# Patient Record
Sex: FEMALE | Race: WHITE | NOT HISPANIC OR LATINO | Employment: OTHER | URBAN - METROPOLITAN AREA
[De-identification: names, ages, dates, MRNs, and addresses within clinical notes are randomized per-mention and may not be internally consistent; named-entity substitution may affect disease eponyms.]

---

## 2020-05-13 ENCOUNTER — TELEPHONE (OUTPATIENT)
Dept: SCHEDULING | Facility: IMAGING CENTER | Age: 75
End: 2020-05-13

## 2020-05-13 ENCOUNTER — NURSE TRIAGE (OUTPATIENT)
Dept: HEALTH INFORMATION MANAGEMENT | Facility: OTHER | Age: 75
End: 2020-05-13

## 2020-05-20 ENCOUNTER — TELEMEDICINE (OUTPATIENT)
Dept: MEDICAL GROUP | Facility: PHYSICIAN GROUP | Age: 75
End: 2020-05-20
Payer: MEDICARE

## 2020-05-20 VITALS — WEIGHT: 293 LBS | BODY MASS INDEX: 57.52 KG/M2 | TEMPERATURE: 98 F | HEIGHT: 60 IN

## 2020-05-20 RX ORDER — FLUTICASONE PROPIONATE 220 UG/1
1 AEROSOL, METERED RESPIRATORY (INHALATION) 2 TIMES DAILY
COMMUNITY
End: 2021-11-30

## 2020-05-20 NOTE — PROGRESS NOTES
Telemedicine Visit: Established Patient     This encounter was conducted via Zoom.   Verbal consent was obtained. Patient's identity was verified.    CC:  Chief Complaint   Patient presents with   • Establish Care   • Other     Needs equipment for sleep apnea machine        HISTORY OF PRESENT ILLNESS: Patient is a 74 y.o. female established patient presenting ***      No problem-specific Assessment & Plan notes found for this encounter.      There are no active problems to display for this patient.     Allergies:Doxycycline; Lactose; Prednisone; Sulfa drugs; Cephalexin; Montelukast; and Codeine    Current Outpatient Medications   Medication Sig Dispense Refill   • Albuterol (VENTOLIN INH) Inhale 200 mcg by mouth 1 time daily as needed.     • fluticasone (FLOVENT HFA) 220 MCG/ACT Aerosol Inhale 1 Puff by mouth 2 times a day.       No current facility-administered medications for this visit.        Social History     Tobacco Use   • Smoking status: Never Smoker   • Smokeless tobacco: Never Used   Substance Use Topics   • Alcohol use: Not Currently     Comment: rare    • Drug use: Never     Social History     Social History Narrative   • Not on file       No family history on file.     ROS:     - Constitutional: *** Negative for fever, chills, unexpected weight change, and fatigue/generalized weakness.    - HEENT: *** Negative for headaches, vision changes, hearing changes, ear pain, ear discharge, rhinorrhea, sinus congestion, sore throat, and neck pain.      - Respiratory:*** Negative for cough, sputum production, chest congestion, dyspnea, wheezing, and crackles.      - Cardiovascular:*** Negative for chest pain, palpitations, orthopnea, and bilateral lower extremity edema.     - Gastrointestinal:*** Negative for heartburn, nausea, vomiting, abdominal pain, hematochezia, melena, diarrhea, constipation, and greasy/foul-smelling stools.     - Genitourinary:*** Negative for dysuria, polyuria, hematuria, pyuria, urinary  urgency, and urinary incontinence.     - Musculoskeletal:*** Negative for myalgias, back pain, and joint pain.     - Skin:*** Negative for rash, itching, cyanotic skin color change.     - Neurological:*** Negative for dizziness, tingling, tremors, focal sensory deficit, focal weakness and headaches.     - Endo/Heme/Allergies:*** Does not bruise/bleed easily.     - Psychiatric/Behavioral:*** Negative for depression, suicidal/homicidal ideation and memory loss.        {ROSALLOTHERSNE}      Exam:  ***  Temp 36.7 °C (98 °F) (Temporal)   Ht 1.524 m (5')   Wt (!) 135.2 kg (298 lb)  Body mass index is 58.2 kg/m².    General:  Well nourished, well developed female in NAD  Head is grossly normal.  Neck: Supple.   Pulmonary: No accessory muscle use  Skin: No apparent lesions  Neuro: Alert and oriented  Psych: normal mood and affect  ***  Please note that this dictation was created using voice recognition software. I have made every reasonable attempt to correct obvious errors, but I expect that there are errors of grammar and possibly content that I did not discover before finalizing the note.    LABS: ***: Results reviewed and discussed with the patient, questions answered.    Assessment/Plan:

## 2021-01-12 DIAGNOSIS — Z23 NEED FOR VACCINATION: ICD-10-CM

## 2021-11-30 ENCOUNTER — APPOINTMENT (OUTPATIENT)
Dept: RADIOLOGY | Facility: MEDICAL CENTER | Age: 76
End: 2021-11-30
Attending: EMERGENCY MEDICINE
Payer: MEDICARE

## 2021-11-30 ENCOUNTER — HOSPITAL ENCOUNTER (EMERGENCY)
Facility: MEDICAL CENTER | Age: 76
End: 2021-11-30
Attending: EMERGENCY MEDICINE
Payer: MEDICARE

## 2021-11-30 ENCOUNTER — OFFICE VISIT (OUTPATIENT)
Dept: URGENT CARE | Facility: CLINIC | Age: 76
End: 2021-11-30
Payer: MEDICARE

## 2021-11-30 VITALS
BODY MASS INDEX: 57.52 KG/M2 | TEMPERATURE: 98 F | HEIGHT: 60 IN | WEIGHT: 293 LBS | SYSTOLIC BLOOD PRESSURE: 214 MMHG | HEART RATE: 105 BPM | RESPIRATION RATE: 22 BRPM | OXYGEN SATURATION: 92 % | DIASTOLIC BLOOD PRESSURE: 93 MMHG

## 2021-11-30 VITALS
SYSTOLIC BLOOD PRESSURE: 132 MMHG | RESPIRATION RATE: 24 BRPM | HEART RATE: 92 BPM | DIASTOLIC BLOOD PRESSURE: 88 MMHG | BODY MASS INDEX: 57.52 KG/M2 | WEIGHT: 293 LBS | HEIGHT: 60 IN | TEMPERATURE: 98.2 F | OXYGEN SATURATION: 93 %

## 2021-11-30 DIAGNOSIS — R07.9 CHEST PAIN, UNSPECIFIED TYPE: ICD-10-CM

## 2021-11-30 DIAGNOSIS — J21.0 ACUTE BRONCHIOLITIS DUE TO RESPIRATORY SYNCYTIAL VIRUS (RSV): ICD-10-CM

## 2021-11-30 DIAGNOSIS — Z87.09 HISTORY OF ASTHMA: ICD-10-CM

## 2021-11-30 DIAGNOSIS — R05.9 COUGH: ICD-10-CM

## 2021-11-30 DIAGNOSIS — R06.02 SHORTNESS OF BREATH: ICD-10-CM

## 2021-11-30 DIAGNOSIS — R06.2 WHEEZING: ICD-10-CM

## 2021-11-30 LAB
ALBUMIN SERPL BCP-MCNC: 4 G/DL (ref 3.2–4.9)
ALBUMIN/GLOB SERPL: 1 G/DL
ALP SERPL-CCNC: 91 U/L (ref 30–99)
ALT SERPL-CCNC: 52 U/L (ref 2–50)
ANION GAP SERPL CALC-SCNC: 17 MMOL/L (ref 7–16)
AST SERPL-CCNC: 53 U/L (ref 12–45)
BASOPHILS # BLD AUTO: 0.6 % (ref 0–1.8)
BASOPHILS # BLD: 0.06 K/UL (ref 0–0.12)
BILIRUB SERPL-MCNC: 0.6 MG/DL (ref 0.1–1.5)
BUN SERPL-MCNC: 10 MG/DL (ref 8–22)
CALCIUM SERPL-MCNC: 8.9 MG/DL (ref 8.4–10.2)
CHLORIDE SERPL-SCNC: 101 MMOL/L (ref 96–112)
CO2 SERPL-SCNC: 19 MMOL/L (ref 20–33)
CREAT SERPL-MCNC: 0.64 MG/DL (ref 0.5–1.4)
EKG IMPRESSION: NORMAL
EOSINOPHIL # BLD AUTO: 0.19 K/UL (ref 0–0.51)
EOSINOPHIL NFR BLD: 1.8 % (ref 0–6.9)
ERYTHROCYTE [DISTWIDTH] IN BLOOD BY AUTOMATED COUNT: 50.4 FL (ref 35.9–50)
FLUAV RNA SPEC QL NAA+PROBE: NEGATIVE
FLUBV RNA SPEC QL NAA+PROBE: NEGATIVE
GLOBULIN SER CALC-MCNC: 4 G/DL (ref 1.9–3.5)
GLUCOSE SERPL-MCNC: 119 MG/DL (ref 65–99)
HCT VFR BLD AUTO: 44.2 % (ref 37–47)
HGB BLD-MCNC: 14.3 G/DL (ref 12–16)
IMM GRANULOCYTES # BLD AUTO: 0.04 K/UL (ref 0–0.11)
IMM GRANULOCYTES NFR BLD AUTO: 0.4 % (ref 0–0.9)
LACTATE BLD-SCNC: 1.5 MMOL/L (ref 0.5–2)
LYMPHOCYTES # BLD AUTO: 1.06 K/UL (ref 1–4.8)
LYMPHOCYTES NFR BLD: 10.1 % (ref 22–41)
MCH RBC QN AUTO: 30.7 PG (ref 27–33)
MCHC RBC AUTO-ENTMCNC: 32.4 G/DL (ref 33.6–35)
MCV RBC AUTO: 94.8 FL (ref 81.4–97.8)
MONOCYTES # BLD AUTO: 1.33 K/UL (ref 0–0.85)
MONOCYTES NFR BLD AUTO: 12.7 % (ref 0–13.4)
NEUTROPHILS # BLD AUTO: 7.78 K/UL (ref 2–7.15)
NEUTROPHILS NFR BLD: 74.4 % (ref 44–72)
NRBC # BLD AUTO: 0 K/UL
NRBC BLD-RTO: 0 /100 WBC
NT-PROBNP SERPL IA-MCNC: 147 PG/ML (ref 0–125)
PLATELET # BLD AUTO: 278 K/UL (ref 164–446)
PMV BLD AUTO: 9.3 FL (ref 9–12.9)
POTASSIUM SERPL-SCNC: 4 MMOL/L (ref 3.6–5.5)
PROT SERPL-MCNC: 8 G/DL (ref 6–8.2)
RBC # BLD AUTO: 4.66 M/UL (ref 4.2–5.4)
RSV RNA SPEC QL NAA+PROBE: POSITIVE
SARS-COV-2 RNA RESP QL NAA+PROBE: NOTDETECTED
SODIUM SERPL-SCNC: 137 MMOL/L (ref 135–145)
SPECIMEN SOURCE: ABNORMAL
TROPONIN T SERPL-MCNC: 12 NG/L (ref 6–19)
WBC # BLD AUTO: 10.5 K/UL (ref 4.8–10.8)

## 2021-11-30 PROCEDURE — 85025 COMPLETE CBC W/AUTO DIFF WBC: CPT

## 2021-11-30 PROCEDURE — 84484 ASSAY OF TROPONIN QUANT: CPT

## 2021-11-30 PROCEDURE — 99204 OFFICE O/P NEW MOD 45 MIN: CPT | Performed by: NURSE PRACTITIONER

## 2021-11-30 PROCEDURE — 83880 ASSAY OF NATRIURETIC PEPTIDE: CPT

## 2021-11-30 PROCEDURE — 83605 ASSAY OF LACTIC ACID: CPT

## 2021-11-30 PROCEDURE — 93005 ELECTROCARDIOGRAM TRACING: CPT | Performed by: EMERGENCY MEDICINE

## 2021-11-30 PROCEDURE — 99284 EMERGENCY DEPT VISIT MOD MDM: CPT

## 2021-11-30 PROCEDURE — 0241U HCHG SARS-COV-2 COVID-19 NFCT DS RESP RNA 4 TRGT MIC: CPT

## 2021-11-30 PROCEDURE — 80053 COMPREHEN METABOLIC PANEL: CPT

## 2021-11-30 PROCEDURE — C9803 HOPD COVID-19 SPEC COLLECT: HCPCS | Performed by: EMERGENCY MEDICINE

## 2021-11-30 PROCEDURE — 71045 X-RAY EXAM CHEST 1 VIEW: CPT

## 2021-11-30 PROCEDURE — 700111 HCHG RX REV CODE 636 W/ 250 OVERRIDE (IP): Performed by: EMERGENCY MEDICINE

## 2021-11-30 RX ORDER — DEXAMETHASONE 4 MG/1
2 TABLET ORAL 2 TIMES DAILY
Status: SHIPPED | COMMUNITY
Start: 2021-11-29 | End: 2022-03-21 | Stop reason: SDUPTHER

## 2021-11-30 RX ORDER — DEXAMETHASONE SODIUM PHOSPHATE 4 MG/ML
6 INJECTION, SOLUTION INTRA-ARTICULAR; INTRALESIONAL; INTRAMUSCULAR; INTRAVENOUS; SOFT TISSUE ONCE
Status: COMPLETED | OUTPATIENT
Start: 2021-11-30 | End: 2021-11-30

## 2021-11-30 RX ORDER — LEVOTHYROXINE SODIUM 0.05 MG/1
50 TABLET ORAL
COMMUNITY
Start: 2021-11-18 | End: 2022-01-21

## 2021-11-30 RX ORDER — IBUPROFEN 200 MG
200 TABLET ORAL 2 TIMES DAILY
COMMUNITY
End: 2022-01-27

## 2021-11-30 RX ORDER — ACETAMINOPHEN 325 MG/1
650 TABLET ORAL EVERY 4 HOURS PRN
COMMUNITY
End: 2021-11-30

## 2021-11-30 RX ORDER — ALBUTEROL SULFATE 90 UG/1
2 AEROSOL, METERED RESPIRATORY (INHALATION) EVERY 6 HOURS PRN
Status: SHIPPED | COMMUNITY
End: 2022-03-21 | Stop reason: SDUPTHER

## 2021-11-30 RX ORDER — ASPIRIN 81 MG/1
81 TABLET ORAL DAILY
COMMUNITY

## 2021-11-30 RX ADMIN — DEXAMETHASONE SODIUM PHOSPHATE 6 MG: 4 INJECTION, SOLUTION INTRA-ARTICULAR; INTRALESIONAL; INTRAMUSCULAR; INTRAVENOUS; SOFT TISSUE at 17:05

## 2021-11-30 NOTE — ED PROVIDER NOTES
ED Provider Note    CHIEF COMPLAINT  Chief Complaint   Patient presents with   • Shortness of Breath     the last 5 days  [place]  for [place]    • Cough     last 5 days        HPI  Cecile Teixeira is a 76 y.o. female who presents with shortness of breath.  The patient states she is had progressive increased work of breathing over the last 5 days.  She does have associated nonproductive cough.  She has not had any associated fevers.  She has chronic swelling to her legs from lymphedema.  She states she is on a sleep apnea machine at night but does not have any known history of CHF nor COPD.  She denies tobacco abuse.  She is vaccinated and she is unaware of any sick contacts.    REVIEW OF SYSTEMS  See HPI for further details. All other systems are negative.     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   • Asthma    • GERD (gastroesophageal reflux disease)    • Hypothyroidism    • Osteoarthritis    • Sleep apnea        FAMILY HISTORY  [unfilled]    SOCIAL HISTORY  Social History     Socioeconomic History   • Marital status: Single     Spouse name: Not on file   • Number of children: Not on file   • Years of education: Not on file   • Highest education level: Not on file   Occupational History   • Not on file   Tobacco Use   • Smoking status: Never Smoker   • Smokeless tobacco: Never Used   Vaping Use   • Vaping Use: Never used   Substance and Sexual Activity   • Alcohol use: Not Currently     Comment: rare    • Drug use: Never   • Sexual activity: Not on file   Other Topics Concern   • Not on file   Social History Narrative   • Not on file     Social Determinants of Health     Financial Resource Strain:    • Difficulty of Paying Living Expenses: Not on file   Food Insecurity:    • Worried About Running Out of Food in the Last Year: Not on file   • Ran Out of Food in the Last Year: Not on file   Transportation Needs:    • Lack of Transportation (Medical):  Not on file   • Lack of Transportation (Non-Medical): Not on file   Physical Activity:    • Days of Exercise per Week: Not on file   • Minutes of Exercise per Session: Not on file   Stress:    • Feeling of Stress : Not on file   Social Connections:    • Frequency of Communication with Friends and Family: Not on file   • Frequency of Social Gatherings with Friends and Family: Not on file   • Attends Yazdanism Services: Not on file   • Active Member of Clubs or Organizations: Not on file   • Attends Club or Organization Meetings: Not on file   • Marital Status: Not on file   Intimate Partner Violence:    • Fear of Current or Ex-Partner: Not on file   • Emotionally Abused: Not on file   • Physically Abused: Not on file   • Sexually Abused: Not on file   Housing Stability:    • Unable to Pay for Housing in the Last Year: Not on file   • Number of Places Lived in the Last Year: Not on file   • Unstable Housing in the Last Year: Not on file       SURGICAL HISTORY  Past Surgical History:   Procedure Laterality Date   • OTHER Left [id number]    LT LEG SURGERY        CURRENT MEDICATIONS  Home Medications    **Home medications have not yet been reviewed for this encounter**         ALLERGIES  Allergies   Allergen Reactions   • Doxycycline Hives   • Lactose Unspecified     Lactose intolerance since a child    • Prednisone Unspecified     Diarrhea and very irritable    • Sulfa Drugs Unspecified     Mental status change    • Cephalexin Diarrhea     Diarrhea and mild rash    • Montelukast Rash     Leg pain, back pain and rash    • Codeine Unspecified     Headaches and confusion        PHYSICAL EXAM  VITAL SIGNS: Pulse 94   Temp 36.9 °C (98.5 °F) (Temporal)   Resp (!) 24   Ht 1.524 m (5')   Wt (!) 152 kg (336 lb 1.5 oz)   SpO2 95%   BMI 65.64 kg/m²       Constitutional: Obese and ill in appearance  HENT: Normocephalic, Atraumatic, Bilateral external ears normal, Oropharynx moist, No oral exudates, Nose normal.   Eyes: PERRLA,  EOMI, Conjunctiva normal, No discharge.   Neck: Normal range of motion, No tenderness, Supple, No stridor.   Lymphatic: No lymphadenopathy noted.   Cardiovascular: Normal heart rate, Normal rhythm, No murmurs, No rubs, No gallops.   Thorax & Lungs: Symmetrically diminished throughout, diffuse rhonchi, diffuse wheezing.   Abdomen: Bowel sounds normal, Soft, No tenderness, No masses, No pulsatile masses.   Skin: Soft tissue mass with some mild bleeding to the left posterior thigh.   Back: No tenderness, No CVA tenderness.   Extremities: Intact distal pulses, No edema, No tenderness, No cyanosis, No clubbing. .   Neurologic: Alert & oriented x 3, Normal motor function, Normal sensory function, No focal deficits noted.   Psychiatric: Affect normal, Judgment normal, Mood normal.     Results for orders placed or performed during the hospital encounter of 11/30/21   CBC w/ Differential   Result Value Ref Range    WBC 10.5 4.8 - 10.8 K/uL    RBC 4.66 4.20 - 5.40 M/uL    Hemoglobin 14.3 12.0 - 16.0 g/dL    Hematocrit 44.2 37.0 - 47.0 %    MCV 94.8 81.4 - 97.8 fL    MCH 30.7 27.0 - 33.0 pg    MCHC 32.4 (L) 33.6 - 35.0 g/dL    RDW 50.4 (H) 35.9 - 50.0 fL    Platelet Count 278 164 - 446 K/uL    MPV 9.3 9.0 - 12.9 fL    Neutrophils-Polys 74.40 (H) 44.00 - 72.00 %    Lymphocytes 10.10 (L) 22.00 - 41.00 %    Monocytes 12.70 0.00 - 13.40 %    Eosinophils 1.80 0.00 - 6.90 %    Basophils 0.60 0.00 - 1.80 %    Immature Granulocytes 0.40 0.00 - 0.90 %    Nucleated RBC 0.00 /100 WBC    Neutrophils (Absolute) 7.78 (H) 2.00 - 7.15 K/uL    Lymphs (Absolute) 1.06 1.00 - 4.80 K/uL    Monos (Absolute) 1.33 (H) 0.00 - 0.85 K/uL    Eos (Absolute) 0.19 0.00 - 0.51 K/uL    Baso (Absolute) 0.06 0.00 - 0.12 K/uL    Immature Granulocytes (abs) 0.04 0.00 - 0.11 K/uL    NRBC (Absolute) 0.00 K/uL   Complete Metabolic Panel (CMP)   Result Value Ref Range    Sodium 137 135 - 145 mmol/L    Potassium 4.0 3.6 - 5.5 mmol/L    Chloride 101 96 - 112 mmol/L     Co2 19 (L) 20 - 33 mmol/L    Anion Gap 17.0 (H) 7.0 - 16.0    Glucose 119 (H) 65 - 99 mg/dL    Bun 10 8 - 22 mg/dL    Creatinine 0.64 0.50 - 1.40 mg/dL    Calcium 8.9 8.4 - 10.2 mg/dL    AST(SGOT) 53 (H) 12 - 45 U/L    ALT(SGPT) 52 (H) 2 - 50 U/L    Alkaline Phosphatase 91 30 - 99 U/L    Total Bilirubin 0.6 0.1 - 1.5 mg/dL    Albumin 4.0 3.2 - 4.9 g/dL    Total Protein 8.0 6.0 - 8.2 g/dL    Globulin 4.0 (H) 1.9 - 3.5 g/dL    A-G Ratio 1.0 g/dL   proBrain Natriuretic Peptide, NT   Result Value Ref Range    NT-proBNP 147 (H) 0 - 125 pg/mL   Troponin STAT   Result Value Ref Range    Troponin T 12 6 - 19 ng/L   COV-2, FLU A/B, AND RSV BY PCR (2-4 HOURS CEPHEID): Collect NP swab in VTM    Specimen: Nasopharyngeal; Respirate   Result Value Ref Range    Influenza virus A RNA Negative Negative    Influenza virus B, PCR Negative Negative    RSV, PCR POSITIVE (A) Negative    SARS-CoV-2 by PCR NotDetected     SARS-CoV-2 Source NP Swab    LACTIC ACID   Result Value Ref Range    Lactic Acid 1.5 0.5 - 2.0 mmol/L   ESTIMATED GFR   Result Value Ref Range    GFR If African American >60 >60 mL/min/1.73 m 2    GFR If Non African American >60 >60 mL/min/1.73 m 2   EKG   Result Value Ref Range    Report       [facility] Emergency Dept[facility]    Test Date:  2021  Pt Name:    LUCIANA MUELLER                   Department: [facility]  MRN:        [id number]                      Room:       -ROOM 7  Gender:     Female                       Technician: RYAN  :        1945                   Requested By:PURVI ESPAÑA  Order #:    [id number]                    Reading MD: PURVI ESPAÑA MD    Measurements  Intervals                                Axis  Rate:       88                           P:          62  UT:         178                          QRS:        68  QRSD:       96                           T:          269  QT:         347  QTc:        420    Interpretive Statements  Twelve-lead EKG shows a normal sinus  rhythm with a ventricular to 88, QRS has  poor R wave progression, no ST segment elevation nor depression, T waves  inverted laterally  Electronically Signed On 11- 16:57:11 PST by AGUSTIN HANKS MD           RADIOLOGY/PROCEDURES  DX-CHEST-PORTABLE (1 VIEW)   Final Result      Diffuse hazy opacity is most likely from body habitus factors favored over diffuse pulmonary ground glass from infection or edema          COURSE & MEDICAL DECISION MAKING  Pertinent Labs & Imaging studies reviewed. (See chart for details)  This a 76-year-old female who presents acutely ill.  Viral testing did come back positive for RSV but negative for Covid and influenza.  The patient is not hypoxic.  She is hypertensive but I suspect is from her acutely ill state.  She has not any respiratory distress.  Chest x-ray does not show any evidence of a focal process such as pneumonia.  The patient will receive Decadron for acute inflammation.  She states she has a humidifier at home.  She will be discharged home with instructions to utilize humidifier and return for increased work of breathing.    As for the soft tissue mass to the left posterior thigh this is not an acute finding the patient does need to have follow-up excision sent to pathology for diagnosis.    FINAL IMPRESSION  1.  Shortness of breath  2.  Secondary to acute bronchiolitis  3.  Soft tissue mass of left posterior thigh    Disposition  The patient will be discharged in stable condition      Electronically signed by: Agustin Hanks M.D., 11/30/2021 3:01 PM

## 2021-11-30 NOTE — ED TRIAGE NOTES
"Pt comes in via [facility]  C/o increased SOB  And cough the last 5 days  Was at  and sent here for further evaluation and treatment of this issue  Was given albuterol X1 breathing treatment by [facility]  \"which helped\"   "

## 2021-11-30 NOTE — PROGRESS NOTES
"Cecile Teixeira is a 76 y.o. female who presents for Cough (coughing fits, phlegm, sore throat x 5 days, dark waxs/welling in ears, Rt ear pop, tarted after getting a flu shot)      HPI This is a new problem.  C/o chest pain, coughing, sore throat and ear pain. Muscle in her stomach hurts and she 'pees myself' when she coughs. Chest pain all the time. Cannot 'get my breath\". hard time breathing for 5 days.  Getting worse each day. \" I feel awful\". Having anterior wall  chest pain. Always has a hard time but worse over the past days.  No fevers.  Has CPAP at home - not helping at all. Using rescue inhaler but it's not helping her symptoms.   She brought herself in her wheelchair today to urgent care. ' I thought I was in the emergency room\".     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 No pertinent family history.  Social History     Tobacco Use   • Smoking status: Never Smoker   • Smokeless tobacco: Never Used   Substance Use Topics   • Alcohol use: Not Currently     Comment: rare        Problems:   There is no problem list on file for this patient.      Medications:   Current Outpatient Medications on File Prior to Visit   Medication Sig Dispense Refill   • levothyroxine " (SYNTHROID) 50 MCG Tab      • aspirin (ASA) 81 MG Chew Tab chewable tablet Chew 81 mg every day.     • acetaminophen (TYLENOL) 325 MG Tab Take 650 mg by mouth every four hours as needed.     • Pseudoephedrine-Naproxen Na (ALEVE COLD & SINUS PO) Take  by mouth.     • NON SPECIFIED Indications: ZPAP MACHINE     • Albuterol (VENTOLIN INH) Inhale 200 mcg by mouth 1 time daily as needed.     • fluticasone (FLOVENT HFA) 220 MCG/ACT Aerosol Inhale 1 Puff by mouth 2 times a day.       No current facility-administered medications on file prior to visit.          Objective:     /88 (BP Location: Left arm, Patient Position: Sitting, BP Cuff Size: Adult long) Comment (BP Location): forearm  Pulse 92   Temp 36.8 °C (98.2 °F) (Temporal)   Resp (!) 24   Ht 1.524 m (5') Comment: per pt  Wt (!) 136 kg (300 lb) Comment: per pt  SpO2 93%   BMI 58.59 kg/m²     Physical Exam  Vitals and nursing note reviewed.   Constitutional:       General: She is not in acute distress.     Appearance: Normal appearance. She is well-developed. She is morbidly obese. She is ill-appearing. She is not toxic-appearing.   HENT:      Right Ear: Hearing normal. No middle ear effusion. Tympanic membrane is injected. Tympanic membrane is not erythematous.      Left Ear: Hearing normal.  No middle ear effusion. Tympanic membrane is injected. Tympanic membrane is not erythematous.      Nose: No mucosal edema.      Right Sinus: No maxillary sinus tenderness or frontal sinus tenderness.      Left Sinus: No maxillary sinus tenderness or frontal sinus tenderness.      Mouth/Throat:      Mouth: Mucous membranes are moist.      Pharynx: Uvula midline.      Tonsils: No tonsillar abscesses.   Neck:      Trachea: Trachea normal.   Cardiovascular:      Rate and Rhythm: Normal rate and regular rhythm.      Pulses: Normal pulses.   Pulmonary:      Effort: Tachypnea and accessory muscle usage present. No respiratory distress.      Breath sounds: Wheezing and  "rhonchi present. No decreased breath sounds.      Comments: + Coughing     Chest:   Breasts:      Right: No supraclavicular adenopathy.      Left: No supraclavicular adenopathy.       Abdominal:      Palpations: Abdomen is soft.      Tenderness: There is no abdominal tenderness.   Musculoskeletal:         General: Normal range of motion.      Cervical back: Full passive range of motion without pain, normal range of motion and neck supple.   Lymphadenopathy:      Cervical: No cervical adenopathy.      Upper Body:      Right upper body: No supraclavicular adenopathy.      Left upper body: No supraclavicular adenopathy.   Skin:     General: Skin is warm and dry.      Capillary Refill: Capillary refill takes less than 2 seconds.   Neurological:      Mental Status: She is alert and oriented to person, place, and time.      Comments: In wheelchair    Psychiatric:         Attention and Perception: Attention normal.         Mood and Affect: Mood normal.         Assessment /Associated Orders:      1. Chest pain, unspecified type     2. Shortness of breath     3. Cough     4. Wheezing     5. History of asthma             Medical Decision Making:    Patient is tachypneic complaining of anterior chest wall pain and shortness of breath.  She reports feeling terrible.  She is wheezing.  She is speaking in full short sentences.  She is wheelchair-bound.  She says her CPAP is not helping her at home.  She feels particularly short of breath at night when she tries to lay down.  She sleeps in a more upright position.  Patient is actively coughing and wheezing during the exam today.  I believe she is further evaluation management care in the emergency room.  She is going to be transported via [facility] ambulance to [facility] emergency room.  Transfer center called to arrange transport to a higher level of care.    Report given to paramedics by exam room.    Pt required maximum assistance to moustapha. C/o \" I can't breath\" - " yelling. Will not keep mask on. Placed on non-rebreather mask. VSS.   Transported to [facility] ER     Her wheelchair / jacket  Were picked up by her dtr from urgent care.

## 2021-12-01 NOTE — DISCHARGE INSTRUCTIONS
Purchase a pulse oximeter to check your oxygen.  Return for repeat exam if your oxygen saturation is consistently less than 88%.  Follow-up with your primary care doctor in 2 weeks for repeat blood pressure check as your blood pressure is elevated here in the emergency department.  Return to the emerge department if you are acutely worse.  You need to follow-up with the plastic surgeon as discussed for excision of the soft tissue mass to the left posterior thigh.

## 2021-12-01 NOTE — ED NOTES
Med rec updated and complete  Allergies reviewed  Pt reports no antibiotics in the last 30 days.    No current facility-administered medications on file prior to encounter.     Current Outpatient Medications on File Prior to Encounter   Medication Sig Dispense Refill   • albuterol 108 (90 Base) MCG/ACT Aero Soln inhalation aerosol Inhale 2 Puffs every 6 hours as needed for Shortness of Breath.     • Phenylephrine HCl (SUDAFED PE SINUS CONGESTION PO) Take 1 Tablet by mouth 2 times a day as needed (For sinus congestion).     • ibuprofen (MOTRIN) 200 MG Tab Take 200 mg by mouth every 6 hours as needed for Mild Pain.     • levothyroxine (SYNTHROID) 50 MCG Tab Take 50 mcg by mouth every morning on an empty stomach.     • aspirin 81 MG EC tablet Take 81 mg by mouth every day.     • FLOVENT  MCG/ACT Aerosol Inhale 1 Puff 2 times a day.

## 2021-12-01 NOTE — ED NOTES
MD at  for re-evaluation of complaints and new item of growth to back of L leg  growth the size of a thumb  tender and scant bleeding noted   Pt to be cleared for home  Calling family to come and pick her up

## 2021-12-01 NOTE — ED NOTES
"Pt very unhappy with service/care. Pt states that we \"lost her coat, bag, and hat.\" Pt yelling at staff. Pt upset that no prescription was giving. States she was told to \"pick it up on the way home.\" No prescription given by ERP. Discharge instructions given to pt/granddaughter. Pt refused to sign. Granddaughter signed. Pt escorted to parking lot via w/c. Pt assisted by family to get into car. Pt dcd home with family with 0 s/s distress noted.   "

## 2021-12-21 ENCOUNTER — TELEPHONE (OUTPATIENT)
Dept: HEALTH INFORMATION MANAGEMENT | Facility: OTHER | Age: 76
End: 2021-12-21

## 2021-12-21 PROBLEM — E03.9 HYPOTHYROIDISM: Status: ACTIVE | Noted: 2021-12-21

## 2021-12-21 PROBLEM — E66.01 MORBID OBESITY (HCC): Status: ACTIVE | Noted: 2021-12-21

## 2021-12-21 PROBLEM — R94.30 NONSPECIFIC ABNORMAL FUNCTION STUDY, CARDIOVASCULAR: Status: ACTIVE | Noted: 2021-12-21

## 2021-12-21 PROBLEM — F32.1 MODERATE MAJOR DEPRESSION (HCC): Status: ACTIVE | Noted: 2021-12-21

## 2021-12-21 PROBLEM — G63 POLYNEUROPATHY IN OTHER DISEASES CLASSIFIED ELSEWHERE (HCC): Status: ACTIVE | Noted: 2021-12-21

## 2021-12-21 NOTE — TELEPHONE ENCOUNTER
Outcome: Pt called wanting a . Explained it depends as to which locations she established with. Pt requested PCP with [facility]. Adv pt would need to call [facility] to schedule an appt. Adv  would not be available if pt established care with [facility]. Pt stated will call back.  Please transfer to Patient Outreach Team at [phone] when patient returns call.      [facility] Verified: yes    Attempt # 1

## 2021-12-22 ENCOUNTER — TELEPHONE (OUTPATIENT)
Dept: HEALTH INFORMATION MANAGEMENT | Facility: OTHER | Age: 76
End: 2021-12-22

## 2022-01-01 ENCOUNTER — APPOINTMENT (OUTPATIENT)
Dept: RADIOLOGY | Facility: MEDICAL CENTER | Age: 77
End: 2022-01-01
Attending: EMERGENCY MEDICINE
Payer: MEDICARE

## 2022-01-01 ENCOUNTER — HOSPITAL ENCOUNTER (EMERGENCY)
Facility: MEDICAL CENTER | Age: 77
End: 2022-01-01
Attending: EMERGENCY MEDICINE
Payer: MEDICARE

## 2022-01-01 VITALS
DIASTOLIC BLOOD PRESSURE: 104 MMHG | HEART RATE: 71 BPM | TEMPERATURE: 98.7 F | BODY MASS INDEX: 60.54 KG/M2 | RESPIRATION RATE: 53 BRPM | WEIGHT: 293 LBS | OXYGEN SATURATION: 96 % | SYSTOLIC BLOOD PRESSURE: 232 MMHG

## 2022-01-01 DIAGNOSIS — E66.01 MORBID OBESITY (HCC): ICD-10-CM

## 2022-01-01 DIAGNOSIS — E03.9 HYPOTHYROIDISM, UNSPECIFIED TYPE: ICD-10-CM

## 2022-01-01 DIAGNOSIS — R06.02 SHORTNESS OF BREATH: ICD-10-CM

## 2022-01-01 DIAGNOSIS — G47.33 OSA (OBSTRUCTIVE SLEEP APNEA): ICD-10-CM

## 2022-01-01 PROBLEM — J45.909 UNCOMPLICATED ASTHMA: Status: ACTIVE | Noted: 2022-01-01

## 2022-01-01 LAB
ALBUMIN SERPL BCP-MCNC: 3.9 G/DL (ref 3.2–4.9)
ALBUMIN/GLOB SERPL: 1 G/DL
ALP SERPL-CCNC: 96 U/L (ref 30–99)
ALT SERPL-CCNC: 48 U/L (ref 2–50)
ANION GAP SERPL CALC-SCNC: 12 MMOL/L (ref 7–16)
AST SERPL-CCNC: 45 U/L (ref 12–45)
BASOPHILS # BLD AUTO: 1 % (ref 0–1.8)
BASOPHILS # BLD: 0.08 K/UL (ref 0–0.12)
BILIRUB SERPL-MCNC: 0.4 MG/DL (ref 0.1–1.5)
BUN SERPL-MCNC: 20 MG/DL (ref 8–22)
CALCIUM SERPL-MCNC: 8.9 MG/DL (ref 8.4–10.2)
CHLORIDE SERPL-SCNC: 100 MMOL/L (ref 96–112)
CO2 SERPL-SCNC: 24 MMOL/L (ref 20–33)
CREAT SERPL-MCNC: 0.7 MG/DL (ref 0.5–1.4)
EOSINOPHIL # BLD AUTO: 0.26 K/UL (ref 0–0.51)
EOSINOPHIL NFR BLD: 3.3 % (ref 0–6.9)
ERYTHROCYTE [DISTWIDTH] IN BLOOD BY AUTOMATED COUNT: 52.4 FL (ref 35.9–50)
GLOBULIN SER CALC-MCNC: 4.1 G/DL (ref 1.9–3.5)
GLUCOSE SERPL-MCNC: 137 MG/DL (ref 65–99)
HCT VFR BLD AUTO: 43.5 % (ref 37–47)
HGB BLD-MCNC: 13.8 G/DL (ref 12–16)
IMM GRANULOCYTES # BLD AUTO: 0.04 K/UL (ref 0–0.11)
IMM GRANULOCYTES NFR BLD AUTO: 0.5 % (ref 0–0.9)
LYMPHOCYTES # BLD AUTO: 1.31 K/UL (ref 1–4.8)
LYMPHOCYTES NFR BLD: 16.6 % (ref 22–41)
MCH RBC QN AUTO: 30.6 PG (ref 27–33)
MCHC RBC AUTO-ENTMCNC: 31.7 G/DL (ref 33.6–35)
MCV RBC AUTO: 96.5 FL (ref 81.4–97.8)
MONOCYTES # BLD AUTO: 0.75 K/UL (ref 0–0.85)
MONOCYTES NFR BLD AUTO: 9.5 % (ref 0–13.4)
NEUTROPHILS # BLD AUTO: 5.44 K/UL (ref 2–7.15)
NEUTROPHILS NFR BLD: 69.1 % (ref 44–72)
NRBC # BLD AUTO: 0 K/UL
NRBC BLD-RTO: 0 /100 WBC
NT-PROBNP SERPL IA-MCNC: 84 PG/ML (ref 0–125)
PLATELET # BLD AUTO: 353 K/UL (ref 164–446)
PMV BLD AUTO: 9.1 FL (ref 9–12.9)
POTASSIUM SERPL-SCNC: 3.8 MMOL/L (ref 3.6–5.5)
PROT SERPL-MCNC: 8 G/DL (ref 6–8.2)
RBC # BLD AUTO: 4.51 M/UL (ref 4.2–5.4)
SODIUM SERPL-SCNC: 136 MMOL/L (ref 135–145)
TROPONIN T SERPL-MCNC: 17 NG/L (ref 6–19)
WBC # BLD AUTO: 7.9 K/UL (ref 4.8–10.8)

## 2022-01-01 PROCEDURE — 71045 X-RAY EXAM CHEST 1 VIEW: CPT

## 2022-01-01 PROCEDURE — 99283 EMERGENCY DEPT VISIT LOW MDM: CPT | Mod: 25

## 2022-01-01 PROCEDURE — 99283 EMERGENCY DEPT VISIT LOW MDM: CPT | Performed by: INTERNAL MEDICINE

## 2022-01-01 PROCEDURE — 80053 COMPREHEN METABOLIC PANEL: CPT

## 2022-01-01 PROCEDURE — 83880 ASSAY OF NATRIURETIC PEPTIDE: CPT

## 2022-01-01 PROCEDURE — 85025 COMPLETE CBC W/AUTO DIFF WBC: CPT

## 2022-01-01 PROCEDURE — 84484 ASSAY OF TROPONIN QUANT: CPT

## 2022-01-01 ASSESSMENT — ENCOUNTER SYMPTOMS
COUGH: 1
SINUS PAIN: 0
CHILLS: 0
HEMOPTYSIS: 0
NAUSEA: 0
DIAPHORESIS: 0
DIZZINESS: 0
PALPITATIONS: 0
WEIGHT LOSS: 0
MYALGIAS: 0
SPUTUM PRODUCTION: 0
WHEEZING: 0
FEVER: 0
HEADACHES: 0
HEARTBURN: 0
SHORTNESS OF BREATH: 1

## 2022-01-01 ASSESSMENT — FIBROSIS 4 INDEX: FIB4 SCORE: 2.01

## 2022-01-01 NOTE — ED NOTES
"Pt asked to speak to \"the Little blonde\" but wound tell me why or more specific as to who she wanted to speak with.  "

## 2022-01-01 NOTE — ED NOTES
Called  for help with transport, [facility] is not able to help with tranport for pt leaving AMA, called Security for lifting help, they also are not allowed per Officer to provide help related to AMA status.

## 2022-01-01 NOTE — ED NOTES
Spoke with son in law and he states the pt home the ramps leading to her door are covered in ice and snow making it very unsafe to get the patient home today without further assistance.

## 2022-01-01 NOTE — DISCHARGE INSTRUCTIONS
We cannot provide a CPAP machine for you.  There was recommendation for you to be admitted to the hospital you chose to go home.  This is an appropriate choice for you if you feel comfortable with the potential risk.    I try to say sitting more upright like in a recliner, return if your symptoms change or worsen or if you change your mind.    Please follow-up with pulmonologist as directed by your discussion with her.

## 2022-01-01 NOTE — ED PROVIDER NOTES
ED Provider Note    Addendum    This patient presented to the emergency room with complaints of CPAP machine not working having difficulty sleeping concerns of eyes significant sleep apnea.  Plan was for her to be admitted.  She was evaluated by Dr. Llamas.  The patient did have a consultation by Dr. Lopez pulmonologist and at this time being a holiday, and no DME company available CPAP cannot be supplied to her at home.    The patient has decided that she does not want to be admitted to the hospital.  She will be leaving AGAINST MEDICAL ADVICE of the advice of Dr. Roa, Dr. Lozano, and Dr. Llamas.    I spoke with the patient and the patient will be discharged with instructions and to return if she changes her mind anything worsens.

## 2022-01-01 NOTE — ED NOTES
Spoke with CM about getting the patient a new Cpap machine.  CM states she may need to speak with her PCP about getting a new one that it will not happen today.

## 2022-01-01 NOTE — ED PROVIDER NOTES
ED Provider Note    CHIEF COMPLAINT  Chief Complaint   Patient presents with   • Shortness of Breath     Pt states her cpap machine is 15 y/o and fire department states it is not working.  Pt felt SOB this am but RA is 95%.       HPI  Cecile Teixeira is a 76 y.o. female who presents with a report that her 15-year-old CPAP machine stopped working and she had a lot of trouble overnight and could not sleep and was very short of breath.  She has not had a recent sleep study and her last 1 was 15 years ago.  Denies any fever, chills, sweats, new leg swelling and denies any history of coronavirus infection.  She is fully vaccinated but did not receive her booster.    REVIEW OF SYSTEMS  See HPI for further details. All other systems are negative.     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 No pertinent family history.    SOCIAL HISTORY   reports that she has never smoked. She has never used smokeless tobacco. She reports previous alcohol use. She reports that she does not use drugs.    SURGICAL HISTORY  Past Surgical History:   Procedure Laterality Date   • OTHER Left [id number]    LT LEG SURGERY        CURRENT MEDICATIONS  Home Medications     Reviewed by Aaron Das (Pharmacy Tech) on 01/01/22 at 1037  Med List Status: Complete   Medication Last Dose Status   albuterol 108 (90 Base) MCG/ACT Aero Soln inhalation aerosol 12/31/2021 Active   aspirin 81 MG EC tablet 12/31/2021 Active   Chlorpheniramine-Phenylephrine (SUDAFED PE SINUS/ALLERGY PO) 12/31/2021 Active   FLOVENT  MCG/ACT Aerosol 12/31/2021 Active   ibuprofen (MOTRIN) 200 MG Tab 12/31/2021 Active   levothyroxine (SYNTHROID) 50 MCG Tab 12/25/2021 Active                ALLERGIES  Allergies   Allergen Reactions   • Doxycycline Hives   • Lactose  Unspecified     Lactose intolerance since a child    • Levothyroxine Anxiety     Panic attack    • Prednisone Unspecified     Diarrhea and very irritable    • Sulfa Drugs Unspecified     Mental status change    • Cephalexin Diarrhea     Diarrhea and mild rash    • Montelukast Rash     Leg pain, back pain and rash    • Spinach Unspecified     Skin allergy test   • Other Environmental      Trees except oak   • Codeine Unspecified     Headaches and confusion        PHYSICAL EXAM  VITAL SIGNS: BP (!) 232/104   Pulse 71   Temp 37.1 °C (98.7 °F) (Temporal)   Resp (!) 53   Wt (!) 141 kg (310 lb)   SpO2 96%   BMI 60.54 kg/m²    Constitutional: Morbidly obese, No acute distress, Non-toxic appearance.   HENT: Normocephalic, Atraumatic, Bilateral external ears normal, Oropharynx is clear mucous membranes are moist. No oral exudates or nasal discharge.   Eyes: Pupils are equal round and reactive, EOMI, Conjunctiva normal, No discharge.   Neck: Normal range of motion, No tenderness, Supple, No stridor. No meningismus.  Lymphatic: No lymphadenopathy noted.   Cardiovascular: Regular rate and rhythm without murmur rub or gallop.  Thorax & Lungs: Clear breath sounds bilaterally without wheezes, rhonchi or rales. There is no chest wall tenderness.   Abdomen: Soft non-tender non-distended. There is no rebound or guarding. No organomegaly is appreciated. Bowel sounds are normal.  Skin: Normal without rash.  Hirsutism of face  back: No CVA or spinal tenderness.   Extremities: Intact distal pulses, 3+ nonpitting edema bilateral lower extremities, No tenderness, No cyanosis, No clubbing. Capillary refill is less than 2 seconds.  Musculoskeletal: Good range of motion in all major joints. No tenderness to palpation or major deformities noted.   Neurologic: Alert & oriented x 3, Normal motor function, Normal sensory function, No focal deficits noted. Reflexes are normal.  Psychiatric: Affect normal, Judgment normal, Mood normal. There  is no suicidal ideation or patient reported hallucinations.       RADIOLOGY/PROCEDURES  DX-CHEST-PORTABLE (1 VIEW)   Final Result      No acute cardiac or pulmonary abnormality is noted.            COURSE & MEDICAL DECISION MAKING  Pertinent Labs & Imaging studies reviewed. (See chart for details)  I initiated a work-up to ensure that she does not have a more serious cause for shortness of breath and this was unremarkable showing a chest x-ray with no acute airspace disease such as pulmonary edema or pneumonia    Laboratory evaluation reveals no leukocytosis, shift, anemia, electrolyte derangements or acidosis and troponins unremarkable at 17 with a BMP of 84.    I spoke with Maranda with case management who evaluated the patient along with me and we feel the patient would do well at home with a new CPAP machine but despite our best efforts we were unable to arrange this through [facility] or [facility] who are requesting a new sleep study prior to dispensing machine.    Given that we are unable to get her home with a safe disposition we will bring her into the hospital for ongoing care and CPAP at night.  Patient is frustrated as we are that this cannot be accomplished today however she understands the need for safe disposition and is agreeable to hospitalization given this scenario    Patient does have a number of questions about her medications and primary care.  She does not have a primary care provider in the region and she has been here for 2-1/2 years.    FINAL IMPRESSION  1. Shortness of breath    2. YOLA (obstructive sleep apnea)    3. Morbid obesity (HCC)    4. Hypothyroidism, unspecified type             Electronically signed by: Rian Lee M.D., 1/1/2022 12:01 PM

## 2022-01-01 NOTE — ED NOTES
Pt was screaming in the room, pt requested the curtain be opened.  Pt was found to have pulled her IV out, she dressed herself and is verbally combative.  ERP came to the room and explained the plan.  We are unable to get the pt a cpap machine today.  Pt continues to yell at staff.

## 2022-01-01 NOTE — DISCHARGE PLANNING
Call from [facility] with transportation concern for patient. Clarified with [facility] that,  Transportation is not arranged for patients leaving AMA. VU

## 2022-01-01 NOTE — ED NOTES
Pt was d/c AMA, pt was explained in detail the risks of leaving  AMA up to and including death.  Pt agrees to accept all risks and signed the AMA form.

## 2022-01-01 NOTE — DISCHARGE PLANNING
Anticipated Discharge Disposition: D/C home with CI-PAP    Action: Bedside discussion with pt who states she received her first unit from the [facility]. She does not know which DME provided unit. Choice obtained for- any company  Choice form faxed to [facility]    Barriers to Discharge: CI-PAP delivery to ER    Plan: Follow closely

## 2022-01-01 NOTE — FACE TO FACE
Face to Face Note  -  Durable Medical Equipment    Rian Lee M.D. - NPI: [id number]  I certify that this patient is under my care and that they have had a durable medical equipment(DME)face to face encounter by myself that meets the physician DME face-to-face encounter requirements with this patient on:    Date of encounter:   Patient:                    MRN:                       YOB: 2022  Cecile Teixeira  [id number]  1945     The encounter with the patient was in whole, or in part, for the following medical condition, which is the primary reason for durable medical equipment:  Other - Obstructive sleep apnea    I certify that, based on my findings, the following durable medical equipment is medically necessary:  CI-PAP.    HOME O2 Saturation Measurements:(Values must be present for Home Oxygen orders)     Of note the patient's machine she has been using for 15 years is now not working causing her her symptoms overnight.      My Clinical findings support the need for the above equipment due to:  Hypoxia    Supporting Symptoms: Sudden awakening with severe shortness of breath    ------------------------------------------------------------------------------------------------------------------    Face to Face Supporting Documentation - Home Health    The encounter with this patient was in whole or in part the primary reason for home health admission.    Date of encounter:   Patient:                    MRN:                       YOB: 2022  Cecile Teixeira  [id number]  1945     Home health to see patient for:  Comment: CPAP machine    Skilled need for:  Exacerbation of Chronic Disease State Of obstructive sleep apnea given a broken CPAP machine    Skilled nursing interventions to include:  Comment: Home assessment for fall risk as well as adequacy of her home treatment plan    Homebound evidenced status by:  Need the aid of supportive devices such as crutches, canes,  wheelchairs or walkers. Leaving home must require a considerable and taxing effort. There must exist a normal inability to leave the home.    Community Physician to provide follow up care: Pcp Pt States None     Optional Interventions    Wound information & treatment:    Home Infusion Therapy orders:    Line/Drain/Airway:    I certify the face to face encounter for this home care referral meets the CMS requirements and the encounter/clinical assessment with the patient was, in whole, or in part, for the medical condition(s) listed above, which is the primary reason for home health care. Based on my clinical findings: the service(s) are medically necessary, support the need for home health care, and the homebound criteria are met.  I certify that this patient has had a face to face encounter by myself.  Rian Lee M.D. - NPI: [id number]    *Debility, frailty and advanced age in the absence of an acute deterioration or exacerbation of a condition do not qualify a patient for home health.

## 2022-01-02 NOTE — ASSESSMENT & PLAN NOTE
Patient likely needs PFTs and a follow up appointment in pulmonary.  Will submit for an appointment.

## 2022-01-02 NOTE — CONSULTS
Pulmonary Consultation    Date of consult: 1/1/2022    Referring Physician  No att. providers found    Reason for Consultation  CPAP/ sleep study    History of Presenting Illness  76 y.o. female who presented 1/1/2022 with broken CPAP machine and inability to sleep.  She has been on CPAP at night for several decades and last completed a sleep study at the [facility] about 10 years ago.  She still has the CPAP machine she was given there and has not followed in a sleep clinic for many years.  She also has a history of asthma on flovent and as needed albuterol and was recently in the ER and diagnosed with RSV which as made her breathing worse and caused her to need her rescue inhaler nightly and sometimes during the day.  She usually uses it about every 3 days or so. Her CPAP machine worked for 2 hours last night and then stopped.     Code Status  No Order    Review of Systems  Review of Systems   Constitutional: Negative for chills, diaphoresis, fever and weight loss.   HENT: Negative for congestion and sinus pain.    Respiratory: Positive for cough and shortness of breath. Negative for hemoptysis, sputum production and wheezing.    Cardiovascular: Negative for chest pain, palpitations and leg swelling.   Gastrointestinal: Negative for heartburn and nausea.   Genitourinary: Negative for dysuria.   Musculoskeletal: Negative for myalgias.   Neurological: Negative for dizziness and headaches.       Past Medical History   has a past medical history of Asthma, Blood clots in brain (2000), GERD (gastroesophageal reflux disease), Hypothyroidism, Osteoarthritis, and Sleep apnea.    Surgical History   has a past surgical history that includes other (Left, [id number]).    Family History  family history is not on file.    Social History   reports that she has never smoked. She has never used smokeless tobacco. She reports previous alcohol use. She reports that she does not use drugs.    Medications  Home Medications      Reviewed by Aaron Das (Pharmacy Tech) on 01/01/22 at 1037  Med List Status: Complete   Medication Last Dose Status   albuterol 108 (90 Base) MCG/ACT Aero Soln inhalation aerosol 12/31/2021 Active   aspirin 81 MG EC tablet 12/31/2021 Active   Chlorpheniramine-Phenylephrine (SUDAFED PE SINUS/ALLERGY PO) 12/31/2021 Active   FLOVENT  MCG/ACT Aerosol 12/31/2021 Active   ibuprofen (MOTRIN) 200 MG Tab 12/31/2021 Active   levothyroxine (SYNTHROID) 50 MCG Tab 12/25/2021 Active              No current facility-administered medications for this encounter.     Current Outpatient Medications   Medication Sig Dispense Refill   • Chlorpheniramine-Phenylephrine (SUDAFED PE SINUS/ALLERGY PO) Take 1 Tablet by mouth every evening.     • levothyroxine (SYNTHROID) 50 MCG Tab Take 50 mcg by mouth every morning on an empty stomach.     • aspirin 81 MG EC tablet Take 81 mg by mouth every day.     • FLOVENT  MCG/ACT Aerosol Inhale 2 Puffs 2 times a day.     • albuterol 108 (90 Base) MCG/ACT Aero Soln inhalation aerosol Inhale 2 Puffs every 6 hours as needed for Shortness of Breath.     • ibuprofen (MOTRIN) 200 MG Tab Take 200 mg by mouth 2 times a day.         Allergies  Allergies   Allergen Reactions   • Doxycycline Hives   • Lactose Unspecified     Lactose intolerance since a child    • Levothyroxine Anxiety     Panic attack    • Prednisone Unspecified     Diarrhea and very irritable    • Sulfa Drugs Unspecified     Mental status change    • Cephalexin Diarrhea     Diarrhea and mild rash    • Montelukast Rash     Leg pain, back pain and rash    • Spinach Unspecified     Skin allergy test   • Other Environmental      Trees except oak   • Codeine Unspecified     Headaches and confusion        Vital Signs last 24 hours  Temp:  [37.1 °C (98.7 °F)] 37.1 °C (98.7 °F)  Pulse:  [68-71] 71  Resp:  [18-53] 53  BP: (232)/(104) 232/104  SpO2:  [95 %-96 %] 96 %    Physical Exam  Physical Exam  Constitutional:        Appearance: Normal appearance. She is obese.   HENT:      Head: Normocephalic and atraumatic.   Cardiovascular:      Rate and Rhythm: Normal rate and regular rhythm.      Heart sounds: Normal heart sounds.   Pulmonary:      Effort: Pulmonary effort is normal.      Breath sounds: Normal breath sounds.   Abdominal:      Palpations: Abdomen is soft.   Musculoskeletal:         General: No deformity.   Skin:     General: Skin is warm and dry.   Neurological:      General: No focal deficit present.      Mental Status: She is alert and oriented to person, place, and time.         Fluids  No intake or output data in the 24 hours ending 01/01/22 1609    Laboratory  Recent Results (from the past 48 hour(s))   CBC w/ Differential    Collection Time: 01/01/22  9:25 AM   Result Value Ref Range    WBC 7.9 4.8 - 10.8 K/uL    RBC 4.51 4.20 - 5.40 M/uL    Hemoglobin 13.8 12.0 - 16.0 g/dL    Hematocrit 43.5 37.0 - 47.0 %    MCV 96.5 81.4 - 97.8 fL    MCH 30.6 27.0 - 33.0 pg    MCHC 31.7 (L) 33.6 - 35.0 g/dL    RDW 52.4 (H) 35.9 - 50.0 fL    Platelet Count 353 164 - 446 K/uL    MPV 9.1 9.0 - 12.9 fL    Neutrophils-Polys 69.10 44.00 - 72.00 %    Lymphocytes 16.60 (L) 22.00 - 41.00 %    Monocytes 9.50 0.00 - 13.40 %    Eosinophils 3.30 0.00 - 6.90 %    Basophils 1.00 0.00 - 1.80 %    Immature Granulocytes 0.50 0.00 - 0.90 %    Nucleated RBC 0.00 /100 WBC    Neutrophils (Absolute) 5.44 2.00 - 7.15 K/uL    Lymphs (Absolute) 1.31 1.00 - 4.80 K/uL    Monos (Absolute) 0.75 0.00 - 0.85 K/uL    Eos (Absolute) 0.26 0.00 - 0.51 K/uL    Baso (Absolute) 0.08 0.00 - 0.12 K/uL    Immature Granulocytes (abs) 0.04 0.00 - 0.11 K/uL    NRBC (Absolute) 0.00 K/uL   Complete Metabolic Panel (CMP)    Collection Time: 01/01/22  9:25 AM   Result Value Ref Range    Sodium 136 135 - 145 mmol/L    Potassium 3.8 3.6 - 5.5 mmol/L    Chloride 100 96 - 112 mmol/L    Co2 24 20 - 33 mmol/L    Anion Gap 12.0 7.0 - 16.0    Glucose 137 (H) 65 - 99 mg/dL    Bun 20 8 - 22  mg/dL    Creatinine 0.70 0.50 - 1.40 mg/dL    Calcium 8.9 8.4 - 10.2 mg/dL    AST(SGOT) 45 12 - 45 U/L    ALT(SGPT) 48 2 - 50 U/L    Alkaline Phosphatase 96 30 - 99 U/L    Total Bilirubin 0.4 0.1 - 1.5 mg/dL    Albumin 3.9 3.2 - 4.9 g/dL    Total Protein 8.0 6.0 - 8.2 g/dL    Globulin 4.1 (H) 1.9 - 3.5 g/dL    A-G Ratio 1.0 g/dL   Troponin STAT    Collection Time: 01/01/22  9:25 AM   Result Value Ref Range    Troponin T 17 6 - 19 ng/L   proBrain Natriuretic Peptide, NT    Collection Time: 01/01/22  9:25 AM   Result Value Ref Range    NT-proBNP 84 0 - 125 pg/mL   ESTIMATED GFR    Collection Time: 01/01/22  9:25 AM   Result Value Ref Range    GFR If African American >60 >60 mL/min/1.73 m 2    GFR If Non African American >60 >60 mL/min/1.73 m 2       Imaging  DX-CHEST-PORTABLE (1 VIEW)   Final Result      No acute cardiac or pulmonary abnormality is noted.          Assessment/Plan  Uncomplicated asthma  Assessment & Plan  Patient likely needs PFTs and a follow up appointment in pulmonary.  Will submit for an appointment.     YOLA (obstructive sleep apnea)  Assessment & Plan  Patient needs a new CPAP machine but first will either need to provide the chip from her current CPAP machine or obtain a new sleep study.  She is unwilling to remain inpatient to receive CPAP here until a study can be arranged for her.  I have messaged our sleep clinic to schedule her asap.  She is able to explain to me the risks of leaving today from the ER without CPAP for tonight including the risk of death.  I did recommend that the healthiest thing to do and the most expedient way to obtain these studies is to remain inpatient with us.        Discussed patient condition and risk of morbidity and/or mortality with Hospitalist, RN and Patient.    The patient remains critically ill.  Critical care time = 30 minutes in directly providing and coordinating critical care and extensive data review.  No time overlap and excludes procedures.    Carol  MD Jessica RD  Pulmonary and Critical Care    Available on Voalte

## 2022-01-02 NOTE — ASSESSMENT & PLAN NOTE
Patient needs a new CPAP machine but first will either need to provide the chip from her current CPAP machine or obtain a new sleep study.  She is unwilling to remain inpatient to receive CPAP here until a study can be arranged for her.  I have messaged our sleep clinic to schedule her asap.  She is able to explain to me the risks of leaving today from the ER without CPAP for tonight including the risk of death.  I did recommend that the healthiest thing to do and the most expedient way to obtain these studies is to remain inpatient with us.

## 2022-01-03 ENCOUNTER — TELEPHONE (OUTPATIENT)
Dept: SLEEP MEDICINE | Facility: MEDICAL CENTER | Age: 77
End: 2022-01-03

## 2022-01-03 NOTE — TELEPHONE ENCOUNTER
Deb from [facility] called, trying to get a hold of MA for Carol Lopez, Dr. Lopez saw RI in ER, and was wondering about a PFT and FV for Pt.     Deb -  ext [phone]    Thank you,   Jeana BURRELL

## 2022-01-03 NOTE — DISCHARGE PLANNING
Anticipated Discharge Disposition: Home    Action:  ER CM followed with Sleep study clinic, no referral found. Contacted Dr Lopez office ( unable to leave vm for MA x2 full mailbox) but left info in office and await CB to see if can expedite appts for Pulm and Sleep study for her    Barriers to Discharge: Home    Plan: Cont to await call back. Requested expedited Pulm MD appt and sleep study. Generic message left for pt

## 2022-01-04 NOTE — DISCHARGE PLANNING
Anticipated Discharge Disposition: Home    Action:  VM left for Kayy RESENDIZ at [facility] to follow this pt for post acute expedited needs    Barriers to Discharge: None    Plan: ER CM referred to [facility] for outpt assistance

## 2022-01-05 DIAGNOSIS — G47.33 OSA (OBSTRUCTIVE SLEEP APNEA): ICD-10-CM

## 2022-01-05 NOTE — DISCHARGE PLANNING
Anticipated Discharge Disposition: Home    Action: ER CM teams Kayy RESENDIZ yesterday and will follow up today. Attempted to leave message again at Dr Tank caballero for expedited follow up appt and expedited Sleep study order but unable to deliver as mailbox full. Called exchange and asked to leave vm for  , Collins Maldonado, and did leave VM    Barriers to Discharge: None    Plan: Will cont to follow

## 2022-01-05 NOTE — TELEPHONE ENCOUNTER
Called and spoke with pt. Scheduled pt a Hospital follow up on 01/26/22 with Dr Lopez at [facility]. (did offer pt an appt for next week 01/11/22 with our PA-C, pt declined)      Called and spoke with Deb. Notified her of this. She said she think pt would need to be seen sooner due to her severity of YOLA..    Dr Hutchins-can you look into this for me? Add [facility] Friday? Place referral for sleep?

## 2022-01-05 NOTE — DISCHARGE PLANNING
Anticipated Discharge Disposition: Home already    Action: Call back from Deja BARONE at Dr Lopez office. Scheduled for the 26th. ERCM expressed concern for sooner appt due to concerns of sleep apnea severity since they were going to admit to hosp. Before she ama'd. She will review with Dr Hutchins and team for expedited appt and or referral sleep md. Pt declined a sooner appt for 1.11.22 with APRN/PA.    Barriers to Discharge: None    Plan: No further needs. Pulm med will follow and expedite as able. [facility] can follow and assist with compliance

## 2022-01-05 NOTE — TELEPHONE ENCOUNTER
Jeana Mcguire, Med Ass't  [facility] 2 days ago     KN    Or call - [phone]

## 2022-01-06 NOTE — TELEPHONE ENCOUNTER
Called and spoke with pt.  Notified pt re: sleep referral to be seen for her Broken CPAP. Offered pt an appt today or Monday. Pt was speaking angerly towards me with her frustration and where she can't get a ride and when she does they cancel on her. Told pt sorry about this and asked how am I able to help her.  Ask if she can lower her voice a little bit. Pt said no and told me I need more training in physiology and was getting more rude towards me. I hung up the call then.       Called Deb and nafisa, notifying her of this. Asked for her to return my call and see how we can help the pt.

## 2022-01-06 NOTE — TELEPHONE ENCOUNTER
Progress Notes  Jason Oliveira M.D. (Physician) • • Pulmonary  Sleep referral  Admitted to [facility] with broken cpap

## 2022-01-09 NOTE — DISCHARGE PLANNING
Anticipated Discharge Disposition: Home    Action: Call from Deja BARONE at [facility] med. She got MD order for sleep study MD. She offered at appt for Thursday or Monday . Pt became frustrated. She did not have ride. Call was ended for agitation/inappropriatenes. ER CM forwarded vm to [facility] Kayy RESENDIZ to assist with ride etc. EXt [phone] Deja    Barriers to Discharge: Agitation. Ride. Appt scheduling.     Plan: No furhter needs from ER CM will allow Pul and [facility] to assist

## 2022-01-12 PROBLEM — I10 ESSENTIAL HYPERTENSION: Status: ACTIVE | Noted: 2022-01-12

## 2022-01-12 PROBLEM — L03.116 CELLULITIS OF LEFT LOWER EXTREMITY: Status: ACTIVE | Noted: 2022-01-12

## 2022-01-12 PROBLEM — H91.93 BILATERAL HEARING LOSS: Status: ACTIVE | Noted: 2022-01-12

## 2022-01-12 PROBLEM — S81.802A WOUND OF LEFT LOWER EXTREMITY: Status: ACTIVE | Noted: 2022-01-12

## 2022-01-12 PROBLEM — I89.0 LYMPHEDEMA ASSOCIATED WITH OBESITY: Status: ACTIVE | Noted: 2022-01-12

## 2022-01-12 PROBLEM — E66.9 LYMPHEDEMA ASSOCIATED WITH OBESITY: Status: ACTIVE | Noted: 2022-01-12

## 2022-01-12 PROBLEM — L30.9 ECZEMA: Status: ACTIVE | Noted: 2022-01-12

## 2022-01-12 PROBLEM — F41.9 ANXIETY: Status: ACTIVE | Noted: 2022-01-12

## 2022-01-19 ENCOUNTER — HOME HEALTH ADMISSION (OUTPATIENT)
Dept: HOME HEALTH SERVICES | Facility: HOME HEALTHCARE | Age: 77
End: 2022-01-19
Payer: MEDICARE

## 2022-01-19 ENCOUNTER — TELEPHONE (OUTPATIENT)
Dept: SLEEP MEDICINE | Facility: MEDICAL CENTER | Age: 77
End: 2022-01-19

## 2022-01-19 NOTE — TELEPHONE ENCOUNTER
Pt requested a call back to talk about this appointment and issues shes having with her cpap she also said she had 4 prior sleep studies at the [facility].     Ph# [phone]    Thank you.

## 2022-01-21 ENCOUNTER — HOME CARE VISIT (OUTPATIENT)
Dept: HOME HEALTH SERVICES | Facility: HOME HEALTHCARE | Age: 77
End: 2022-01-21
Payer: MEDICARE

## 2022-01-21 ENCOUNTER — DOCUMENTATION (OUTPATIENT)
Dept: MEDICAL GROUP | Facility: PHYSICIAN GROUP | Age: 77
End: 2022-01-21

## 2022-01-21 VITALS
TEMPERATURE: 98.7 F | HEART RATE: 80 BPM | RESPIRATION RATE: 20 BRPM | OXYGEN SATURATION: 95 % | DIASTOLIC BLOOD PRESSURE: 94 MMHG | SYSTOLIC BLOOD PRESSURE: 140 MMHG

## 2022-01-21 PROCEDURE — 665001 SOC-HOME HEALTH

## 2022-01-21 PROCEDURE — G0493 RN CARE EA 15 MIN HH/HOSPICE: HCPCS

## 2022-01-21 ASSESSMENT — ENCOUNTER SYMPTOMS
DEPRESSED MOOD: 1
PAIN LOCATION - PAIN QUALITY: ANNOYING""
HIGHEST PAIN SEVERITY IN PAST 24 HOURS: 6/10
SUBJECTIVE PAIN PROGRESSION: WAXING AND WANING
NAUSEA: DENIES
PAIN LOCATION - PAIN SEVERITY: 6/10
LOWEST PAIN SEVERITY IN PAST 24 HOURS: 2/10
FATIGUE: 1
PERSON REPORTING PAIN: PATIENT
PAIN LOCATION: LEFT LEG
PAIN SEVERITY GOAL: 2/10
VOMITING: DENIES
PAIN LOCATION - PAIN FREQUENCY: INTERMITTENT
SHORTNESS OF BREATH: T
PAIN: 1
PAIN LOCATION - PAIN DURATION: VARIES

## 2022-01-21 ASSESSMENT — PATIENT HEALTH QUESTIONNAIRE - PHQ9
CLINICAL INTERPRETATION OF PHQ2 SCORE: 1
2. FEELING DOWN, DEPRESSED, IRRITABLE, OR HOPELESS: 00
1. LITTLE INTEREST OR PLEASURE IN DOING THINGS: 01
5. POOR APPETITE OR OVEREATING: 1 - SEVERAL DAYS
SUM OF ALL RESPONSES TO PHQ QUESTIONS 1-9: 4

## 2022-01-21 ASSESSMENT — ACTIVITIES OF DAILY LIVING (ADL): OASIS_M1830: 06

## 2022-01-21 NOTE — CASE COMMUNICATION
Primary Diagnosis: Essential HTN, wound LLE, cellulitis LLE, lymphedema assiciated obesity, YOLA rose mary/ramos depression, polyneuropathy, Anxiety  Skilled Need: Assessment and Instruction  Zip Code: [address]   Frequency: 1x1, 3x9  Disciplines ordered: SN, PT, OT, MSW, HHA  Insurance and Authorization: [facility]  Certification Period: 1/21/2022-3/21/2022  Special Considerations: Call daughter Elmer with appointments [phone]

## 2022-01-21 NOTE — CASE COMMUNICATION
Admitted to [facility], medication reconciliation completed, no major drug interations, for review. Thank you, Kareen LAURENT

## 2022-01-22 NOTE — PROGRESS NOTES
Medication chart review for [facility] services    PCP:  Cici Hunt P.A.-C.  [address] [address]  Fax: [phone]    Current medication list     Current Outpatient Medications:   •  Vitamin D3, 2 Drop, Oral, DAILY  •  TURMERIC CURCUMIN PO, 500 mg, Oral, DAILY  •  Phenylephrine-Ibuprofen (ADVIL SINUS CONGESTION & PAIN PO), 1 Capsule, Oral, BID PRN  •  nystatin, 1 Application, Topical, BID  •  amoxicillin, 500 mg, Oral, TID  •  furosemide, 40 mg, Oral, DAILY  •  potassium chloride SA, 20 mEq, Oral, DAILY  •  lisinopril, 5 mg, Oral, DAILY  •  triamcinolone acetonide, 1 Application, Topical, BID PRN  •  aspirin, 81 mg, Oral, DAILY  •  Flovent HFA, 2 Puff, Inhalation, BID  •  albuterol, 2 Puff, Inhalation, Q6HRS PRN  •  ibuprofen, 200 mg, Oral, BID    Allergies   Allergen Reactions   • Doxycycline Hives   • Lactose Unspecified     Lactose intolerance since a child    • Levothyroxine Anxiety     Panic attack    • Prednisone Unspecified     Diarrhea and very irritable    • Sulfa Drugs Unspecified     Mental status change    • Cephalexin Diarrhea     Diarrhea and mild rash    • Montelukast Rash     Leg pain, back pain and rash    • Spinach Unspecified     Skin allergy test   • Other Environmental      Trees except oak   • Codeine Unspecified     Headaches and confusion        Labs     Lab Results   Component Value Date/Time    SODIUM 136 01/01/2022 09:25 AM    POTASSIUM 3.8 01/01/2022 09:25 AM    CHLORIDE 100 01/01/2022 09:25 AM    CO2 24 01/01/2022 09:25 AM    GLUCOSE 137 (H) 01/01/2022 09:25 AM    BUN 20 01/01/2022 09:25 AM    CREATININE 0.70 01/01/2022 09:25 AM     Lab Results   Component Value Date/Time    ALKPHOSPHAT 96 01/01/2022 09:25 AM    ASTSGOT 45 01/01/2022 09:25 AM    ALTSGPT 48 01/01/2022 09:25 AM    TBILIRUBIN 0.4 01/01/2022 09:25 AM    ALBUMIN 3.9 01/01/2022 09:25 AM        Assessment and Plan:   • Received referral from [facility]. Medications reviewed.       Joel Ramey, TyD, MS,  EDMUNDO, [facility]  Phone [phone] fax [phone]    This note was created using voice recognition software (Dragon). The accuracy of the dictation is limited by the abilities of the software. I have reviewed the note prior to signing, however some errors in grammar and context are still possible. If you have any questions related to this note please do not hesitate to contact our office.

## 2022-01-22 NOTE — CASE COMMUNICATION
FYI: In addition to telephone call today recorded by April, patient also reported she does not take her levothyroxine, states will not take it, believes it has components that increase her anxiety, has not taken it since sometime in December, prefers brand Synthroid if prescribed again, order sent to [place] levothyroxine. SN to obtain ordered lab work at Monday's visit.

## 2022-01-24 ENCOUNTER — HOME CARE VISIT (OUTPATIENT)
Dept: HOME HEALTH SERVICES | Facility: HOME HEALTHCARE | Age: 77
End: 2022-01-24
Payer: MEDICARE

## 2022-01-24 ENCOUNTER — HOSPITAL ENCOUNTER (OUTPATIENT)
Facility: MEDICAL CENTER | Age: 77
End: 2022-01-24
Attending: INTERNAL MEDICINE
Payer: MEDICARE

## 2022-01-24 VITALS
OXYGEN SATURATION: 96 % | DIASTOLIC BLOOD PRESSURE: 96 MMHG | HEART RATE: 72 BPM | TEMPERATURE: 97.5 F | SYSTOLIC BLOOD PRESSURE: 144 MMHG | RESPIRATION RATE: 20 BRPM

## 2022-01-24 VITALS
TEMPERATURE: 97.5 F | DIASTOLIC BLOOD PRESSURE: 96 MMHG | RESPIRATION RATE: 20 BRPM | HEART RATE: 72 BPM | OXYGEN SATURATION: 96 % | SYSTOLIC BLOOD PRESSURE: 144 MMHG

## 2022-01-24 LAB
25(OH)D3 SERPL-MCNC: 25 NG/ML (ref 30–100)
ALBUMIN SERPL BCP-MCNC: 3.8 G/DL (ref 3.2–4.9)
ALBUMIN/GLOB SERPL: 1 G/DL
ALP SERPL-CCNC: 91 U/L (ref 30–99)
ALT SERPL-CCNC: 32 U/L (ref 2–50)
AMORPH CRY #/AREA URNS HPF: PRESENT /HPF
ANION GAP SERPL CALC-SCNC: 10 MMOL/L (ref 7–16)
APPEARANCE UR: ABNORMAL
AST SERPL-CCNC: 40 U/L (ref 12–45)
BACTERIA #/AREA URNS HPF: ABNORMAL /HPF
BASOPHILS # BLD AUTO: 1 % (ref 0–1.8)
BASOPHILS # BLD: 0.07 K/UL (ref 0–0.12)
BILIRUB SERPL-MCNC: 0.5 MG/DL (ref 0.1–1.5)
BILIRUB UR QL STRIP.AUTO: NEGATIVE
BUN SERPL-MCNC: 22 MG/DL (ref 8–22)
CALCIUM SERPL-MCNC: 8.1 MG/DL (ref 8.4–10.2)
CAOX CRY #/AREA URNS HPF: ABNORMAL /HPF
CHLORIDE SERPL-SCNC: 102 MMOL/L (ref 96–112)
CHOLEST SERPL-MCNC: 198 MG/DL (ref 100–199)
CO2 SERPL-SCNC: 27 MMOL/L (ref 20–33)
COLOR UR: YELLOW
CREAT SERPL-MCNC: 0.72 MG/DL (ref 0.5–1.4)
EOSINOPHIL # BLD AUTO: 0.27 K/UL (ref 0–0.51)
EOSINOPHIL NFR BLD: 3.9 % (ref 0–6.9)
EPI CELLS #/AREA URNS HPF: ABNORMAL /HPF
ERYTHROCYTE [DISTWIDTH] IN BLOOD BY AUTOMATED COUNT: 51.7 FL (ref 35.9–50)
EST. AVERAGE GLUCOSE BLD GHB EST-MCNC: 146 MG/DL
FASTING STATUS PATIENT QL REPORTED: NORMAL
FOLATE SERPL-MCNC: 5.6 NG/ML
GLOBULIN SER CALC-MCNC: 3.9 G/DL (ref 1.9–3.5)
GLUCOSE SERPL-MCNC: 124 MG/DL (ref 65–99)
GLUCOSE UR STRIP.AUTO-MCNC: NEGATIVE MG/DL
HBA1C MFR BLD: 6.7 % (ref 4–5.6)
HCT VFR BLD AUTO: 41.2 % (ref 37–47)
HDLC SERPL-MCNC: 54 MG/DL
HGB BLD-MCNC: 13.4 G/DL (ref 12–16)
HYALINE CASTS #/AREA URNS LPF: ABNORMAL /LPF
IMM GRANULOCYTES # BLD AUTO: 0.02 K/UL (ref 0–0.11)
IMM GRANULOCYTES NFR BLD AUTO: 0.3 % (ref 0–0.9)
KETONES UR STRIP.AUTO-MCNC: NEGATIVE MG/DL
LDLC SERPL CALC-MCNC: 126 MG/DL
LEUKOCYTE ESTERASE UR QL STRIP.AUTO: NEGATIVE
LYMPHOCYTES # BLD AUTO: 1.41 K/UL (ref 1–4.8)
LYMPHOCYTES NFR BLD: 20.5 % (ref 22–41)
MCH RBC QN AUTO: 30.9 PG (ref 27–33)
MCHC RBC AUTO-ENTMCNC: 32.5 G/DL (ref 33.6–35)
MCV RBC AUTO: 94.9 FL (ref 81.4–97.8)
MICRO URNS: ABNORMAL
MONOCYTES # BLD AUTO: 0.6 K/UL (ref 0–0.85)
MONOCYTES NFR BLD AUTO: 8.7 % (ref 0–13.4)
MUCOUS THREADS #/AREA URNS HPF: ABNORMAL /HPF
NEUTROPHILS # BLD AUTO: 4.5 K/UL (ref 2–7.15)
NEUTROPHILS NFR BLD: 65.6 % (ref 44–72)
NITRITE UR QL STRIP.AUTO: NEGATIVE
NRBC # BLD AUTO: 0 K/UL
NRBC BLD-RTO: 0 /100 WBC
NT-PROBNP SERPL IA-MCNC: 103 PG/ML (ref 0–125)
PH UR STRIP.AUTO: 6 [PH] (ref 5–8)
PLATELET # BLD AUTO: 368 K/UL (ref 164–446)
PMV BLD AUTO: 9.5 FL (ref 9–12.9)
POTASSIUM SERPL-SCNC: 4.1 MMOL/L (ref 3.6–5.5)
PROT SERPL-MCNC: 7.7 G/DL (ref 6–8.2)
PROT UR QL STRIP: 30 MG/DL
RBC # BLD AUTO: 4.34 M/UL (ref 4.2–5.4)
RBC # URNS HPF: ABNORMAL /HPF
RBC UR QL AUTO: NEGATIVE
SODIUM SERPL-SCNC: 139 MMOL/L (ref 135–145)
SP GR UR STRIP.AUTO: >=1.03
T4 FREE SERPL-MCNC: 0.7 NG/DL (ref 0.93–1.7)
TRIGL SERPL-MCNC: 92 MG/DL (ref 0–149)
TSH SERPL DL<=0.005 MIU/L-ACNC: 22.8 UIU/ML (ref 0.38–5.33)
VIT B12 SERPL-MCNC: 362 PG/ML (ref 211–911)
WBC # BLD AUTO: 6.9 K/UL (ref 4.8–10.8)
WBC #/AREA URNS HPF: ABNORMAL /HPF

## 2022-01-24 PROCEDURE — 83036 HEMOGLOBIN GLYCOSYLATED A1C: CPT

## 2022-01-24 PROCEDURE — 81001 URINALYSIS AUTO W/SCOPE: CPT

## 2022-01-24 PROCEDURE — 84443 ASSAY THYROID STIM HORMONE: CPT

## 2022-01-24 PROCEDURE — 85025 COMPLETE CBC W/AUTO DIFF WBC: CPT

## 2022-01-24 PROCEDURE — A6197 ALGINATE DRSG >16 <=48 SQ IN: HCPCS

## 2022-01-24 PROCEDURE — 83880 ASSAY OF NATRIURETIC PEPTIDE: CPT

## 2022-01-24 PROCEDURE — 80053 COMPREHEN METABOLIC PANEL: CPT

## 2022-01-24 PROCEDURE — G0299 HHS/HOSPICE OF RN EA 15 MIN: HCPCS

## 2022-01-24 PROCEDURE — G0156 HHCP-SVS OF AIDE,EA 15 MIN: HCPCS

## 2022-01-24 PROCEDURE — 82607 VITAMIN B-12: CPT

## 2022-01-24 PROCEDURE — G0151 HHCP-SERV OF PT,EA 15 MIN: HCPCS

## 2022-01-24 PROCEDURE — A6453 SELF-ADHER BAND W <3"/YD: HCPCS

## 2022-01-24 PROCEDURE — 84439 ASSAY OF FREE THYROXINE: CPT

## 2022-01-24 PROCEDURE — A6253 ABSORPT DRG > 48 SQ IN W/O B: HCPCS

## 2022-01-24 PROCEDURE — 80061 LIPID PANEL: CPT

## 2022-01-24 PROCEDURE — A6403 STERILE GAUZE>16 <= 48 SQ IN: HCPCS

## 2022-01-24 PROCEDURE — 82746 ASSAY OF FOLIC ACID SERUM: CPT

## 2022-01-24 PROCEDURE — 82306 VITAMIN D 25 HYDROXY: CPT

## 2022-01-24 ASSESSMENT — ENCOUNTER SYMPTOMS
PAIN: 1
PAIN LOCATION - RELIEVING FACTORS: PAIN MED , REST
SHORTNESS OF BREATH: T
LIMITED RANGE OF MOTION: 1
HIGHEST PAIN SEVERITY IN PAST 24 HOURS: 5/10
HIGHEST PAIN SEVERITY IN PAST 24 HOURS: 5/10
PERSON REPORTING PAIN: PATIENT
PAIN: 1
LOWEST PAIN SEVERITY IN PAST 24 HOURS: 4/10
MUSCLE WEAKNESS: 1
PAIN LOCATION - PAIN SEVERITY: 4/10
PAIN SEVERITY GOAL: 1/10
SUBJECTIVE PAIN PROGRESSION: WAXING AND WANING
PAIN LOCATION - PAIN SEVERITY: 4/10
NAUSEA: DENIES
MUSCLE WEAKNESS: 1
LOWEST PAIN SEVERITY IN PAST 24 HOURS: 4/10
PAIN LOCATION - PAIN QUALITY: ACHY
ASSOCIATED SYMPTOMS: DENIES
VOMITING: DENIES
PAIN LOCATION: LEFT LEG
PAIN LOCATION - EXACERBATING FACTORS: MOVEMENT
PERSON REPORTING PAIN: PATIENT
ARTHRALGIAS: 1
PAIN LOCATION - PAIN QUALITY: ACHY
PAIN SEVERITY GOAL: 1/10
PAIN LOCATION: LEFT LEG
PAIN LOCATION - PAIN FREQUENCY: FREQUENT

## 2022-01-24 ASSESSMENT — ACTIVITIES OF DAILY LIVING (ADL)
AMBULATION ASSISTANCE: NON-AMBULATORY
CURRENT_FUNCTION: ONE PERSON

## 2022-01-24 NOTE — CASE COMMUNICATION
noted  ----- Message -----  From: Yovana Mina R.N.  Sent: 1/21/2022  12:48 PM PST  To: Mallika Espana R.N.      Primary Diagnosis: Essential HTN, wound LLE, cellulitis LLE, lymphedema assiciated obesity, YOLA rose mary/ramos depression, polyneuropathy, Anxiety  Skilled Need: Assessment and Instruction  Zip Code: [address]   Frequency: 1x1, 3x9  Disciplines ordered: SN, PT, OT, MSW, HHA  Insurance and Authorization: [facility]  Certificatio n Period: 1/21/2022-3/21/2022  Special Considerations: Call daughter Elmer with appointments [phone]

## 2022-01-24 NOTE — CASE COMMUNICATION
noted  ----- Message -----  From: Yovana Mina R.N.  Sent: 1/21/2022   7:40 PM PST  To: MADELEINE Pink: In addition to telephone call today recorded by April, patient also reported she does not take her levothyroxine, states will not take it, believes it has components that increase her anxiety, has not taken it since sometime in December, prefers brand Synthroid if prescribed again, order sent to [place] levothyroxine. SN to  obtain ordered lab work at Monday's visit.

## 2022-01-24 NOTE — Clinical Note
PT evaluation completed, requesting follow up visits with frequency of 1w1,2w3 effective as of 1/24/2022.

## 2022-01-25 NOTE — TELEPHONE ENCOUNTER
Called and spoke with pt. Scheduled pt an appt on 02/3/22 with Dr Lemos. Pt asked for me to call her back and lm with address.    Called pt but her vm was not set up. Unable to leave message.     Mailed appt letter to pt.

## 2022-01-26 ENCOUNTER — HOME CARE VISIT (OUTPATIENT)
Dept: HOME HEALTH SERVICES | Facility: HOME HEALTHCARE | Age: 77
End: 2022-01-26
Payer: MEDICARE

## 2022-01-26 VITALS
RESPIRATION RATE: 20 BRPM | OXYGEN SATURATION: 95 % | DIASTOLIC BLOOD PRESSURE: 70 MMHG | HEART RATE: 68 BPM | TEMPERATURE: 98.7 F | SYSTOLIC BLOOD PRESSURE: 142 MMHG

## 2022-01-26 PROCEDURE — A6253 ABSORPT DRG > 48 SQ IN W/O B: HCPCS

## 2022-01-26 PROCEDURE — G0299 HHS/HOSPICE OF RN EA 15 MIN: HCPCS

## 2022-01-26 PROCEDURE — A6403 STERILE GAUZE>16 <= 48 SQ IN: HCPCS

## 2022-01-26 PROCEDURE — 6650300 HCR  CLEANSER 4-IN-1

## 2022-01-26 PROCEDURE — A6455 SELF-ADHER BAND >=5"/YD: HCPCS

## 2022-01-26 PROCEDURE — A4369 SKIN BARRIER LIQUID PER OZ: HCPCS

## 2022-01-26 ASSESSMENT — ENCOUNTER SYMPTOMS
VOMITING: NO
LOWEST PAIN SEVERITY IN PAST 24 HOURS: 5/10
PAIN LOCATION: LEFT LEG
LIMITED RANGE OF MOTION: 1
PAIN: 1
PERSON REPORTING PAIN: PATIENT
HIGHEST PAIN SEVERITY IN PAST 24 HOURS: 8/10
PAIN LOCATION - PAIN FREQUENCY: CONSTANT
SUBJECTIVE PAIN PROGRESSION: UNCHANGED
PAIN LOCATION - PAIN SEVERITY: 5/10
PAIN SEVERITY GOAL: 2/10
NAUSEA: NO

## 2022-01-26 NOTE — CASE COMMUNICATION
noted  ----- Message -----  From: Navin Samuel, PT  Sent: 1/24/2022  11:08 PM PST  To: Mallika Espana R.N.      PT evaluation completed, requesting follow up visits with frequency of 1w1,2w3 effective as of 1/24/2022.

## 2022-01-26 NOTE — CASE COMMUNICATION
noted  ----- Message -----  From: Pat Sorto CTorresNTorresATorres  Sent: 1/24/2022   3:09 PM PST  To: Mallika Espana R.N.      Patient refused vitals, because she said RN just took them

## 2022-01-26 NOTE — Clinical Note
ACTION REQUIRED  Wound care options submitted to Cici Hunt for review and signature.  Pt states she was extremely active prior to menopause. States she lived in [place] and would bike, ski, etc. States she gained her weight after menopause - stopped exercising but can't explain why. Lives alone; daughter visits several times a week. Poor memory. Pt is in motorized w/c. Sleeps in recliner with legs elevated on chair in front of her. Prepares her own food. Medications are in several zip lock bags. States she is not taking Lasix, potassium, or lisinopril. Was receptive to medication tray and will have next HH RN bring her one. Is taking her antibiotic - suggested she space out about every 8 hr if she can. She states she has to take it with food. She has her own thoughts about things. Has extensive candidiasis side to side under pannus and bilateral groins. States this is an ongoing problem. Has not been doing the antifungal consistently. I think she wound do better with cream. She has a small tube of triamcinolone, but Lotrimin or Nystatin should be fine. I don't think she can manage to lift pannus and use powder. Cream would be easier. Washed now and antifungal applied. Her right ear is draining - brown crusty dried exudate outside ear. States it is itchy. She thinks she may have ruptured her ear drum? Will let PCP know. May need referral to ENT?  Pt states LLE wound has been there for years. Does not look venous. She says she has eczema but has not been seen by dermatology since moving here from [place]. She should have a referral. I think it would be worth trying triamcinolone ointment over the area twice daily (we will probably be bipin to get once; she thinks daughter would come daily if needed). Should see improvement in a few days if it is going to work. Could use Tubigrip for the compression. Right now, daughter is changing dressing every day we are not coming. Took several explanations to get pt  to understand she needs compression. She kept saying, no it needs to come out. I hope she finally understands. Recommend a good size tube/jar of triamcinolone ointment or cream. Ointment might be better. I asked her to go on line and look for long-handled applicator. Would use Tubigrip for light compression so she can expose wound twice daily and get ointment on. Today, she finally allowed me to wash her leg with No-rinse foam. Full sheet of Aquacel AG+ placed over wound area and covered with ABD's. LLE wrapped with kerlix and light stretch Coban from base of toes to two fingerbreadths below popliteal space. Asked her to please try to leave dressing intact and not change tomorrow. Stated she would try. Also receptive to OT, especially for upper body exercises. Cannot reach beyond knees. Uses C-PAP at night and if she lies down during the day.  Thank you, Makenna Christine, RN, MSN, CWCN

## 2022-01-27 ENCOUNTER — HOME CARE VISIT (OUTPATIENT)
Dept: HOME HEALTH SERVICES | Facility: HOME HEALTHCARE | Age: 77
End: 2022-01-27
Payer: MEDICARE

## 2022-01-27 ASSESSMENT — ACTIVITIES OF DAILY LIVING (ADL): AMBULATION ASSISTANCE: NON-AMBULATORY

## 2022-01-27 NOTE — CASE COMMUNICATION
Quality Review for 1/21/22 SOC OASIS by LEIAN Raymond RN on  January 27, 2022    Edits completed by ELIAN Raymond RN:  1.  is 5 per narrative that patient needs mod assistance with locomotion and response to [id number] R and S that patient needs assistance with mobility in wheelchair  2.  is 8 per care plan therapy sets.

## 2022-01-28 ENCOUNTER — HOME CARE VISIT (OUTPATIENT)
Dept: HOME HEALTH SERVICES | Facility: HOME HEALTHCARE | Age: 77
End: 2022-01-28
Payer: MEDICARE

## 2022-01-28 VITALS
SYSTOLIC BLOOD PRESSURE: 140 MMHG | HEART RATE: 74 BPM | DIASTOLIC BLOOD PRESSURE: 100 MMHG | OXYGEN SATURATION: 97 % | TEMPERATURE: 98.8 F | RESPIRATION RATE: 20 BRPM

## 2022-01-28 PROCEDURE — A6197 ALGINATE DRSG >16 <=48 SQ IN: HCPCS

## 2022-01-28 PROCEDURE — G0299 HHS/HOSPICE OF RN EA 15 MIN: HCPCS

## 2022-01-28 PROCEDURE — A6253 ABSORPT DRG > 48 SQ IN W/O B: HCPCS

## 2022-01-28 PROCEDURE — G0155 HHCP-SVS OF CSW,EA 15 MIN: HCPCS

## 2022-01-28 PROCEDURE — A6452 HIGH COMPRES BAND W>=3"<5"YD: HCPCS

## 2022-01-28 ASSESSMENT — ENCOUNTER SYMPTOMS
VOMITING: NO
PERSON REPORTING PAIN: PATIENT
PAIN LOCATION: LEFT LEG
PAIN LOCATION - EXACERBATING FACTORS: NONE IDENTIFIED
PAIN SEVERITY GOAL: 0/10
NAUSEA: NO
LOWEST PAIN SEVERITY IN PAST 24 HOURS: 3/10
MUSCLE WEAKNESS: 1
PAIN: 1
SUBJECTIVE PAIN PROGRESSION: WAXING AND WANING
PAIN LOCATION - PAIN FREQUENCY: CONSTANT
LIMITED RANGE OF MOTION: 1
HIGHEST PAIN SEVERITY IN PAST 24 HOURS: 9/10
PAIN LOCATION - PAIN SEVERITY: 6/10

## 2022-01-28 NOTE — CASE COMMUNICATION
Agree with CDI recommendations.  Kareen  ----- Message -----  From: Kellen Raymond R.N.  Sent: 1/27/2022   2:50 PM PST  To: Yovana Mina R.N.      Quality Review for 1/21/22 SOC OASIS by ELIAN Raymond RN on  January 27, 2022    Edits completed by ELIAN Raymond RN:  1.  is 5 per narrative that patient needs mod assistance with locomotion and response to [id number] R and S that patient needs assistance with mobility in wheel chair  2.  is 8 per care plan therapy sets.

## 2022-01-28 NOTE — CASE COMMUNICATION
noted  ----- Message -----  From: Makenna Christine R.N.  Sent: 1/27/2022   9:11 PM PST  To: Mallika Espana R.N.      ACTION REQUIRED  Wound care options submitted to Cici Hunt for review and signature.  Pt states she was extremely active prior to menopause. States she lived in [place] [facility] and would bike, ski, etc. States she gained her weight after menopause - stopped exercising but can't explain why. Lives alone; daughter edwin ellison several times a week. Poor memory. Pt is in motorized w/c. Sleeps in recliner with legs elevated on chair in front of her. Prepares her own food. Medications are in several zip lock bags. States she is not taking Lasix, potassium, or lisinopril. Was receptive to medication tray and will have next HH RN bring her one. Is taking her antibiotic - suggested she space out about every 8 hr if she can. She states she has to take it with jigar d. She has her own thoughts about things. Has extensive candidiasis side to side under pannus and bilateral groins. States this is an ongoing problem. Has not been doing the antifungal consistently. I think she wound do better with cream. She has a small tube of triamcinolone, but Lotrimin or Nystatin should be fine. I don't think she can manage to lift pannus and use powder. Cream would be easier. Washed now and antifungal applied. Her  right ear is draining - brown crusty dried exudate outside ear. States it is itchy. She thinks she may have ruptured her ear drum? Will let PCP know. May need referral to ENT?  Pt states LLE wound has been there for years. Does not look venous. She says she has eczema but has not been seen by dermatology since moving here from [place]. She should have a referral. I think it would be worth trying triamcinolone ointment over the area twice da wilfredo (we will probably be bipin to get once; she thinks daughter would come daily if needed). Should see improvement in a few days if it is going to work. Could use Tubigrip for the  compression. Right now, daughter is changing dressing every day we are not coming. Took several explanations to get pt to understand she needs compression. She kept saying, no it needs to come out. I hope she finally understands. Recommend a good size tube/ja r of triamcinolone ointment or cream. Ointment might be better. I asked her to go on line and look for long-handled applicator. Would use Tubigrip for light compression so she can expose wound twice daily and get ointment on. Today, she finally allowed me to wash her leg with No-rinse foam. Full sheet of Aquacel AG+ placed over wound area and covered with ABD's. LLE wrapped with kerlix and light stretch Coban from base of toes to two fi ngerbreadths below popliteal space. Asked her to please try to leave dressing intact and not change tomorrow. Stated she would try. Also receptive to OT, especially for upper body exercises. Cannot reach beyond knees. Uses C-PAP at night and if she lies down during the day.  Thank you, Makenna Christine, RN, MSN, CWCN

## 2022-01-28 NOTE — Clinical Note
FYI  Patient reports pain level 7 in her left leg. Dtr picked up prescription: spironolactone 25 mgs 1/2 tab daily.

## 2022-01-28 NOTE — Clinical Note
"ACTION REQUIRED  Wound care orders clarified and submitted to Cici Hunt for review and signature.  VS 98.8 - 74 - 20  /100  Oxygen saturation 97% on room air. States \"I just got into it with my daughter who was here.\" LLE dressing still intact when I came but did slide down about 1/3 of the way. Serous exudate on dressing. Lateral leg skin breakdown looks blotchy. Not a defined ulcer. May have candida component. Cleansed well. She did not get a large container of triamcinolone - she has 3 small tubes but needs larger one if we are going to try to use daily. Triamcinolone applied from small tube she had. Moist area covered with full sheet of silver hydrofiber. Covered with ABD's, including one over anterior ankle. Skin breakdown does go down over lateral ankle. LLE wrapped with Coflex 2-layer short-stretch compression dressing. If dressing does not hold over the weekend, and daughter willing to come daily, may cleanse daily, apply triamcinolone, and cover with ABD and Tubigrip J. Ideally, should be done twice daily, but unlikely that will happen. Pt cannot reach beyond knees. Did not go on line to find long-handled cream/lotion device. Pt has large area of candidiasis from hip to hip under pannus. Area washed and triamcinolone applied. It does look better. Pt did care once yesterday. Triamcinolone cream applied over affected area LLE. Difficult for pt to lift large pannus, wash and dry as she should, and apply cream. Cream is easier than powder.  Pt continues to c/o bilateral ear pain, itching, exudate. States she has had problems with her ears for years and was seeing ENT before moving here. Pt may have candida in ears as well. She has so much candidiasis that she states never goes away. I don't think she can adequately do hygiene care. Given 2 daily medication boxes and advised her to amanda one am and one pm. She does have new prescription for Aldactone. Explained she was to start taking it. When healed, " recommend referral to CLT. [facility] does not have someone for home health but there is a CLT at out-pt [facility]. MSW visit before I came. Pt found it very helpful. Anticipates she will be able to get Meals on Wheels and use ACCESS for transportation needs.  Thank you, Makenna Christine RN, MSN, CWCN

## 2022-01-30 NOTE — CASE COMMUNICATION
noted  ----- Message -----  From: DAKOTA Colunga  Sent: 1/28/2022   5:51 PM PST  To: MADELEINE Pink  Patient reports pain level 7 in her left leg. Dtr picked up prescription: spironolactone 25 mgs 1/2 tab daily.

## 2022-01-31 ENCOUNTER — HOME CARE VISIT (OUTPATIENT)
Dept: HOME HEALTH SERVICES | Facility: HOME HEALTHCARE | Age: 77
End: 2022-01-31
Payer: MEDICARE

## 2022-01-31 PROCEDURE — G0299 HHS/HOSPICE OF RN EA 15 MIN: HCPCS

## 2022-01-31 NOTE — CASE COMMUNICATION
"noted  ----- Message -----  From: Makenna Christine R.N.  Sent: 1/30/2022   6:00 PM PST  To: Mallika Espana R.N.      ACTION REQUIRED  Wound care orders clarified and submitted to Cici Hunt for review and signature.  VS 98.8 - 74 - 20  /100  Oxygen saturation 97% on room air. States \"I just got into it with my daughter who was here.\" LLE dressing still intact when I came but did slide down about 1/3 of the way. Serous exudate on dressi ng. Lateral leg skin breakdown looks blotchy. Not a defined ulcer. May have candida component. Cleansed well. She did not get a large container of triamcinolone - she has 3 small tubes but needs larger one if we are going to try to use daily. Triamcinolone applied from small tube she had. Moist area covered with full sheet of silver hydrofiber. Covered with ABD's, including one over anterior ankle. Skin breakdown does go down over later al ankle. LLE wrapped with Coflex 2-layer short-stretch compression dressing. If dressing does not hold over the weekend, and daughter willing to come daily, may cleanse daily, apply triamcinolone, and cover with ABD and Tubigrip J. Ideally, should be done twice daily, but unlikely that will happen. Pt cannot reach beyond knees. Did not go on line to find long-handled cream/lotion device. Pt has large area of candidiasis from hip to hip  under pannus. Area washed and triamcinolone applied. It does look better. Pt did care once yesterday. Triamcinolone cream applied over affected area LLE. Difficult for pt to lift large pannus, wash and dry as she should, and apply cream. Cream is easier than powder.  Pt continues to c/o bilateral ear pain, itching, exudate. States she has had problems with her ears for years and was seeing ENT before moving here. Pt may have candida in  ears as well. She has so much candidiasis that she states never goes away. I don't think she can adequately do hygiene care. Given 2 daily medication boxes and advised her to amanda one " am and one pm. She does have new prescription for Aldactone. Explained she was to start taking it. When healed, recommend referral to CLT. [facility] does not have someone for home health but there is a CLT at out-pt [facility]. MSW visit before I came. Pt  found it very helpful. Anticipates she will be able to get Meals on Wheels and use ACCESS for transportation needs.  Thank you, Makenna Christine RN, MSN, CWCN

## 2022-02-01 ENCOUNTER — HOME CARE VISIT (OUTPATIENT)
Dept: HOME HEALTH SERVICES | Facility: HOME HEALTHCARE | Age: 77
End: 2022-02-01
Payer: MEDICARE

## 2022-02-01 PROCEDURE — G0152 HHCP-SERV OF OT,EA 15 MIN: HCPCS

## 2022-02-01 PROCEDURE — G0151 HHCP-SERV OF PT,EA 15 MIN: HCPCS

## 2022-02-01 ASSESSMENT — ENCOUNTER SYMPTOMS
PAIN: 1
PAIN LOCATION - PAIN FREQUENCY: CONSTANT
PAIN LOCATION - PAIN SEVERITY: 4/10
PAIN LOCATION - PAIN QUALITY: ACHY
PERSON REPORTING PAIN: PATIENT
PAIN LOCATION: LEFT LEG

## 2022-02-01 NOTE — Clinical Note
Occupational Therapy Evaluation Completed 02/02/2022.  Requesting authorization for 1w4 effective 02/06/2022 for continued skilled OT.

## 2022-02-02 ENCOUNTER — HOME CARE VISIT (OUTPATIENT)
Dept: HOME HEALTH SERVICES | Facility: HOME HEALTHCARE | Age: 77
End: 2022-02-02
Payer: MEDICARE

## 2022-02-02 VITALS
HEART RATE: 78 BPM | OXYGEN SATURATION: 97 % | SYSTOLIC BLOOD PRESSURE: 150 MMHG | DIASTOLIC BLOOD PRESSURE: 86 MMHG | TEMPERATURE: 97.9 F | RESPIRATION RATE: 20 BRPM

## 2022-02-02 VITALS
RESPIRATION RATE: 20 BRPM | SYSTOLIC BLOOD PRESSURE: 147 MMHG | HEART RATE: 74 BPM | OXYGEN SATURATION: 94 % | TEMPERATURE: 98.6 F | DIASTOLIC BLOOD PRESSURE: 78 MMHG

## 2022-02-02 VITALS
SYSTOLIC BLOOD PRESSURE: 150 MMHG | TEMPERATURE: 97.7 F | DIASTOLIC BLOOD PRESSURE: 90 MMHG | OXYGEN SATURATION: 94 % | RESPIRATION RATE: 18 BRPM | HEART RATE: 77 BPM

## 2022-02-02 PROCEDURE — G0299 HHS/HOSPICE OF RN EA 15 MIN: HCPCS

## 2022-02-02 PROCEDURE — G0155 HHCP-SVS OF CSW,EA 15 MIN: HCPCS

## 2022-02-02 SDOH — ECONOMIC STABILITY: HOUSING INSECURITY: HOME SAFETY: THERAPIST CONTACT INFORMATION LEFT ON BINDER.  FUTURE APPOINTMENTS WRITTEN ON PATIENT CALENDAR.

## 2022-02-02 ASSESSMENT — ENCOUNTER SYMPTOMS
MUSCLE WEAKNESS: 1
PAIN LOCATION - PAIN FREQUENCY: FREQUENT
PAIN SEVERITY GOAL: 0/10
PAIN: 1
PAIN LOCATION - PAIN SEVERITY: 3/10
PAIN LOCATION - RELIEVING FACTORS: REST
ASSOCIATED SYMPTOMS: DENIES
POOR JUDGMENT: 1
PAIN SEVERITY GOAL: 0/10
LOWEST PAIN SEVERITY IN PAST 24 HOURS: 0/10
HIGHEST PAIN SEVERITY IN PAST 24 HOURS: 4/10
PERSON REPORTING PAIN: PATIENT
HIGHEST PAIN SEVERITY IN PAST 24 HOURS: 3/10
PAIN LOCATION - PAIN SEVERITY: 4/10
LIMITED RANGE OF MOTION: 1
PAIN LOCATION - PAIN FREQUENCY: INTERMITTENT
LOWEST PAIN SEVERITY IN PAST 24 HOURS: 3/10
LOWEST PAIN SEVERITY IN PAST 24 HOURS: 3/10
MUSCLE WEAKNESS: 1
SUBJECTIVE PAIN PROGRESSION: UNCHANGED
SUBJECTIVE PAIN PROGRESSION: UNCHANGED
PAIN LOCATION: LEFT LEG
SUBJECTIVE PAIN PROGRESSION: WAXING AND WANING
PAIN LOCATION - PAIN FREQUENCY: FREQUENT
LIMITED RANGE OF MOTION: 1
PERSON REPORTING PAIN: PATIENT
PAIN LOCATION: LEFT LEG
PAIN: 1
DEPRESSED MOOD: 1
NAUSEA: DENIES
PERSON REPORTING PAIN: PATIENT
PAIN SEVERITY GOAL: 0/10
PAIN LOCATION - PAIN QUALITY: ACHE
PAIN LOCATION - PAIN SEVERITY: 4/10
HIGHEST PAIN SEVERITY IN PAST 24 HOURS: 6/10
PAIN: 1
PAIN LOCATION - PAIN DURATION: ONGOING
PAIN LOCATION: LEFT FOOT

## 2022-02-02 ASSESSMENT — ACTIVITIES OF DAILY LIVING (ADL)
CURRENT_FUNCTION: CONTACT GUARD ASSIST
USING THE TELPHONE: INDEPENDENT
TOILETING: MINIMUM ASSIST
PHYSICAL TRANSFERS ASSESSED: 1
AMBULATION ASSISTANCE: NON-AMBULATORY
GROOMING ASSESSED: 1
GROOMING_CURRENT_FUNCTION: INDEPENDENT
TOILETING: 1
CURRENT_FUNCTION: MODERATE ASSIST
DRESSING_LB_CURRENT_FUNCTION: MINIMUM ASSIST
TELEPHONE USE ASSESSED: 1
CURRENT_FUNCTION: MINIMUM ASSIST
ORAL_CARE_ASSESSED: 1
ORAL_CARE_CURRENT_FUNCTION: INDEPENDENT
DRESSING_UB_CURRENT_FUNCTION: INDEPENDENT

## 2022-02-02 NOTE — Clinical Note
FYI-Patient fall   Falls Template         Date & Time of fall: 1/29           Cause of fall:  Patient reports on Saturday evening, she was transferring to her chair to sleep in from her electric wheelchair. She lost her balance and fell back into her wheelchair           Location:  Patient's living room         Was the fall witnessed?                  If yes, by who? No            Actions taken by patient:  Nothing. She did not call 911, her daughter, PCP, or home health            Any new injury?                   If yes, what kind?  No            Any recent medication changes?    Yes-new antibiotic            Reviewed Post Fall Questionnaire: No                 Post fall instructions:  N/a            Actions Taken: Updated care team

## 2022-02-02 NOTE — Clinical Note
pt was being seen by DAKOTA Rinaldi when sn came. pt's apartment  , states she has paid [employer] that comes once a week. pt sitted in motorized wheelchair as menas of ambulation.  assessment and vital signs taken. pt doesn't have  new medications ( amlodipine, diflucan, and snythroid ) ordered by Jacqueline OBRIEN. states she will not take new medications  not until she finished her antibiotic. reported that she used albuterol earleir for shortness of breath, states breathing is fine now.  noted compression wrappings down to ankle area, states it doesnt stay.  wound care done to left lower leg with improvement, scant serosanguinous drainage to old dressing.. wound care done under pannus area, and bilateral groins .pt.instructed on the importance of taking amlodipine to prevent high blood pressure .reenforced safety/fall precautions  Pt/Cg response to the services provided: denies chest pains/respiratory distress, or falls .

## 2022-02-03 ENCOUNTER — HOME CARE VISIT (OUTPATIENT)
Dept: HOME HEALTH SERVICES | Facility: HOME HEALTHCARE | Age: 77
End: 2022-02-03
Payer: MEDICARE

## 2022-02-03 ENCOUNTER — APPOINTMENT (OUTPATIENT)
Dept: SLEEP MEDICINE | Facility: MEDICAL CENTER | Age: 77
End: 2022-02-03
Payer: MEDICARE

## 2022-02-03 VITALS
OXYGEN SATURATION: 95 % | RESPIRATION RATE: 20 BRPM | DIASTOLIC BLOOD PRESSURE: 85 MMHG | SYSTOLIC BLOOD PRESSURE: 150 MMHG | HEART RATE: 81 BPM | TEMPERATURE: 97.6 F

## 2022-02-03 PROCEDURE — G0151 HHCP-SERV OF PT,EA 15 MIN: HCPCS

## 2022-02-03 ASSESSMENT — ENCOUNTER SYMPTOMS
PAIN LOCATION: LEFT LEG
PAIN LOCATION - PAIN FREQUENCY: CONSTANT
PAIN LOCATION - PAIN SEVERITY: 3/10
POOR JUDGMENT: 1
PAIN LOCATION - PAIN QUALITY: ACHY
PAIN LOCATION - PAIN DURATION: CHRONIC
PERSON REPORTING PAIN: PATIENT
PAIN: 1

## 2022-02-03 NOTE — CASE COMMUNICATION
noted  ----- Message -----  From: Ghassan Delgado MS,OTR/L  Sent: 2/2/2022   1:10 PM PST  To: Mallika Espana R.N.      Occupational Therapy Evaluation Completed 02/02/2022.  Requesting authorization for 1w4 effective 02/06/2022 for continued skilled OT.

## 2022-02-04 ENCOUNTER — HOME CARE VISIT (OUTPATIENT)
Dept: HOME HEALTH SERVICES | Facility: HOME HEALTHCARE | Age: 77
End: 2022-02-04
Payer: MEDICARE

## 2022-02-04 PROCEDURE — G0299 HHS/HOSPICE OF RN EA 15 MIN: HCPCS

## 2022-02-04 NOTE — CASE COMMUNICATION
noted  ----- Message -----  From: Melinda Boyd R.N.  Sent: 2/2/2022   8:49 PM PST  To: Mallika Espana R.N.      pt was being seen by DAKOTA Rinaldi when sn came. pt's apartment  , states she has paid caregievr that comes once a week. pt sitted in motorized wheelchair as menas of ambulation.  assessment and vital signs taken. pt doesn't have  new medications ( amlodipine, diflucan, and snythroid ) ordered by Jacqueline SEGOVIA states  she will not take new medications  not until she finished her antibiotic. reported that she used albuterol earleir for shortness of breath, states breathing is fine now.  noted compression wrappings down to ankle area, states it doesnt stay.  wound care done to left lower leg with improvement, scant serosanguinous drainage to old dressing.. wound care done under pannus area, and bilateral groins .pt.instructed on the importance of takin g amlodipine to prevent high blood pressure .reenforced safety/fall precautions  Pt/Cg response to the services provided: denies chest pains/respiratory distress, or falls .

## 2022-02-04 NOTE — CASE COMMUNICATION
noted  ----- Message -----  From: DAKOTA Colunga  Sent: 2/2/2022   6:00 PM PST  To: MADELEINE Pink-Patient fall   Falls Template         Date & Time of fall: 1/29           Cause of fall:  Patient reports on Saturday evening, she was transferring to her chair to sleep in from her electric wheelchair. She lost her balance and fell back into her wheelchair           Location:  Patient's living room         Was the fall w itnessed?                  If yes, by who? No            Actions taken by patient:  Nothing. She did not call 911, her daughter, PCP, or home health            Any new injury?                   If yes, what kind?  No            Any recent medication changes?    Yes-new antibiotic            Reviewed Post Fall Questionnaire: No                 Post fall instructions:  N/a            Actions Taken: Updated care team

## 2022-02-05 VITALS
RESPIRATION RATE: 20 BRPM | DIASTOLIC BLOOD PRESSURE: 100 MMHG | OXYGEN SATURATION: 97 % | SYSTOLIC BLOOD PRESSURE: 170 MMHG | HEART RATE: 73 BPM | TEMPERATURE: 97.5 F

## 2022-02-05 ASSESSMENT — ENCOUNTER SYMPTOMS
PAIN: 1
PAIN LOCATION - PAIN QUALITY: ACHE
PAIN LOCATION - PAIN SEVERITY: 3/10
MUSCLE WEAKNESS: 1
HIGHEST PAIN SEVERITY IN PAST 24 HOURS: 3/10
PERSON REPORTING PAIN: PATIENT
PAIN LOCATION - PAIN FREQUENCY: INTERMITTENT
PAIN LOCATION: LEFT ANKLE
PAIN LOCATION - PAIN DURATION: ONGOING
LOWEST PAIN SEVERITY IN PAST 24 HOURS: 0/10
PAIN SEVERITY GOAL: 0/10

## 2022-02-05 ASSESSMENT — ACTIVITIES OF DAILY LIVING (ADL): AMBULATION ASSISTANCE: NON-AMBULATORY

## 2022-02-07 ENCOUNTER — HOME CARE VISIT (OUTPATIENT)
Dept: HOME HEALTH SERVICES | Facility: HOME HEALTHCARE | Age: 77
End: 2022-02-07
Payer: MEDICARE

## 2022-02-07 VITALS
TEMPERATURE: 97.6 F | DIASTOLIC BLOOD PRESSURE: 84 MMHG | SYSTOLIC BLOOD PRESSURE: 144 MMHG | HEART RATE: 69 BPM | OXYGEN SATURATION: 93 % | RESPIRATION RATE: 20 BRPM

## 2022-02-07 PROCEDURE — G0152 HHCP-SERV OF OT,EA 15 MIN: HCPCS

## 2022-02-07 ASSESSMENT — ENCOUNTER SYMPTOMS
SUBJECTIVE PAIN PROGRESSION: UNCHANGED
PAIN LOCATION - PAIN SEVERITY: 3/10
HIGHEST PAIN SEVERITY IN PAST 24 HOURS: 7/10
DEPRESSED MOOD: 1
LOWEST PAIN SEVERITY IN PAST 24 HOURS: 2/10
PAIN LOCATION: LEFT LEG
DIFFICULTY THINKING: 1
PAIN LOCATION - PAIN QUALITY: SHARP/ACHE
PAIN SEVERITY GOAL: 1/10
POOR JUDGMENT: 1
PAIN LOCATION - PAIN FREQUENCY: INTERMITTENT
PAIN: 1
POOR JUDGMENT: 1
PERSON REPORTING PAIN: PATIENT

## 2022-02-07 NOTE — Clinical Note
Missed SNV, unable to contact. . Went to see pt as schedueled , used doorbell  and knocked several times, no answer. Called pt's listed phone , no answer. ,

## 2022-02-08 ENCOUNTER — HOME CARE VISIT (OUTPATIENT)
Dept: HOME HEALTH SERVICES | Facility: HOME HEALTHCARE | Age: 77
End: 2022-02-08
Payer: MEDICARE

## 2022-02-08 VITALS
DIASTOLIC BLOOD PRESSURE: 80 MMHG | OXYGEN SATURATION: 95 % | TEMPERATURE: 97.8 F | RESPIRATION RATE: 20 BRPM | HEART RATE: 82 BPM | SYSTOLIC BLOOD PRESSURE: 165 MMHG

## 2022-02-08 PROCEDURE — G0155 HHCP-SVS OF CSW,EA 15 MIN: HCPCS

## 2022-02-08 PROCEDURE — G0151 HHCP-SERV OF PT,EA 15 MIN: HCPCS

## 2022-02-08 ASSESSMENT — ENCOUNTER SYMPTOMS
PAIN: 1
PAIN LOCATION - PAIN FREQUENCY: CONSTANT
PAIN LOCATION - PAIN DURATION: CHRONIC
POOR JUDGMENT: 1
PERSON REPORTING PAIN: PATIENT
PAIN LOCATION: LEFT LEG
PAIN LOCATION - PAIN SEVERITY: 3/10
PAIN LOCATION - PAIN QUALITY: ACHE

## 2022-02-08 NOTE — Clinical Note
Patient needs additional medication education, she has been taking and stopping medications based on needs and how she is feeling that day.  She called [employer] the other day thinking that the Levothyroxine that she is taking has added stuff in it like what she saw in the news and it has been causing her difficulty breathing, panic attacks.  I was trying to sort it out but she was on her taking mode and it was hard to redirect her during my visit.  Her BP was elevated during this visit at 165/80 mmHg but she is currently not taking any BP meds.

## 2022-02-08 NOTE — CASE COMMUNICATION
noted  ----- Message -----  From: Melinda Boyd R.N.  Sent: 2/7/2022   6:52 PM PST  To: Mallika Espana R.N.        Missed SNV, unable to contact. . Went to see pt as schedueled , used doorbell  and knocked several times, no answer. Called pt's listed phone , no answer. ,

## 2022-02-09 ENCOUNTER — HOME CARE VISIT (OUTPATIENT)
Dept: HOME HEALTH SERVICES | Facility: HOME HEALTHCARE | Age: 77
End: 2022-02-09
Payer: MEDICARE

## 2022-02-09 VITALS
HEART RATE: 63 BPM | OXYGEN SATURATION: 94 % | TEMPERATURE: 97.6 F | RESPIRATION RATE: 20 BRPM | SYSTOLIC BLOOD PRESSURE: 152 MMHG | DIASTOLIC BLOOD PRESSURE: 90 MMHG

## 2022-02-09 PROCEDURE — G0299 HHS/HOSPICE OF RN EA 15 MIN: HCPCS

## 2022-02-09 PROCEDURE — G0156 HHCP-SVS OF AIDE,EA 15 MIN: HCPCS

## 2022-02-09 ASSESSMENT — ENCOUNTER SYMPTOMS
AGITATION: 1
NAUSEA: DENIES
DENIES PAIN: 1
LIMITED RANGE OF MOTION: 1
PERSON REPORTING PAIN: PATIENT
VOMITING: DENIES
MUSCLE WEAKNESS: 1

## 2022-02-09 ASSESSMENT — ACTIVITIES OF DAILY LIVING (ADL): AMBULATION ASSISTANCE: NON-AMBULATORY

## 2022-02-09 NOTE — Clinical Note
"Estephanie,  CNA was leaving when SN came. vital signs already taken, B/P 152/90.  Elmer, daughter came bringing prescription of lisinopril from [facility] (was discontinued already ). pt reported that she still have problem to her ears more to left ear now,  that she will go to ER because nobody wants to listen or take care of her .  daughter  told her that she doesnt need to go to ER , just go to urgent care . pt easily gets annoyed , irritated was shouting to her daughter  when she was giving suggestions to pt on how they  will do bathing or cleaning  when there is no aide visit..daughter didnt stay long and left. . dressing to left leg already done in ankle area. wound care done . pt has large reddened rashes?, irritatons , exocoriated  under left breast. pannus area bilateral groin ,inner thighs more to left side . CNA had applied nystatin powder after cleaning her to breast , pannus, bilateral groin, inner thighs areas, pt reported that the redness irritations comes and goes sometimes worst in one side , that nobody lsitens to her when she is telling that it is not new.. pt allowed photo taken for short time .   medications reviewed.new printed medlists provided .  pt cant remember if she took diflucan . pt has norvasc, informed her that it is for blood pressure , wrote on container , the importance of taking it to prevnt high blood pressure. states she will take it tomorrow. pt also has synthroid 50mcg , states she is not taking it because it doesnt make her feel good , like \"taking diet pill\" and she doesnt think it is the right dose. pt told this nurse that she wants to live in [place] , air is better than [place].. states she didnt sleep well last night ,had to go outside using her motorized chair. pt instructed on keeping breast areas, pannus, groin ,inner thigh areas clean and dry , apply nystatin powder 2 x a day.. reenforced on safety/fall precautions. [place] sn called SAMANTHA [place] [facility] [place] spoke to Deja" reported about large reddened ,raw areas, to breat pannus, groins, inner thighs and will notify DIMITRY Hunt . to call [facility] if she has questions or concerns.  Pt/Cg response to the services provided: denies chest pains, breathing problems or falls. Plan for the next visit  cont poc, wound consult

## 2022-02-09 NOTE — Clinical Note
----- Message -----  From: Cici Hunt P.A.-C.  Sent: 2/11/2022   4:57 AM PST  To: Mallika Espana R.N., Melinda Boyd R.N., *  Subject: Updates                                            I appreciate the ongoing updates and the documentation.  Our nurse Deja Reed has communicated our concerns with daughter and our recommendations.  Patient and daughter to be encouraged to look into [facility] or  residential scenarios going forward.  Thank you  ----- Message -----  From: Melinda Boyd R.N.  Sent: 2/9/2022   9:02 PM PST  To: Cici Hunt P.A.-C.    Estephanie,  CNA was leaving when SN came. vital signs already taken, B/P 152/90.  Elmer, daughter came bringing prescription of lisinopril from [facility] (was discontinued already ). pt reported that she still have problem to her ears more to left ear now,  that she will go to ER because nobody wants to listen or take care of her .  daughter  told her that she doesnt need to go to ER , just go to urgent care . pt easily gets annoyed , irritated was shouting to her daughter  when she was giving suggestions to pt on how they  will do bathing or cleaning  when there is no aide visit..daughter didnt stay long and left. . dressing to left leg already done in ankle area. wound care done . pt has large reddened rashes?, irritatons , exocoriated  under left breast. pannus area bilateral groin ,inner thighs more to left side . CNA had applied nystatin powder after cleaning her to breast , pannus, bilateral groin, inner thighs areas, pt reported that the redness irritations comes and goes sometimes worst in one side , that nobody lsitens to her when she is telling that it is not new.. pt allowed photo taken for short time .   medications reviewed.new printed medlists provided .  pt cant remember if she took diflucan . pt has norvasc, informed her that it is for blood pressure , wrote on container , the importance of taking it to prevnt high blood pressure. states she will take it  "tomorrow. pt also has synthroid 50mcg , states she is not taking it because it doesnt make her feel good , like \"taking diet pill\" and she doesnt think it is the right dose. pt told this nurse that she wants to live in [place] , air is better than [place].. states she didnt sleep well last night ,had to go outside using her motorized chair. pt instructed on keeping breast areas, pannus, groin ,inner thigh areas clean and dry , apply nystatin powder 2 x a day.. reenforced on safety/fall precautions. this sn called MATTHEW Phillips. ELLEN of [facility] , spoke to Deja reported about large reddened ,raw areas, to breat pannus, groins, inner thighs and will notify DIMITRY Hunt . to call [facility] if she has questions or concerns.  Pt/Cg response to the services provided: denies chest pains, breathing problems or falls. Plan for the next visit  cont poc, wound consult    "

## 2022-02-09 NOTE — CASE COMMUNICATION
FYI  Not taking meds. Continues to be very paranoid, thinks there is fentanyl in levothyroxin, called [employer] regarding issue. Will only take 1 med at a time. Most of the time will only sleep 2-3 hours a day. Unable to stay on topic when talking. BP continues to be up to 165/90 when checked.

## 2022-02-09 NOTE — CASE COMMUNICATION
noted  ----- Message -----  From: Navin Samuel, PT  Sent: 2/8/2022  10:55 PM PST  To: Mallika Espana R.N.      Patient needs additional medication education, she has been taking and stopping medications based on needs and how she is feeling that day.  She called [employer] the other day thinking that the Levothyroxine that she is taking has added stuff in it like what she saw in the news and it has been causing her difficulty breathing, panic  attacks.  I was trying to sort it out but she was on her taking mode and it was hard to redirect her during my visit.  Her BP was elevated during this visit at 165/80 mmHg but she is currently not taking any BP meds.

## 2022-02-10 ENCOUNTER — HOME CARE VISIT (OUTPATIENT)
Dept: HOME HEALTH SERVICES | Facility: HOME HEALTHCARE | Age: 77
End: 2022-02-10
Payer: MEDICARE

## 2022-02-10 NOTE — CASE COMMUNICATION
Lab requested for Friday & order written. If possible, making the psych eval urgent would be helpful but I am not sure she would cooperate. Yelling at both nurse & daughter today during visit.  ----- Message -----  From: Cici Hunt P.A.-C.  Sent: 2/9/2022   9:56 AM PST  To: Mallika Espana R.N., Tanvi Fernández, Med Ass't, *  Subject: Behaviors and other concerns                       Mallika, please obtain UA with C&S to rule out UTI.  Se fu needs a psych evaluation.  Deja, please call patient/POA to advise.  Tanvi, please refer to psychiatry.  Thank you  ----- Message -----  From: Mallika Espana R.N.  Sent: 2/9/2022   8:46 AM PST  To: Cici Hunt P.A.-C.    FYI  Not taking meds. Continues to be very paranoid, thinks there is fentanyl in levothyroxin, called [employer] regarding issue. Will only take 1 med at a time. Most of the time will only sleep 2-3 hours a day. Unable  to stay on topic when talking. BP continues to be up to 165/90 when checked.

## 2022-02-10 NOTE — CASE COMMUNICATION
----- Message -----  From: Deja Reed R.N.  Sent: 2/10/2022  12:39 PM PST  To: Mallika Espana R.N., Cici Hunt P.A.-C., *  Subject: RE: Behaviors and other concerns                   I have spoken with the daughter.    1. She feels patient will be more compliant with medication with a written list of medications and their purposes, as she does not remember this due to memory problems, and is then suspicious of them and unwilling  to take. I have provided that to her. I have asked her to let us know if this strategy is helpful, and if so, perhaps we can add back in other meds that were discontinued due to her unwillingness to take them.    2.  nurse Melinda reported to me that patient's rash to skin folds is worsening again. She is unsure if patient took diflucan, as is the daughter. The daughter is going to check her medications when she is there this afternoon a nd report back to me on this.    3. She feels that patient is overwhelmed and exhausted by too many daily appointments - she is currently having multiple appointments daily between Nursing, PT, OT, CNA, housekeeping, and occasionally social work. She would like to take over most or all bathing of patient, states that she is able to use slide board to transfer to bath without problems. She would like to reduce frequency of PT/OT to once  a week, preferably when she can be there so she can learn exercises and continue to work with patient herself. She would like to continue skilled nursing as currently scheduled. I don't know if this is feasible.    4. She very strongly feels that while patient has significant psychiatric issues, this is not new behavior and she does not want a psych evaluation. She does not feel that patient represents any danger to herself or others be yond her choice not to take medications.  ----- Message -----  From: Cici Hunt P.A.-C.  Sent: 2/9/2022   9:56 AM PST  To: Mallika Espana R.N., Tanvi Fernández, Med Ass't, *  Subject:  Behaviors and other concerns                     Mallika, please obtain UA with C&S to rule out UTI.  Patient needs a psych evaluation.  Deja, please call patient/POA to advise.  Tanvi, please refer to psychiatry.  Thank you  ----- Message -----  From: PETE Espana R.N.  Sent: 2/9/2022   8:46 AM PST  To: Cici Hunt P.A.-C.    FYI  Not taking meds. Continues to be very paranoid, thinks there is fentanyl in levothyroxin, called [employer] regarding issue. Will only take 1 med at a time. Most of the time will only sleep 2-3 hours a day. Unable to stay on topic when talking. BP continues to be up to 165/90 when checked.

## 2022-02-10 NOTE — CASE COMMUNICATION
"noted  ----- Message -----  From: Melinda Boyd R.N.  Sent: 2/9/2022   9:02 PM PST  To: MADELEINE Pink,  CNA was leaving when SN came. vital signs already taken, B/P 152/90.  Elmer, daughter came bringing prescription of lisinopril from [facility] (was discontinued already ). pt reported that she still have problem to her ears more to left ear now,  that she will go to ER because nobody wants to listen or take care of her .   daughter  told her that she doesnt need to go to ER , just go to urgent care . pt easily gets annoyed , irritated was shouting to her daughter  when she was giving suggestions to pt on how they  will do bathing or cleaning  when there is no aide visit..daughter didnt stay long and left. . dressing to left leg already done in ankle area. wound care done . pt has large reddened rashes?, irritatons , exocoriated  under left breast. pannus  area bilateral groin ,inner thighs more to left side . CNA had applied nystatin powder after cleaning her to breast , pannus, bilateral groin, inner thighs areas, pt reported that the redness irritations comes and goes sometimes worst in one side , that nobody lsitens to her when she is telling that it is not new.. pt allowed photo taken for short time .   medications reviewed.new printed medlists provided .  pt cant remember if she to ok diflucan . pt has norvasc, informed her that it is for blood pressure , wrote on container , the importance of taking it to prevnt high blood pressure. states she will take it tomorrow. pt also has synthroid 50mcg , states she is not taking it because it doesnt make her feel good , like \"taking diet pill\" and she doesnt think it is the right dose. pt told this nurse that she wants to live in [place] , air is better than [place].. st ates she didnt sleep well last night ,had to go outside using her motorized chair. pt instructed on keeping breast areas, pannus, groin ,inner thigh areas clean and dry , apply nystatin " powder 2 x a day.. reenforced on safety/fall precautions. this sn called MATTHEW Phillips. C of [facility] [place] spoke to Deja reported about large reddened ,raw areas, to breat pannus, groins, inner thighs and will notify DIMITRY Hunt . to call [facility] if she has questio ns or concerns.  Pt/Cg response to the services provided: denies chest pains, breathing problems or falls. Plan for the next visit  cont poc, wound consult

## 2022-02-10 NOTE — CASE COMMUNICATION
----- Message -----  From: Cici Hunt P.A.-C.  Sent: 2/9/2022   4:42 PM PST  To: Mallika Espana R.N., Deja Reed R.N.  Subject: RE:Behaviors and other concerns                    Patient may need a legal hold if she is danger to herself/others.  May benefit from [facility] admission, though she will likely refuse.  Thank you for update.    ----- Message -----  From: Mallika Espana R.N.  Sent: 2/9/2022   4:34 PM PST  To: Cici ahuja P.A.-C.  Subject: RE:Behaviors and other concerns                  Lab requested for Friday & order written. If possible, making the psych eval urgent would be helpful but I am not sure she would cooperate. Yelling at both nurse & daughter today during visit.  ----- Message -----  From: Cici Hunt P.A.-C.  Sent: 2/9/2022   9:56 AM PST  To: Mallika Espana R.N., Tanvi Fernández, Med Ass't, *  Subject: Behaviors and other concerns                        Mallika, please obtain UA with C&S to rule out UTI.  Patient needs a psych evaluation.  Deja, please call patient/POA to advise.  Tanvi, please refer to psychiatry.  Thank you  ----- Message -----  From: Mallika Espana R.N.  Sent: 2/9/2022   8:46 AM PST  To: Cici Hunt P.A.-C.    FYI  Not taking meds. Continues to be very paranoid, thinks there is fentanyl in levothyroxin, called [employer] regarding issue. Will only take 1  med at a time. Most of the time will only sleep 2-3 hours a day. Unable to stay on topic when talking. BP continues to be up to 165/90 when checked.

## 2022-02-10 NOTE — CASE COMMUNICATION
noted  ----- Message -----  From: Deja Reed R.N.  Sent: 2/10/2022  12:39 PM PST  To: Mallika Espana R.N., Cici Hunt P.A.-C., *  Subject: RE: Behaviors and other concerns                   I have spoken with the daughter.    1. She feels patient will be more compliant with medication with a written list of medications and their purposes, as she does not remember this due to memory problems, and is then suspicious of them and unwil ling to take. I have provided that to her. I have asked her to let us know if this strategy is helpful, and if so, perhaps we can add back in other meds that were discontinued due to her unwillingness to take them.    2.  nurse Melinda reported to me that patient's rash to skin folds is worsening again. She is unsure if patient took diflucan, as is the daughter. The daughter is going to check her medications when she is there this aftern oon and report back to me on this.    3. She feels that patient is overwhelmed and exhausted by too many daily appointments - she is currently having multiple appointments daily between Nursing, PT, OT, CNA, housekeeping, and occasionally social work. She would like to take over most or all bathing of patient, states that she is able to use slide board to transfer to bath without problems. She would like to reduce frequency of PT/OT to  once a week, preferably when she can be there so she can learn exercises and continue to work with patient herself. She would like to continue skilled nursing as currently scheduled. I don't know if this is feasible.    4. She very strongly feels that while patient has significant psychiatric issues, this is not new behavior and she does not want a psych evaluation. She does not feel that patient represents any danger to herself or othe rs beyond her choice not to take medications.  ----- Message -----  From: Cici Hunt P.A.-C.  Sent: 2/9/2022   9:56 AM PST  To: Mallika Espana R.N., Tanvi Fernández, Med Ass't,  *  Subject: Behaviors and other concerns                     Mallika, please obtain UA with C&S to rule out UTI.  Patient needs a psych evaluation.  Deja, please call patient/POA to advise.  Tanvi, please refer to psychiatry.  Thank you  ----- Message -----  Fr om: Mallika Espana R.N.  Sent: 2/9/2022   8:46 AM PST  To: Cici Hunt P.A.-C.    FYI  Not taking meds. Continues to be very paranoid, thinks there is fentanyl in levothyroxin, called [employer] regarding issue. Will only take 1 med at a time. Most of the time will only sleep 2-3 hours a day. Unable to stay on topic when talking. BP continues to be up to 165/90 when checked.

## 2022-02-10 NOTE — Clinical Note
PT arrived for scheduled PT visit, patient did not answer the door or her phone.  To reschedule visit for next week.

## 2022-02-11 ENCOUNTER — HOSPITAL ENCOUNTER (OUTPATIENT)
Facility: MEDICAL CENTER | Age: 77
End: 2022-02-11
Attending: PHYSICIAN ASSISTANT
Payer: MEDICARE

## 2022-02-11 ENCOUNTER — HOME CARE VISIT (OUTPATIENT)
Dept: HOME HEALTH SERVICES | Facility: HOME HEALTHCARE | Age: 77
End: 2022-02-11
Payer: MEDICARE

## 2022-02-11 VITALS
RESPIRATION RATE: 20 BRPM | TEMPERATURE: 99.2 F | SYSTOLIC BLOOD PRESSURE: 142 MMHG | HEART RATE: 80 BPM | DIASTOLIC BLOOD PRESSURE: 92 MMHG | OXYGEN SATURATION: 95 %

## 2022-02-11 LAB
APPEARANCE UR: CLEAR
BACTERIA #/AREA URNS HPF: ABNORMAL /HPF
BILIRUB UR QL STRIP.AUTO: NEGATIVE
COLOR UR: YELLOW
EPI CELLS #/AREA URNS HPF: ABNORMAL /HPF
GLUCOSE UR STRIP.AUTO-MCNC: NEGATIVE MG/DL
KETONES UR STRIP.AUTO-MCNC: NEGATIVE MG/DL
LEUKOCYTE ESTERASE UR QL STRIP.AUTO: NEGATIVE
MICRO URNS: ABNORMAL
MUCOUS THREADS #/AREA URNS HPF: ABNORMAL /HPF
NITRITE UR QL STRIP.AUTO: NEGATIVE
PH UR STRIP.AUTO: 6.5 [PH] (ref 5–8)
PROT UR QL STRIP: 30 MG/DL
RBC # URNS HPF: ABNORMAL /HPF
RBC UR QL AUTO: NEGATIVE
SP GR UR STRIP.AUTO: 1.02
UNIDENT CRYS URNS QL MICRO: ABNORMAL /HPF
WBC #/AREA URNS HPF: ABNORMAL /HPF

## 2022-02-11 PROCEDURE — G0299 HHS/HOSPICE OF RN EA 15 MIN: HCPCS

## 2022-02-11 PROCEDURE — 81001 URINALYSIS AUTO W/SCOPE: CPT

## 2022-02-11 ASSESSMENT — ENCOUNTER SYMPTOMS
PAIN LOCATION - EXACERBATING FACTORS: NONE IDENTIFIED
PAIN LOCATION - PAIN SEVERITY: 3/10
CHEST TIGHTNESS: 1
PAIN LOCATION: LEFT LEG
LOWEST PAIN SEVERITY IN PAST 24 HOURS: 2/10
SHORTNESS OF BREATH: 1
PERSON REPORTING PAIN: PATIENT
PAIN LOCATION - PAIN QUALITY: STABBING
NAUSEA: NO
SUBJECTIVE PAIN PROGRESSION: UNCHANGED
PAIN SEVERITY GOAL: 2/10
DYSPNEA ACTIVITY LEVEL: AT REST
VOMITING: NO
PAIN LOCATION - PAIN FREQUENCY: INTERMITTENT
HIGHEST PAIN SEVERITY IN PAST 24 HOURS: 5/10
PAIN: 1
DEPRESSED MOOD: 1

## 2022-02-11 NOTE — CASE COMMUNICATION
Thank you  ----- Message -----  From: Cici Hunt P.A.-C.  Sent: 2/11/2022   4:57 AM PST  To: Mallika Espana R.N., Melinda Boyd R.N., *  Subject: Updates                                            I appreciate the ongoing updates and the documentation.  Our nurse Deja Reed has communicated our concerns with daughter and our recommendations.  Patient and daughter to be encouraged to look into [facility] or  residential scenarios going  forward.  Thank you  ----- Message -----  From: Melinda Boyd R.N.  Sent: 2/9/2022   9:02 PM PST  To: Cici Hunt P.A.-C.    Estephanie,  CNA was leaving when SN came. vital signs already taken, B/P 152/90.  Elmer, daughter came bringing prescription of lisinopril from [facility] (was discontinued already ). pt reported that she still have problem to her ears more to left ear now,  that she will go to ER because nobody wants to listen or edda e care of her .  daughter  told her that she doesnt need to go to ER , just go to urgent care . pt easily gets annoyed , irritated was shouting to her daughter  when she was giving suggestions to pt on how they  will do bathing or cleaning  when there is no aide visit..daughter didnt stay long and left. . dressing to left leg already done in ankle area. wound care done . pt has large reddened rashes?, irritatons , exocoriated  under lef t breast. pannus area bilateral groin ,inner thighs more to left side . CNA had applied nystatin powder after cleaning her to breast , pannus, bilateral groin, inner thighs areas, pt reported that the redness irritations comes and goes sometimes worst in one side , that nobody lsitens to her when she is telling that it is not new.. pt allowed photo taken for short time .   medications reviewed.new printed medlists provided .  pt cant re member if she took diflucan . pt has norvasc, informed her that it is for blood pressure , wrote on container , the importance of taking it to prevnt high blood pressure. states she  "will take it tomorrow. pt also has synthroid 50mcg , states she is not taking it because it doesnt make her feel good , like \"taking diet pill\" and she doesnt think it is the right dose. pt told this nurse that she wants to live in [place] , air is ruthy r than [place].. states she didnt sleep well last night ,had to go outside using her motorized chair. pt instructed on keeping breast areas, pannus, groin ,inner thigh areas clean and dry , apply nystatin powder 2 x a day.. reenforced on safety/fall precautions. this sn called SAMANTHA Phillips [place] [facility] [place] spoke to Deja sanford about large reddened ,raw areas, to breat pannus, groins, inner thighs and will notify DIMITRY Hunt . to call [facility] if  she has questions or concerns.  Pt/Cg response to the services provided: denies chest pains, breathing problems or falls. Plan for the next visit  cont poc, wound consult    "

## 2022-02-11 NOTE — CASE COMMUNICATION
noted  ----- Message -----  From: Navin Samuel, PT  Sent: 2/10/2022  10:22 PM PST  To: Mallika Espana R.N.      PT arrived for scheduled PT visit, patient did not answer the door or her phone.  To reschedule visit for next week.

## 2022-02-11 NOTE — CASE COMMUNICATION
----- Message -----  From: Navin Samuel, PT  Sent: 2/10/2022  10:22 PM PST  To: Mallika Espana R.N.      PT arrived for scheduled PT visit, patient did not answer the door or her phone.  To reschedule visit for next week.

## 2022-02-11 NOTE — Clinical Note
"FYI  Daughter present for HH visit. She came before HH RN and assisted pt with shower. States she can do this on Fridays. Persistent candidiasis under breasts and pannus. Pt states she always has it - \"just goes from one side to the other.\" Cannot adequately apply antifungal. Triamcinolone applied. She has difficult time lifting skin folds and then applying antifungal. Would be good if she had cloth or something to keep skin folds . LLE lateral skin breakdown almost totally healed. Small thin crusted area remains. Triamcinolone applied to that area. Only need to use Aquacel Ag+ over this area now. Moisturizer applied to the rest of the leg. Coflex 2-layer compression dressing. Pt has no interest in losing weight. Has stopped taking all her medications and states she will start them one at a time. Daughter has printed out a list of her medications, what they are for, when to take them, and posted on wall for pt to see. Pt is most concerned about her thyroid. Is convinced what she has is generic and does not work the same as the non-generic. Would like PCP to write prescription for non-generic. Plans to start with low dose and gradually increase until at prescribed dose. She is not using medication boxes given to her. Is deciding if she will let daughter fill them. Hypertensive with /92 but not taking medication. Unlikely pt will change her self-care behaviors. UA collected at 1335 after pt showered using \"hat\" collection container under toilet seat. Taken to [facility].     "

## 2022-02-14 ENCOUNTER — HOME CARE VISIT (OUTPATIENT)
Dept: HOME HEALTH SERVICES | Facility: HOME HEALTHCARE | Age: 77
End: 2022-02-14
Payer: MEDICARE

## 2022-02-14 VITALS
TEMPERATURE: 97.4 F | SYSTOLIC BLOOD PRESSURE: 140 MMHG | OXYGEN SATURATION: 96 % | HEART RATE: 65 BPM | RESPIRATION RATE: 20 BRPM | DIASTOLIC BLOOD PRESSURE: 70 MMHG

## 2022-02-14 VITALS
HEART RATE: 63 BPM | OXYGEN SATURATION: 94 % | RESPIRATION RATE: 20 BRPM | TEMPERATURE: 97.6 F | SYSTOLIC BLOOD PRESSURE: 152 MMHG | DIASTOLIC BLOOD PRESSURE: 90 MMHG

## 2022-02-14 PROCEDURE — G0152 HHCP-SERV OF OT,EA 15 MIN: HCPCS

## 2022-02-14 PROCEDURE — G0299 HHS/HOSPICE OF RN EA 15 MIN: HCPCS

## 2022-02-14 ASSESSMENT — ENCOUNTER SYMPTOMS
DEPRESSED MOOD: 1
POOR JUDGMENT: 1
SEVERE DYSPNEA: 1
DENIES PAIN: 1

## 2022-02-15 ENCOUNTER — HOME CARE VISIT (OUTPATIENT)
Dept: HOME HEALTH SERVICES | Facility: HOME HEALTHCARE | Age: 77
End: 2022-02-15
Payer: MEDICARE

## 2022-02-15 VITALS
DIASTOLIC BLOOD PRESSURE: 90 MMHG | OXYGEN SATURATION: 99 % | SYSTOLIC BLOOD PRESSURE: 150 MMHG | RESPIRATION RATE: 18 BRPM | HEART RATE: 82 BPM | TEMPERATURE: 98.3 F

## 2022-02-15 VITALS
SYSTOLIC BLOOD PRESSURE: 160 MMHG | OXYGEN SATURATION: 92 % | TEMPERATURE: 97.6 F | RESPIRATION RATE: 18 BRPM | HEART RATE: 65 BPM | DIASTOLIC BLOOD PRESSURE: 80 MMHG

## 2022-02-15 PROCEDURE — G0151 HHCP-SERV OF PT,EA 15 MIN: HCPCS

## 2022-02-15 ASSESSMENT — ENCOUNTER SYMPTOMS
MUSCLE WEAKNESS: 1
POOR JUDGMENT: 1
THOUGHT CONTENT - SUSPICIOUS: 1
DENIES PAIN: 1
PAIN: PATIENT DENIES PAIN DURING THIS VISIT
PERSON REPORTING PAIN: PATIENT
LIMITED RANGE OF MOTION: 1
DENIES PAIN: 1
POOR JUDGMENT: 1
MUSCLE WEAKNESS: 1

## 2022-02-15 ASSESSMENT — ACTIVITIES OF DAILY LIVING (ADL): AMBULATION ASSISTANCE: NON-AMBULATORY

## 2022-02-15 NOTE — CASE COMMUNICATION
noted  ----- Message -----  From: JANELLE KeithNTorresATorres  Sent: 2/14/2022   4:41 PM PST  To: Mallika Espana R.N.      Pt has very irritated red skin under left breast, under stomach, and between her legs. MEHRAN Lawrence and supervisor Hanh are notified.

## 2022-02-15 NOTE — CASE COMMUNICATION
Pt has very irritated red skin under left breast, under stomach, and between her legs. MEHRAN Lawrence and supervisor Hanh are notified.

## 2022-02-15 NOTE — CASE COMMUNICATION
"noted  ----- Message -----  From: Makenna Christine R.N.  Sent: 2/15/2022   6:36 AM PST  To: Mallika Espana R.N.      WALLACE  Daughter present for HH visit. She came before HH RN and assisted pt with shower. States she can do this on Fridays. Persistent candidiasis under breasts and pannus. Pt states she always has it - \"just goes from one side to the other.\" Cannot adequately apply antifungal. Triamcinolone applied. She has difficult time liftin g skin folds and then applying antifungal. Would be good if she had cloth or something to keep skin folds . LLE lateral skin breakdown almost totally healed. Small thin crusted area remains. Triamcinolone applied to that area. Only need to use Aquacel Ag+ over this area now. Moisturizer applied to the rest of the leg. Coflex 2-layer compression dressing. Pt has no interest in losing weight. Has stopped taking all her medication s and states she will start them one at a time. Daughter has printed out a list of her medications, what they are for, when to take them, and posted on wall for pt to see. Pt is most concerned about her thyroid. Is convinced what she has is generic and does not work the same as the non-generic. Would like PCP to write prescription for non-generic. Plans to start with low dose and gradually increase until at prescribed dose. She is not u sing medication boxes given to her. Is deciding if she will let daughter fill them. Hypertensive with /92 but not taking medication. Unlikely pt will change her self-care behaviors. UA collected at 1335 after pt showered using \"hat\" collection container under toilet seat. Taken to [email or web address].     "

## 2022-02-15 NOTE — Clinical Note
Patient's BP outside homehealth parameter during this visit: 160/80.  Patient verbalized that she will continue to take medications based on how she feels she should take them and not on how home health RNs tell her to do.

## 2022-02-16 ENCOUNTER — HOME CARE VISIT (OUTPATIENT)
Dept: HOME HEALTH SERVICES | Facility: HOME HEALTHCARE | Age: 77
End: 2022-02-16
Payer: MEDICARE

## 2022-02-16 PROCEDURE — G0299 HHS/HOSPICE OF RN EA 15 MIN: HCPCS

## 2022-02-16 NOTE — CASE COMMUNICATION
noted  ----- Message -----  From: Navin Samuel, PT  Sent: 2/15/2022   8:08 PM PST  To: Mallika Espana R.N.      Patient's BP outside homehealth parameter during this visit: 160/80.  Patient verbalized that she will continue to take medications based on how she feels she should take them and not on how home health RNs tell her to do.

## 2022-02-16 NOTE — CASE COMMUNICATION
FYI  /90  : LLE only small eschar left. candidiasis under breasts light pink today but under pannus is bright red. Placed interdry under breasts & pannus.Explained use.Stated took amlodipine & levothyroxine today, unable to assess other med

## 2022-02-17 ENCOUNTER — HOME CARE VISIT (OUTPATIENT)
Dept: HOME HEALTH SERVICES | Facility: HOME HEALTHCARE | Age: 77
End: 2022-02-17
Payer: MEDICARE

## 2022-02-17 VITALS
DIASTOLIC BLOOD PRESSURE: 98 MMHG | OXYGEN SATURATION: 95 % | SYSTOLIC BLOOD PRESSURE: 160 MMHG | RESPIRATION RATE: 18 BRPM | HEART RATE: 77 BPM | TEMPERATURE: 97.8 F

## 2022-02-17 PROCEDURE — G0151 HHCP-SERV OF PT,EA 15 MIN: HCPCS

## 2022-02-17 ASSESSMENT — ENCOUNTER SYMPTOMS
PAIN LOCATION: GENERALIZED
PAIN LOCATION - PAIN DURATION: ONGOING
PAIN: 1
PAIN LOCATION - PAIN FREQUENCY: INTERMITTENT
PERSON REPORTING PAIN: PATIENT
HIGHEST PAIN SEVERITY IN PAST 24 HOURS: 5/10
LOWEST PAIN SEVERITY IN PAST 24 HOURS: 0/10
PAIN LOCATION - PAIN SEVERITY: 5/10
SUBJECTIVE PAIN PROGRESSION: UNCHANGED
MUSCLE WEAKNESS: 1
PAIN LOCATION - PAIN QUALITY: THROBBING
PAIN SEVERITY GOAL: 0/10
POOR JUDGMENT: 1

## 2022-02-17 ASSESSMENT — ACTIVITIES OF DAILY LIVING (ADL)
AMBULATION ASSISTANCE: NON-AMBULATORY
CURRENT_FUNCTION: STAND BY ASSIST

## 2022-02-17 NOTE — Clinical Note
Please request for additional visit 1w1 effective week of 2/21/2022 to facilitate dc and finalize all safety instructions.

## 2022-02-17 NOTE — CASE COMMUNICATION
visit 2/16  /98, daughter left as nurse arrived, appeared to be some conflict. Continues to take meds as desired & only took 1/2 of her levothyroxine today. Redness under breasts & pannus much better with interdry although was not on when I arrived

## 2022-02-18 ENCOUNTER — HOME CARE VISIT (OUTPATIENT)
Dept: HOME HEALTH SERVICES | Facility: HOME HEALTHCARE | Age: 77
End: 2022-02-18
Payer: MEDICARE

## 2022-02-18 ENCOUNTER — TELEPHONE (OUTPATIENT)
Dept: SCHEDULING | Facility: IMAGING CENTER | Age: 77
End: 2022-02-18
Payer: MEDICARE

## 2022-02-18 PROCEDURE — G0493 RN CARE EA 15 MIN HH/HOSPICE: HCPCS

## 2022-02-19 NOTE — TELEPHONE ENCOUNTER
Hello,      The pt's daughter Elmer called in and stated that pt is not able to have transportation for the hospital follow up with Dr. Bhakta on 02- and cancelled the appt. I tried to reschedule the appt but I am not finding any appts available at the [facility] location and or the [facility] location for hospital follow ups. Is someone able to help with scheduling this pt's hospital follow up appointment and looking into this for me?         Best contact for pt's daughter Elmer  PH: [phone]    Thank you,  Allegra RAMIREZ

## 2022-02-20 VITALS
RESPIRATION RATE: 18 BRPM | DIASTOLIC BLOOD PRESSURE: 85 MMHG | OXYGEN SATURATION: 96 % | SYSTOLIC BLOOD PRESSURE: 160 MMHG | HEART RATE: 68 BPM | TEMPERATURE: 97.5 F

## 2022-02-20 ASSESSMENT — ENCOUNTER SYMPTOMS
POOR JUDGMENT: 1
PERSON REPORTING PAIN: PATIENT
PAIN SEVERITY GOAL: 0/10
PAIN LOCATION: LEFT LEG
LOWEST PAIN SEVERITY IN PAST 24 HOURS: 4/10
DIFFICULTY THINKING: 1
PAIN LOCATION - PAIN SEVERITY: 4/10
PAIN LOCATION - PAIN FREQUENCY: CONSTANT
PAIN LOCATION - PAIN QUALITY: ACHY
HIGHEST PAIN SEVERITY IN PAST 24 HOURS: 6/10
SUBJECTIVE PAIN PROGRESSION: UNCHANGED
PAIN LOCATION - PAIN DURATION: CHRONIC
PAIN: 1

## 2022-02-21 ENCOUNTER — HOME CARE VISIT (OUTPATIENT)
Dept: HOME HEALTH SERVICES | Facility: HOME HEALTHCARE | Age: 77
End: 2022-02-21
Payer: MEDICARE

## 2022-02-21 VITALS
OXYGEN SATURATION: 97 % | RESPIRATION RATE: 18 BRPM | HEART RATE: 73 BPM | SYSTOLIC BLOOD PRESSURE: 172 MMHG | TEMPERATURE: 97.6 F | DIASTOLIC BLOOD PRESSURE: 90 MMHG

## 2022-02-21 PROCEDURE — 665001 SOC-HOME HEALTH

## 2022-02-21 PROCEDURE — G0493 RN CARE EA 15 MIN HH/HOSPICE: HCPCS

## 2022-02-21 PROCEDURE — G0152 HHCP-SERV OF OT,EA 15 MIN: HCPCS

## 2022-02-21 ASSESSMENT — ENCOUNTER SYMPTOMS
SUBJECTIVE PAIN PROGRESSION: UNCHANGED
MUSCLE WEAKNESS: 1
PAIN: 1
PAIN LOCATION - PAIN FREQUENCY: INTERMITTENT
PAIN LOCATION: BACK
HIGHEST PAIN SEVERITY IN PAST 24 HOURS: 6/10
PAIN LOCATION - PAIN DURATION: ONGOING
PAIN LOCATION - PAIN QUALITY: ACHE
PAIN SEVERITY GOAL: 0/10
PAIN LOCATION - PAIN SEVERITY: 5/10
POOR JUDGMENT: 1
PERSON REPORTING PAIN: PATIENT
LOWEST PAIN SEVERITY IN PAST 24 HOURS: 3/10

## 2022-02-21 ASSESSMENT — ACTIVITIES OF DAILY LIVING (ADL)
CURRENT_FUNCTION: STAND BY ASSIST
AMBULATION ASSISTANCE: NON-AMBULATORY

## 2022-02-21 NOTE — Clinical Note
"Patient declined further OT tx.  Completion of occupational therapy discharge 02/21/2022 with partial goals met.    Barriers to goals include poor carryover of instruction, poor environmental/safety awareness, resistant to \"Safe\" transfer techniques."

## 2022-02-21 NOTE — CASE COMMUNICATION
noted  ----- Message -----  From: Navin Samuel, PT  Sent: 2/20/2022   6:01 PM PST  To: Mallika Espana R.N.      Please request for additional visit 1w1 effective week of 2/21/2022 to facilitate dc and finalize all safety instructions.

## 2022-02-22 ENCOUNTER — HOME CARE VISIT (OUTPATIENT)
Dept: HOME HEALTH SERVICES | Facility: HOME HEALTHCARE | Age: 77
End: 2022-02-22
Payer: MEDICARE

## 2022-02-22 VITALS
DIASTOLIC BLOOD PRESSURE: 80 MMHG | RESPIRATION RATE: 18 BRPM | TEMPERATURE: 7.8 F | OXYGEN SATURATION: 97 % | SYSTOLIC BLOOD PRESSURE: 130 MMHG | HEART RATE: 75 BPM

## 2022-02-22 VITALS
RESPIRATION RATE: 18 BRPM | TEMPERATURE: 97.8 F | DIASTOLIC BLOOD PRESSURE: 82 MMHG | HEART RATE: 71 BPM | SYSTOLIC BLOOD PRESSURE: 164 MMHG | OXYGEN SATURATION: 94 %

## 2022-02-22 ASSESSMENT — ACTIVITIES OF DAILY LIVING (ADL)
GROOMING_ONE_PERSON: 1
WASHING_LB_CURRENT_FUNCTION: MAXIMUM ASSIST
USING THE TELPHONE: INDEPENDENT
SPONGING_LB_CURRENT_FUNCTION: MAXIMUM ASSIST
SPONGING_UB_CURRENT_FUNCTION: ONE PERSON
CURRENT_FUNCTION: CONTACT GUARD ASSIST
BATHING_CURRENT_FUNCTION: ONE PERSON
BATHING_CURRENT_FUNCTION: MODERATE ASSIST
SPONGING_UB_CURRENT_FUNCTION: MAXIMUM ASSIST
FEEDING_WITHIN_DEFINED_LIMITS: 1
WASHING_HAIR_CURRENT_FUNCTION: ONE PERSON
WASHING_UPB_CURRENT_FUNCTION: MAXIMUM ASSIST
BATHING ASSESSED: 1
WASHING_BACK_CURRENT_FUNCTION: ONE PERSON
WASHING_UPB_CURRENT_FUNCTION: ONE PERSON
SPONGING_LB_CURRENT_FUNCTION: ONE PERSON
WASHING_HAIR_CURRENT_FUNCTION: DEPENDENT
GROOMING_DEPENDENT_CURRENT_FUNCTION: 1
PHYSICAL TRANSFERS ASSESSED: 1
FEEDING: INDEPENDENT
FEEDING ASSESSED: 1
TELEPHONE USE ASSESSED: 1
WASHING_LB_CURRENT_FUNCTION: ONE PERSON
WASHING_BACK_CURRENT_FUNCTION: DEPENDENT

## 2022-02-22 ASSESSMENT — ENCOUNTER SYMPTOMS
DENIES PAIN: 1
POOR JUDGMENT: 1
SEVERE DYSPNEA: 1
POOR JUDGMENT: 1

## 2022-02-22 NOTE — TELEPHONE ENCOUNTER
Called and spoke with Elmer soto.  Scheduled pt a Hospital follow up on 03/11/22 with Dr Love at [facility]

## 2022-02-23 ENCOUNTER — APPOINTMENT (OUTPATIENT)
Dept: SLEEP MEDICINE | Facility: MEDICAL CENTER | Age: 77
End: 2022-02-23
Payer: MEDICARE

## 2022-02-24 ENCOUNTER — HOME CARE VISIT (OUTPATIENT)
Dept: HOME HEALTH SERVICES | Facility: HOME HEALTHCARE | Age: 77
End: 2022-02-24
Payer: MEDICARE

## 2022-02-24 VITALS
DIASTOLIC BLOOD PRESSURE: 80 MMHG | SYSTOLIC BLOOD PRESSURE: 138 MMHG | OXYGEN SATURATION: 95 % | RESPIRATION RATE: 18 BRPM | TEMPERATURE: 97.8 F | HEART RATE: 76 BPM

## 2022-02-24 PROCEDURE — G0151 HHCP-SERV OF PT,EA 15 MIN: HCPCS

## 2022-02-24 ASSESSMENT — ENCOUNTER SYMPTOMS
PAIN LOCATION: LEFT LEG
PAIN LOCATION - PAIN QUALITY: ACHY
HIGHEST PAIN SEVERITY IN PAST 24 HOURS: 4/10
PAIN LOCATION - PAIN SEVERITY: 3/10
PAIN SEVERITY GOAL: 1/10
PAIN LOCATION - PAIN DURATION: CHRONIC
PERSON REPORTING PAIN: PATIENT
PAIN: 1
SUBJECTIVE PAIN PROGRESSION: UNCHANGED
PAIN LOCATION - PAIN FREQUENCY: CONSTANT
POOR JUDGMENT: 1
LOWEST PAIN SEVERITY IN PAST 24 HOURS: 3/10

## 2022-02-24 ASSESSMENT — ACTIVITIES OF DAILY LIVING (ADL)
HOME_HEALTH_OASIS: 01
OASIS_M1830: 02

## 2022-02-24 ASSESSMENT — PATIENT HEALTH QUESTIONNAIRE - PHQ9: CLINICAL INTERPRETATION OF PHQ2 SCORE: 0

## 2022-02-24 NOTE — CASE COMMUNICATION
"noted  ----- Message -----  From: Ghassan Delgado MS,OTR/L  Sent: 2/22/2022   5:53 PM PST  To: Mallika Espana R.N.      Patient declined further OT tx.  Completion of occupational therapy discharge 02/21/2022 with partial goals met.    Barriers to goals include poor carryover of instruction, poor environmental/safety awareness, resistant to \"Safe\" transfer techniques."

## 2022-02-25 ENCOUNTER — HOME CARE VISIT (OUTPATIENT)
Dept: HOME HEALTH SERVICES | Facility: HOME HEALTHCARE | Age: 77
End: 2022-02-25
Payer: MEDICARE

## 2022-02-25 NOTE — CASE COMMUNICATION
Quality Review for 2.24.22 [place] performed on by NAINA Holliday RN on 2.25.2022:    Edits completed by NAINA Holliday RN:  1. Changed  C,E to yes per POC

## 2022-02-26 NOTE — CASE COMMUNICATION
noted  ----- Message -----  From: Navin Samuel, PT  Sent: 2/24/2022   9:12 PM PST  To: Mallika Espana R.N.      Patient discharged from home health agency effective 02/24/2022 with goals partially met.

## 2022-02-28 ENCOUNTER — HOME CARE VISIT (OUTPATIENT)
Dept: HOME HEALTH SERVICES | Facility: HOME HEALTHCARE | Age: 77
End: 2022-02-28
Payer: MEDICARE

## 2022-02-28 NOTE — CASE COMMUNICATION
I agree with the change. Thanks  ----- Message -----  From: Svetlana Holliday R.N.  Sent: 2/25/2022   6:12 AM PST  To: Navin Samuel PT      Quality Review for 2.24.22 [place] performed on by NAINA Holliday RN on 2.25.2022:    Edits completed by NAINA Holliday RN:  1. Changed  C,E to yes per POC

## 2022-03-11 ENCOUNTER — OFFICE VISIT (OUTPATIENT)
Dept: SLEEP MEDICINE | Facility: MEDICAL CENTER | Age: 77
End: 2022-03-11
Payer: MEDICARE

## 2022-03-11 VITALS
BODY MASS INDEX: 57.52 KG/M2 | SYSTOLIC BLOOD PRESSURE: 134 MMHG | WEIGHT: 293 LBS | OXYGEN SATURATION: 96 % | DIASTOLIC BLOOD PRESSURE: 84 MMHG | HEIGHT: 60 IN | HEART RATE: 69 BPM

## 2022-03-11 DIAGNOSIS — R06.09 DYSPNEA ON EXERTION: ICD-10-CM

## 2022-03-11 DIAGNOSIS — J45.30 MILD PERSISTENT ASTHMA WITHOUT COMPLICATION: ICD-10-CM

## 2022-03-11 DIAGNOSIS — G47.33 OSA (OBSTRUCTIVE SLEEP APNEA): ICD-10-CM

## 2022-03-11 PROCEDURE — 99214 OFFICE O/P EST MOD 30 MIN: CPT | Performed by: INTERNAL MEDICINE

## 2022-03-11 ASSESSMENT — ENCOUNTER SYMPTOMS
MYALGIAS: 0
MEMORY LOSS: 1
SHORTNESS OF BREATH: 1
COUGH: 0
ORTHOPNEA: 0
VOMITING: 0
WEIGHT LOSS: 0
SORE THROAT: 0
LOSS OF CONSCIOUSNESS: 0
EYE DISCHARGE: 0
DIARRHEA: 0
FEVER: 0
STRIDOR: 0
CHILLS: 0
SINUS PAIN: 0
ABDOMINAL PAIN: 0
DIZZINESS: 0
EYE PAIN: 0
SPUTUM PRODUCTION: 0
SENSORY CHANGE: 0
HEADACHES: 0
NAUSEA: 0
WHEEZING: 0
FOCAL WEAKNESS: 0

## 2022-03-11 ASSESSMENT — FIBROSIS 4 INDEX: FIB4 SCORE: 1.46

## 2022-03-11 NOTE — ASSESSMENT & PLAN NOTE
Has the same machine and has not been followed up in nearly 2 decades  Manifesting symptoms suggestive of the current settings insufficient, daytime fatigue, breathlessness, memory loss  --Sleep referral placed

## 2022-03-11 NOTE — PROGRESS NOTES
Pulmonary Clinic Note    Chief Complaint:  Chief Complaint   Patient presents with   • Hospital Follow-up     ED 01/01/22, Pulm. Consult 01/01/22   • Other     CXR 01/01/22     HPI:   Cecile Teixeira is a very pleasant 76 y.o. female never smoker hospitalized in 1/2022 for broken CPAP machine and inability to sleep.  Has been on CPAP for years however has not followed up in the sleep clinic for a number of years as well.  Since discharge, she continues to use her old CPAP machine she is unsure if it is functioning or not.  She endorses memory issues and daytime fatigue.    Functionally, Tiffanie is wheelchair-bound due to severe lymphedema.  She is able to walk 1-2 steps but gets easily short of breath and weak.  Denies any chest pain or LOC.    She has a past medical history of asthma on Flovent and albuterol as needed, which is usually 1 time per day.  She reports good inhaler technique and to rinse his mouth after use of ICS.  Has a long history of eczema/atopy.  No eosinophilia on recent CBC with differential.    In her youth she was very active, riding bikes and cross-country skiing.  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   • Chest pain    • Cough    • GERD (gastroesophageal reflux disease)    • Hypothyroidism    • Osteoarthritis    • Painful breathing    • Shortness of breath    • Sleep apnea    • Sputum production    • Wheezing        Past Surgical History:   Procedure Laterality Date   • OTHER Left [id number]    LT LEG SURGERY        Social History     Socioeconomic History   • Marital status: Single     Spouse name: Not on file   • Number of children: Not on file   • Years of education: Not on file   • Highest education level: Not on file   Occupational History   • Not on file   Tobacco Use   • Smoking status: Former Smoker     Types: Cigarettes   • Smokeless  tobacco: Never Used   • Tobacco comment: only 1.5 yr. only social   Vaping Use   • Vaping Use: Never used   Substance and Sexual Activity   • Alcohol use: Not Currently     Comment: rare    • Drug use: Never   • Sexual activity: Not on file   Other Topics Concern   • Not on file   Social History Narrative   • Not on file     Social Determinants of Health     Financial Resource Strain: Not on file   Food Insecurity: Not on file   Transportation Needs: Not on file   Physical Activity: Not on file   Stress: Not on file   Social Connections: Not on file   Intimate Partner Violence: Not on file   Housing Stability: Not on file          History reviewed. No pertinent family history.    Current Outpatient Medications on File Prior to Visit   Medication Sig Dispense Refill   • amLODIPine (NORVASC) 2.5 MG Tab Take 1 Tablet by mouth every day for 180 days. 30 Tablet 5   • levothyroxine (SYNTHROID) 50 MCG Tab Take 1 Tablet by mouth every morning on an empty stomach for 180 days. 30 Tablet 5   • aspirin 81 MG EC tablet Take 81 mg by mouth every day.     • ibuprofen (MOTRIN) 200 MG Tab Take 200 mg by mouth every 6 hours as needed. historic medication, usually take once a day (Patient not taking: Reported on 3/11/2022)     • Cholecalciferol (VITAMIN D3) Liquid Take 2 Drops by mouth every day. Dose is 2000IU per drop, patient places 2 drops daily in tea (Patient not taking: Reported on 3/11/2022)     • TURMERIC CURCUMIN PO Take 500 mg by mouth every day. (Patient not taking: Reported on 3/11/2022)     • Phenylephrine-Ibuprofen (ADVIL SINUS CONGESTION & PAIN PO) Take 1 Capsule by mouth 2 times a day as needed (congestion). Contains 2mg Chlorpheniramine maleate, Ibuprofen 200mg, Pseudoephedrine HCL 30mg per capsule (Patient not taking: Reported on 3/11/2022)     • nystatin (MYCOSTATIN) powder Apply 1 Application topically 2 (two) times a day. Apply to reddened skin folds (Patient not taking: Reported on 3/11/2022)     • FLOVENT HFA  110 MCG/ACT Aerosol Inhale 2 Puffs 2 times a day. (Patient not taking: Reported on 3/11/2022)     • albuterol 108 (90 Base) MCG/ACT Aero Soln inhalation aerosol Inhale 2 Puffs every 6 hours as needed for Shortness of Breath. (Patient not taking: Reported on 3/11/2022)       No current facility-administered medications on file prior to visit.       Allergies: Doxycycline, Lactose, Levothyroxine, Prednisone, Sulfa drugs, Cephalexin, Montelukast, Spinach, Other environmental, and Codeine      ROS:   Review of Systems   Constitutional: Negative for chills, fever and weight loss.   HENT: Negative for congestion, sinus pain and sore throat.    Eyes: Negative for pain and discharge.   Respiratory: Positive for shortness of breath. Negative for cough, sputum production, wheezing and stridor.    Cardiovascular: Negative for chest pain, orthopnea and leg swelling.   Gastrointestinal: Negative for abdominal pain, diarrhea, nausea and vomiting.   Genitourinary: Negative for dysuria, frequency and urgency.   Musculoskeletal: Positive for joint pain. Negative for myalgias.   Skin: Negative for rash.   Neurological: Negative for dizziness, sensory change, focal weakness, loss of consciousness and headaches.   Psychiatric/Behavioral: Positive for memory loss.   All other systems reviewed and are negative.    Vitals:  /84 (BP Location: Right arm, Patient Position: Sitting, BP Cuff Size: Adult)   Pulse 69   Ht 1.524 m (5')   Wt (!) 153 kg (337 lb)   SpO2 96%     Physical Exam:  Physical Exam  Vitals and nursing note reviewed.   Constitutional:       General: She is not in acute distress.     Appearance: Normal appearance. She is well-developed. She is obese. She is not ill-appearing or diaphoretic.      Comments: Very pleasant, accompanied by family member   Eyes:      General: No scleral icterus.        Right eye: No discharge.         Left eye: No discharge.      Conjunctiva/sclera: Conjunctivae normal.      Pupils:  Pupils are equal, round, and reactive to light.   Neck:      Thyroid: No thyromegaly.      Vascular: No JVD.   Cardiovascular:      Rate and Rhythm: Normal rate and regular rhythm.      Heart sounds: Normal heart sounds. No murmur heard.    No gallop.   Pulmonary:      Effort: Pulmonary effort is normal. No respiratory distress.      Breath sounds: Normal breath sounds. No wheezing or rales.   Abdominal:      General: There is no distension.      Palpations: Abdomen is soft.      Tenderness: There is no abdominal tenderness. There is no guarding.   Musculoskeletal:         General: No tenderness.      Right lower leg: Edema present.      Left lower leg: Edema present.      Comments: In motorized wheelchair   Lymphadenopathy:      Cervical: No cervical adenopathy.   Skin:     General: Skin is warm.      Capillary Refill: Capillary refill takes less than 2 seconds.      Findings: No erythema or rash.   Neurological:      Mental Status: She is alert and oriented to person, place, and time.      Cranial Nerves: No cranial nerve deficit.      Sensory: No sensory deficit.      Motor: No abnormal muscle tone.   Psychiatric:         Behavior: Behavior normal.       Laboratory Data:    PFTs as reviewed by me personally show:  None for review at time of consultation    Imaging as reviewed by me personally show:    CXR from hospitalization 1/2022 personally reviewed and unremarkable.    Assessment/Plan:    Problem List Items Addressed This Visit     BMI 60.0-69.9, adult (HCC)     Functionally debilitated due to lymphedema limits exercise  --Offered dietitian referral, patient declined, advised strict dietary modifications to help with weight loss for long-term health risk reduction improvement of pulmonary health         YOLA (obstructive sleep apnea)     Has the same machine and has not been followed up in nearly 2 decades  Manifesting symptoms suggestive of the current settings insufficient, daytime fatigue, breathlessness,  memory loss  --Sleep referral placed         Uncomplicated asthma     --Continue Flovent, albuterol as needed  --PFTs if/when weight loss achieved         Dyspnea on exertion     Due to extrathoracic restriction from body habitus, with some contribution of untreated sleep apnea and mild asthma  -- Plan of care as outlined above.               Return in about 4 months (around 7/11/2022) for any available MD/APRN/PAC.     This note was generated using voice recognition software which has a chance of producing errors of grammar and possibly content.  I have made every reasonable attempt to find and correct any obvious errors, but it should be expected that some may not be found prior to finalization of this note.    Time spent in record review prior to patient arrival, reviewing results, and in face-to-face encounter totaled 39 min, excluding any procedures if performed.  __________  Fam Love MD  Pulmonary and Critical Care Medicine  [facility]

## 2022-03-11 NOTE — ASSESSMENT & PLAN NOTE
Due to extrathoracic restriction from body habitus, with some contribution of untreated sleep apnea and mild asthma  -- Plan of care as outlined above.

## 2022-03-11 NOTE — ASSESSMENT & PLAN NOTE
Functionally debilitated due to lymphedema limits exercise  --Offered dietitian referral, patient declined, advised strict dietary modifications to help with weight loss for long-term health risk reduction improvement of pulmonary health

## 2022-04-04 PROBLEM — Z91.148 NON COMPLIANCE W MEDICATION REGIMEN: Status: ACTIVE | Noted: 2022-04-04

## 2022-05-12 ENCOUNTER — OFFICE VISIT (OUTPATIENT)
Dept: SLEEP MEDICINE | Facility: MEDICAL CENTER | Age: 77
End: 2022-05-12
Payer: MEDICARE

## 2022-05-12 VITALS
BODY MASS INDEX: 57.52 KG/M2 | RESPIRATION RATE: 16 BRPM | DIASTOLIC BLOOD PRESSURE: 60 MMHG | SYSTOLIC BLOOD PRESSURE: 130 MMHG | HEART RATE: 66 BPM | WEIGHT: 293 LBS | OXYGEN SATURATION: 97 % | HEIGHT: 60 IN

## 2022-05-12 DIAGNOSIS — G47.8 POOR SLEEP PATTERN: ICD-10-CM

## 2022-05-12 DIAGNOSIS — E66.9 LYMPHEDEMA ASSOCIATED WITH OBESITY: ICD-10-CM

## 2022-05-12 DIAGNOSIS — G47.19 EXCESSIVE DAYTIME SLEEPINESS: ICD-10-CM

## 2022-05-12 DIAGNOSIS — G47.33 OSA ON CPAP: ICD-10-CM

## 2022-05-12 DIAGNOSIS — Z99.3 WHEELCHAIR BOUND: ICD-10-CM

## 2022-05-12 DIAGNOSIS — I89.0 LYMPHEDEMA ASSOCIATED WITH OBESITY: ICD-10-CM

## 2022-05-12 PROCEDURE — 99204 OFFICE O/P NEW MOD 45 MIN: CPT | Performed by: PREVENTIVE MEDICINE

## 2022-05-12 ASSESSMENT — FIBROSIS 4 INDEX: FIB4 SCORE: 1.46

## 2022-05-12 NOTE — PROGRESS NOTES
"  CC: \" I have not followed up with a sleep medicine doctor for years.  I am not sure my old machine is actually working.\"        HPI:  Cecile Teixeira is a very pleasant 76 y.o. female never smoker hospitalized in 1/2022 for broken CPAP machine and inability to sleep.  Has been on CPAP for years however has not followed up in the sleep clinic for a number of years as well.  Since discharge, she continues to use her old CPAP machine she is unsure if it is functioning or not.  She endorses memory issues and daytime fatigue.     Functionally, Tiffanie is wheelchair-bound due to severe lymphedema.  She is able to walk 1-2 steps but gets easily short of breath and weak.  Denies any chest pain or LOC.        Symptom Summary:  Snoring: +  Very loud snoring: -  Witnessed apneas: ++  Resuscitative snorts: +  Nocturnal shortness of breath: +  Non-restorative sleep: +  EPWORTH SCORE:   14   /24, this is  consistent with EDS  Insomnia: +  Nocturnal awakenings: +  Nocturia: +  EDS: +  Fatigue: +  Falls asleep accidentally: +   Napping or returning to bed after arising: +  Restless legs: -  Limb movements during sleep: -  Nocturnal headaches: -  Morning Headaches: -    Significant comorbidities and modifying factors include: see HPI      Patient Active Problem List    Diagnosis Date Noted   • Non compliance w medication regimen 04/04/2022   • Dyspnea on exertion 03/11/2022   • Lymphedema associated with obesity 01/12/2022   • Wound of left lower extremity 01/12/2022   • Cellulitis of left lower extremity 01/12/2022   • Eczema 01/12/2022   • Essential hypertension 01/12/2022   • Anxiety 01/12/2022   • Bilateral hearing loss 01/12/2022   • YOLA (obstructive sleep apnea) 01/01/2022   • Uncomplicated asthma 01/01/2022   • BMI 60.0-69.9, adult ([facility]) 12/21/2021   • Polyneuropathy in other diseases classified elsewhere ([facility]) 12/21/2021   • Hypothyroidism 12/21/2021   • Nonspecific abnormal function study, cardiovascular 12/21/2021   • Morbid " obesity ([facility]) 12/21/2021   • 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 No pertinent family history.    Social History     Socioeconomic History   • Marital status: Single     Spouse name: Not on file   • Number of children: Not on file   • Years of education: Not on file   • Highest education level: Not on file   Occupational History   • Not on file   Tobacco Use   • Smoking status: Former Smoker     Types: Cigarettes   • Smokeless tobacco: Never Used   • Tobacco comment: only 1.5 yr. only social   Vaping Use   • Vaping Use: Never used   Substance and Sexual Activity   • Alcohol use: Not Currently     Comment: rare    • Drug use: Never   • Sexual activity: Not on file   Other Topics Concern   • Not on file   Social History Narrative   • Not on file     Social Determinants of Health     Financial Resource Strain: Not on file   Food Insecurity: Not on file   Transportation Needs: Not on file   Physical Activity: Not on file   Stress: Not on file   Social Connections: Not on file   Intimate Partner Violence: Not on file   Housing Stability: Not on file           ALLERGIES: Doxycycline, Lactose, Levothyroxine, Prednisone, Sulfa drugs, Cephalexin, Montelukast, Spinach, Other environmental, and Codeine    ROS    Constitutional: Denies weight loss, fatigue.  Eyes: Denies vision changes  Ears/Nose/Mouth/Throat: Denies rhinitis/nasal congestion, injury, decayed teeth/toothaches.  Cardiovascular: Denies chest pain, tightness, palpitations, swelling in legs/feet, difficulty breathing when lying down but gets  better when sitting up.   Respiratory: Denies shortness of breath while awake,  Sleep: per HPI  Gastrointestinal: Denies  difficulty swallowing,  heartburn.  Genitourinary: REPORTS nocturia  Musculoskeletal: Denies painful joints, sore muscles, back pain.   Neurological: Denies frequent headaches,weakness, dizziness.    PHYSICAL EXAM    Neck circumference:  /60 (BP Location: Left arm, Patient Position: Sitting, BP Cuff Size: Large adult)   Pulse 66   Resp 16   Ht 1.524 m (5')   Wt (!) 157 kg (347 lb)   SpO2 97%   BMI 67.77 kg/m²   Appearance: Well-nourished, well-developed,  looks stated age, no acute distress  Eyes:   EOMI  ENMT: MASKED  Neck: Supple, trachea midline  Respiratory effort:  No intercostal retractions or use of accessory muscles  Lung auscultation:  No wheezes rhonchi rubs or rales  Cardiac: No murmurs, rubs, or gallops; regular rhythm, normal rate; no edema  Musculoskeletal:  Grossly normal; gait and station normal  Neurologic:  oriented to person, time, place, and purpose; judgement intact  Psychiatric:  No depression, anxiety, agitation        Medical Decision Making:  The medical record was reviewed in its as pertains to this referral. This includes records from primary care, consultants notes,  referral request, hospital records, labs, imaging.    Any available diagnostic and titration nocturnal polysomnograms, home sleep apnea tests, continuous nocturnal oximetry results, multiple sleep latency tests, and compliance reports were reviewed with the patient.    Assessment/PLAN:    1. YOLA on CPAP  - Polysomnography, 4 or More; Future    2. Poor sleep pattern  - Polysomnography, 4 or More; Future    3. Excessive daytime sleepiness  - Polysomnography, 4 or More; Future    4. Wheelchair bound  - Polysomnography, 4 or More; Future    5. Lymphedema associated with obesity  - Polysomnography, 4 or More; Future        The patient has signs and symptoms consistent with obstructive sleep apnea  hypopnea syndrome. Will schedule a nocturnal polysomnogram or a home sleep apnea test.      The risks of untreated sleep apnea were discussed with the patient at length. Patients with YOLA are at increased risk of cardiovascular disease including coronary artery disease, systemic arterial hypertension, pulmonary arterial hypertension, cardiac arrhythmias, and stroke. YOLA patients have an increased risk of motor vehicle accidents, type 2 diabetes, chronic kidney disease, and non-alcoholic liver disease. The patient was advised to avoid driving a motor vehicle when drowsy.        Have advised the patient to follow up with the appropriate healthcare practitioners for all other medical problems and issues.          Return in about 2 weeks (around 5/26/2022) for Discuss results of sleep study with Dr. Joseph.      My total time spent caring for the patient on the day of the encounter was 45 minutes. This includes time time spent on a thorough chart review including other physician notes, any type of sleep study, as well as critical labs and pulmonary and cardiac studies.  Additionally it includes discussions of good sleep hygiene, stimulus control and going over the need for consistency in terms of sleep preparation and practice.       Please note that this dictation was created using voice recognition software.  I have made every reasonable attempt to correct obvious errors, I expect that there are errors of grammar and possibly content that I did not discover before finalizing this note.

## 2022-05-12 NOTE — PROGRESS NOTES
CHIEF COMPLAINT: Establishing care.   HISTORY OF PRESENT ILLNESS:  Cecile Teixeira is a 76 y.o.female  kindly referred by Cici Hunt P.A.-C. with a past medical history of obese, hypothyroidism, hypertension, and dyspnea on exertion.     Sleep History: EPWORTH 14/24 c/w EDS. She endorses that she has periods of not breathing in sleep and insomnia, RLS, daytime fatigue, and leg jerks. She Goes to bed from 10-11pm and goes to sleep in minutes. She wakes up between 7-8am. She reports does wake up refreshed although she wakes up 4 or more times at night. She does nap intermittently during the day. She prefers to sleep in a chair. She smoked 1-2 cigarettes per day and quit 50years ago. She has an occasional alcoholic beverage and has three cups of coffee per day. She is retired. She does have difficulties breathing through her nose. She does not use anything for a sleep aid. Functionally, Tiffanie is wheelchair-bound due to severe lymphedema.  She is able to walk 1-2 steps but gets easily short of breath and weak.  Denies any chest pain or LOC.      COMPLIANCE DATA:  97%  Machine type: AirSense 10 AutoSet for her   Date range:  04/12/2022-05/11/2022  AHI: 4.0  TIME USED: 8hrs 47mins   PRESSURE SETTINGS: CPAP 18cm H2O   LEAK: significant mask leak. Patient continues to benefit form PAP use but is limited by mask leak.           Significant comorbidities and modifying factors: see HPI  PROBLEM LIST:  Patient Active Problem List    Diagnosis Date Noted   • Non compliance w medication regimen 04/04/2022   • Dyspnea on exertion 03/11/2022   • Lymphedema associated with obesity 01/12/2022   • Wound of left lower extremity 01/12/2022   • Cellulitis of left lower extremity 01/12/2022   • Eczema 01/12/2022   • Essential hypertension 01/12/2022   • Anxiety 01/12/2022   • Bilateral hearing loss 01/12/2022   • YOLA (obstructive sleep apnea) 01/01/2022   • Uncomplicated asthma 01/01/2022   • BMI 60.0-69.9, adult ([facility]) 12/21/2021   •  Polyneuropathy in other diseases classified elsewhere ([facility]) 12/21/2021   • Hypothyroidism 12/21/2021   • Nonspecific abnormal function study, cardiovascular 12/21/2021   • Morbid obesity ([facility]) 12/21/2021   • Moderate major depression ([facility]) 12/21/2021     PAST MEDICAL HISTORY:  Past Medical History:   Diagnosis Date   • Asthma    • Blood clots in brain 2000   • Chest pain    • Cough    • GERD (gastroesophageal reflux disease)    • Hypothyroidism    • Osteoarthritis    • Painful breathing    • Shortness of breath    • Sleep apnea    • Sputum production    • Wheezing       PAST SOCIAL HISTORY:  Past Surgical History:   Procedure Laterality Date   • OTHER Left [id number]    LT LEG SURGERY      PAST FAMILY HISTORY:  History reviewed. No pertinent family history.  SOCIAL HISTORY:  Social History     Socioeconomic History   • Marital status: Single     Spouse name: Not on file   • Number of children: Not on file   • Years of education: Not on file   • Highest education level: Not on file   Occupational History   • Not on file   Tobacco Use   • Smoking status: Former Smoker     Types: Cigarettes   • Smokeless tobacco: Never Used   • Tobacco comment: only 1.5 yr. only social   Vaping Use   • Vaping Use: Never used   Substance and Sexual Activity   • Alcohol use: Not Currently     Comment: rare    • Drug use: Never   • Sexual activity: Not on file   Other Topics Concern   • Not on file   Social History Narrative   • Not on file     Social Determinants of Health     Financial Resource Strain: Not on file   Food Insecurity: Not on file   Transportation Needs: Not on file   Physical Activity: Not on file   Stress: Not on file   Social Connections: Not on file   Intimate Partner Violence: Not on file   Housing Stability: Not on file     ALLERGIES: Doxycycline, Lactose, Levothyroxine, Prednisone, Sulfa drugs, Cephalexin, Montelukast, Spinach, Other environmental, and Codeine  MEDICATIONS:  Current Outpatient Medications  "  Medication Sig Dispense Refill   • levothyroxine (SYNTHROID) 50 MCG Tab Take 1 Tablet by mouth every morning on an empty stomach for 180 days. 90 Tablet 1   • aspirin 81 MG EC tablet Take 81 mg by mouth every day.     • FLOVENT  MCG/ACT Aerosol Inhale 2 Puffs 2 times a day. (Patient not taking: Reported on 5/12/2022) 12 g 11   • albuterol 108 (90 Base) MCG/ACT Aero Soln inhalation aerosol Inhale 2 Puffs every 6 hours as needed for Shortness of Breath. (Patient not taking: Reported on 5/12/2022) 8.5 g 11   • ibuprofen (MOTRIN) 200 MG Tab Take 200 mg by mouth every 6 hours as needed. historic medication, usually take once a day (Patient not taking: No sig reported)     • amLODIPine (NORVASC) 2.5 MG Tab Take 1 Tablet by mouth every day for 180 days. (Patient not taking: Reported on 5/12/2022) 30 Tablet 5   • Cholecalciferol (VITAMIN D3) Liquid Take 2 Drops by mouth every day. Dose is 2000IU per drop, patient places 2 drops daily in tea (Patient not taking: No sig reported)     • TURMERIC CURCUMIN PO Take 500 mg by mouth every day. (Patient not taking: No sig reported)     • Phenylephrine-Ibuprofen (ADVIL SINUS CONGESTION & PAIN PO) Take 1 Capsule by mouth 2 times a day as needed (congestion). Contains 2mg Chlorpheniramine maleate, Ibuprofen 200mg, Pseudoephedrine HCL 30mg per capsule (Patient not taking: No sig reported)     • nystatin (MYCOSTATIN) powder Apply 1 Application topically 2 (two) times a day. Apply to reddened skin folds (Patient not taking: No sig reported)       No current facility-administered medications for this visit.    \"CURRENT RX\"    REVIEW OF SYSTEMS:  Constitutional: Denies weight loss, endorses chronic daytime fatigue  Eyes: Denies vision changes  Ears/Nose/Mouth/Throat: Denies rhinitis/nasal congestion, injury, decayed teeth/toothaches.  Cardiovascular: Denies chest pain, tightness, palpitations, swelling in legs/feet, difficulty breathing when lying down but gets better when sitting " up.   Respiratory: Denies shortness of breath while awake,  Sleep: per HPI  Gastrointestinal: Denies  difficulty swallowing,  heartburn.  Genitourinary: REPORTS nocturia  Musculoskeletal: Denies painful joints, sore muscles, back pain.   Neurological: Denies frequent headaches,weakness, dizziness.    PHYSICAL EXAM/VITALS:  Neck Circumference: 15.5inches   /60 (BP Location: Left arm, Patient Position: Sitting, BP Cuff Size: Large adult)   Pulse 66   Resp 16   Ht 1.524 m (5')   Wt (!) 157 kg (347 lb)   SpO2 97%   BMI 67.77 kg/m²   Appearance: Well-nourished, well-developed,  looks stated age, no acute distress  Eyes:   EOMI  ENMT: Mallampati:  3  Neck: Supple, trachea midline  Respiratory effort:  No intercostal retractions or use of accessory muscles  Lung auscultation:  No wheezes rhonchi rubs or rales  Cardiac: No murmurs, rubs, or gallops; regular rhythm, normal rate; no edema  Musculoskeletal:  Wheelchair bound due to obesity and lower extremity edema.   Neurologic:  oriented to person, time, place, and purpose; judgement intact  Psychiatric:  Somewhat paranoid of medical providers especially if they have a Party orientation.     MEDICAL DECISION MAKING:  • The medical record was reviewed in its as pertains to this referral. This includes records from primary care, consultants notes,  referral request, hospital records, labs, imaging. Any available diagnostic and titration nocturnal polysomnograms, home sleep apnea tests, continuous nocturnal oximetry results, multiple sleep latency tests, and compliance reports were reviewed with the patient.    ASSESSMENT/PLAN:  1. YOLA on CPAP  - Polysomnography, 4 or More; Future    2. Poor sleep pattern  - Polysomnography, 4 or More; Future    3. Excessive daytime sleepiness  - Polysomnography, 4 or More; Future    4. Wheelchair bound  - Polysomnography, 4 or More; Future    5. Lymphedema associated with obesity  - Polysomnography, 4 or More; Future    • Has  been advised to continue the current Pap Therapy.  Also advised to clean equipment frequently, and get new mask and supplies as allowed by insurance and DME.  Patient was advised to NEVER use  OZONE containing cleaning systems such as So Clean.  • The risks of untreated sleep apnea were discussed with the patient at length. Patients with YOLA are at increased risk of cardiovascular disease including coronary artery disease, systemic arterial hypertension, pulmonary arterial hypertension, cardiac arrhythmias, and stroke. YOLA patients have an increased risk of motor vehicle accidents, type 2 diabetes, chronic kidney disease, and non-alcoholic liver disease. The patient was advised to avoid driving a motor vehicle when drowsy.  • Have advised the patient to follow up with the appropriate healthcare practitioners for all other medical problems and issues.    RETURN TO CLINIC: Return in about 2 weeks (around 5/26/2022) for Discuss results of sleep study with Dr. Joseph.  My total time spent caring for the patient on the day of the encounter was 40minutes. This includes time time spent on a thorough chart review including other physician notes, any type of sleep study, as well as critical labs and pulmonary and cardiac studies.  Additionally it includes discussions of good sleep hygiene, stimulus control and going over the need for consistency in terms of sleep preparation and practice.     Please note that this dictation was created using voice recognition software.  I have made every reasonable attempt to correct obvious errors, I expect that there are errors of grammar and possibly content that I did not discover before finalizing this note.

## 2022-07-01 ENCOUNTER — APPOINTMENT (RX ONLY)
Dept: URBAN - METROPOLITAN AREA CLINIC 35 | Facility: CLINIC | Age: 77
Setting detail: DERMATOLOGY
End: 2022-07-01

## 2022-07-01 VITALS — WEIGHT: 293 LBS

## 2022-07-01 DIAGNOSIS — Z71.89 OTHER SPECIFIED COUNSELING: ICD-10-CM

## 2022-07-01 DIAGNOSIS — D18.0 HEMANGIOMA: ICD-10-CM

## 2022-07-01 DIAGNOSIS — D22 MELANOCYTIC NEVI: ICD-10-CM

## 2022-07-01 DIAGNOSIS — L30.4 ERYTHEMA INTERTRIGO: ICD-10-CM

## 2022-07-01 DIAGNOSIS — L82.1 OTHER SEBORRHEIC KERATOSIS: ICD-10-CM

## 2022-07-01 DIAGNOSIS — L21.8 OTHER SEBORRHEIC DERMATITIS: ICD-10-CM

## 2022-07-01 DIAGNOSIS — I87.2 VENOUS INSUFFICIENCY (CHRONIC) (PERIPHERAL): ICD-10-CM

## 2022-07-01 DIAGNOSIS — I89.0 LYMPHEDEMA, NOT ELSEWHERE CLASSIFIED: ICD-10-CM

## 2022-07-01 DIAGNOSIS — L91.8 OTHER HYPERTROPHIC DISORDERS OF THE SKIN: ICD-10-CM

## 2022-07-01 DIAGNOSIS — L81.4 OTHER MELANIN HYPERPIGMENTATION: ICD-10-CM

## 2022-07-01 PROBLEM — D18.01 HEMANGIOMA OF SKIN AND SUBCUTANEOUS TISSUE: Status: ACTIVE | Noted: 2022-07-01

## 2022-07-01 PROBLEM — D22.5 MELANOCYTIC NEVI OF TRUNK: Status: ACTIVE | Noted: 2022-07-01

## 2022-07-01 PROCEDURE — ? TREATMENT REGIMEN

## 2022-07-01 PROCEDURE — ? SUNSCREEN TREATMENT REGIMEN

## 2022-07-01 PROCEDURE — ? PRESCRIPTION

## 2022-07-01 PROCEDURE — 99204 OFFICE O/P NEW MOD 45 MIN: CPT

## 2022-07-01 PROCEDURE — ? COUNSELING

## 2022-07-01 RX ORDER — HYDROCORTISONE 25 MG/G
CREAM TOPICAL BID
Qty: 28.35 | Refills: 2 | Status: ERX | COMMUNITY
Start: 2022-07-01

## 2022-07-01 RX ADMIN — HYDROCORTISONE: 25 CREAM TOPICAL at 00:00

## 2022-07-01 ASSESSMENT — LOCATION DETAILED DESCRIPTION DERM
LOCATION DETAILED: RIGHT VENTRAL PROXIMAL FOREARM
LOCATION DETAILED: LEFT POSTERIOR EAR
LOCATION DETAILED: RIGHT POSTERIOR EAR
LOCATION DETAILED: SUPERIOR THORACIC SPINE
LOCATION DETAILED: RIGHT ELBOW
LOCATION DETAILED: RIGHT SUPERIOR LATERAL NECK
LOCATION DETAILED: INFERIOR THORACIC SPINE
LOCATION DETAILED: LEFT INFERIOR LATERAL MIDBACK
LOCATION DETAILED: LEFT SUPERIOR LATERAL NECK
LOCATION DETAILED: PERIUMBILICAL SKIN
LOCATION DETAILED: LEFT RIB CAGE
LOCATION DETAILED: LEFT DISTAL PRETIBIAL REGION
LOCATION DETAILED: RIGHT DISTAL PRETIBIAL REGION
LOCATION DETAILED: INFERIOR MID FOREHEAD
LOCATION DETAILED: RIGHT RIB CAGE
LOCATION DETAILED: LEFT VENTRAL PROXIMAL FOREARM
LOCATION DETAILED: LEFT SUPERIOR LATERAL UPPER BACK
LOCATION DETAILED: RIGHT SUPERIOR UPPER BACK
LOCATION DETAILED: MID POSTERIOR NECK

## 2022-07-01 ASSESSMENT — LOCATION SIMPLE DESCRIPTION DERM
LOCATION SIMPLE: LEFT ANTERIOR NECK
LOCATION SIMPLE: LEFT EAR
LOCATION SIMPLE: LEFT FOREARM
LOCATION SIMPLE: UPPER BACK
LOCATION SIMPLE: RIGHT UPPER BACK
LOCATION SIMPLE: RIGHT ELBOW
LOCATION SIMPLE: RIGHT FOREARM
LOCATION SIMPLE: LEFT UPPER BACK
LOCATION SIMPLE: ABDOMEN
LOCATION SIMPLE: LEFT PRETIBIAL REGION
LOCATION SIMPLE: RIGHT ANTERIOR NECK
LOCATION SIMPLE: RIGHT PRETIBIAL REGION
LOCATION SIMPLE: POSTERIOR NECK
LOCATION SIMPLE: INFERIOR FOREHEAD
LOCATION SIMPLE: RIGHT EAR
LOCATION SIMPLE: LEFT LOWER BACK

## 2022-07-01 ASSESSMENT — SEVERITY ASSESSMENT: SEVERITY: MILD

## 2022-07-01 ASSESSMENT — LOCATION ZONE DERM
LOCATION ZONE: TRUNK
LOCATION ZONE: FACE
LOCATION ZONE: ARM
LOCATION ZONE: NECK
LOCATION ZONE: EAR
LOCATION ZONE: LEG

## 2022-07-01 NOTE — PROCEDURE: MIPS QUALITY
Quality 226: Preventive Care And Screening: Tobacco Use: Screening And Cessation Intervention: Patient screened for tobacco use and is an ex/non-smoker
Quality 111:Pneumonia Vaccination Status For Older Adults: Pneumococcal vaccine administered on or after patient’s 60th birthday and before the end of the measurement period
Quality 130: Documentation Of Current Medications In The Medical Record: Current Medications Documented
Quality 402: Tobacco Use And Help With Quitting Among Adolescents: Patient screened for tobacco and never smoked
Detail Level: Detailed

## 2022-07-09 ENCOUNTER — SLEEP STUDY (OUTPATIENT)
Dept: SLEEP MEDICINE | Facility: MEDICAL CENTER | Age: 77
End: 2022-07-09
Attending: PREVENTIVE MEDICINE
Payer: MEDICARE

## 2022-07-09 DIAGNOSIS — Z99.3 WHEELCHAIR BOUND: ICD-10-CM

## 2022-07-09 DIAGNOSIS — G47.19 EXCESSIVE DAYTIME SLEEPINESS: ICD-10-CM

## 2022-07-09 DIAGNOSIS — I89.0 LYMPHEDEMA ASSOCIATED WITH OBESITY: ICD-10-CM

## 2022-07-09 DIAGNOSIS — G47.33 OSA ON CPAP: ICD-10-CM

## 2022-07-09 DIAGNOSIS — E66.9 LYMPHEDEMA ASSOCIATED WITH OBESITY: ICD-10-CM

## 2022-07-09 DIAGNOSIS — G47.8 POOR SLEEP PATTERN: ICD-10-CM

## 2022-07-09 PROCEDURE — 95810 POLYSOM 6/> YRS 4/> PARAM: CPT | Performed by: PREVENTIVE MEDICINE

## 2022-07-12 NOTE — PROCEDURES
MONTAGE: Standard  STUDY TYPE: Diagnostic    RECORDING TECHNIQUE:   After the scalp was prepared, gold plated electrodes were applied to the scalp according to the International 10-20 System. EEG (electroencephalogram) was continuously monitored from the O1-M2, O2-M1, C3-M2, C4-M1, F3-M2, and F4-M1. EOGs (electrooculograms) were monitored by electrodes placed at the left and right outer canthi. Chin EMG (electromyogram) was monitored by electrodes placed on the mentalis and sub-mentalis muscles. Nasal and oral airflow were monitored using a triple port thermocouple as well as oronasal pressure transducer. Respiratory effort was measured by inductive plethysmography technology employing abdominal and thoracic belts. Blood oxygen saturation and pulse were monitored by pulse oximetry. Heart rhythm was monitored by surface electrocardiogram. Leg EMG was studied using surface electrodes placed on left and right anterior tibialis. A microphone was used to monitor tracheal sounds and snoring. Body position was monitored and documented by technician observation.   SCORING CRITERIA:   A modification of the AASM manual for scoring of sleep and associated events was used. Obstructive apneas were scored by cessation of airflow for at least 10 seconds with continuing respiratory effort. Central apneas were scored by cessation of airflow for at least 10 seconds with no respiratory effort. Hypopneas were scored by a 30% or more reduction in airflow for at least 10 seconds accompanied by arterial oxygen desaturation of 3% or an arousal. For CMS (Medicare) patients, per AASM rule 1B, hypopneas are scored by 30% with mild reduction in airflow for at least 10 seconds accompanied by arterial saturation decreased at 4%.    STUDY DATA: No external hemorrhoids if she is feeling    Study start time was 09:44:32 PM. Diagnostic recording time was 8h 22.0m with a total sleep time of 0h 7.5m resulting in a sleep efficiency of 1.49%%. Sleep  latency from the start of the study was 166 minutes and the latency from sleep to REM was 00 minutes. In total,14 arousals were scored for an arousal index of 112.0.  The patient's total sleep time was 7 minutes.  Sleep efficiency was 1.49%.  Respiratory:  There were a total of 0 apneas consisting of 0 obstructive apneas, 0 mixed apneas, and 0 central apneas. A total of 9 hypopneas were scored. The apnea index was 0.00 per hour and the hypopnea index was 72.00 per hour resulting in an overall AHI of 72.00. AHI during rem was 0.0 and AHI while supine was 72.00.  Oximetry:  There was a mean oxygen saturation of 91.0%. The minimum oxygen saturation in NREM was 87.0 % and in REM was --. The patient spent 0.4 minutes of TST with SaO2 <88%.  Cardiac:  The highest heart rate seen while awake was 120 BPM while the highest heart rate during sleep was 97 BPM with an average sleeping heart rate of 81 BPM.  Limb Movements:  There were a total of 0 PLMs during sleep which resulted in a PLMS index of 0.0. Of these, 0 were associated with arousals which resulted in a PLMS arousal index of 0.0.  Assessment:   This patient did not sleep at least 2 hours.  Therefore she did not meet this critical criterion for an adequate sleep study.  She will need to be restudied and a sleep aid is recommended.  Additionally the patient reported taking a 2-hour nap on the day of her study.  Patient should be cautioned not to nap on the day of her study.  No conclusions can be drawn from the study.    Recommendation:   HYPNOGRAM

## 2022-07-18 ENCOUNTER — TELEPHONE (OUTPATIENT)
Dept: SLEEP MEDICINE | Facility: MEDICAL CENTER | Age: 77
End: 2022-07-18

## 2022-07-18 NOTE — TELEPHONE ENCOUNTER
----- Message from Jacquie Kimble, Med Ass't sent at 7/10/2022  9:27 AM PDT -----  Regarding: Sleep study 07/09/2022  Matthew Powell!    I attempted this patient's PSG/Split-night on Saturday night (07/09/2022), but she did not sleep a wink. She was very uncomfortable and required a lot of assistance with getting in and out of bed. She wants to know if she can do a home sleep test instead?      If Dr. Joseph says yes to a home test, please consider NOVASOM, because I don't know how easy it will be for her to do one of ours. Just a thought.    Thank you!  ~Jacquie

## 2022-07-21 ENCOUNTER — OFFICE VISIT (OUTPATIENT)
Dept: SLEEP MEDICINE | Facility: MEDICAL CENTER | Age: 77
End: 2022-07-21
Payer: MEDICARE

## 2022-07-21 VITALS
DIASTOLIC BLOOD PRESSURE: 70 MMHG | RESPIRATION RATE: 16 BRPM | SYSTOLIC BLOOD PRESSURE: 130 MMHG | HEIGHT: 60 IN | OXYGEN SATURATION: 97 % | HEART RATE: 80 BPM | WEIGHT: 293 LBS | BODY MASS INDEX: 57.52 KG/M2

## 2022-07-21 DIAGNOSIS — G47.33 OSA (OBSTRUCTIVE SLEEP APNEA): ICD-10-CM

## 2022-07-21 DIAGNOSIS — F41.9 ANXIETY: ICD-10-CM

## 2022-07-21 DIAGNOSIS — Z87.891 FORMER SMOKER: ICD-10-CM

## 2022-07-21 DIAGNOSIS — I10 ESSENTIAL HYPERTENSION: ICD-10-CM

## 2022-07-21 DIAGNOSIS — J45.30 MILD PERSISTENT ASTHMA WITHOUT COMPLICATION: ICD-10-CM

## 2022-07-21 PROCEDURE — 99214 OFFICE O/P EST MOD 30 MIN: CPT | Performed by: NURSE PRACTITIONER

## 2022-07-21 RX ORDER — ALBUTEROL SULFATE 90 UG/1
2 AEROSOL, METERED RESPIRATORY (INHALATION) EVERY 6 HOURS PRN
Qty: 8.5 G | Refills: 11 | Status: SHIPPED | OUTPATIENT
Start: 2022-07-21 | End: 2023-03-13 | Stop reason: SDUPTHER

## 2022-07-21 RX ORDER — DEXAMETHASONE 4 MG/1
2 TABLET ORAL 2 TIMES DAILY
Qty: 12 G | Refills: 11 | Status: SHIPPED | OUTPATIENT
Start: 2022-07-21 | End: 2023-03-13 | Stop reason: SDUPTHER

## 2022-07-21 ASSESSMENT — FIBROSIS 4 INDEX: FIB4 SCORE: 1.48

## 2022-07-21 NOTE — PROGRESS NOTES
Chief Complaint   Patient presents with   • Results     Sleep Study 7/9/2022       HPI:  Cecile Teixeira is a 77 y.o. year old female here today for follow-up on sleep study results.  Also pulmonary patient see Dr. Love dictation 3/11/2022; pending appointment August 2022 for follow-up  Next office visit 5/12/2022 with Dr. Joseph    Patient is currently using a CPAP that she notes to be outdated.  She was originally diagnosed and treated by [facility] again in the last 20 years.  She relocated to [place] 2 years ago.  She recently reported in the last year to [facility] for her CPAP to possibly not be working.  She is found to have respiratory issues.  PSG 7/9/2022 noted a sleep efficiency of 2% patient was awake for the duration of study talking to herself.  We are unable to qualify her for therapy based on this.  Recommend home sleep study now to qualify her for a new CPAP device.  Advised patient to bring her current device to the office for us to assess as well.  Suspicion is due to her body habitus she will also require supplemental O2.  Reviewed study in detail with patient and options.  We will also try to obtain her records for [facility].    Regards to asthma she continues use Flovent and Minal as needed.  She is not currently using it with a spacer.  Reviewed proper use.  Rx is refilled for patient.      ROS: As per HPI and otherwise negative if not stated.    Past Medical History:   Diagnosis Date   • Asthma    • Blood clots in brain 2000   • Chest pain    • Cough    • GERD (gastroesophageal reflux disease)    • Hypothyroidism    • Osteoarthritis    • Painful breathing    • Shortness of breath    • Sleep apnea    • Sputum production    • Wheezing        Past Surgical History:   Procedure Laterality Date   • OTHER Left [id number]    LT LEG SURGERY        History reviewed. No pertinent family history.    Social History     Socioeconomic History   • Marital status: Single      Spouse name: Not on file   • Number of children: Not on file   • Years of education: Not on file   • Highest education level: Not on file   Occupational History   • Not on file   Tobacco Use   • Smoking status: Former Smoker     Types: Cigarettes   • Smokeless tobacco: Never Used   • Tobacco comment: only 1.5 yr. only social   Vaping Use   • Vaping Use: Never used   Substance and Sexual Activity   • Alcohol use: Not Currently     Comment: rare    • Drug use: Never   • Sexual activity: Not on file   Other Topics Concern   • Not on file   Social History Narrative   • Not on file     Social Determinants of Health     Financial Resource Strain: Not on file   Food Insecurity: Not on file   Transportation Needs: Not on file   Physical Activity: Not on file   Stress: Not on file   Social Connections: Not on file   Intimate Partner Violence: Not on file   Housing Stability: Not on file       Allergies as of 07/21/2022 - Reviewed 07/21/2022   Allergen Reaction Noted   • Doxycycline Hives 05/20/2020   • Lactose Unspecified 05/20/2020   • Levothyroxine Anxiety 01/01/2022   • Prednisone Unspecified 05/20/2020   • Sulfa drugs Unspecified 05/20/2020   • Cephalexin Diarrhea 05/20/2020   • Montelukast Rash 05/20/2020   • Spinach Unspecified 01/01/2022   • Other environmental  01/01/2022   • Codeine Unspecified 05/20/2020        Vitals:  /70 (BP Location: Left arm, Patient Position: Sitting, BP Cuff Size: Adult)   Pulse 80   Resp 16   Ht 1.524 m (5')   Wt (!) 157 kg (347 lb)   SpO2 97%     Current medications as of today   Current Outpatient Medications   Medication Sig Dispense Refill   • albuterol 108 (90 Base) MCG/ACT Aero Soln inhalation aerosol Inhale 2 Puffs every 6 hours as needed for Shortness of Breath. 8.5 g 11   • FLOVENT  MCG/ACT Aerosol Inhale 2 Puffs 2 times a day. 12 g 11   • Spacer/Aero-Holding Chambers Device To use with inhalers 2 Each 1   • levothyroxine (SYNTHROID) 50 MCG Tab Take 1 Tablet by  mouth every morning on an empty stomach for 180 days. 90 Tablet 1   • aspirin 81 MG EC tablet Take 81 mg by mouth every day.       No current facility-administered medications for this visit.         Physical Exam:   Gen:           Alert and oriented, No apparent distress. Mood and affect appropriate, normal interaction with examiner.  Eyes:          PERRL, EOM intact, sclere white, conjunctive moist.  Ears:          Not examined.   Hearing:     Grossly intact.  Nose:          Normal, no lesions or deformities.  Dentition:    mask  Oropharynx:   mask  Mallampati Classification: mask  Neck:        Supple, trachea midline, no masses.  Respiratory Effort: No intercostal retractions or use of accessory muscles.   Lung Auscultation:      Clear to auscultation bilaterally; no rales, rhonchi or wheezing.  CV:            Regular rate and rhythm. No murmurs, rubs or gallops.  Abd:           Not examined. Large pannis  Lymphadenopathy: Not examined.  Gait and Station: Scooter  Digits and Nails: No clubbing, cyanosis, petechiae, or nodes.   Cranial Nerves: II-XII grossly intact.  Skin:        No rashes, lesions or ulcers noted.               Ext:           BLE edema      Assessment:  1. YOLA (obstructive sleep apnea)  Overnight Home Sleep Study   2. Anxiety     3. Mild persistent asthma without complication  albuterol 108 (90 Base) MCG/ACT Aero Soln inhalation aerosol    FLOVENT  MCG/ACT Aerosol   4. Essential hypertension     5. BMI 60.0-69.9, adult (HCC)  HEIGHT AND WEIGHT   6. Former smoker         Immunizations:    Flu:11/16/21  Pneumovax 23:2013  Prevnar 13:2015  COVID-19: 7/21/21, 6/25/21    Plan:  1.  Unable to determine if patient sleep apnea is well treated.  She notes her device to be outdated and needs replacement.  She has also gained significant weight since her original sleep study.  She was unable to sleep during her recent diagnostic study.  Recommend attempting home sleep study at this time to qualify  patient for therapy.  She notes being comfortable sitting in her chairs at home and was very uncomfortable in our bed at the sleep center.  Pending results we will place orders for CPAP and possible supplemental O2.  2.  Follow-up primary care for the health concerns including management of anxiety and hypertension  3.  Follow-up with pulmonary as scheduled in August for review of asthma.  Refill of bronchodilators sent to updated pharmacy today.  4.  Follow-up in 6 weeks to review testing, sooner if needed.    Please note that this dictation was created using voice recognition software. I have made every reasonable attempt to correct obvious errors, but it is possible there are errors of grammar and possibly content that I did not discover before finalizing the note.

## 2022-07-22 ENCOUNTER — HOME STUDY (OUTPATIENT)
Dept: SLEEP MEDICINE | Facility: MEDICAL CENTER | Age: 77
End: 2022-07-22
Attending: NURSE PRACTITIONER
Payer: MEDICARE

## 2022-07-22 DIAGNOSIS — G47.33 OSA (OBSTRUCTIVE SLEEP APNEA): ICD-10-CM

## 2022-07-22 PROCEDURE — 95806 SLEEP STUDY UNATT&RESP EFFT: CPT | Performed by: STUDENT IN AN ORGANIZED HEALTH CARE EDUCATION/TRAINING PROGRAM

## 2022-07-26 DIAGNOSIS — G47.33 OSA (OBSTRUCTIVE SLEEP APNEA): ICD-10-CM

## 2022-07-26 NOTE — PROCEDURES
DIAGNOSTIC HOME SLEEP TEST (HST) REPORT       PATIENT ID:  NAME:  Cecile Teixeira  MRN:               [id number]  YOB: 1945  DATE OF STUDY: 7/22/2022      Impression:     This study shows evidence of:     1. Moderate obstructive sleep apnea with  4% Respiratory Event Index (REANNA) of 16.5 per hour and worse in supine sleep with REANNA at 19.6. These findings are based on the recording time (flow evaluation time). It is not possible with this device to determine a traditional apnea+hypopnea index (AHI) for total sleep time since EEG channels are not available.     2. Oxygenation O2 Sat. sherice was 80% and mean O2 sat was 91% and baseline O2 at 94 %. O2 sat was below 88% for 34 min of the flow evaluation time. Oxygen Desaturation (>= 4%) Index was elevated at 12.6/hr. AVG HR was 89 BPM.      TECHNICAL DESCRIPTION: mParticle apnea link air device used was a type-III home study device. Home sleep study recording included: Airflow recording by nasal pressure transducer; Respiratory Effort by 1 RIP Band; Oxygen Saturation and heart rate by finger oximetry. A position sensor and a snore channel was also used.    Scoring Criteria: A modification of the the AASM Manual for the Scoring of Sleep and Associated Events, 2012, was used.   Obstructive apnea was scored by cessation of airflow for at least 10 seconds with continuing respiratory effort.  Central apnea was scored by cessation of airflow for at least 10 seconds with no effort.  Hypopnea was scored by a 30% or more reduction in airflow for at least 10 seconds accompanied by an arterial oxygen desaturation of 3% or more.  (For Medicare patients, hypopneas were scored by a 30% or more reduction in airflow for at least 10 seconds accompanied by an arterial oxygen saturation of 4% or more, as required by their insurance, CMS.        General sleep summary: . Total recording time is 12 hours and 0 minutes and total flow evaluation time is 4 hours and 25 minutes.  Oxygen Saturation evaluation time 5hours 39 minutes  The patient spent 3 hours and 43 minutes in the supine position and 0 hours and 0 minutes in the nonsupine position.    Respiratory events:    Apneas: Total 11 (Obstructive apnea index 2.5/hr, Central apnea index 0 /hr, mixed 0 /hour)  Hypopneas: Total 62 with a Hypopnea index of 14 /hr    Recommendations:    1. CPAP titration study vs Auto CPAP trial.   2.   In general patients with sleep apnea are advised to avoid alcohol and sedatives and to not operate a motor vehicle while drowsy. In some cases alternative treatment options may prove effective in resolving sleep apnea in these options include upper airway surgery, the use of a dental orthotic or weight loss and positional therapy. Clinical correlation is required.         Daniel Leavitt MD

## 2022-07-29 ENCOUNTER — TELEPHONE (OUTPATIENT)
Dept: SLEEP MEDICINE | Facility: MEDICAL CENTER | Age: 77
End: 2022-07-29
Payer: MEDICARE

## 2022-07-29 NOTE — TELEPHONE ENCOUNTER
----- Message from GERBER Lund sent at 7/26/2022  8:12 AM PDT -----  Recommend using APAP. Orders placed. Please notify patient. Will need f/u appt in 3 mos for first compliance check.

## 2022-08-23 NOTE — TELEPHONE ENCOUNTER
SLEEP STUDY HAS BEEN PRINTED AND PLACED ON DR. PIÑA DESK WITH NOTE STATING WE ARE NEEDING TO SEND CPAP ORDER .

## 2022-08-23 NOTE — TELEPHONE ENCOUNTER
Email was sent out and sleep study has still not been read. Please advise as I can not send pts order until this is done.

## 2022-09-01 ENCOUNTER — APPOINTMENT (OUTPATIENT)
Dept: SLEEP MEDICINE | Facility: MEDICAL CENTER | Age: 77
End: 2022-09-01
Payer: MEDICARE

## 2022-09-01 DIAGNOSIS — G47.30 BREATHING-RELATED SLEEP DISORDER: ICD-10-CM

## 2022-09-01 DIAGNOSIS — R06.83 SNORING: ICD-10-CM

## 2022-09-01 DIAGNOSIS — G47.19 EXCESSIVE DAYTIME SLEEPINESS: ICD-10-CM

## 2022-09-01 NOTE — PROGRESS NOTES
This pt did not sleep during her in-lab PSG.  She will need to be scheduled for a HST.  Ordered today.

## 2022-10-10 ENCOUNTER — OFFICE VISIT (OUTPATIENT)
Dept: SLEEP MEDICINE | Facility: MEDICAL CENTER | Age: 77
End: 2022-10-10
Payer: MEDICARE

## 2022-10-10 VITALS
RESPIRATION RATE: 16 BRPM | HEART RATE: 77 BPM | OXYGEN SATURATION: 96 % | HEIGHT: 60 IN | WEIGHT: 293 LBS | BODY MASS INDEX: 57.52 KG/M2

## 2022-10-10 DIAGNOSIS — G47.33 OSA (OBSTRUCTIVE SLEEP APNEA): ICD-10-CM

## 2022-10-10 DIAGNOSIS — Z87.891 FORMER SMOKER: ICD-10-CM

## 2022-10-10 PROCEDURE — 99214 OFFICE O/P EST MOD 30 MIN: CPT | Performed by: NURSE PRACTITIONER

## 2022-10-10 ASSESSMENT — FIBROSIS 4 INDEX: FIB4 SCORE: 1.48

## 2022-10-10 NOTE — LETTER
GERBER Lund  [facility] Pulmonary Medicine   [address] [address]  Phone: [phone] - Fax:             Encounter Date: 10/10/2022  Provider: GERBER Lund  Location of Care: [facility] PULMONARY [facility]  [address] [facility] [address]      Patient:   Cecile Teixeira   MR Number: [id number]   YOB: 1945     PROGRESS NOTE:  Chief Complaint   Patient presents with   • Follow-Up     Last seen 7/21/22       HPI:  Cecile Teixeira is a 77 y.o. year old female here today for follow-up on YOLA.  Last OV 7/21/22 with Dr. Joseph    Currently using CPAP @ 18cm H20 nightly; RESMED; device obtained >5yrs old.  Compliance report noted consistent nightly use >9hr per night with reduced AHI <5/hr. She tolerates mask and pressure well. She denies AM headaches. She feels significantly better on therapy. She would like to obtain a new device.    HST through [employer]  7/22/2022 indicates REANNA 16.5/h and O2 sherice 80%.  Less than 88% for 34 minutes of testing.  Average heart 89 bpm. Reviewed with patient. She notes not sleeping as well as she normally does during the study due to being off therapy. She continues to qualify for treatment for sleep apnea. Her original testing was done [place] but I do not have records to review and we have been unable to obtain them.    She also has a hx of asthma [place]CP [place] that she notes to be stable.       ROS: As per HPI and otherwise negative if not stated.    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    Social History     Socioeconomic History   • Marital status: Single     Spouse name: Not on file   • Number of children: Not on file   • Years of education: Not on file   • Highest education level: Not on file   Occupational History   • Not on file   Tobacco Use   • Smoking status: Former     Types: Cigarettes   • Smokeless tobacco: Never   • Tobacco comments:     only 1.5 yr. only social   Vaping Use   • Vaping Use: Never used   Substance and Sexual Activity   • Alcohol use: Not Currently     Comment: rare    • Drug use: Never   • Sexual activity: Not on file   Other Topics Concern   • Not on file   Social History Narrative   • Not on file     Social Determinants of Health     Financial Resource Strain: Not on file   Food Insecurity: Not on file   Transportation Needs: Not on file   Physical Activity: Not on file   Stress: Not on file   Social Connections: Not on file   Intimate Partner Violence: Not on file   Housing Stability: Not on file       Allergies as of 10/10/2022 - Reviewed 10/10/2022   Allergen Reaction Noted   • Doxycycline Hives 05/20/2020   • Lactose Unspecified 05/20/2020   • Levothyroxine Anxiety 01/01/2022   • Prednisone Unspecified 05/20/2020   • Sulfa drugs Unspecified 05/20/2020   • Cephalexin Diarrhea 05/20/2020   • Montelukast Rash 05/20/2020   • Spinach Unspecified 01/01/2022   • Cow's milk [lac bovis]  10/10/2022   • Other environmental  01/01/2022   • Codeine Unspecified 05/20/2020        Vitals:  Pulse 77   Resp 16   Ht 1.524 m (5')   Wt (!) 150 kg (330 lb)   SpO2 96%     Current medications as of today   Current Outpatient Medications   Medication Sig Dispense Refill   • SYNTHROID 50 MCG Tab TAKE 1 TABLET BY MOUTH EVERY MORNING ON AN EMPTY STOMACH 90 Tablet 1   • albuterol 108 (90 Base) MCG/ACT Aero Soln inhalation aerosol Inhale 2 Puffs every 6 hours as needed for Shortness of Breath. 8.5 g 11   • FLOVENT  MCG/ACT Aerosol Inhale 2 Puffs 2 times a day. 12 g 11   •  Spacer/Aero-Holding Chambers Device To use with inhalers 2 Each 1   • aspirin 81 MG EC tablet Take 81 mg by mouth every day.       No current facility-administered medications for this visit.         Physical Exam:   Gen:           Alert and oriented, No apparent distress. Mood and affect appropriate, normal interaction with examiner.  Eyes:          PERRL, EOM intact, sclere white, conjunctive moist.  Ears:          Not examined.   Hearing:     Grossly intact.  Nose:          Normal, no lesions or deformities.  Dentition:    mask  Oropharynx:   mask  Mallampati Classification: mask  Neck:        Supple, trachea midline, no masses.  Respiratory Effort: No intercostal retractions or use of accessory muscles.   Lung Auscultation:      Clear to auscultation bilaterally; no rales, rhonchi or wheezing.  CV:            Regular rate and rhythm. No murmurs, rubs or gallops.  Abd:           Not examined.   Lymphadenopathy: Not examined.  Gait and Station: In scooter  Digits and Nails: No clubbing, cyanosis, petechiae, or nodes.   Cranial Nerves: II-XII grossly intact.  Skin:        No rashes, lesions or ulcers noted.               Ext:           BLE edema      Assessment:  1. YOLA (obstructive sleep apnea)  DME CPAP    DME Mask and Supplies      2. BMI 60.0-69.9, adult (HCC)        3. Former smoker            Immunizations:    Flu:recommend  Pneumovax 23:2015  Prevnar 13:2013  PCV 20: not due  COVID-19: 7/21/21, 6/25/21    Plan:  1. YOLA is clinically stable using CPAP 18cm. She would like to obtain a new device.   DME CPAP  DME mask/supplies  2. F/u with PCP for other health concerns including asthma  3. F/u in 3 months for compliance check on new device, sooner if needed.    Please note that this dictation was created using voice recognition software. I have made every reasonable attempt to correct obvious errors, but it is possible there are errors of grammar and possibly content that I did not discover before finalizing  the note.          Electronically signed by GERBER Lund  on 10/10/22    Cici Hunt P.A.-C.  [address] [place] [id number]  Via In Basket

## 2022-10-10 NOTE — PROGRESS NOTES
Chief Complaint   Patient presents with    Follow-Up     Last seen 7/21/22       HPI:  Cecile Teixeira is a 77 y.o. year old female here today for follow-up on YOLA.  Last OV 7/21/22 with Dr. Joseph    Currently using CPAP @ 18cm H20 nightly; RESMED; device obtained >5yrs old.  Compliance report noted consistent nightly use >9hr per night with reduced AHI <5/hr. She tolerates mask and pressure well. She denies AM headaches. She feels significantly better on therapy. She would like to obtain a new device.    HST through [employer]  7/22/2022 indicates REANNA 16.5/h and O2 sherice 80%.  Less than 88% for 34 minutes of testing.  Average heart 89 bpm. Reviewed with patient. She notes not sleeping as well as she normally does during the study due to being off therapy. She continues to qualify for treatment for sleep apnea. Her original testing was done [place] but I do not have records to review and we have been unable to obtain them.    She also has a hx of asthma that [place]CP [place] that she notes to be stable.       ROS: As per HPI and otherwise negative if not stated.    Past Medical History:   Diagnosis Date    Asthma     Blood clots in brain 2000    Chest pain     Cough     GERD (gastroesophageal reflux disease)     Hypothyroidism     Osteoarthritis     Painful breathing     Shortness of breath     Sleep apnea     Sputum production     Wheezing        Past Surgical History:   Procedure Laterality Date    OTHER Left [id number]    LT LEG SURGERY        No family history on file.    Social History     Socioeconomic History    Marital status: Single     Spouse name: Not on file    Number of children: Not on file    Years of education: Not on file    Highest education level: Not on file   Occupational History    Not on file   Tobacco Use    Smoking status: Former     Types: Cigarettes    Smokeless tobacco: Never    Tobacco comments:     only 1.5 yr. only social   Vaping Use    Vaping Use: Never used   Substance and Sexual Activity     Alcohol use: Not Currently     Comment: rare     Drug use: Never    Sexual activity: Not on file   Other Topics Concern    Not on file   Social History Narrative    Not on file     Social Determinants of Health     Financial Resource Strain: Not on file   Food Insecurity: Not on file   Transportation Needs: Not on file   Physical Activity: Not on file   Stress: Not on file   Social Connections: Not on file   Intimate Partner Violence: Not on file   Housing Stability: Not on file       Allergies as of 10/10/2022 - Reviewed 10/10/2022   Allergen Reaction Noted    Doxycycline Hives 05/20/2020    Lactose Unspecified 05/20/2020    Levothyroxine Anxiety 01/01/2022    Prednisone Unspecified 05/20/2020    Sulfa drugs Unspecified 05/20/2020    Cephalexin Diarrhea 05/20/2020    Montelukast Rash 05/20/2020    Spinach Unspecified 01/01/2022    Cow's milk [lac bovis]  10/10/2022    Other environmental  01/01/2022    Codeine Unspecified 05/20/2020        Vitals:  Pulse 77   Resp 16   Ht 1.524 m (5')   Wt (!) 150 kg (330 lb)   SpO2 96%     Current medications as of today   Current Outpatient Medications   Medication Sig Dispense Refill    SYNTHROID 50 MCG Tab TAKE 1 TABLET BY MOUTH EVERY MORNING ON AN EMPTY STOMACH 90 Tablet 1    albuterol 108 (90 Base) MCG/ACT Aero Soln inhalation aerosol Inhale 2 Puffs every 6 hours as needed for Shortness of Breath. 8.5 g 11    FLOVENT  MCG/ACT Aerosol Inhale 2 Puffs 2 times a day. 12 g 11    Spacer/Aero-Holding Chambers Device To use with inhalers 2 Each 1    aspirin 81 MG EC tablet Take 81 mg by mouth every day.       No current facility-administered medications for this visit.         Physical Exam:   Gen:           Alert and oriented, No apparent distress. Mood and affect appropriate, normal interaction with examiner.  Eyes:          PERRL, EOM intact, sclere white, conjunctive moist.  Ears:          Not examined.   Hearing:     Grossly intact.  Nose:          Normal, no lesions  or deformities.  Dentition:    mask  Oropharynx:   mask  Mallampati Classification: mask  Neck:        Supple, trachea midline, no masses.  Respiratory Effort: No intercostal retractions or use of accessory muscles.   Lung Auscultation:      Clear to auscultation bilaterally; no rales, rhonchi or wheezing.  CV:            Regular rate and rhythm. No murmurs, rubs or gallops.  Abd:           Not examined.   Lymphadenopathy: Not examined.  Gait and Station: In scooter  Digits and Nails: No clubbing, cyanosis, petechiae, or nodes.   Cranial Nerves: II-XII grossly intact.  Skin:        No rashes, lesions or ulcers noted.               Ext:           BLE edema      Assessment:  1. YOLA (obstructive sleep apnea)  DME CPAP    DME Mask and Supplies      2. BMI 60.0-69.9, adult (HCC)        3. Former smoker            Immunizations:    Flu:recommend  Pneumovax 23:2015  Prevnar 13:2013  PCV 20: not due  COVID-19: 7/21/21, 6/25/21    Plan:  YOLA is clinically stable using CPAP 18cm. She would like to obtain a new device.   DME CPAP  DME mask/supplies  2. F/u with PCP for other health concerns including asthma  3. F/u in 3 months for compliance check on new device, sooner if needed.    Please note that this dictation was created using voice recognition software. I have made every reasonable attempt to correct obvious errors, but it is possible there are errors of grammar and possibly content that I did not discover before finalizing the note.

## 2022-11-09 ENCOUNTER — PATIENT MESSAGE (OUTPATIENT)
Dept: HEALTH INFORMATION MANAGEMENT | Facility: OTHER | Age: 77
End: 2022-11-09

## 2023-01-10 ENCOUNTER — APPOINTMENT (OUTPATIENT)
Dept: SLEEP MEDICINE | Facility: MEDICAL CENTER | Age: 78
End: 2023-01-10
Payer: MEDICARE

## 2023-02-10 ENCOUNTER — TELEPHONE (OUTPATIENT)
Dept: SLEEP MEDICINE | Facility: MEDICAL CENTER | Age: 78
End: 2023-02-10
Payer: MEDICARE

## 2023-02-10 NOTE — TELEPHONE ENCOUNTER
2-10-23  1st NO SHOW  Date of No Show: 2-9-23  Provider: Padmini Tate  Reason For Visit: FV/3M  Outcome of call:  no answer LVM.  Left call back for scheduling [phone].

## 2023-02-12 PROBLEM — Z86.73 HISTORY OF CVA (CEREBROVASCULAR ACCIDENT): Status: ACTIVE | Noted: 2023-02-12

## 2023-02-12 PROBLEM — R46.89 BEHAVIORAL CHANGE: Status: ACTIVE | Noted: 2023-02-12

## 2023-03-13 ENCOUNTER — TELEPHONE (OUTPATIENT)
Dept: HEALTH INFORMATION MANAGEMENT | Facility: OTHER | Age: 78
End: 2023-03-13

## 2023-04-13 ENCOUNTER — OFFICE VISIT (OUTPATIENT)
Dept: CARDIOLOGY | Facility: MEDICAL CENTER | Age: 78
End: 2023-04-13
Payer: MEDICARE

## 2023-04-13 VITALS
RESPIRATION RATE: 16 BRPM | HEART RATE: 75 BPM | WEIGHT: 293 LBS | OXYGEN SATURATION: 96 % | DIASTOLIC BLOOD PRESSURE: 82 MMHG | HEIGHT: 60 IN | BODY MASS INDEX: 57.52 KG/M2 | SYSTOLIC BLOOD PRESSURE: 168 MMHG

## 2023-04-13 DIAGNOSIS — Z01.810 PREOP CARDIOVASCULAR EXAM: ICD-10-CM

## 2023-04-13 DIAGNOSIS — I10 ESSENTIAL HYPERTENSION: ICD-10-CM

## 2023-04-13 PROCEDURE — 99203 OFFICE O/P NEW LOW 30 MIN: CPT | Performed by: INTERNAL MEDICINE

## 2023-04-13 PROCEDURE — 93000 ELECTROCARDIOGRAM COMPLETE: CPT | Performed by: INTERNAL MEDICINE

## 2023-04-13 ASSESSMENT — ENCOUNTER SYMPTOMS
MUSCULOSKELETAL NEGATIVE: 1
SHORTNESS OF BREATH: 0
GASTROINTESTINAL NEGATIVE: 1
DEPRESSION: 0
PSYCHIATRIC NEGATIVE: 1
MYALGIAS: 0
WEIGHT LOSS: 0
PALPITATIONS: 0
CONSTITUTIONAL NEGATIVE: 1
HEADACHES: 0
CLAUDICATION: 0
NAUSEA: 0
BRUISES/BLEEDS EASILY: 0
BLURRED VISION: 0
WEAKNESS: 0
DIZZINESS: 0
FOCAL WEAKNESS: 0
VOMITING: 0
NEUROLOGICAL NEGATIVE: 1
DOUBLE VISION: 0
CHILLS: 0
COUGH: 0
ABDOMINAL PAIN: 0
RESPIRATORY NEGATIVE: 1
FEVER: 0
EYES NEGATIVE: 1
NERVOUS/ANXIOUS: 0

## 2023-04-13 ASSESSMENT — FIBROSIS 4 INDEX: FIB4 SCORE: 1.48

## 2023-04-13 NOTE — PROGRESS NOTES
Chief Complaint   Patient presents with    Hypertension       Subjective     Cecile Teixeira is a 77 y.o. female who presents today for new patient establishment for presurgical evaluation.    Ms. Teixeira is a 77 year old female with multiple medical and psychiatric history. She is pending cataract surgery. She moved from [place] to live here to be closer to her daughter.    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 No pertinent family history.  Social History     Socioeconomic History    Marital status: Single     Spouse name: Not on file    Number of children: Not on file    Years of education: Not on file    Highest education level: Not on file   Occupational History    Not on file   Tobacco Use    Smoking status: Former     Types: Cigarettes    Smokeless tobacco: Never    Tobacco comments:     only 1.5 yr. only social   Vaping Use    Vaping Use: Never used   Substance and Sexual Activity    Alcohol use: Not Currently     Comment: rare     Drug use: Never    Sexual activity: Not on file   Other Topics Concern    Not on file   Social History Narrative    Not on file     Social Determinants of Health     Financial Resource Strain: Not on file   Food Insecurity: Not on file   Transportation Needs: Not on file   Physical Activity: Not on file   Stress: Not on file   Social Connections: Not on file   Intimate Partner Violence: Not on file   Housing Stability: Not on file     Allergies   Allergen Reactions    Doxycycline Hives    Lactose Unspecified     Lactose intolerance since a child     Levothyroxine Anxiety     Panic attack     Prednisone Unspecified     Diarrhea  and very irritable     Sulfa Drugs Unspecified     Mental status change     Cephalexin Diarrhea     Diarrhea and mild rash     Montelukast Rash     Leg pain, back pain and rash     Spinach Unspecified     Skin allergy test    Cow's Milk [Lac Bovis]     Other Environmental      Trees except oak    Codeine Unspecified     Headaches and confusion      (Medications reviewed.)  Outpatient Encounter Medications as of 4/13/2023   Medication Sig Dispense Refill    FLOVENT  MCG/ACT Aerosol Inhale 2 Puffs 2 times a day. 12 g 11    albuterol 108 (90 Base) MCG/ACT Aero Soln inhalation aerosol Inhale 2 Puffs every 6 hours as needed for Shortness of Breath. 8.5 g 11    SYNTHROID 50 MCG Tab Take 1 Tablet by mouth every morning on an empty stomach for 180 days. 90 Tablet 1    hydrALAZINE (APRESOLINE) 25 MG Tab Take 1 Tablet by mouth 4 times a day for 180 days. 120 Tablet 5    Spacer/Aero-Holding Chambers Device To use with inhalers 2 Each 1    aspirin 81 MG EC tablet Take 81 mg by mouth every day.      [DISCONTINUED] divalproex (DEPAKOTE SPRINKLE) 125 MG Capsule Delayed Release Sprinkle Take 1 Capsule by mouth 2 times a day for 180 days. (Patient not taking: Reported on 4/13/2023) 60 Capsule 5     No facility-administered encounter medications on file as of 4/13/2023.     Review of Systems   Constitutional: Negative.  Negative for chills, fever, malaise/fatigue and weight loss.   HENT: Negative.  Negative for hearing loss.    Eyes: Negative.  Negative for blurred vision and double vision.   Respiratory: Negative.  Negative for cough and shortness of breath.    Cardiovascular:  Positive for leg swelling. Negative for chest pain, palpitations and claudication.   Gastrointestinal: Negative.  Negative for abdominal pain, nausea and vomiting.   Genitourinary: Negative.  Negative for dysuria and urgency.   Musculoskeletal: Negative.  Negative for joint pain and myalgias.   Skin: Negative.  Negative for itching and rash.    Neurological: Negative.  Negative for dizziness, focal weakness, weakness and headaches.   Endo/Heme/Allergies: Negative.  Does not bruise/bleed easily.   Psychiatric/Behavioral: Negative.  Negative for depression. The patient is not nervous/anxious.             Objective     BP (!) 168/82 (BP Location: Left arm, Patient Position: Sitting, BP Cuff Size: Adult)   Pulse 75   Resp 16   Ht 1.524 m (5')   Wt (!) 153 kg (337 lb)   SpO2 96%   BMI 65.82 kg/m²     Physical Exam  Constitutional:       Appearance: Normal appearance. She is well-developed and normal weight.   HENT:      Head: Normocephalic and atraumatic.   Neck:      Vascular: No JVD.   Cardiovascular:      Rate and Rhythm: Normal rate and regular rhythm.      Heart sounds: Normal heart sounds.   Pulmonary:      Effort: Pulmonary effort is normal.      Breath sounds: Normal breath sounds.   Abdominal:      General: Bowel sounds are normal.      Palpations: Abdomen is soft.      Comments: No hepatosplenomegaly.   Musculoskeletal:         General: Swelling present. Normal range of motion.   Lymphadenopathy:      Cervical: No cervical adenopathy.   Skin:     General: Skin is warm and dry.   Neurological:      Mental Status: She is alert and oriented to person, place, and time.   Using wheelchair.         CARDIAC STUDIES/PROCEDURES:    EKG was ordered for presurgical evaluation, performed on (04/13/23) was reviewed: EKG, personally interpreted shows sinus rhythm.    Laboratory results of (01/24/22) were reviewed. Cholesterol profile of 198/92/54/126 mg/dL noted.    Assessment & Plan     1. Preop cardiovascular exam        2. Essential hypertension  EKG          Medical Decision Making: Today's Assessment/Status/Plan:        Presurgical evaluation: She is pending cataract surgery and is doing well from cardiac standpoint. She may proceed with surgery from cardiac standpoint with low risk.   Hypertension: Blood pressure has been high.    We will see the  patient as needed.    CC Cici Peterson PAC

## 2023-04-14 LAB — EKG IMPRESSION: NORMAL

## 2023-04-26 PROBLEM — R01.1 MURMUR: Status: ACTIVE | Noted: 2023-04-26

## 2023-05-08 ENCOUNTER — TELEPHONE (OUTPATIENT)
Dept: NEUROLOGY | Facility: MEDICAL CENTER | Age: 78
End: 2023-05-08
Payer: MEDICARE

## 2023-05-10 ENCOUNTER — OFFICE VISIT (OUTPATIENT)
Dept: NEUROLOGY | Facility: MEDICAL CENTER | Age: 78
End: 2023-05-10
Attending: PSYCHIATRY & NEUROLOGY
Payer: MEDICARE

## 2023-05-10 VITALS
SYSTOLIC BLOOD PRESSURE: 178 MMHG | TEMPERATURE: 97.8 F | HEART RATE: 76 BPM | OXYGEN SATURATION: 96 % | RESPIRATION RATE: 20 BRPM | HEIGHT: 60 IN | DIASTOLIC BLOOD PRESSURE: 92 MMHG | BODY MASS INDEX: 57.52 KG/M2 | WEIGHT: 293 LBS

## 2023-05-10 DIAGNOSIS — F40.240 CLAUSTROPHOBIA: ICD-10-CM

## 2023-05-10 DIAGNOSIS — R29.810 LOWER FACIAL WEAKNESS: ICD-10-CM

## 2023-05-10 DIAGNOSIS — E66.01 MORBID OBESITY (HCC): ICD-10-CM

## 2023-05-10 PROBLEM — Z86.73 HISTORY OF CVA (CEREBROVASCULAR ACCIDENT): Status: RESOLVED | Noted: 2023-02-12 | Resolved: 2023-05-10

## 2023-05-10 PROCEDURE — 99204 OFFICE O/P NEW MOD 45 MIN: CPT | Performed by: PSYCHIATRY & NEUROLOGY

## 2023-05-10 PROCEDURE — 99202 OFFICE O/P NEW SF 15 MIN: CPT | Performed by: PSYCHIATRY & NEUROLOGY

## 2023-05-10 RX ORDER — LORAZEPAM 1 MG/1
1 TABLET ORAL
Qty: 1 TABLET | Refills: 0 | Status: SHIPPED | OUTPATIENT
Start: 2023-05-10 | End: 2023-07-01

## 2023-05-10 ASSESSMENT — FIBROSIS 4 INDEX: FIB4 SCORE: 1.48

## 2023-05-10 NOTE — PATIENT INSTRUCTIONS
I have ordered a MRI of the brain and a MR angiogram of the neck and head arteries     This is for your episode of right facial weakness and memory/word finding trouble

## 2023-05-10 NOTE — PROGRESS NOTES
Chief Complaint   Patient presents with    New Patient     Stroke bridge, CVA       History of present illness:  Cecile Teixeira 77 y.o. female with YOLA, HTN, obesity, with history of ischemic stroke and behavioral changes.   She is wheelchair bound and is unable to stand up since her 30's.   The patient provides the following history:     She has remote history of a blood clot in her leg.   She had a second blood clot - in the right brain causing left facial droop. It was vibrating and spread to the left side of her head.  This past winter - she had another spell, she had drooping of the right face and coffee was spilling out of her right mouth. This lasted for hours but was resolved by the next day.   She still has a mild right facial weakness. She lives alone and was not speaking during this.     She is scared that she may have trouble thinking - she has word finding difficulty when she is tired.     Past medical history:   Past Medical History:   Diagnosis Date    Asthma     Blood clots in brain 2000    Chest pain     Cough     GERD (gastroesophageal reflux disease)     Hypothyroidism     Osteoarthritis     Painful breathing     Shortness of breath     Sleep apnea     Sputum production     Wheezing        Past surgical history:   Past Surgical History:   Procedure Laterality Date    OTHER Left [id number]    LT LEG SURGERY        Family history:   No family history on file.    Social history:   Social History     Socioeconomic History    Marital status: Single     Spouse name: Not on file    Number of children: Not on file    Years of education: Not on file    Highest education level: Not on file   Occupational History    Not on file   Tobacco Use    Smoking status: Former     Types: Cigarettes    Smokeless tobacco: Never    Tobacco comments:     only 1.5 yr. only social   Vaping Use    Vaping Use: Never used   Substance and Sexual Activity    Alcohol use: Not Currently     Comment: rare     Drug use: Never    Sexual  activity: Not on file   Other Topics Concern    Not on file   Social History Narrative    Not on file     Social Determinants of Health     Financial Resource Strain: Not on file   Food Insecurity: Not on file   Transportation Needs: Not on file   Physical Activity: Not on file   Stress: Not on file   Social Connections: Not on file   Intimate Partner Violence: Not on file   Housing Stability: Not on file       Current medications:   Current Outpatient Medications   Medication    hydrALAZINE (APRESOLINE) 50 MG Tab    SYNTHROID 75 MCG Tab    FLOVENT  MCG/ACT Aerosol    albuterol 108 (90 Base) MCG/ACT Aero Soln inhalation aerosol    Spacer/Aero-Holding Chambers Device    aspirin 81 MG EC tablet     No current facility-administered medications for this visit.       Medication Allergy:  Allergies   Allergen Reactions    Doxycycline Hives    Lactose Unspecified     Lactose intolerance since a child     Levothyroxine Anxiety     Panic attack     Prednisone Unspecified     Diarrhea and very irritable     Sulfa Drugs Unspecified     Mental status change     Cephalexin Diarrhea     Diarrhea and mild rash     Montelukast Rash     Leg pain, back pain and rash     Spinach Unspecified     Skin allergy test    Cow's Milk [Lac Bovis]     Other Environmental      Trees except oak    Codeine Unspecified     Headaches and confusion        Physical examination:   Vitals:    05/10/23 1307   BP: (!) 178/92   BP Location: Left arm   Patient Position: Sitting   BP Cuff Size: Adult   Pulse: 76   Resp: 20   Temp: 36.6 °C (97.8 °F)   TempSrc: Temporal   SpO2: 96%   Weight: (!) 150 kg (330 lb)   Height: 1.524 m (5')     Neurological Exam  Mental Status  Awake and alert. Speech is normal. Language is fluent with no aphasia.    Cranial Nerves  CN II: Right normal visual field. Left normal visual field.  CN III, IV, VI: Extraocular movements intact bilaterally. No nystagmus. Normal smooth pursuit.  CN V:  Right: Diminished sensation of  the entire right side of the face. Sensation is decreased (80%) on the right.  Left: Facial sensation is normal on the left.  CN VII:  Right: There is no facial weakness.  Left: There is no facial weakness.    Motor        Her limbs are symmetrically antigravity bilaterally .    Sensory  Light touch abnormality: Right sided sensation is 80% of normal (compared to the left side).     Coordination  Right: Finger-to-nose normal.Left: Finger-to-nose normal.    Gait    She is morbidly obese and cannot ambulate   She sits in an electric wheelchair .      Labs:  I reviewed the following labs personally:  None    Imaging:   None     ASSESSMENT AND PLAN:  Problem List Items Addressed This Visit       Morbid obesity (HCC)     Other Visit Diagnoses       Lower facial weakness        Relevant Orders    MR-BRAIN-W/O    MR-MRA HEAD-W/O    MR-MRA NECK-W/O            1. Lower facial weakness  - MR-BRAIN-W/O; Future  - MR-MRA HEAD-W/O; Future  - MR-MRA NECK-W/O; Future    2. Morbid obesity (HCC)    This is a 77-year-old nonambulatory female who is morbidly obese, who has had a episode of right facial weakness lasting less than 24 hours.  She describes that she was having coffee spill out of the right side of her mouth.  On her examination today, there is no facial weakness or neurologic abnormality.  She is also concerned about memory problems, and describes frequent word finding difficulty.  During the conversation that we had today, she was having multiple lapses in her word finding.  I have noted that a suspect that she does not have dementia, and this is a result of poor sleep and advancing age.  I did  her today on weight loss.  I have ordered a brain MRI, as well as an MR angiogram of the head and neck given the episode of right facial weakness.  If these scans are normal, no further intervention is indicated.    FOLLOW-UP:   Return if symptoms worsen or fail to improve.    Total time spent for the day for this patient  unrelated to procedure time is: 47 minutes. I spent 37 minutes in face to face time and I spent 2 minutes pre-charting and 8 minutes in post-visit documentation.      Dr. Herb Robert D.O.  [facility]

## 2023-05-15 ENCOUNTER — HOSPITAL ENCOUNTER (EMERGENCY)
Facility: MEDICAL CENTER | Age: 78
End: 2023-05-15
Attending: EMERGENCY MEDICINE
Payer: MEDICARE

## 2023-05-15 VITALS
RESPIRATION RATE: 18 BRPM | DIASTOLIC BLOOD PRESSURE: 86 MMHG | BODY MASS INDEX: 57.52 KG/M2 | WEIGHT: 293 LBS | HEIGHT: 60 IN | HEART RATE: 80 BPM | TEMPERATURE: 96.6 F | OXYGEN SATURATION: 96 % | SYSTOLIC BLOOD PRESSURE: 198 MMHG

## 2023-05-15 DIAGNOSIS — E03.8 OTHER SPECIFIED HYPOTHYROIDISM: ICD-10-CM

## 2023-05-15 DIAGNOSIS — R53.81 MALAISE: ICD-10-CM

## 2023-05-15 DIAGNOSIS — Z76.0 MEDICATION REFILL: ICD-10-CM

## 2023-05-15 LAB
ALBUMIN SERPL BCP-MCNC: 3.7 G/DL (ref 3.2–4.9)
ALBUMIN/GLOB SERPL: 0.8 G/DL
ALP SERPL-CCNC: 84 U/L (ref 30–99)
ALT SERPL-CCNC: 26 U/L (ref 2–50)
ANION GAP SERPL CALC-SCNC: 14 MMOL/L (ref 7–16)
APPEARANCE UR: CLEAR
AST SERPL-CCNC: 35 U/L (ref 12–45)
BACTERIA #/AREA URNS HPF: ABNORMAL /HPF
BASOPHILS # BLD AUTO: 0.9 % (ref 0–1.8)
BASOPHILS # BLD: 0.07 K/UL (ref 0–0.12)
BILIRUB SERPL-MCNC: 0.4 MG/DL (ref 0.1–1.5)
BILIRUB UR QL STRIP.AUTO: NEGATIVE
BUN SERPL-MCNC: 15 MG/DL (ref 8–22)
CALCIUM ALBUM COR SERPL-MCNC: 9.4 MG/DL (ref 8.5–10.5)
CALCIUM SERPL-MCNC: 9.2 MG/DL (ref 8.4–10.2)
CHLORIDE SERPL-SCNC: 101 MMOL/L (ref 96–112)
CO2 SERPL-SCNC: 20 MMOL/L (ref 20–33)
COLOR UR: YELLOW
CREAT SERPL-MCNC: 0.74 MG/DL (ref 0.5–1.4)
EOSINOPHIL # BLD AUTO: 0.29 K/UL (ref 0–0.51)
EOSINOPHIL NFR BLD: 3.6 % (ref 0–6.9)
EPI CELLS #/AREA URNS HPF: ABNORMAL /HPF
ERYTHROCYTE [DISTWIDTH] IN BLOOD BY AUTOMATED COUNT: 50.5 FL (ref 35.9–50)
GFR SERPLBLD CREATININE-BSD FMLA CKD-EPI: 83 ML/MIN/1.73 M 2
GLOBULIN SER CALC-MCNC: 4.6 G/DL (ref 1.9–3.5)
GLUCOSE SERPL-MCNC: 100 MG/DL (ref 65–99)
GLUCOSE UR STRIP.AUTO-MCNC: NEGATIVE MG/DL
HCT VFR BLD AUTO: 48 % (ref 37–47)
HGB BLD-MCNC: 15.1 G/DL (ref 12–16)
IMM GRANULOCYTES # BLD AUTO: 0.03 K/UL (ref 0–0.11)
IMM GRANULOCYTES NFR BLD AUTO: 0.4 % (ref 0–0.9)
KETONES UR STRIP.AUTO-MCNC: NEGATIVE MG/DL
LEUKOCYTE ESTERASE UR QL STRIP.AUTO: NEGATIVE
LYMPHOCYTES # BLD AUTO: 1.67 K/UL (ref 1–4.8)
LYMPHOCYTES NFR BLD: 20.5 % (ref 22–41)
MCH RBC QN AUTO: 30.8 PG (ref 27–33)
MCHC RBC AUTO-ENTMCNC: 31.5 G/DL (ref 33.6–35)
MCV RBC AUTO: 98 FL (ref 81.4–97.8)
MICRO URNS: ABNORMAL
MONOCYTES # BLD AUTO: 0.71 K/UL (ref 0–0.85)
MONOCYTES NFR BLD AUTO: 8.7 % (ref 0–13.4)
NEUTROPHILS # BLD AUTO: 5.39 K/UL (ref 2–7.15)
NEUTROPHILS NFR BLD: 65.9 % (ref 44–72)
NITRITE UR QL STRIP.AUTO: NEGATIVE
NRBC # BLD AUTO: 0 K/UL
NRBC BLD-RTO: 0 /100 WBC
PH UR STRIP.AUTO: 7 [PH] (ref 5–8)
PLATELET # BLD AUTO: 274 K/UL (ref 164–446)
PMV BLD AUTO: 9.7 FL (ref 9–12.9)
POTASSIUM SERPL-SCNC: 4 MMOL/L (ref 3.6–5.5)
PROT SERPL-MCNC: 8.3 G/DL (ref 6–8.2)
PROT UR QL STRIP: NEGATIVE MG/DL
RBC # BLD AUTO: 4.9 M/UL (ref 4.2–5.4)
RBC # URNS HPF: ABNORMAL /HPF
RBC UR QL AUTO: ABNORMAL
SODIUM SERPL-SCNC: 135 MMOL/L (ref 135–145)
SP GR UR STRIP.AUTO: 1.01
T4 FREE SERPL-MCNC: 0.82 NG/DL (ref 0.93–1.7)
TSH SERPL DL<=0.005 MIU/L-ACNC: 19.88 UIU/ML (ref 0.38–5.33)
WBC # BLD AUTO: 8.2 K/UL (ref 4.8–10.8)
WBC #/AREA URNS HPF: ABNORMAL /HPF

## 2023-05-15 PROCEDURE — 84439 ASSAY OF FREE THYROXINE: CPT

## 2023-05-15 PROCEDURE — 81001 URINALYSIS AUTO W/SCOPE: CPT

## 2023-05-15 PROCEDURE — 36415 COLL VENOUS BLD VENIPUNCTURE: CPT

## 2023-05-15 PROCEDURE — 99284 EMERGENCY DEPT VISIT MOD MDM: CPT

## 2023-05-15 PROCEDURE — 85025 COMPLETE CBC W/AUTO DIFF WBC: CPT

## 2023-05-15 PROCEDURE — 84443 ASSAY THYROID STIM HORMONE: CPT

## 2023-05-15 PROCEDURE — 80053 COMPREHEN METABOLIC PANEL: CPT

## 2023-05-15 RX ORDER — LEVOTHYROXINE SODIUM 0.07 MG/1
75 TABLET ORAL
Qty: 30 TABLET | Refills: 1 | Status: SHIPPED | OUTPATIENT
Start: 2023-05-15 | End: 2023-06-14

## 2023-05-15 ASSESSMENT — FIBROSIS 4 INDEX: FIB4 SCORE: 1.48

## 2023-05-15 NOTE — ED PROVIDER NOTES
"ED Provider Note    CHIEF COMPLAINT  Chief Complaint   Patient presents with    Other     Pt. States \"I live alone but someone is messing with my medication\". Pt. Reports feeling anxious.       EXTERNAL RECORDS REVIEWED  Reviewed outpatient clinic records recent neurological consultations     HPI/ROS  LIMITATION TO HISTORY   None  OUTSIDE HISTORIAN(S):  None    Cecile Teixeira is a 77 y.o. female who presents for evaluation of concerns about her medications.  The patient is a complex medical history as listed below.  She has a known history of hypothyroidism.  Is primarily requesting a refill of her Synthroid.  She reports that there have been issues getting the appropriate type of Synthroid as she reports that she is allergic or has a poor response to different manufacturers.  She is concerned as well about some chronic symptoms she has included some facial numbness that happened last week.  She did subsequently see a neurologist last week who has ordered an extensive battery of imaging studies including MRI/MRA of the head neck and brain.  She currently has no strokelike symptoms such as focal numbness weakness tingling to the arms legs or face difficulty word finding.  Patient is wheelchair-bound.  She does live by herself.    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    SURGICAL HISTORY   has a past surgical history that includes other (Left, [id number]).    FAMILY HISTORY  No family history on file.  Noncontributory  SOCIAL HISTORY  Social History     Tobacco Use    Smoking status: Former     Types: Cigarettes    Smokeless tobacco: Never    Tobacco comments:     only 1.5 yr. only social   Vaping Use    Vaping Use: Never used   Substance and Sexual Activity    Alcohol use: Not Currently     " Comment: rare     Drug use: Never    Sexual activity: Not on file     No drug or alcohol use  CURRENT MEDICATIONS  No current facility-administered medications for this encounter.    Current Outpatient Medications:     LORazepam (ATIVAN) 1 MG Tab, Take 1 Tablet by mouth one time as needed for Anxiety (45 min prior to MRI scan) for up to 1 dose., Disp: 1 Tablet, Rfl: 0    hydrALAZINE (APRESOLINE) 50 MG Tab, Take 1 Tablet by mouth 3 times a day for 180 days., Disp: 90 Tablet, Rfl: 5    SYNTHROID 75 MCG Tab, Take 1 Tablet by mouth every morning on an empty stomach., Disp: 30 Tablet, Rfl: 11    FLOVENT  MCG/ACT Aerosol, Inhale 2 Puffs 2 times a day., Disp: 12 g, Rfl: 11    albuterol 108 (90 Base) MCG/ACT Aero Soln inhalation aerosol, Inhale 2 Puffs every 6 hours as needed for Shortness of Breath., Disp: 8.5 g, Rfl: 11    Spacer/Aero-Holding Chambers Device, To use with inhalers, Disp: 2 Each, Rfl: 1    aspirin 81 MG EC tablet, Take 81 mg by mouth every day. (Patient not taking: Reported on 5/10/2023), Disp: , Rfl:         ALLERGIES  Allergies   Allergen Reactions    Doxycycline Hives    Lactose Unspecified     Lactose intolerance since a child     Levothyroxine Anxiety     Panic attack     Prednisone Unspecified     Diarrhea and very irritable     Sulfa Drugs Unspecified     Mental status change     Cephalexin Diarrhea     Diarrhea and mild rash     Montelukast Rash     Leg pain, back pain and rash     Spinach Unspecified     Skin allergy test    Cow's Milk [Lac Bovis]     Other Environmental      Trees except oak    Codeine Unspecified     Headaches and confusion        PHYSICAL EXAM  VITAL SIGNS: Pulse 82   Temp 36.5 °C (97.7 °F) (Temporal)   Resp 20   Ht 1.524 m (5')   Wt (!) 141 kg (311 lb)   SpO2 94%   BMI 60.74 kg/m² patient refused to have her blood pressure checked  Pulse ox interpretation: I interpret this pulse ox as normal.  Constitutional: Alert and oriented x 3, no acute distress  HEENT:  Atraumatic normocephalic, pupils are equal round reactive to light extraocular movements are intact. The nares is clear, external ears are normal, mouth shows moist mucous membranes normal dentition for age  Neck: Supple, no JVD no tracheal deviation  Cardiovascular: Regular rate and rhythm no murmur rub or gallop 2+ pulses peripherally x4  Thorax & Lungs: No respiratory distress, no wheezes rales or rhonchi, No chest tenderness.   GI: Soft nontender nondistended positive bowel sounds, no peritoneal signs  Skin: Warm dry no acute rash or lesion  Musculoskeletal: Moving all extremities with full range and 5 of 5 strength no acute  deformity lymphedema  Neurologic: Cranial nerves III through XII are grossly intact no sensory deficit no cerebellar dysfunction   Psychiatric: Appropriate affect for situation at this time          DIAGNOSTIC STUDIES / PROCEDURES    LABS  Results for orders placed or performed during the hospital encounter of 05/15/23   Comp Metabolic Panel   Result Value Ref Range    Sodium 135 135 - 145 mmol/L    Potassium 4.0 3.6 - 5.5 mmol/L    Chloride 101 96 - 112 mmol/L    Co2 20 20 - 33 mmol/L    Anion Gap 14.0 7.0 - 16.0    Glucose 100 (H) 65 - 99 mg/dL    Bun 15 8 - 22 mg/dL    Creatinine 0.74 0.50 - 1.40 mg/dL    Calcium 9.2 8.4 - 10.2 mg/dL    AST(SGOT) 35 12 - 45 U/L    ALT(SGPT) 26 2 - 50 U/L    Alkaline Phosphatase 84 30 - 99 U/L    Total Bilirubin 0.4 0.1 - 1.5 mg/dL    Albumin 3.7 3.2 - 4.9 g/dL    Total Protein 8.3 (H) 6.0 - 8.2 g/dL    Globulin 4.6 (H) 1.9 - 3.5 g/dL    A-G Ratio 0.8 g/dL   URINALYSIS    Specimen: Urine   Result Value Ref Range    Color Yellow     Character Clear     Specific Gravity 1.015 <1.035    Ph 7.0 5.0 - 8.0    Glucose Negative Negative mg/dL    Ketones Negative Negative mg/dL    Protein Negative Negative mg/dL    Bilirubin Negative Negative    Nitrite Negative Negative    Leukocyte Esterase Negative Negative    Occult Blood Small (A) Negative    Micro Urine Req  Microscopic    TSH WITH REFLEX TO FT4   Result Value Ref Range    TSH 19.880 (H) 0.380 - 5.330 uIU/mL   CBC WITH DIFFERENTIAL   Result Value Ref Range    WBC 8.2 4.8 - 10.8 K/uL    RBC 4.90 4.20 - 5.40 M/uL    Hemoglobin 15.1 12.0 - 16.0 g/dL    Hematocrit 48.0 (H) 37.0 - 47.0 %    MCV 98.0 (H) 81.4 - 97.8 fL    MCH 30.8 27.0 - 33.0 pg    MCHC 31.5 (L) 33.6 - 35.0 g/dL    RDW 50.5 (H) 35.9 - 50.0 fL    Platelet Count 274 164 - 446 K/uL    MPV 9.7 9.0 - 12.9 fL    Neutrophils-Polys 65.90 44.00 - 72.00 %    Lymphocytes 20.50 (L) 22.00 - 41.00 %    Monocytes 8.70 0.00 - 13.40 %    Eosinophils 3.60 0.00 - 6.90 %    Basophils 0.90 0.00 - 1.80 %    Immature Granulocytes 0.40 0.00 - 0.90 %    Nucleated RBC 0.00 /100 WBC    Neutrophils (Absolute) 5.39 2.00 - 7.15 K/uL    Lymphs (Absolute) 1.67 1.00 - 4.80 K/uL    Monos (Absolute) 0.71 0.00 - 0.85 K/uL    Eos (Absolute) 0.29 0.00 - 0.51 K/uL    Baso (Absolute) 0.07 0.00 - 0.12 K/uL    Immature Granulocytes (abs) 0.03 0.00 - 0.11 K/uL    NRBC (Absolute) 0.00 K/uL   CORRECTED CALCIUM   Result Value Ref Range    Correct Calcium 9.4 8.5 - 10.5 mg/dL   ESTIMATED GFR   Result Value Ref Range    GFR (CKD-EPI) 83 >60 mL/min/1.73 m 2   URINE MICROSCOPIC (W/UA)   Result Value Ref Range    WBC 5-10 (A) /hpf    RBC 2-5 (A) /hpf    Bacteria Few (A) None /hpf    Epithelial Cells Few Few /hpf        RADIOLOGY    COURSE & MEDICAL DECISION MAKING    ED Observation Status? Yes; I am placing the patient in to an observation status due to a diagnostic uncertainty as well as therapeutic intensity. Patient placed in observation status at 203 PM, 5/15/2023.     Observation plan is as follows: Tablets an IV, monitor on telemetry review plan of care with patient    Upon Reevaluation, the patient's condition has: Improved; and will be discharged.    Patient discharged from ED Observation status at 1700 (Time) 5/15 (Date).     INITIAL ASSESSMENT, COURSE AND PLAN  Care Narrative:    This is a very  pleasant 77-year-old female presents here with numerous complaints but her primary redirected complaint was need for a Synthroid refill.  She had some basic laboratory studies which are reassuring.  Her urinalysis is likely contaminated and she has no report of any dysuria or hesitancy to suggest UTI.  She has no leukocytosis or fever.  Patient was very adamant and challenging regarding checking her blood pressure she was notable to have high blood pressure here but no headache, strokelike symptoms or chest pain to merit additional work-up.  All of her thyroid studies are still pending TSH is slightly high, the patient was adamant about catching the bus and wanted to leave which I think is reasonable.  I counseled her that her PCP needs to follow-up on the rest of her thyroid studies.  We will refill 75 mcg of Synthroid and recommend she follow-up with her PCP for ongoing management    ADDITIONAL PROBLEM LIST    DISPOSITION AND DISCUSSIONS  I have discussed management of the patient with the following physicians and SAGAR's: None    Discussion of management with other QHP or appropriate source(s): None    Escalation of care considered, and ultimately not performed:IV fluids and diagnostic imaging    Barriers to care at this time, including but not limited to: None    Decision tools and prescription drugs considered including, but not limited to: Patient will be provided a refill of Synthroid    FINAL DIAGNOSIS  1. Medication refill        2. Malaise        3. Other specified hypothyroidism  levothyroxine (SYNTHROID) 75 MCG Tab               Electronically signed by: Bo Del Rosario M.D., 5/15/2023 1:12 PM

## 2023-05-15 NOTE — ED NOTES
Attempted BP.  Pt refusing BP on forearm and requesting BP be taken with the largest cuff available.  Attempted BP.  Pt did not tolerate it.

## 2023-05-15 NOTE — ED NOTES
"Call light placed within pt's reach.  I asked the pt to please call if she needs to use the restroom so she can provide a sample.  Lab called and asked to draw blood.  Plan of care reviewed with pt - pt's response, \"I'm suspicious of everyone.\"  "

## 2023-05-15 NOTE — ED NOTES
PIV established.  Unable to draw blood with IV start.  Pt in scooter instead of on the gurney.  Pt is c/o headache (from us trying to take her blood pressure) and rates her pain 10/10.

## 2023-05-15 NOTE — ED NOTES
Pt left her room and went out to the PAR desk demanding an RTC schedule.  I told the patient since she can't seem to stay in her room I will take her IV out.  I gave the pt 2 bus tickets.  Pt continues to demand RTC schedule.  Pt's scooter battery is running low and she does not have a  with her.

## 2023-05-16 NOTE — DISCHARGE PLANNING
note  Pt requested for transportation to home.   GMT called   Trip # [id number]  Cost $127.98   [id number]  Pt has [facility].

## 2023-05-16 NOTE — ED NOTES
Discharge instructions given to pt with verbalized understanding.  Printout from the Internet about the RTC was also given to the patient.  Pt asked me to call her a bus.  I offered her a phone so she could call and arrange her ride and she said she has a phone.  Pt left the ER in her motorized scooter.    PAR just came back and told me that the patient asked her to call RTC and she did for the patient.  Now patient wants another way home.   called to assist the patient.

## 2023-06-01 ENCOUNTER — APPOINTMENT (OUTPATIENT)
Dept: RADIOLOGY | Facility: MEDICAL CENTER | Age: 78
End: 2023-06-01
Attending: EMERGENCY MEDICINE
Payer: MEDICARE

## 2023-06-01 ENCOUNTER — HOSPITAL ENCOUNTER (EMERGENCY)
Facility: MEDICAL CENTER | Age: 78
End: 2023-06-01
Attending: EMERGENCY MEDICINE
Payer: MEDICARE

## 2023-06-01 VITALS
DIASTOLIC BLOOD PRESSURE: 87 MMHG | WEIGHT: 293 LBS | BODY MASS INDEX: 57.52 KG/M2 | SYSTOLIC BLOOD PRESSURE: 224 MMHG | HEART RATE: 86 BPM | RESPIRATION RATE: 26 BRPM | HEIGHT: 60 IN | OXYGEN SATURATION: 94 % | TEMPERATURE: 97.7 F

## 2023-06-01 DIAGNOSIS — S59.901A INJURY OF RIGHT ELBOW, INITIAL ENCOUNTER: ICD-10-CM

## 2023-06-01 DIAGNOSIS — S49.91XA INJURY OF RIGHT SHOULDER, INITIAL ENCOUNTER: ICD-10-CM

## 2023-06-01 DIAGNOSIS — S89.91XA INJURY OF RIGHT LOWER LEG, INITIAL ENCOUNTER: ICD-10-CM

## 2023-06-01 DIAGNOSIS — S69.91XA HAND INJURY, RIGHT, INITIAL ENCOUNTER: ICD-10-CM
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
## 2023-06-01 PROCEDURE — 73590 X-RAY EXAM OF LOWER LEG: CPT | Mod: RT

## 2023-06-01 PROCEDURE — 73030 X-RAY EXAM OF SHOULDER: CPT | Mod: RT

## 2023-06-01 PROCEDURE — 73080 X-RAY EXAM OF ELBOW: CPT | Mod: RT

## 2023-06-01 PROCEDURE — 73130 X-RAY EXAM OF HAND: CPT | Mod: RT

## 2023-06-01 PROCEDURE — 99284 EMERGENCY DEPT VISIT MOD MDM: CPT

## 2023-06-01 ASSESSMENT — FIBROSIS 4 INDEX: FIB4 SCORE: 1.93

## 2023-06-02 ENCOUNTER — TELEPHONE (OUTPATIENT)
Dept: CARDIOLOGY | Facility: MEDICAL CENTER | Age: 78
End: 2023-06-02
Payer: MEDICARE

## 2023-06-02 NOTE — ED NOTES
"Pt refused DC BP - Pt stated \"It hurts really bad, I already know it's gonna be high\"; ERP notified and approved DC;  "

## 2023-06-02 NOTE — ED TRIAGE NOTES
Chief Complaint   Patient presents with    Fall     Pt BIB EMS for Rt Shoulder and elbow Pn from fall out of motorized wheelchair - EMS stated that she back up over grass and tipped over; Pt denied LOC and/or hitting her head; Pt does take blood thinners; Pt currently AAOx4, GCS = 15;      ED Triage Vitals   Enc Vitals Group      Blood Pressure  06/01/23 1910 (Abnormal) 224/87      Pulse 06/01/23 1910 84      Respiration 06/01/23 1910 (Abnormal) 26      Temperature 06/01/23 1910 36.7 °C (98 °F)      Temp src 06/01/23 1910 Temporal      Pulse Oximetry 06/01/23 1910 94 %      Weight 06/01/23 1908 (Abnormal) 141 kg (311 lb)      Height 06/01/23 1908 1.524 m (5')      Head Circumference --       Peak Flow --       Pain Score --       Pain Loc --       Pain Edu? --       Excl. in GC? --

## 2023-06-02 NOTE — ED PROVIDER NOTES
ED Provider Note    CHIEF COMPLAINT  Chief Complaint   Patient presents with    Fall     Pt BIB EMS for Rt Shoulder and elbow Pn from fall out of motorized wheelchair - EMS stated that she back up over grass and tipped over; Pt denied LOC and/or hitting her head; Pt does take blood thinners; Pt currently AAOx4, GCS = 15;        HPI    Primary care provider: Cici Hunt P.A.-C.   History obtained from: Patient  History limited by: None     Cecile Teixeira is a 77 y.o. female who presents to the ED by EMS after she accidentally fell out of her motorized wheelchair shortly prior to arrival.  Patient was backing up on grass and tipped over falling out of her wheelchair.  She denies head injury or loss of consciousness.  She is reporting pain to her right shoulder, right elbow and right hand.  She also states that she fell about a week and a half ago and hurt her right lower leg and wants x-rays there as well.  She otherwise denies pain anywhere else or other injuries.  No new weakness or sensory change.  No nausea or vomiting.  She reports that she is on blood thinners but record review shows that her only blood thinner is aspirin.    REVIEW OF SYSTEMS  Please see HPI for pertinent positives/negatives.  All other systems reviewed and are negative.     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 only social   Vaping Use     Vaping Use: Never used   Substance and Sexual Activity    Alcohol use: Not Currently     Comment: rare     Drug use: Never    Sexual activity: Not on file        FAMILY HISTORY  No family history on file.     CURRENT MEDICATIONS  Home Medications       Reviewed by Ron Miles R.N. (Registered Nurse) on 06/01/23 at 1916  Med List Status: Partial     Medication Last Dose Status   albuterol 108 (90 Base) MCG/ACT Aero Soln inhalation aerosol  Active   aspirin 81 MG EC tablet  Active   FLOVENT  MCG/ACT Aerosol  Active   hydrALAZINE (APRESOLINE) 50 MG Tab  Active   levothyroxine (SYNTHROID) 75 MCG Tab  Active   LORazepam (ATIVAN) 1 MG Tab  Active   Spacer/Aero-Holding Chambers Device  Active   SYNTHROID 75 MCG Tab  Active                     ALLERGIES  Allergies   Allergen Reactions    Doxycycline Hives    Lactose Unspecified     Lactose intolerance since a child     Levothyroxine Anxiety     Panic attack     Prednisone Unspecified     Diarrhea and very irritable     Sulfa Drugs Unspecified     Mental status change     Cephalexin Diarrhea     Diarrhea and mild rash     Montelukast Rash     Leg pain, back pain and rash     Spinach Unspecified     Skin allergy test    Cow's Milk [Lac Bovis]     Other Environmental      Trees except oak    Codeine Unspecified     Headaches and confusion         PHYSICAL EXAM  VITAL SIGNS: BP (!) 224/87   Pulse 86   Temp 36.5 °C (97.7 °F) (Temporal)   Resp (!) 26   Ht 1.524 m (5')   Wt (!) 141 kg (311 lb)   SpO2 94%   BMI 60.74 kg/m²  @MEIR[[id number]::@     Pulse ox interpretation: 94% I interpret this pulse ox as normal     Constitutional: Well developed, well nourished, alert in no apparent distress, nontoxic appearance    HENT: No external signs of trauma, normocephalic  Eyes: PERRL, conjunctiva without erythema, no discharge, no icterus    Neck: Soft and supple, trachea midline, no stridor, no tenderness, no LAD, no JVD, good ROM without discomfort  Cardiovascular: Regular  rate and rhythm, no murmurs/rubs/gallops, strong distal pulses and good perfusion    Thorax & Lungs: No respiratory distress, CTAB    Abdomen: Soft, nontender, nondistended, no guarding, no rebound, normal BS    Back: Nontender to palpation  Extremities: No cyanosis, bilateral lower extremity edema with chronic appearing skin changes, no gross deformity, diffuse tenderness to palpation right shoulder posteriorly without gross deformity/swelling/bruising, mild tenderness to right elbow posteriorly and to right hand diffusely without gross deformity/swelling/bruising, mild diffuse tenderness right lower leg distally without gross deformity/bruising, intact distal pulses with brisk cap refill, sensation intact to touch throughout  Skin: Warm, dry, no pallor/cyanosis, no rash noted except as above     Lymphatic: No lymphadenopathy noted     Neuro: A/O times 3, no focal deficits noted    Psychiatric: Cooperative, slightly anxious      DIAGNOSTIC STUDIES / PROCEDURES        LABS  All labs reviewed by me.     Results for orders placed or performed during the hospital encounter of 05/15/23   Comp Metabolic Panel   Result Value Ref Range    Sodium 135 135 - 145 mmol/L    Potassium 4.0 3.6 - 5.5 mmol/L    Chloride 101 96 - 112 mmol/L    Co2 20 20 - 33 mmol/L    Anion Gap 14.0 7.0 - 16.0    Glucose 100 (H) 65 - 99 mg/dL    Bun 15 8 - 22 mg/dL    Creatinine 0.74 0.50 - 1.40 mg/dL    Calcium 9.2 8.4 - 10.2 mg/dL    AST(SGOT) 35 12 - 45 U/L    ALT(SGPT) 26 2 - 50 U/L    Alkaline Phosphatase 84 30 - 99 U/L    Total Bilirubin 0.4 0.1 - 1.5 mg/dL    Albumin 3.7 3.2 - 4.9 g/dL    Total Protein 8.3 (H) 6.0 - 8.2 g/dL    Globulin 4.6 (H) 1.9 - 3.5 g/dL    A-G Ratio 0.8 g/dL   URINALYSIS    Specimen: Urine   Result Value Ref Range    Color Yellow     Character Clear     Specific Gravity 1.015 <1.035    Ph 7.0 5.0 - 8.0    Glucose Negative Negative mg/dL    Ketones Negative Negative mg/dL    Protein Negative Negative mg/dL    Bilirubin  Negative Negative    Nitrite Negative Negative    Leukocyte Esterase Negative Negative    Occult Blood Small (A) Negative    Micro Urine Req Microscopic    TSH WITH REFLEX TO FT4   Result Value Ref Range    TSH 19.880 (H) 0.380 - 5.330 uIU/mL   CBC WITH DIFFERENTIAL   Result Value Ref Range    WBC 8.2 4.8 - 10.8 K/uL    RBC 4.90 4.20 - 5.40 M/uL    Hemoglobin 15.1 12.0 - 16.0 g/dL    Hematocrit 48.0 (H) 37.0 - 47.0 %    MCV 98.0 (H) 81.4 - 97.8 fL    MCH 30.8 27.0 - 33.0 pg    MCHC 31.5 (L) 33.6 - 35.0 g/dL    RDW 50.5 (H) 35.9 - 50.0 fL    Platelet Count 274 164 - 446 K/uL    MPV 9.7 9.0 - 12.9 fL    Neutrophils-Polys 65.90 44.00 - 72.00 %    Lymphocytes 20.50 (L) 22.00 - 41.00 %    Monocytes 8.70 0.00 - 13.40 %    Eosinophils 3.60 0.00 - 6.90 %    Basophils 0.90 0.00 - 1.80 %    Immature Granulocytes 0.40 0.00 - 0.90 %    Nucleated RBC 0.00 /100 WBC    Neutrophils (Absolute) 5.39 2.00 - 7.15 K/uL    Lymphs (Absolute) 1.67 1.00 - 4.80 K/uL    Monos (Absolute) 0.71 0.00 - 0.85 K/uL    Eos (Absolute) 0.29 0.00 - 0.51 K/uL    Baso (Absolute) 0.07 0.00 - 0.12 K/uL    Immature Granulocytes (abs) 0.03 0.00 - 0.11 K/uL    NRBC (Absolute) 0.00 K/uL   CORRECTED CALCIUM   Result Value Ref Range    Correct Calcium 9.4 8.5 - 10.5 mg/dL   ESTIMATED GFR   Result Value Ref Range    GFR (CKD-EPI) 83 >60 mL/min/1.73 m 2   URINE MICROSCOPIC (W/UA)   Result Value Ref Range    WBC 5-10 (A) /hpf    RBC 2-5 (A) /hpf    Bacteria Few (A) None /hpf    Epithelial Cells Few Few /hpf   FREE THYROXINE   Result Value Ref Range    Free T-4 0.82 (L) 0.93 - 1.70 ng/dL        RADIOLOGY  I have independently interpreted the diagnostic imaging associated with this visit and am waiting the final reading from the radiologist.     DX-ELBOW-COMPLETE 3+ RIGHT   Final Result         1.  No acute traumatic bony injury.   2.  Degenerative changes         DX-SHOULDER 2+ RIGHT   Final Result         1.  No acute traumatic bony injury.         DX-TIBIA AND  FIBULA RIGHT   Final Result      Soft tissue swelling/prominence. No acute fracture or dislocation.      DX-HAND 3+ RIGHT   Final Result      No acute osseous abnormality.      Degenerative changes.             COURSE & MEDICAL DECISION MAKING  Nursing notes, VS, PMSFHx reviewed in chart.     Review of past medical records shows the patient was last seen in this ED May 15, 2023 for concerns regarding her medications.  She was seen in the office on May 10, 2023 by neurology regarding lower facial weakness, morbid obesity, claustrophobia.      Differential diagnoses considered include but are not limited to: Fx, dislocation, subluxation, contusion, strain, sprain, neurovascular injury       ED Observation Status? No; Patient does not meet criteria for ED Observation.       Discussion of management with other [facility] or appropriate source(s): None     Escalation of care considered, and ultimately not performed: blood analysis and acute inpatient care management, however at this time, the patient is most appropriate for outpatient management.     Decision tools and prescription drugs considered including, but not limited to: Pain Medications   .        History and physical exam as above.  Imaging studies without evidence for acute bony injury.  I discussed the findings with the patient.  Patient is alert, in no acute distress and nontoxic in appearance and has remained clinically stable during her ED stay.  No evidence for other significant traumatic injuries.  I discussed with patient supportive home care for likely contusion/strain/sprain, outpatient follow-up and return to ED precautions.  At this time, no evidence for significant neurovascular compromise or compartment syndrome.  Patient also noted to have elevated blood pressure reading for which she can follow-up on outpatient basis for further management.  She verbalized understanding and agreed with plan of care with no further questions or concerns.      The  patient is referred to a primary physician for blood pressure management, diabetic screening, and for all other preventative health concerns.       FINAL IMPRESSION  1. Fall from motorized wheelchair, initial encounter Acute   2. Injury of right shoulder, initial encounter Acute   3. Injury of right elbow, initial encounter Acute   4. Hand injury, right, initial encounter Acute   5. Injury of right lower leg, initial encounter Acute          DISPOSITION  Patient will be discharged home in stable condition.       FOLLOW UP  Cici Hunt P.A.-C.  [address] [facility] [id number] [phone]    Call in 1 day      [facility], Emergency Dept  [address]  [facility] [id number] [phone]    If symptoms worsen         OUTPATIENT MEDICATIONS  Discharge Medication List as of 6/1/2023  9:13 PM             Electronically signed by: Ryan Beasley D.O., 6/1/2023 7:08 PM      Portions of this record were made with voice recognition software.  Despite my review, spelling/grammar/context errors may still remain.  Interpretation of this chart should be taken in this context.

## 2023-06-02 NOTE — ED NOTES
Patient is stable for discharge at this time, anticipatory guidance provided, close follow-up is encouraged, and ED return instructions have been detailed. Patient is both agreeable to the disposition and plan and discharged home via [facility] in good condition.     Pt took sandals, sunglasses and keys home with her; daughter has Pt wallet and phone;

## 2023-06-19 PROBLEM — H92.03 OTALGIA OF BOTH EARS: Status: ACTIVE | Noted: 2023-06-19

## 2023-06-27 ENCOUNTER — HOSPITAL ENCOUNTER (OUTPATIENT)
Facility: MEDICAL CENTER | Age: 78
End: 2023-06-27
Attending: PHYSICIAN ASSISTANT
Payer: MEDICARE

## 2023-06-27 DIAGNOSIS — N39.0 URINARY TRACT INFECTION WITHOUT HEMATURIA, SITE UNSPECIFIED: ICD-10-CM

## 2023-06-27 DIAGNOSIS — E03.8 OTHER SPECIFIED HYPOTHYROIDISM: ICD-10-CM

## 2023-06-27 DIAGNOSIS — I10 ESSENTIAL HYPERTENSION: ICD-10-CM

## 2023-06-27 DIAGNOSIS — E55.9 VITAMIN D INSUFFICIENCY: ICD-10-CM

## 2023-06-27 DIAGNOSIS — Z79.899 HIGH RISK MEDICATION USE: ICD-10-CM

## 2023-06-27 DIAGNOSIS — R60.1 ANASARCA: ICD-10-CM

## 2023-06-27 LAB
25(OH)D3 SERPL-MCNC: 30 NG/ML (ref 30–100)
ALBUMIN SERPL BCP-MCNC: 4.3 G/DL (ref 3.2–4.9)
ALBUMIN/GLOB SERPL: 1.4 G/DL
ALP SERPL-CCNC: 82 U/L (ref 30–99)
ALT SERPL-CCNC: 26 U/L (ref 2–50)
ANION GAP SERPL CALC-SCNC: 17 MMOL/L (ref 7–16)
AST SERPL-CCNC: 29 U/L (ref 12–45)
BILIRUB SERPL-MCNC: 0.4 MG/DL (ref 0.1–1.5)
BUN SERPL-MCNC: 27 MG/DL (ref 8–22)
CALCIUM ALBUM COR SERPL-MCNC: 8.9 MG/DL (ref 8.5–10.5)
CALCIUM SERPL-MCNC: 9.1 MG/DL (ref 8.5–10.5)
CHLORIDE SERPL-SCNC: 99 MMOL/L (ref 96–112)
CO2 SERPL-SCNC: 21 MMOL/L (ref 20–33)
CREAT SERPL-MCNC: 0.95 MG/DL (ref 0.5–1.4)
FOLATE SERPL-MCNC: 4.8 NG/ML
GFR SERPLBLD CREATININE-BSD FMLA CKD-EPI: 61 ML/MIN/1.73 M 2
GLOBULIN SER CALC-MCNC: 3.1 G/DL (ref 1.9–3.5)
GLUCOSE SERPL-MCNC: 211 MG/DL (ref 65–99)
POTASSIUM SERPL-SCNC: 4.1 MMOL/L (ref 3.6–5.5)
PROT SERPL-MCNC: 7.4 G/DL (ref 6–8.2)
SODIUM SERPL-SCNC: 137 MMOL/L (ref 135–145)
T4 FREE SERPL-MCNC: 0.75 NG/DL (ref 0.93–1.7)
TSH SERPL DL<=0.005 MIU/L-ACNC: 24.8 UIU/ML (ref 0.38–5.33)
VIT B12 SERPL-MCNC: 365 PG/ML (ref 211–911)

## 2023-06-27 PROCEDURE — 82746 ASSAY OF FOLIC ACID SERUM: CPT

## 2023-06-27 PROCEDURE — 84439 ASSAY OF FREE THYROXINE: CPT

## 2023-06-27 PROCEDURE — 82306 VITAMIN D 25 HYDROXY: CPT

## 2023-06-27 PROCEDURE — 82607 VITAMIN B-12: CPT

## 2023-06-27 PROCEDURE — 84443 ASSAY THYROID STIM HORMONE: CPT

## 2023-06-27 PROCEDURE — 80053 COMPREHEN METABOLIC PANEL: CPT

## 2023-06-29 ENCOUNTER — HOSPITAL ENCOUNTER (OUTPATIENT)
Facility: MEDICAL CENTER | Age: 78
End: 2023-06-29
Attending: PHYSICIAN ASSISTANT
Payer: MEDICARE

## 2023-06-29 DIAGNOSIS — R73.9 ELEVATED SERUM GLUCOSE: ICD-10-CM

## 2023-06-29 DIAGNOSIS — R60.1 ANASARCA: ICD-10-CM

## 2023-06-29 DIAGNOSIS — Z79.899 HIGH RISK MEDICATION USE: ICD-10-CM

## 2023-06-29 DIAGNOSIS — E55.9 VITAMIN D INSUFFICIENCY: ICD-10-CM

## 2023-06-29 DIAGNOSIS — E03.8 OTHER SPECIFIED HYPOTHYROIDISM: ICD-10-CM

## 2023-06-29 DIAGNOSIS — N39.0 URINARY TRACT INFECTION WITHOUT HEMATURIA, SITE UNSPECIFIED: ICD-10-CM

## 2023-06-29 DIAGNOSIS — I10 ESSENTIAL HYPERTENSION: ICD-10-CM

## 2023-06-29 LAB
APPEARANCE UR: CLEAR
BILIRUB UR QL STRIP.AUTO: NEGATIVE
CHOLEST SERPL-MCNC: 215 MG/DL (ref 100–199)
COLOR UR: YELLOW
GLUCOSE UR STRIP.AUTO-MCNC: NEGATIVE MG/DL
HDLC SERPL-MCNC: 57 MG/DL
KETONES UR STRIP.AUTO-MCNC: NEGATIVE MG/DL
LDLC SERPL CALC-MCNC: 137 MG/DL
LEUKOCYTE ESTERASE UR QL STRIP.AUTO: NEGATIVE
MICRO URNS: NORMAL
NITRITE UR QL STRIP.AUTO: NEGATIVE
NT-PROBNP SERPL IA-MCNC: 97 PG/ML (ref 0–125)
PH UR STRIP.AUTO: 6 [PH] (ref 5–8)
PROT UR QL STRIP: NEGATIVE MG/DL
RBC UR QL AUTO: NEGATIVE
SP GR UR STRIP.AUTO: 1.01
TRIGL SERPL-MCNC: 104 MG/DL (ref 0–149)
UROBILINOGEN UR STRIP.AUTO-MCNC: 0.2 MG/DL

## 2023-06-29 PROCEDURE — 83880 ASSAY OF NATRIURETIC PEPTIDE: CPT

## 2023-06-29 PROCEDURE — 81003 URINALYSIS AUTO W/O SCOPE: CPT

## 2023-06-29 PROCEDURE — 80061 LIPID PANEL: CPT

## 2023-11-08 ENCOUNTER — HOSPITAL ENCOUNTER (EMERGENCY)
Facility: MEDICAL CENTER | Age: 78
End: 2023-11-08
Attending: EMERGENCY MEDICINE
Payer: MEDICARE

## 2023-11-08 ENCOUNTER — OFFICE VISIT (OUTPATIENT)
Dept: URGENT CARE | Facility: CLINIC | Age: 78
End: 2023-11-08
Payer: MEDICARE

## 2023-11-08 ENCOUNTER — APPOINTMENT (OUTPATIENT)
Dept: RADIOLOGY | Facility: MEDICAL CENTER | Age: 78
End: 2023-11-08
Attending: EMERGENCY MEDICINE
Payer: MEDICARE

## 2023-11-08 VITALS
HEART RATE: 85 BPM | WEIGHT: 293 LBS | BODY MASS INDEX: 57.52 KG/M2 | DIASTOLIC BLOOD PRESSURE: 94 MMHG | RESPIRATION RATE: 36 BRPM | HEIGHT: 60 IN | TEMPERATURE: 98 F | SYSTOLIC BLOOD PRESSURE: 144 MMHG | OXYGEN SATURATION: 95 %

## 2023-11-08 VITALS
HEART RATE: 89 BPM | TEMPERATURE: 98 F | OXYGEN SATURATION: 93 % | RESPIRATION RATE: 18 BRPM | DIASTOLIC BLOOD PRESSURE: 74 MMHG | SYSTOLIC BLOOD PRESSURE: 192 MMHG | WEIGHT: 293 LBS | BODY MASS INDEX: 57.52 KG/M2 | HEIGHT: 60 IN

## 2023-11-08 DIAGNOSIS — I49.9 IRREGULAR HEART BEAT: ICD-10-CM

## 2023-11-08 DIAGNOSIS — M79.605 PAIN OF LEFT LOWER EXTREMITY: ICD-10-CM

## 2023-11-08 DIAGNOSIS — M54.42 ACUTE LEFT-SIDED LOW BACK PAIN WITH LEFT-SIDED SCIATICA: ICD-10-CM

## 2023-11-08 DIAGNOSIS — M54.32 SCIATICA OF LEFT SIDE: ICD-10-CM

## 2023-11-08 LAB
ALBUMIN SERPL BCP-MCNC: 4.3 G/DL (ref 3.2–4.9)
ALBUMIN/GLOB SERPL: 1 G/DL
ALP SERPL-CCNC: 79 U/L (ref 30–99)
ALT SERPL-CCNC: 34 U/L (ref 2–50)
ANION GAP SERPL CALC-SCNC: 12 MMOL/L (ref 7–16)
AST SERPL-CCNC: 47 U/L (ref 12–45)
BASOPHILS # BLD AUTO: 0.8 % (ref 0–1.8)
BASOPHILS # BLD: 0.06 K/UL (ref 0–0.12)
BILIRUB SERPL-MCNC: 0.6 MG/DL (ref 0.1–1.5)
BUN SERPL-MCNC: 24 MG/DL (ref 8–22)
CALCIUM ALBUM COR SERPL-MCNC: 9.6 MG/DL (ref 8.5–10.5)
CALCIUM SERPL-MCNC: 9.8 MG/DL (ref 8.4–10.2)
CHLORIDE SERPL-SCNC: 99 MMOL/L (ref 96–112)
CO2 SERPL-SCNC: 26 MMOL/L (ref 20–33)
CREAT SERPL-MCNC: 0.99 MG/DL (ref 0.5–1.4)
EOSINOPHIL # BLD AUTO: 0.39 K/UL (ref 0–0.51)
EOSINOPHIL NFR BLD: 5 % (ref 0–6.9)
ERYTHROCYTE [DISTWIDTH] IN BLOOD BY AUTOMATED COUNT: 48.9 FL (ref 35.9–50)
GFR SERPLBLD CREATININE-BSD FMLA CKD-EPI: 58 ML/MIN/1.73 M 2
GLOBULIN SER CALC-MCNC: 4.4 G/DL (ref 1.9–3.5)
GLUCOSE SERPL-MCNC: 105 MG/DL (ref 65–99)
HCT VFR BLD AUTO: 43.9 % (ref 37–47)
HGB BLD-MCNC: 14.5 G/DL (ref 12–16)
IMM GRANULOCYTES # BLD AUTO: 0.04 K/UL (ref 0–0.11)
IMM GRANULOCYTES NFR BLD AUTO: 0.5 % (ref 0–0.9)
LYMPHOCYTES # BLD AUTO: 1.45 K/UL (ref 1–4.8)
LYMPHOCYTES NFR BLD: 18.4 % (ref 22–41)
MCH RBC QN AUTO: 30.2 PG (ref 27–33)
MCHC RBC AUTO-ENTMCNC: 33 G/DL (ref 32.2–35.5)
MCV RBC AUTO: 91.5 FL (ref 81.4–97.8)
MONOCYTES # BLD AUTO: 0.83 K/UL (ref 0–0.85)
MONOCYTES NFR BLD AUTO: 10.5 % (ref 0–13.4)
NEUTROPHILS # BLD AUTO: 5.1 K/UL (ref 1.82–7.42)
NEUTROPHILS NFR BLD: 64.8 % (ref 44–72)
NRBC # BLD AUTO: 0 K/UL
NRBC BLD-RTO: 0 /100 WBC (ref 0–0.2)
PLATELET # BLD AUTO: 335 K/UL (ref 164–446)
PMV BLD AUTO: 9.3 FL (ref 9–12.9)
POTASSIUM SERPL-SCNC: 4.4 MMOL/L (ref 3.6–5.5)
PROT SERPL-MCNC: 8.7 G/DL (ref 6–8.2)
RBC # BLD AUTO: 4.8 M/UL (ref 4.2–5.4)
SODIUM SERPL-SCNC: 137 MMOL/L (ref 135–145)
WBC # BLD AUTO: 7.9 K/UL (ref 4.8–10.8)

## 2023-11-08 PROCEDURE — 99284 EMERGENCY DEPT VISIT MOD MDM: CPT

## 2023-11-08 PROCEDURE — 85025 COMPLETE CBC W/AUTO DIFF WBC: CPT

## 2023-11-08 PROCEDURE — 36415 COLL VENOUS BLD VENIPUNCTURE: CPT

## 2023-11-08 PROCEDURE — 80053 COMPREHEN METABOLIC PANEL: CPT

## 2023-11-08 PROCEDURE — 700102 HCHG RX REV CODE 250 W/ 637 OVERRIDE(OP): Performed by: EMERGENCY MEDICINE

## 2023-11-08 PROCEDURE — 99213 OFFICE O/P EST LOW 20 MIN: CPT | Performed by: FAMILY MEDICINE

## 2023-11-08 PROCEDURE — 3080F DIAST BP >= 90 MM HG: CPT | Performed by: FAMILY MEDICINE

## 2023-11-08 PROCEDURE — 93971 EXTREMITY STUDY: CPT | Mod: LT

## 2023-11-08 PROCEDURE — A9270 NON-COVERED ITEM OR SERVICE: HCPCS | Performed by: EMERGENCY MEDICINE

## 2023-11-08 PROCEDURE — 3077F SYST BP >= 140 MM HG: CPT | Performed by: FAMILY MEDICINE

## 2023-11-08 RX ORDER — OXYCODONE HYDROCHLORIDE AND ACETAMINOPHEN 5; 325 MG/1; MG/1
1 TABLET ORAL ONCE
Status: COMPLETED | OUTPATIENT
Start: 2023-11-08 | End: 2023-11-08

## 2023-11-08 RX ORDER — OXYCODONE HYDROCHLORIDE AND ACETAMINOPHEN 5; 325 MG/1; MG/1
1 TABLET ORAL EVERY 4 HOURS PRN
Qty: 15 TABLET | Refills: 0 | Status: SHIPPED | OUTPATIENT
Start: 2023-11-08 | End: 2023-11-13

## 2023-11-08 RX ORDER — MORPHINE SULFATE 4 MG/ML
4 INJECTION INTRAVENOUS ONCE
Status: DISCONTINUED | OUTPATIENT
Start: 2023-11-08 | End: 2023-11-08 | Stop reason: HOSPADM

## 2023-11-08 RX ORDER — METHYLPREDNISOLONE 4 MG/1
TABLET ORAL
Qty: 1 EACH | Refills: 0 | Status: SHIPPED | OUTPATIENT
Start: 2023-11-08 | End: 2024-03-27

## 2023-11-08 RX ADMIN — OXYCODONE AND ACETAMINOPHEN 1 TABLET: 325; 5 TABLET ORAL at 21:02

## 2023-11-08 ASSESSMENT — ENCOUNTER SYMPTOMS
RESPIRATORY NEGATIVE: 1
GASTROINTESTINAL NEGATIVE: 1
PALPITATIONS: 1
CONSTITUTIONAL NEGATIVE: 1

## 2023-11-08 ASSESSMENT — FIBROSIS 4 INDEX
FIB4 SCORE: 1.62
FIB4 SCORE: 1.62

## 2023-11-08 ASSESSMENT — PAIN DESCRIPTION - DESCRIPTORS: DESCRIPTORS: BURNING

## 2023-11-09 NOTE — ED NOTES
Discharged in stable condition, alert and oriented, via own electric wheelchair .Prescribed medication and follow up appointment instructed. Health education imparted. Instructed to come back once symptoms worsened. Pt verbalized understanding of the information given. Removed IV line and applied pressure. Pt states her daughter will arrange a taxi for her and she will wait in the lobby

## 2023-11-09 NOTE — ED PROVIDER NOTES
ER Provider Note    Scribed for Aj Smith D.O. by Cathy Austin. 11/8/2023  7:32 PM    Primary Care Provider: Cici Hunt P.A.-C.    CHIEF COMPLAINT  Chief Complaint   Patient presents with    Leg Pain     Pt was seen at urgent care and was sent for further eval. Pt received the RSV vaccine on 11/3 and started to have left leg pain that radiates to her left hip and back of her left thigh. Pt has a hx of blood clots but states not on blood thinners.       HPI/ROS    Cecile Teixeira is a 78 y.o. female who presents to the Emergency Department for left leg pain onset 11/3/23. The patient describes that she received the RSV vaccine on 11/3 and then started to have this left leg pain. She notes the pain is a burning pain mostly to her left knee and left ankle. She notes the pain radiates to her left lower back and left hip. She notes also having a small possibly open wound on her left ankle. She notes that she is concerned as she has lymphedema. There are no known exacerbating factors. She notes taking Advil and Tylenol for pain alleviation, but notes it does not work. She notes concerns for a blood clot, but denies being on blood thinners. She notes a history of left knee surgery. She notes a history of high blood pressure. She denies history of diabetes.    ROS as per HPI.    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 Her smoking use included cigarettes. She has never used smokeless tobacco. She reports that she does not currently use  alcohol. She reports that she does not use drugs.    CURRENT MEDICATIONS  Previous Medications    ALBUTEROL 108 (90 BASE) MCG/ACT AERO SOLN INHALATION AEROSOL    Inhale 2 Puffs every 6 hours as needed for Shortness of Breath.    ASPIRIN 81 MG EC TABLET    Take 81 mg by mouth every day.    FLOVENT  MCG/ACT AEROSOL    Inhale 2 Puffs 2 times a day.    NYSTATIN (MYCOSTATIN) POWDER    Apply 1 g topically 2 times a day.    SPACER/AERO-HOLDING CHAMBERS DEVICE    To use with inhalers    SYNTHROID 75 MCG TAB    Take 1 Tablet by mouth every morning on an empty stomach.       ALLERGIES  Doxycycline, Lactose, Levothyroxine, Prednisone, Sulfa drugs, Cephalexin, Montelukast, Spinach, Cow's milk [lac bovis], Other environmental, and Codeine    PHYSICAL EXAM  BP (!) 189/90   Pulse 82   Temp 36.7 °C (98.1 °F) (Temporal)   Resp 16   Ht 1.524 m (5')   Wt (!) 157 kg (346 lb 2 oz)   SpO2 92%   BMI 67.60 kg/m²     General: No acute distress. Patient is in a wheelchair. BMI is significantly greater than 30.  HENT: Normocephalic, Mucus membranes are moist.   Chest: Lungs have even and unlabored respirations, Clear to auscultation.   Cardiovascular: Regular rate and regular rhythm, No peripheral cyanosis.  Abdomen: Non distended.  Back: tenderness at left upper gluteal  Extremities: Bilateral lower extremity edema 3+, good color, sensation normal  Neuro: Awake, Conversive, Able to relay recent events.  Psychiatric: Calm and cooperative.       EXTERNAL RECORDS REVIEWED  Review of patient's past medical records show the patient was seen at the office today and was sent in for further evaluation.     INITIAL ASSESSMENT  Patient has left lower back pain radiates to her left leg. Concerning for sciatica. History of lymphedema and DVT. Concern for DVT. US and labs done for evaluation. Pain management done.    ED Observation Status? Yes; I am placing the patient in to an observation status due to a diagnostic uncertainty as well as  therapeutic intensity. Patient placed in observation status at 7:39 PM, 11/8/2023.     Observation plan is as follows: Laboratory studies, imaging, and reassessment. Pain management.     Upon Reevaluation, the patient's condition has: Improved; and will be discharged.    Patient discharged from ED Observation status at 9:30 PM  11/8/2023     DIAGNOSTIC STUDIES    Labs:   Results for orders placed or performed during the hospital encounter of 11/08/23   CBC WITH DIFFERENTIAL   Result Value Ref Range    WBC 7.9 4.8 - 10.8 K/uL    RBC 4.80 4.20 - 5.40 M/uL    Hemoglobin 14.5 12.0 - 16.0 g/dL    Hematocrit 43.9 37.0 - 47.0 %    MCV 91.5 81.4 - 97.8 fL    MCH 30.2 27.0 - 33.0 pg    MCHC 33.0 32.2 - 35.5 g/dL    RDW 48.9 35.9 - 50.0 fL    Platelet Count 335 164 - 446 K/uL    MPV 9.3 9.0 - 12.9 fL    Neutrophils-Polys 64.80 44.00 - 72.00 %    Lymphocytes 18.40 (L) 22.00 - 41.00 %    Monocytes 10.50 0.00 - 13.40 %    Eosinophils 5.00 0.00 - 6.90 %    Basophils 0.80 0.00 - 1.80 %    Immature Granulocytes 0.50 0.00 - 0.90 %    Nucleated RBC 0.00 0.00 - 0.20 /100 WBC    Neutrophils (Absolute) 5.10 1.82 - 7.42 K/uL    Lymphs (Absolute) 1.45 1.00 - 4.80 K/uL    Monos (Absolute) 0.83 0.00 - 0.85 K/uL    Eos (Absolute) 0.39 0.00 - 0.51 K/uL    Baso (Absolute) 0.06 0.00 - 0.12 K/uL    Immature Granulocytes (abs) 0.04 0.00 - 0.11 K/uL    NRBC (Absolute) 0.00 K/uL   COMP METABOLIC PANEL   Result Value Ref Range    Sodium 137 135 - 145 mmol/L    Potassium 4.4 3.6 - 5.5 mmol/L    Chloride 99 96 - 112 mmol/L    Co2 26 20 - 33 mmol/L    Anion Gap 12.0 7.0 - 16.0    Glucose 105 (H) 65 - 99 mg/dL    Bun 24 (H) 8 - 22 mg/dL    Creatinine 0.99 0.50 - 1.40 mg/dL    Calcium 9.8 8.4 - 10.2 mg/dL    Correct Calcium 9.6 8.5 - 10.5 mg/dL    AST(SGOT) 47 (H) 12 - 45 U/L    ALT(SGPT) 34 2 - 50 U/L    Alkaline Phosphatase 79 30 - 99 U/L    Total Bilirubin 0.6 0.1 - 1.5 mg/dL    Albumin 4.3 3.2 - 4.9 g/dL    Total Protein 8.7 (H) 6.0 - 8.2 g/dL     Globulin 4.4 (H) 1.9 - 3.5 g/dL    A-G Ratio 1.0 g/dL   ESTIMATED GFR   Result Value Ref Range    GFR (CKD-EPI) 58 (A) >60 mL/min/1.73 m 2      Radiology:   The attending emergency physician has independently interpreted the diagnostic imaging associated with this visit and am waiting the final reading from the radiologist.   Preliminary interpretation is as follows: No evidence of DVT  Radiologist interpretation:   US-EXTREMITY VENOUS LOWER UNILAT LEFT   Final Result         1.  No evidence of left lower extremity deep venous thrombosis.      Note: Diagnostic sensitivity of this examination is significantly limited due to patient body habitus and difficulty positioning due to mobility.           COURSE & MEDICAL DECISION MAKING     COURSE AND PLAN  7:35 PM - Patient was evaluated at bedside; Patient is a 78 y.o. female who presents for evaluation of left leg pain associated with left lower back pain, left hip pain. Exam reveals patient in a wheelchair with a BMI significantly greater than 30.  Back: tenderness at left upper gluteal  Extremities: Bilateral lower extremity edema 3+, good color, sensation normal. Discussed with patient and/or family that given history of blood clots we will do a ultrasound to further evaluate, will also order labs to evaluate. Also noted concern for sciatica. US-venous lower left, CBC w/ diff, CMP ordered. The patient was medicated with morphine 4 mg for their symptoms.  Patient and/or family agrees to the plan of care.    9:02 PM - Patient medicated with Percocet 5-325 MG for pain.    9:30 PM - The patient informs me their pain feels improved following medication administration. I discussed the patient's diagnostic study results which show left sided sciatica. Noted the patient's exam and vitals at this time are reassuring. Discussed plan for discharge; I advised the patient to return to the [facility] ED with any new or worsening symptoms. Patient was given the opportunity to ask any  questions. I addressed all questions or concerns and the patient is stable for discharge at this time. Patient verbalizes understanding and agreement to this plan of care.             ED Summary: This patient is sedentary, has history of DVTs, complains of left lower extremity pain.  Pain radiates from the low back at the superior gluteal area.  There is mild tenderness there.  She has no neurological deficit her pain pattern is most consistent with sciatica.  Ultrasound was done and there is no signs of DVT at this time.  She was medicated for pain and this improved her pain she will be discharged home with steroid, and pain medication.    Decision tools and prescription drugs considered including, but not limited to: Pain medication: OXYCODONE-ACETAMINOPHEN (PERCOCET) 5-325 MG TAB    DISPOSITION AND DISCUSSIONS    I reviewed prescription monitoring program for patient's narcotic use before prescribing a scheduled drug.The patient will not drink alcohol nor drive with prescribed medications. The patient will return for new or worsening symptoms and is stable at the time of discharge.    The patient is referred to a primary physician for blood pressure management, diabetic screening, and for all other preventative health concerns.    In prescribing controlled substances to this patient, I certify that I have obtained and reviewed the medical history of Cecile Teixeira. I have also made a good estefanía effort to obtain applicable records from other providers who have treated the patient and records did not demonstrate any increased risk of substance abuse that would prevent me from prescribing controlled substances.     I have conducted a physical exam and documented it. I have reviewed Ms. Teixeira’s prescription history as maintained by the [place] Prescription Monitoring Program.     I have assessed the patient’s risk for abuse, dependency, and addiction using the validated Opioid Risk Tool available at  https://www.mdcalc.com/eujczu-frot-kcvc-ort-narcotic-abuse.     Given the above, I believe the benefits of controlled substance therapy outweigh the risks. The reasons for prescribing controlled substances include non-narcotic, oral analgesic alternatives have been inadequate for pain control. Accordingly, I have discussed the risk and benefits, treatment plan, and alternative therapies with the patient.      DISPOSITION:  Patient will be discharged home in stable condition.    FOLLOW UP:  Cici Hunt P.A.-C.  [address] [facility] [address]  [phone]            OUTPATIENT MEDICATIONS:  New Prescriptions    METHYLPREDNISOLONE (MEDROL DOSEPAK) 4 MG TABLET THERAPY PACK    Use as directed    OXYCODONE-ACETAMINOPHEN (PERCOCET) 5-325 MG TAB    Take 1 Tablet by mouth every four hours as needed for Moderate Pain for up to 5 days.      FINAL DIAGNOSIS  1. Sciatica of left side    2. Acute left-sided low back pain with left-sided sciatica      Cathy ZAVALETA (Heatheriblanny), am scribing for, and in the presence of, Aj Smith D.O..    Electronically signed by: Cathy Austin (Scribe), 11/8/2023    Aj ZAVALETA D.O. personally performed the services described in this documentation, as scribed by Cathy Austin in my presence, and it is both accurate and complete.     The note accurately reflects work and decisions made by me.  Aj Smith D.O.  11/8/2023  9:51 PM

## 2023-11-09 NOTE — DISCHARGE INSTRUCTIONS
Continue all your previous care, please see your primary doctor in 3 to 4 days for reevaluation.  Please take the pain medication as prescribed

## 2023-11-09 NOTE — PROGRESS NOTES
Subjective:   Cecile Teixeira is a 78 y.o. female who presents for Other (X2days, Pt states that she had the RSV vaccine and that a day and half later states that she started having issues with her left leg, states the she has lymphedema, states that her left hip started to hurt, states a day later noticed that knee hurt, then states pain in back of knee and leg hurt, ankle pain, and foot pain, states its all different types of pain, states she's having trouble sleeping states she has tried over the counter medication and its not helping, states that her right leg is starting) and Foot Problem (States it feels like something is on the bottom of foot, nothing is there, states that foot is numb, concern for a blood clot due to RSV vaccine, )      Other  Associated symptoms include chest pain.   Foot Problem  Associated symptoms include chest pain.       Review of Systems   Constitutional: Negative.    HENT: Negative.     Respiratory: Negative.     Cardiovascular:  Positive for chest pain and palpitations.   Gastrointestinal: Negative.    Genitourinary: Negative.    Musculoskeletal:         Left leg pain and numbness   Skin: Negative.        Medications, Allergies, and current problem list reviewed today in Epic.     Objective:     BP (!) 144/94   Pulse 85   Temp 36.7 °C (98 °F) (Temporal)   Resp (!) 36   Ht 1.524 m (5')   Wt (!) 157 kg (347 lb)   SpO2 95%     Physical Exam  Vitals and nursing note reviewed.   Constitutional:       Appearance: Normal appearance.   HENT:      Head: Normocephalic and atraumatic.   Cardiovascular:      Rate and Rhythm: Normal rate. Rhythm irregular.      Pulses: Normal pulses.      Heart sounds: Normal heart sounds.   Pulmonary:      Effort: Pulmonary effort is normal.      Breath sounds: Normal breath sounds.   Musculoskeletal:      Comments: Lymph edema of lower extremities, now with severe leg pain from the calf to the thigh, states cannot sleep with the pain.   Neurological:       Mental Status: She is alert.         Assessment/Plan:     Diagnosis and associated orders:     1. Pain of left lower extremity        2. Irregular heart beat           Comments/MDM:     Sent by ambulance to hospital for dvt evaluation, she has no other transportation with her large electric wheelchair.         Differential diagnosis, natural history, supportive care, and indications for immediate follow-up discussed.    Advised the patient to follow-up with the primary care physician for recheck, reevaluation, and consideration of further management.    Please note that this dictation was created using voice recognition software. I have made a reasonable attempt to correct obvious errors, but I expect that there are errors of grammar and possibly content that I did not discover before finalizing the note.    This note was electronically signed by Gray Vazquez M.D.

## 2023-11-09 NOTE — ED TRIAGE NOTES
Pt is wheelchair bound and came to triage for the following c/o    Chief Complaint   Patient presents with    Leg Pain     Pt received the RSV vaccine on 11/3 and started to have left leg pain that radiates to her left hip and back of her left thigh. Pt has a hx of blood clots but states not on blood thinners     BP (!) 189/90   Pulse 82   Temp 36.7 °C (98.1 °F) (Temporal)   Resp 16   Ht 1.524 m (5')   Wt (!) 157 kg (346 lb 2 oz)   SpO2 92%   BMI 67.60 kg/m²

## 2023-12-04 PROBLEM — M54.42 CHRONIC LOW BACK PAIN WITH LEFT-SIDED SCIATICA: Status: ACTIVE | Noted: 2023-12-04

## 2023-12-04 PROBLEM — G89.29 CHRONIC LOW BACK PAIN WITH LEFT-SIDED SCIATICA: Status: ACTIVE | Noted: 2023-12-04

## 2023-12-04 PROBLEM — J40 BRONCHITIS: Status: ACTIVE | Noted: 2023-12-04

## 2023-12-28 ENCOUNTER — HOME HEALTH ADMISSION (OUTPATIENT)
Dept: HOME HEALTH SERVICES | Facility: HOME HEALTHCARE | Age: 78
End: 2023-12-28
Payer: MEDICARE

## 2024-01-04 PROBLEM — F39 MOOD DISORDER (HCC): Status: ACTIVE | Noted: 2024-01-04

## 2024-01-04 PROBLEM — J45.30 MILD PERSISTENT ASTHMA WITHOUT COMPLICATION: Status: ACTIVE | Noted: 2022-01-01

## 2024-01-07 ENCOUNTER — HOME CARE VISIT (OUTPATIENT)
Dept: HOME HEALTH SERVICES | Facility: HOME HEALTHCARE | Age: 79
End: 2024-01-07

## 2024-01-07 NOTE — CASE COMMUNICATION
noted  ----- Message -----  From: Yovana Mina R.N.  Sent: 1/7/2024   1:22 PM PST  To: Mallika Espana R.N.; Jes Aguiar R.N.; *      FYI: NON ADMIT TO HOME CARE: SN arrived at patient's apartment for scheduled home evaluation. Patient reported wounds have healed to leg. States had one open area to lateral lower extremity that is healed, and no open aread to back of leg. SN observed areas of prior concern, no open areas noted. Se fu declined any other further needs. Voalte text to TCS with non admit.

## 2024-01-07 NOTE — Clinical Note
FYI: NON ADMIT TO HOME CARE: SN arrived at patient's apartment for scheduled home evaluation. Patient reported wounds have healed to leg. States had one open area to lateral lower extremity that is healed, and no open aread to back of leg. SN observed areas of prior concern, no open areas noted. Patient declined any other further needs. Voalte text to TCS with non admit.

## 2024-02-28 PROBLEM — G31.84 MCI (MILD COGNITIVE IMPAIRMENT): Status: ACTIVE | Noted: 2024-02-28

## 2024-02-28 PROBLEM — Z74.09 IMPAIRED FUNCTIONAL MOBILITY, BALANCE, GAIT, AND ENDURANCE: Status: ACTIVE | Noted: 2024-02-28

## 2024-03-04 ENCOUNTER — APPOINTMENT (OUTPATIENT)
Dept: RADIOLOGY | Facility: MEDICAL CENTER | Age: 79
End: 2024-03-04
Attending: EMERGENCY MEDICINE
Payer: MEDICARE

## 2024-03-04 ENCOUNTER — HOSPITAL ENCOUNTER (EMERGENCY)
Facility: MEDICAL CENTER | Age: 79
End: 2024-03-04
Attending: EMERGENCY MEDICINE
Payer: MEDICARE

## 2024-03-04 VITALS
SYSTOLIC BLOOD PRESSURE: 185 MMHG | BODY MASS INDEX: 57.52 KG/M2 | TEMPERATURE: 98.5 F | WEIGHT: 293 LBS | RESPIRATION RATE: 20 BRPM | DIASTOLIC BLOOD PRESSURE: 82 MMHG | HEIGHT: 60 IN | HEART RATE: 85 BPM | OXYGEN SATURATION: 94 %

## 2024-03-04 DIAGNOSIS — M79.604 PAIN OF RIGHT LOWER EXTREMITY: ICD-10-CM

## 2024-03-04 LAB
25(OH)D3 SERPL-MCNC: 33 NG/ML (ref 30–100)
ALBUMIN SERPL BCP-MCNC: 4.6 G/DL (ref 3.2–4.9)
ALBUMIN/GLOB SERPL: 1 G/DL
ALP SERPL-CCNC: 88 U/L (ref 30–99)
ALT SERPL-CCNC: 21 U/L (ref 2–50)
ANION GAP SERPL CALC-SCNC: 14 MMOL/L (ref 7–16)
APTT PPP: 28.9 SEC (ref 24.7–36)
AST SERPL-CCNC: 25 U/L (ref 12–45)
BASOPHILS # BLD AUTO: 0.6 % (ref 0–1.8)
BASOPHILS # BLD: 0.06 K/UL (ref 0–0.12)
BILIRUB SERPL-MCNC: 0.6 MG/DL (ref 0.1–1.5)
BUN SERPL-MCNC: 24 MG/DL (ref 8–22)
CALCIUM ALBUM COR SERPL-MCNC: 9.4 MG/DL (ref 8.5–10.5)
CALCIUM SERPL-MCNC: 9.9 MG/DL (ref 8.4–10.2)
CHLORIDE SERPL-SCNC: 98 MMOL/L (ref 96–112)
CO2 SERPL-SCNC: 25 MMOL/L (ref 20–33)
CREAT SERPL-MCNC: 0.81 MG/DL (ref 0.5–1.4)
EOSINOPHIL # BLD AUTO: 0.29 K/UL (ref 0–0.51)
EOSINOPHIL NFR BLD: 2.8 % (ref 0–6.9)
ERYTHROCYTE [DISTWIDTH] IN BLOOD BY AUTOMATED COUNT: 50.4 FL (ref 35.9–50)
GFR SERPLBLD CREATININE-BSD FMLA CKD-EPI: 74 ML/MIN/1.73 M 2
GLOBULIN SER CALC-MCNC: 4.6 G/DL (ref 1.9–3.5)
GLUCOSE SERPL-MCNC: 94 MG/DL (ref 65–99)
HCT VFR BLD AUTO: 42.7 % (ref 37–47)
HGB BLD-MCNC: 14.1 G/DL (ref 12–16)
IMM GRANULOCYTES # BLD AUTO: 0.03 K/UL (ref 0–0.11)
IMM GRANULOCYTES NFR BLD AUTO: 0.3 % (ref 0–0.9)
INR PPP: 1.06 (ref 0.87–1.13)
LYMPHOCYTES # BLD AUTO: 1.83 K/UL (ref 1–4.8)
LYMPHOCYTES NFR BLD: 17.8 % (ref 22–41)
MCH RBC QN AUTO: 31.1 PG (ref 27–33)
MCHC RBC AUTO-ENTMCNC: 33 G/DL (ref 32.2–35.5)
MCV RBC AUTO: 94.1 FL (ref 81.4–97.8)
MONOCYTES # BLD AUTO: 0.91 K/UL (ref 0–0.85)
MONOCYTES NFR BLD AUTO: 8.8 % (ref 0–13.4)
NEUTROPHILS # BLD AUTO: 7.18 K/UL (ref 1.82–7.42)
NEUTROPHILS NFR BLD: 69.7 % (ref 44–72)
NRBC # BLD AUTO: 0 K/UL
NRBC BLD-RTO: 0 /100 WBC (ref 0–0.2)
PLATELET # BLD AUTO: 376 K/UL (ref 164–446)
PMV BLD AUTO: 9.3 FL (ref 9–12.9)
POTASSIUM SERPL-SCNC: 3.9 MMOL/L (ref 3.6–5.5)
PROT SERPL-MCNC: 9.2 G/DL (ref 6–8.2)
PROTHROMBIN TIME: 14.2 SEC (ref 12–14.6)
RBC # BLD AUTO: 4.54 M/UL (ref 4.2–5.4)
SODIUM SERPL-SCNC: 137 MMOL/L (ref 135–145)
T4 FREE SERPL-MCNC: 1.33 NG/DL (ref 0.93–1.7)
TSH SERPL DL<=0.005 MIU/L-ACNC: 9.66 UIU/ML (ref 0.38–5.33)
WBC # BLD AUTO: 10.3 K/UL (ref 4.8–10.8)

## 2024-03-04 PROCEDURE — 85610 PROTHROMBIN TIME: CPT

## 2024-03-04 PROCEDURE — 700111 HCHG RX REV CODE 636 W/ 250 OVERRIDE (IP): Mod: JZ | Performed by: EMERGENCY MEDICINE

## 2024-03-04 PROCEDURE — 96374 THER/PROPH/DIAG INJ IV PUSH: CPT

## 2024-03-04 PROCEDURE — 84443 ASSAY THYROID STIM HORMONE: CPT

## 2024-03-04 PROCEDURE — 84481 FREE ASSAY (FT-3): CPT

## 2024-03-04 PROCEDURE — 73590 X-RAY EXAM OF LOWER LEG: CPT | Mod: RT

## 2024-03-04 PROCEDURE — 96375 TX/PRO/DX INJ NEW DRUG ADDON: CPT

## 2024-03-04 PROCEDURE — 85730 THROMBOPLASTIN TIME PARTIAL: CPT

## 2024-03-04 PROCEDURE — 99285 EMERGENCY DEPT VISIT HI MDM: CPT

## 2024-03-04 PROCEDURE — 36415 COLL VENOUS BLD VENIPUNCTURE: CPT

## 2024-03-04 PROCEDURE — 93971 EXTREMITY STUDY: CPT | Mod: RT

## 2024-03-04 PROCEDURE — 73552 X-RAY EXAM OF FEMUR 2/>: CPT | Mod: RT

## 2024-03-04 PROCEDURE — 82306 VITAMIN D 25 HYDROXY: CPT

## 2024-03-04 PROCEDURE — 72192 CT PELVIS W/O DYE: CPT

## 2024-03-04 PROCEDURE — 83036 HEMOGLOBIN GLYCOSYLATED A1C: CPT

## 2024-03-04 PROCEDURE — 72131 CT LUMBAR SPINE W/O DYE: CPT

## 2024-03-04 PROCEDURE — 85025 COMPLETE CBC W/AUTO DIFF WBC: CPT

## 2024-03-04 PROCEDURE — 80053 COMPREHEN METABOLIC PANEL: CPT

## 2024-03-04 PROCEDURE — 84439 ASSAY OF FREE THYROXINE: CPT

## 2024-03-04 RX ORDER — MORPHINE SULFATE 4 MG/ML
4 INJECTION INTRAVENOUS ONCE
Status: COMPLETED | OUTPATIENT
Start: 2024-03-04 | End: 2024-03-04

## 2024-03-04 RX ORDER — ONDANSETRON 2 MG/ML
4 INJECTION INTRAMUSCULAR; INTRAVENOUS ONCE
Status: COMPLETED | OUTPATIENT
Start: 2024-03-04 | End: 2024-03-04

## 2024-03-04 RX ORDER — GABAPENTIN 100 MG/1
200 CAPSULE ORAL 3 TIMES DAILY
Qty: 90 CAPSULE | Refills: 0 | Status: SHIPPED | OUTPATIENT
Start: 2024-03-04 | End: 2024-03-19

## 2024-03-04 RX ORDER — METHOCARBAMOL 750 MG/1
750 TABLET, FILM COATED ORAL 3 TIMES DAILY
Qty: 20 TABLET | Refills: 0 | Status: SHIPPED | OUTPATIENT
Start: 2024-03-04

## 2024-03-04 RX ADMIN — MORPHINE SULFATE 4 MG: 4 INJECTION, SOLUTION INTRAMUSCULAR; INTRAVENOUS at 20:00

## 2024-03-04 RX ADMIN — ONDANSETRON 4 MG: 2 INJECTION INTRAMUSCULAR; INTRAVENOUS at 20:00

## 2024-03-04 ASSESSMENT — FIBROSIS 4 INDEX: FIB4 SCORE: 1.88

## 2024-03-05 LAB
EST. AVERAGE GLUCOSE BLD GHB EST-MCNC: 123 MG/DL
HBA1C MFR BLD: 5.9 % (ref 4–5.6)
T3FREE SERPL-MCNC: 2.53 PG/ML (ref 2–4.4)

## 2024-03-05 NOTE — ED PROVIDER NOTES
ED Provider Note    CHIEF COMPLAINT  Chief Complaint   Patient presents with    Leg Pain     R upper leg pain in thigh and hip, Denies known injury.        EXTERNAL RECORDS REVIEWED  Inpatient Notes hospitalization and Outpatient Notes previous ER visits and primary care notes    HPI/ROS  LIMITATION TO HISTORY   Select: : None      Cecile Teixeira is a 78 y.o. female who presents for evaluation of leg pain.  Patient states for the last 3 days she has been having pain that starts up in the right hip area and goes all the way down to her foot.  She describes pain with any movement.  She denies falls or any type of trauma that she can recall.  The patient denies recent: Fever, chills, URI symptoms, cardiorespiratory symptoms, gastrointestinal symptoms.  No other complaints.    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    SURGICAL HISTORY   has a past surgical history that includes other (Left, [id number]).    FAMILY HISTORY  No family history on file.    SOCIAL HISTORY  Social History     Tobacco Use    Smoking status: Former     Types: Cigarettes    Smokeless tobacco: Never    Tobacco comments:     only 1.5 yr. only social   Vaping Use    Vaping Use: Never used   Substance and Sexual Activity    Alcohol use: Not Currently     Comment: rare     Drug use: Never    Sexual activity: Not on file       CURRENT MEDICATIONS  Home Medications       Reviewed by Ayde Alvarez R.N. (Registered Nurse) on 03/04/24 at 1812  Med List Status: Not Addressed     Medication Last Dose Status   albuterol 108 (90 Base) MCG/ACT Aero Soln inhalation aerosol  Active   aspirin 81 MG EC tablet  Active   azithromycin (ZITHROMAX) 250 MG Tab  Active   Dextromethorphan-guaiFENesin (TUSSIN DM)  MG/5ML Syrup  Active    FLOVENT  MCG/ACT Aerosol  Active   losartan (COZAAR) 25 MG Tab  Active   methylPREDNISolone (MEDROL DOSEPAK) 4 MG Tablet Therapy Pack  Active   nystatin (MYCOSTATIN) powder  Active   Spacer/Aero-Holding Chambers Device  Active   SYNTHROID 75 MCG Tab  Active                    ALLERGIES  Allergies   Allergen Reactions    Doxycycline Hives    Lactose Unspecified     Lactose intolerance since a child     Levothyroxine Anxiety     Panic attack     Prednisone Unspecified     Diarrhea and very irritable     Sulfa Drugs Unspecified     Mental status change     Cephalexin Diarrhea     Diarrhea and mild rash     Montelukast Rash     Leg pain, back pain and rash     Spinach Unspecified     Skin allergy test    Cow's Milk [Lac Bovis]     Other Environmental      Trees except oak    Codeine Unspecified     Headaches and confusion        PHYSICAL EXAM  VITAL SIGNS: BP (!) 206/96   Pulse 81   Temp 36.7 °C (98.1 °F) (Temporal)   Resp 20   Ht 1.524 m (5')   Wt (!) 150 kg (330 lb)   SpO2 92%   BMI 64.45 kg/m²      Constitutional: 78-year-old female, BMI 64, anxious, oriented x 3  HENT: Normocephalic, Atraumatic, Nares:Clear, Oropharynx: moist, well hydrated, posterior pharynx:clear   Eyes: PERRL, EOMI, Conjunctiva normal, No discharge.   Neck: Normal range of motion, No tenderness, Supple, No stridor.   Lymphatic: No lymphadenopathy noted.   Cardiovascular: Regular rate and rhythm without mumurs, gallups, rubs   Thorax & Lungs: Normal Equal breath sounds, No respiratory distress, No wheezing, no stridor, no rales. No chest tenderness.   Abdomen: Soft, nontender, nondistended, no organomegaly, positive bowel sounds normal in quality. No guarding or rebound.  Skin: Good skin turgor, pink, warm, dry. No rashes, petechiae, purpura. Normal capillary refill.   Back: No tenderness, No CVA tenderness.   Extremities: Intact distal pulses, large amount of bilateral lymphedema, No tenderness, No cyanosis,  Vascular: Pulses are  1+, symmetric in the upper and lower extremities.  Musculoskeletal: Has tenderness diffusely around the right hip joint area extending all the way down to the thigh knee leg area; the patient has significant bilateral lymphedema but no asymmetry comparing the left and right lower extremities.  There is no evidence any joint effusions in the knee joint or ankle joint and no skin findings of rashes or signs of cellulitis;  Neurologic: Alert & oriented x 3,  No gross focal deficits noted.   Psychiatric: Affect normal, Judgment normal, Mood normal.       DIAGNOSTIC STUDIES / PROCEDURES      LABS  CBC and differential within normal limits; CMP shows a BUN of 24 otherwise within normal limits; coags normal;    RADIOLOGY  I have independently interpreted the diagnostic imaging associated with this visit and am waiting the final reading from the radiologist.   My preliminary interpretation is as follows: No evidence of acute fractures identified but multiple areas of degenerative changes;  Radiologist interpretation:   CT-LSPINE W/O PLUS RECONS   Final Result      Degenerative changes of the lumbar spine without acute fracture or malalignment.      CT-PELVIS W/O PLUS RECONS   Final Result      No acute fracture or malalignment.      DX-TIBIA AND FIBULA RIGHT   Final Result      Degenerative changes without acute fracture or malalignment.      DX-FEMUR-2+ RIGHT   Final Result      Degenerative changes without definite acute fracture or malalignment.      US-EXTREMITY VENOUS LOWER UNILAT RIGHT    (Results Pending)     IMPRESSION:     1. Limited study demonstrates no definite evidence of right lower extremity deep venous thrombosis.     2.  There are no incidental findings.    COURSE & MEDICAL DECISION MAKING    ED Observation Status? No; Patient does not meet criteria for ED Observation.     INITIAL ASSESSMENT, COURSE AND PLAN  1.  Monitor  2.  Saline lock  3.  Zofran 4 mg IV  4.  Morphine 4 mg IV    Care Narrative: This  time, the patient presents for evaluation of pain in her right lower extremity.  The pain is very diffuse and extends from the hip area all the way down to the foot.  I obtained an noninvasive ultrasound to rule out DVT.  X-ray of the right femur and right tib-fib were obtained which showed degenerative changes but no acute bony abnormalities.  A CT had to be obtained as she apparently broke the diagnostic radiology table secondary to her high BMI.  The CT showed degenerative changes but no acute fractures.  Laboratory studies were reassuring.  At this time, we have ruled out any serious conditions.  The pain appears to be muscular in origin.  I see no signs of infection and other conditions were evaluated with laboratory and imaging studies.  The patient is requesting something different for pain.  I will administer a trial of Neurontin to see how she responds to this.        ADDITIONAL PROBLEM LIST  See PMH    DISPOSITION AND DISCUSSIONS  I have discussed management of the patient with the following physicians and SAGAR's:  none      Discussion of management with other QHP or appropriate source(s): None     Escalation of care considered, and ultimately not performed:acute inpatient care management, however at this time, the patient is most appropriate for outpatient management      Decision tools and prescription drugs considered including, but not limited to: Pain Medications Neurontin .    PLAN:  1.  Appropriate discharge instructions given  2.  Neurontin 200 mg 3 times daily #90  3.  Continue your current medications  4.  Follow-up with her primary care physician    FINAL DIAGNOSIS  1. Pain of right lower extremity             Electronically signed by: Guy G Gansert, M.D., 3/4/2024 6:36 PM

## 2024-03-05 NOTE — ED TRIAGE NOTES
Chief Complaint   Patient presents with    Leg Pain     R upper leg pain in thigh and hip, Denies known injury.      Physical Exam  Pulmonary:      Effort: Pulmonary effort is normal.   Skin:     General: Skin is warm and dry.   Neurological:      Mental Status: She is alert.

## 2024-03-25 ENCOUNTER — HOME HEALTH ADMISSION (OUTPATIENT)
Dept: HOME HEALTH SERVICES | Facility: HOME HEALTHCARE | Age: 79
End: 2024-03-25
Payer: MEDICARE

## 2024-03-27 ENCOUNTER — HOME CARE VISIT (OUTPATIENT)
Dept: HOME HEALTH SERVICES | Facility: HOME HEALTHCARE | Age: 79
End: 2024-03-27
Payer: MEDICARE

## 2024-03-27 PROCEDURE — 665005 NO-PAY RAP - HOME HEALTH

## 2024-03-27 PROCEDURE — G0493 RN CARE EA 15 MIN HH/HOSPICE: HCPCS

## 2024-03-27 ASSESSMENT — ENCOUNTER SYMPTOMS: POOR JUDGMENT: 1

## 2024-03-27 NOTE — Clinical Note
"Called patient to schedule PT evaluation.  Offered 3/28/2024 at 1215, pt declined due to \"feeling pretty tired.\"  Pt requesting to delay PT eval to next week.  PT eval scheduled with patient for 4/2/2024 at 1400.  "

## 2024-03-28 NOTE — CASE COMMUNICATION
"noted  ----- Message -----  From: Colette Gilbert, PT  Sent: 3/27/2024   5:22 PM PDT  To: Mallika Espana R.N.; Rebecca Thompson, OT      Called patient to schedule PT evaluation.  Offered 3/28/2024 at 1215, pt declined due to \"feeling pretty tired.\"  Pt requesting to delay PT eval to next week.  PT eval scheduled with patient for 4/2/2024 at 1400.  "

## 2024-03-29 ENCOUNTER — HOME CARE VISIT (OUTPATIENT)
Dept: HOME HEALTH SERVICES | Facility: HOME HEALTHCARE | Age: 79
End: 2024-03-29
Payer: MEDICARE

## 2024-03-29 PROCEDURE — G0495 RN CARE TRAIN/EDU IN HH: HCPCS

## 2024-03-29 ASSESSMENT — ENCOUNTER SYMPTOMS
COUGH CHARACTERISTICS: NON-PRODUCTIVE
SHORTNESS OF BREATH: 1
COUGH: 1
DENIES PAIN: 1
FORGETFULNESS: 1
PERSON REPORTING PAIN: PATIENT
FATIGUE: 1

## 2024-03-30 ENCOUNTER — HOME CARE VISIT (OUTPATIENT)
Dept: HOME HEALTH SERVICES | Facility: HOME HEALTHCARE | Age: 79
End: 2024-03-30
Payer: MEDICARE

## 2024-03-30 VITALS
OXYGEN SATURATION: 93 % | RESPIRATION RATE: 18 BRPM | SYSTOLIC BLOOD PRESSURE: 126 MMHG | TEMPERATURE: 98 F | DIASTOLIC BLOOD PRESSURE: 72 MMHG | HEART RATE: 86 BPM

## 2024-03-30 VITALS
RESPIRATION RATE: 18 BRPM | TEMPERATURE: 98.1 F | DIASTOLIC BLOOD PRESSURE: 70 MMHG | SYSTOLIC BLOOD PRESSURE: 124 MMHG | HEART RATE: 75 BPM | OXYGEN SATURATION: 93 %

## 2024-03-30 ASSESSMENT — ENCOUNTER SYMPTOMS
PAIN LOCATION - RELIEVING FACTORS: REST,MEDS
PAIN LOCATION - PAIN FREQUENCY: INTERMITTENT
PAIN SEVERITY GOAL: 0/10
LOWEST PAIN SEVERITY IN PAST 24 HOURS: 0/10
DYSPNEA ACTIVITY LEVEL: AFTER AMBULATING LESS THAN 10 FT
LIMITED RANGE OF MOTION: 1
PAIN LOCATION: BACK
SUBJECTIVE PAIN PROGRESSION: UNCHANGED
PAIN LOCATION - EXACERBATING FACTORS: MOVE
FATIGUES EASILY: 1
PERSON REPORTING PAIN: PATIENT
LAST BOWEL MOVEMENT: 66926
LOWER EXTREMITY EDEMA: 1
BOWEL PATTERN NORMAL: 1
BOWEL PATTERN NORMAL: 1
SHORTNESS OF BREATH: 1
STOOL FREQUENCY: LESS THAN DAILY
PAIN LOCATION - PAIN QUALITY: ACHE
MUSCLE WEAKNESS: 1
STOOL FREQUENCY: DAILY
LAST BOWEL MOVEMENT: 66926
PAIN LOCATION - PAIN DURATION: ONGOING
PAIN LOCATION - PAIN SEVERITY: 0/10
HIGHEST PAIN SEVERITY IN PAST 24 HOURS: 5/10
PAIN: 1

## 2024-03-30 ASSESSMENT — PATIENT HEALTH QUESTIONNAIRE - PHQ9: CLINICAL INTERPRETATION OF PHQ2 SCORE: 0

## 2024-03-30 ASSESSMENT — ACTIVITIES OF DAILY LIVING (ADL): OASIS_M1830: 05

## 2024-03-30 NOTE — CASE COMMUNICATION
Primary dx/Skilled need:morbidly obese sp hernia surgery  SN frequency: 2w1,1w3  Zip code: [id number]  Disciplines ordered: Pt/OT/MSW/HHA  Insurance & authorization: [facility]  Certification period:3/27-5/25  Special considerations: EXAMPLE: specific directions to pt home etc.

## 2024-04-01 ENCOUNTER — HOME CARE VISIT (OUTPATIENT)
Dept: HOME HEALTH SERVICES | Facility: HOME HEALTHCARE | Age: 79
End: 2024-04-01
Payer: MEDICARE

## 2024-04-01 PROCEDURE — G0156 HHCP-SVS OF AIDE,EA 15 MIN: HCPCS

## 2024-04-01 PROCEDURE — G0155 HHCP-SVS OF CSW,EA 15 MIN: HCPCS

## 2024-04-01 ASSESSMENT — ENCOUNTER SYMPTOMS
HIGHEST PAIN SEVERITY IN PAST 24 HOURS: 0/10
PAIN SEVERITY GOAL: 0/10
PERSON REPORTING PAIN: PATIENT
DENIES PAIN: 1

## 2024-04-02 ENCOUNTER — HOME CARE VISIT (OUTPATIENT)
Dept: HOME HEALTH SERVICES | Facility: HOME HEALTHCARE | Age: 79
End: 2024-04-02
Payer: MEDICARE

## 2024-04-02 VITALS
TEMPERATURE: 97.8 F | RESPIRATION RATE: 18 BRPM | SYSTOLIC BLOOD PRESSURE: 122 MMHG | DIASTOLIC BLOOD PRESSURE: 70 MMHG | HEART RATE: 86 BPM | OXYGEN SATURATION: 93 %

## 2024-04-02 PROCEDURE — G0495 RN CARE TRAIN/EDU IN HH: HCPCS

## 2024-04-02 PROCEDURE — G0151 HHCP-SERV OF PT,EA 15 MIN: HCPCS

## 2024-04-02 ASSESSMENT — ENCOUNTER SYMPTOMS
DESCRIPTION OF MEMORY LOSS: SHORT TERM
STOOL FREQUENCY: DAILY
LAST BOWEL MOVEMENT: 66932
MUSCLE WEAKNESS: 1
POOR JUDGMENT: 1
BOWEL PATTERN NORMAL: 1
LOWEST PAIN SEVERITY IN PAST 24 HOURS: 0/10
MUSCLE WEAKNESS: 1
POOR JUDGMENT: 1
DENIES PAIN: 1
PERSON REPORTING PAIN: PATIENT
DENIES PAIN: 1
AGITATION: 1
PAIN SEVERITY GOAL: 0/10
HIGHEST PAIN SEVERITY IN PAST 24 HOURS: 0/10

## 2024-04-02 ASSESSMENT — ACTIVITIES OF DAILY LIVING (ADL)
PHYSICAL_TRANSFERS_DEVICES: USE OF B UES TO ASSIST
GROOMING_COMMENTS: SEE OT NOTES
SHOPPING_REQUIRES_ASSISTANCE: 1
BATHING_COMMENTS: SEE OT NOTES
LAUNDRY_REQUIRES_ASSISTANCE: 1
FEEDING_COMMENTS: SEE OT NOTES
CURRENT_FUNCTION: SUPERVISION
PHYSICAL TRANSFERS ASSESSED: 1
AMBULATION ASSISTANCE: NON-AMBULATORY
AMBULATION ASSISTANCE: NON-AMBULATORY
BATHING_REQUIRES_ASSISTANCE: 1
CURRENT_FUNCTION: STAND BY ASSIST

## 2024-04-03 NOTE — CASE COMMUNICATION
noted  ----- Message -----  From: Colette Gilbert, PT  Sent: 4/2/2024   3:55 PM PDT  To: Mallika Espana R.N.; Rebecca Thompson, OT; *      PT crissy completed, requesting auth for 1w3 effective 4/2/2024.

## 2024-04-04 ENCOUNTER — HOME CARE VISIT (OUTPATIENT)
Dept: HOME HEALTH SERVICES | Facility: HOME HEALTHCARE | Age: 79
End: 2024-04-04
Payer: MEDICARE

## 2024-04-04 VITALS
TEMPERATURE: 97.9 F | RESPIRATION RATE: 20 BRPM | DIASTOLIC BLOOD PRESSURE: 86 MMHG | SYSTOLIC BLOOD PRESSURE: 144 MMHG | OXYGEN SATURATION: 92 % | HEART RATE: 89 BPM

## 2024-04-04 VITALS
SYSTOLIC BLOOD PRESSURE: 122 MMHG | TEMPERATURE: 97.5 F | DIASTOLIC BLOOD PRESSURE: 70 MMHG | RESPIRATION RATE: 20 BRPM | HEART RATE: 86 BPM | OXYGEN SATURATION: 93 %

## 2024-04-04 PROCEDURE — G0152 HHCP-SERV OF OT,EA 15 MIN: HCPCS

## 2024-04-04 ASSESSMENT — ACTIVITIES OF DAILY LIVING (ADL)
TOILETING: 1
GROOMING ASSESSED: 1
GROOMING_CURRENT_FUNCTION: INDEPENDENT
FEEDING: INDEPENDENT
ORAL_CARE_ASSESSED: 1
PHYSICAL TRANSFERS ASSESSED: 1
LAUNDRY ASSESSED: 1
FEEDING ASSESSED: 1

## 2024-04-04 ASSESSMENT — ENCOUNTER SYMPTOMS
PERSON REPORTING PAIN: PATIENT
DENIES PAIN: 1

## 2024-04-04 NOTE — CASE COMMUNICATION
Quality Review Completed for SOC [facility]ASIS by JANELLE March RN on 4/4/2024:     Edits completed by JANELLE March RN:     1.  and  diagnosis coding updated per chart review  2.  changed to newly epithelialized, no infection noted in clinical charting/documentation  3.  changed to 3 per respiratory assessment   4.  therapeutic diet for HTN management   5.  non invasive CPAP  6. Mod level of assist per narrative in func tional status items. Changed , 1810, 1820, 1845 to 2.  to 5  7.  changed to 3 per guidelines when ambulation is supervised  8. Checked low sodium to 485 Nutritional Requirements

## 2024-04-05 PROBLEM — J45.31 MILD PERSISTENT ASTHMA WITH EXACERBATION: Status: ACTIVE | Noted: 2024-04-05

## 2024-04-05 NOTE — CASE COMMUNICATION
I agree with changes  ----- Message -----  From: Sheba March R.N.  Sent: 4/4/2024  11:59 AM PDT  To: Mallika Espana R.N.      Quality Review Completed for SOC [facility]IS by JANELLE March, RN on 4/4/2024:     Edits completed by JANELLE March RN:     1.  and  diagnosis coding updated per chart review  2.  changed to newly epithelialized, no infection noted in clinical charting/documentation  3.  changed to 3 per respiratory asse ssment   4.  therapeutic diet for HTN management   5.  non invasive CPAP  6. Mod level of assist per narrative in functional status items. Changed , 1810, 1820, 1845 to 2.  to 5  7.  changed to 3 per guidelines when ambulation is supervised  8. Checked low sodium to 485 Nutritional Requirements

## 2024-04-08 ENCOUNTER — HOME CARE VISIT (OUTPATIENT)
Dept: HOME HEALTH SERVICES | Facility: HOME HEALTHCARE | Age: 79
End: 2024-04-08
Payer: MEDICARE

## 2024-04-08 VITALS
RESPIRATION RATE: 18 BRPM | SYSTOLIC BLOOD PRESSURE: 125 MMHG | TEMPERATURE: 97.9 F | HEART RATE: 76 BPM | OXYGEN SATURATION: 93 % | DIASTOLIC BLOOD PRESSURE: 80 MMHG

## 2024-04-08 PROCEDURE — G0151 HHCP-SERV OF PT,EA 15 MIN: HCPCS

## 2024-04-08 ASSESSMENT — ACTIVITIES OF DAILY LIVING (ADL)
DRESSING_UB_CURRENT_FUNCTION: INDEPENDENT
ORAL_CARE_CURRENT_FUNCTION: INDEPENDENT
CURRENT_FUNCTION: STAND BY ASSIST
LAUNDRY: INDEPENDENT
DRESSING_LB_CURRENT_FUNCTION: SUPERVISION
CURRENT_FUNCTION: SUPERVISION
PREPARING MEALS: INDEPENDENT
GROOMING_WITHIN_DEFINED_LIMITS: 1
FEEDING_WITHIN_DEFINED_LIMITS: 1

## 2024-04-08 ASSESSMENT — ENCOUNTER SYMPTOMS
DENIES PAIN: 1
POOR JUDGMENT: 1
PERSON REPORTING PAIN: PATIENT

## 2024-04-08 ASSESSMENT — PATIENT HEALTH QUESTIONNAIRE - PHQ9: CLINICAL INTERPRETATION OF PHQ2 SCORE: 0

## 2024-04-09 ENCOUNTER — HOME CARE VISIT (OUTPATIENT)
Dept: HOME HEALTH SERVICES | Facility: HOME HEALTHCARE | Age: 79
End: 2024-04-09
Payer: MEDICARE

## 2024-04-09 VITALS
HEART RATE: 83 BPM | OXYGEN SATURATION: 94 % | DIASTOLIC BLOOD PRESSURE: 80 MMHG | TEMPERATURE: 98.1 F | RESPIRATION RATE: 18 BRPM | SYSTOLIC BLOOD PRESSURE: 122 MMHG

## 2024-04-09 PROCEDURE — G0495 RN CARE TRAIN/EDU IN HH: HCPCS

## 2024-04-09 PROCEDURE — G0156 HHCP-SVS OF AIDE,EA 15 MIN: HCPCS

## 2024-04-09 ASSESSMENT — ENCOUNTER SYMPTOMS
DENIES PAIN: 1
PERSON REPORTING PAIN: PATIENT
PAIN SEVERITY GOAL: 0/10

## 2024-04-09 NOTE — Clinical Note
FYI    patient appears to have discoloration (red) under left breast and on the breast . Assisted patient to place Nystatin powder to the area.

## 2024-04-09 NOTE — Clinical Note
"FYI    patient stated \"I have more phlegm now then when I came home from the Hospital.\" patient appears to have discoloration (red) under the left breast and on the breast assisted patient to apply Nystatin "

## 2024-04-09 NOTE — CASE COMMUNICATION
"noted  ----- Message -----  From: JANELLE SosaNELEAZAR.  Sent: 4/9/2024   3:21 PM PDT  To: Mallika Espana R.N.; *      FYI    patient stated \"I have more phlegm now then when I came home from the Hospital.\" patient appears to have discoloration (red) under the left breast and on the breast assisted patient to apply Nystatin "

## 2024-04-10 ENCOUNTER — HOME CARE VISIT (OUTPATIENT)
Dept: HOME HEALTH SERVICES | Facility: HOME HEALTHCARE | Age: 79
End: 2024-04-10
Payer: MEDICARE

## 2024-04-11 ENCOUNTER — HOME CARE VISIT (OUTPATIENT)
Dept: HOME HEALTH SERVICES | Facility: HOME HEALTHCARE | Age: 79
End: 2024-04-11
Payer: MEDICARE

## 2024-04-11 VITALS
OXYGEN SATURATION: 94 % | DIASTOLIC BLOOD PRESSURE: 80 MMHG | TEMPERATURE: 98.1 F | RESPIRATION RATE: 18 BRPM | SYSTOLIC BLOOD PRESSURE: 122 MMHG | HEART RATE: 83 BPM

## 2024-04-11 PROCEDURE — G0152 HHCP-SERV OF OT,EA 15 MIN: HCPCS

## 2024-04-11 ASSESSMENT — ENCOUNTER SYMPTOMS
STOOL FREQUENCY: DAILY
DESCRIPTION OF MEMORY LOSS: SHORT TERM
LAST BOWEL MOVEMENT: 66939
BOWEL PATTERN NORMAL: 1
POOR JUDGMENT: 1
DENIES PAIN: 1

## 2024-04-12 ENCOUNTER — HOME CARE VISIT (OUTPATIENT)
Dept: HOME HEALTH SERVICES | Facility: HOME HEALTHCARE | Age: 79
End: 2024-04-12
Payer: MEDICARE

## 2024-04-12 VITALS
HEART RATE: 85 BPM | TEMPERATURE: 98.1 F | OXYGEN SATURATION: 95 % | SYSTOLIC BLOOD PRESSURE: 154 MMHG | RESPIRATION RATE: 18 BRPM | DIASTOLIC BLOOD PRESSURE: 80 MMHG

## 2024-04-12 PROCEDURE — G0155 HHCP-SVS OF CSW,EA 15 MIN: HCPCS

## 2024-04-12 ASSESSMENT — ACTIVITIES OF DAILY LIVING (ADL)
BATHING_REQUIRES_ASSISTANCE: 1
SHOPPING_REQUIRES_ASSISTANCE: 1
LAUNDRY_REQUIRES_ASSISTANCE: 1
TOILETING_REQUIRES_ASSISTANCE: 1
DRESSING_REQUIRES_ASSISTANCE: 1

## 2024-04-12 ASSESSMENT — ENCOUNTER SYMPTOMS
PERSON REPORTING PAIN: PATIENT
DENIES PAIN: 1

## 2024-04-12 ASSESSMENT — PATIENT HEALTH QUESTIONNAIRE - PHQ9: CLINICAL INTERPRETATION OF PHQ2 SCORE: 0

## 2024-04-13 NOTE — CASE COMMUNICATION
noted  ----- Message -----  From: Rebecca Thompson OT  Sent: 4/12/2024   8:19 AM PDT  To: Mallika Espana R.N.; Navin Samuel, CINTHYA      Patient discharged on 4/11/24 with goals met.

## 2024-04-15 ENCOUNTER — HOME CARE VISIT (OUTPATIENT)
Dept: HOME HEALTH SERVICES | Facility: HOME HEALTHCARE | Age: 79
End: 2024-04-15
Payer: MEDICARE

## 2024-04-15 NOTE — Clinical Note
Patient requested visit next week after she finds out what will happen to her apartment.  PT will request additional HHPT viist 1w1 effective week of 04/21/2024 to reassess.

## 2024-04-16 ENCOUNTER — HOME CARE VISIT (OUTPATIENT)
Dept: HOME HEALTH SERVICES | Facility: HOME HEALTHCARE | Age: 79
End: 2024-04-16
Payer: MEDICARE

## 2024-04-16 NOTE — Clinical Note
WALLACE    MSW spoke with patient's daughter as patient received 5 day notice for eviction. MSW addressed resources for [place], [place]  and placement options. Daughter addressed plan to see about extension and another apartment for patient to get in while looking at higher level of care options. Daughter to reach out to team when more known.

## 2024-04-17 NOTE — CASE COMMUNICATION
noted  ----- Message -----  From: April M YIN Parikh, MSW  Sent: 4/16/2024  12:51 PM PDT  To: Mallika Espana R.N.; Jes Aguiar R.N.; *      W[place]ACE    MSW spoke with patient's daughter as patient received 5 day notice for eviction. MSW addressed resources for [place], [place]  and placement options. Daughter addressed plan to see about extension and another apartment for patient to get in while looking at higher level of  care options. Daughter to reach out to team when more known.

## 2024-04-21 ENCOUNTER — HOME CARE VISIT (OUTPATIENT)
Dept: HOME HEALTH SERVICES | Facility: HOME HEALTHCARE | Age: 79
End: 2024-04-21
Payer: MEDICARE

## 2024-04-21 ENCOUNTER — APPOINTMENT (OUTPATIENT)
Dept: RADIOLOGY | Facility: MEDICAL CENTER | Age: 79
DRG: 948 | End: 2024-04-21
Attending: EMERGENCY MEDICINE
Payer: MEDICARE

## 2024-04-21 ENCOUNTER — HOSPITAL ENCOUNTER (INPATIENT)
Facility: MEDICAL CENTER | Age: 79
LOS: 2 days | DRG: 948 | End: 2024-04-23
Attending: EMERGENCY MEDICINE | Admitting: STUDENT IN AN ORGANIZED HEALTH CARE EDUCATION/TRAINING PROGRAM
Payer: MEDICARE

## 2024-04-21 DIAGNOSIS — R10.9 POSTOPERATIVE ABDOMINAL PAIN: ICD-10-CM

## 2024-04-21 DIAGNOSIS — G63 POLYNEUROPATHY IN OTHER DISEASES CLASSIFIED ELSEWHERE (HCC): ICD-10-CM

## 2024-04-21 DIAGNOSIS — R18.8 ABDOMINAL FLUID COLLECTION: ICD-10-CM

## 2024-04-21 DIAGNOSIS — L02.211 ABDOMINAL WALL ABSCESS: ICD-10-CM

## 2024-04-21 DIAGNOSIS — I89.0 LYMPHEDEMA ASSOCIATED WITH OBESITY: ICD-10-CM

## 2024-04-21 DIAGNOSIS — E66.9 LYMPHEDEMA ASSOCIATED WITH OBESITY: ICD-10-CM

## 2024-04-21 DIAGNOSIS — G89.18 POSTOPERATIVE ABDOMINAL PAIN: ICD-10-CM

## 2024-04-21 PROBLEM — J45.909 ASTHMA: Status: ACTIVE | Noted: 2024-04-21

## 2024-04-21 LAB
ALBUMIN SERPL BCP-MCNC: 4.1 G/DL (ref 3.2–4.9)
ALBUMIN/GLOB SERPL: 1.1 G/DL
ALP SERPL-CCNC: 76 U/L (ref 30–99)
ALT SERPL-CCNC: 15 U/L (ref 2–50)
ANION GAP SERPL CALC-SCNC: 13 MMOL/L (ref 7–16)
APPEARANCE UR: CLEAR
AST SERPL-CCNC: 21 U/L (ref 12–45)
BASOPHILS # BLD AUTO: 0.5 % (ref 0–1.8)
BASOPHILS # BLD: 0.05 K/UL (ref 0–0.12)
BILIRUB SERPL-MCNC: 0.3 MG/DL (ref 0.1–1.5)
BILIRUB UR QL STRIP.AUTO: NEGATIVE
BUN SERPL-MCNC: 20 MG/DL (ref 8–22)
CALCIUM ALBUM COR SERPL-MCNC: 9.4 MG/DL (ref 8.5–10.5)
CALCIUM SERPL-MCNC: 9.5 MG/DL (ref 8.5–10.5)
CHLORIDE SERPL-SCNC: 102 MMOL/L (ref 96–112)
CO2 SERPL-SCNC: 24 MMOL/L (ref 20–33)
COLOR UR: YELLOW
CREAT SERPL-MCNC: 0.86 MG/DL (ref 0.5–1.4)
EOSINOPHIL # BLD AUTO: 0.37 K/UL (ref 0–0.51)
EOSINOPHIL NFR BLD: 4 % (ref 0–6.9)
ERYTHROCYTE [DISTWIDTH] IN BLOOD BY AUTOMATED COUNT: 50.7 FL (ref 35.9–50)
GFR SERPLBLD CREATININE-BSD FMLA CKD-EPI: 69 ML/MIN/1.73 M 2
GLOBULIN SER CALC-MCNC: 3.8 G/DL (ref 1.9–3.5)
GLUCOSE BLD STRIP.AUTO-MCNC: 135 MG/DL (ref 65–99)
GLUCOSE SERPL-MCNC: 114 MG/DL (ref 65–99)
GLUCOSE UR STRIP.AUTO-MCNC: NEGATIVE MG/DL
HCT VFR BLD AUTO: 41.4 % (ref 37–47)
HGB BLD-MCNC: 13.1 G/DL (ref 12–16)
IMM GRANULOCYTES # BLD AUTO: 0.04 K/UL (ref 0–0.11)
IMM GRANULOCYTES NFR BLD AUTO: 0.4 % (ref 0–0.9)
KETONES UR STRIP.AUTO-MCNC: NEGATIVE MG/DL
LEUKOCYTE ESTERASE UR QL STRIP.AUTO: NEGATIVE
LIPASE SERPL-CCNC: 36 U/L (ref 11–82)
LYMPHOCYTES # BLD AUTO: 1.41 K/UL (ref 1–4.8)
LYMPHOCYTES NFR BLD: 15.2 % (ref 22–41)
MCH RBC QN AUTO: 30.6 PG (ref 27–33)
MCHC RBC AUTO-ENTMCNC: 31.6 G/DL (ref 32.2–35.5)
MCV RBC AUTO: 96.7 FL (ref 81.4–97.8)
MICRO URNS: NORMAL
MONOCYTES # BLD AUTO: 0.69 K/UL (ref 0–0.85)
MONOCYTES NFR BLD AUTO: 7.5 % (ref 0–13.4)
NEUTROPHILS # BLD AUTO: 6.7 K/UL (ref 1.82–7.42)
NEUTROPHILS NFR BLD: 72.4 % (ref 44–72)
NITRITE UR QL STRIP.AUTO: NEGATIVE
NRBC # BLD AUTO: 0 K/UL
NRBC BLD-RTO: 0 /100 WBC (ref 0–0.2)
PH UR STRIP.AUTO: 6.5 [PH] (ref 5–8)
PLATELET # BLD AUTO: 389 K/UL (ref 164–446)
PMV BLD AUTO: 9.1 FL (ref 9–12.9)
POTASSIUM SERPL-SCNC: 4 MMOL/L (ref 3.6–5.5)
PROT SERPL-MCNC: 7.9 G/DL (ref 6–8.2)
PROT UR QL STRIP: NEGATIVE MG/DL
RBC # BLD AUTO: 4.28 M/UL (ref 4.2–5.4)
RBC UR QL AUTO: NEGATIVE
SODIUM SERPL-SCNC: 139 MMOL/L (ref 135–145)
SP GR UR STRIP.AUTO: 1.01
UROBILINOGEN UR STRIP.AUTO-MCNC: 0.2 MG/DL
WBC # BLD AUTO: 9.3 K/UL (ref 4.8–10.8)

## 2024-04-21 PROCEDURE — 85025 COMPLETE CBC W/AUTO DIFF WBC: CPT

## 2024-04-21 PROCEDURE — 700105 HCHG RX REV CODE 258: Performed by: EMERGENCY MEDICINE

## 2024-04-21 PROCEDURE — 83690 ASSAY OF LIPASE: CPT

## 2024-04-21 PROCEDURE — 36415 COLL VENOUS BLD VENIPUNCTURE: CPT

## 2024-04-21 PROCEDURE — 96365 THER/PROPH/DIAG IV INF INIT: CPT

## 2024-04-21 PROCEDURE — 99222 1ST HOSP IP/OBS MODERATE 55: CPT | Mod: AI,GC | Performed by: STUDENT IN AN ORGANIZED HEALTH CARE EDUCATION/TRAINING PROGRAM

## 2024-04-21 PROCEDURE — 82962 GLUCOSE BLOOD TEST: CPT

## 2024-04-21 PROCEDURE — 700102 HCHG RX REV CODE 250 W/ 637 OVERRIDE(OP)

## 2024-04-21 PROCEDURE — 700117 HCHG RX CONTRAST REV CODE 255: Performed by: EMERGENCY MEDICINE

## 2024-04-21 PROCEDURE — 99222 1ST HOSP IP/OBS MODERATE 55: CPT | Performed by: SURGERY

## 2024-04-21 PROCEDURE — 81003 URINALYSIS AUTO W/O SCOPE: CPT

## 2024-04-21 PROCEDURE — 770001 HCHG ROOM/CARE - MED/SURG/GYN PRIV*

## 2024-04-21 PROCEDURE — 80053 COMPREHEN METABOLIC PANEL: CPT

## 2024-04-21 PROCEDURE — A9270 NON-COVERED ITEM OR SERVICE: HCPCS

## 2024-04-21 PROCEDURE — 74177 CT ABD & PELVIS W/CONTRAST: CPT

## 2024-04-21 PROCEDURE — 99285 EMERGENCY DEPT VISIT HI MDM: CPT

## 2024-04-21 PROCEDURE — 700111 HCHG RX REV CODE 636 W/ 250 OVERRIDE (IP): Mod: JZ | Performed by: EMERGENCY MEDICINE

## 2024-04-21 RX ORDER — LINEZOLID 600 MG/1
600 TABLET, FILM COATED ORAL 2 TIMES DAILY
Qty: 20 TABLET | Refills: 0 | Status: ACTIVE | OUTPATIENT
Start: 2024-04-21 | End: 2024-04-21

## 2024-04-21 RX ORDER — LOSARTAN POTASSIUM 25 MG/1
25 TABLET ORAL DAILY
COMMUNITY
End: 2024-05-15

## 2024-04-21 RX ORDER — POLYETHYLENE GLYCOL 3350 17 G/17G
1 POWDER, FOR SOLUTION ORAL
Status: DISCONTINUED | OUTPATIENT
Start: 2024-04-21 | End: 2024-04-23 | Stop reason: HOSPADM

## 2024-04-21 RX ORDER — LOSARTAN POTASSIUM 50 MG/1
25 TABLET ORAL DAILY
Status: DISCONTINUED | OUTPATIENT
Start: 2024-04-22 | End: 2024-04-22

## 2024-04-21 RX ORDER — METOPROLOL SUCCINATE 25 MG/1
25 TABLET, EXTENDED RELEASE ORAL DAILY
Status: DISCONTINUED | OUTPATIENT
Start: 2024-04-22 | End: 2024-04-23 | Stop reason: HOSPADM

## 2024-04-21 RX ORDER — ACETAMINOPHEN 325 MG/1
650 TABLET ORAL EVERY 6 HOURS PRN
Status: DISCONTINUED | OUTPATIENT
Start: 2024-04-21 | End: 2024-04-23 | Stop reason: HOSPADM

## 2024-04-21 RX ORDER — AMOXICILLIN 250 MG
2 CAPSULE ORAL EVERY EVENING
Status: DISCONTINUED | OUTPATIENT
Start: 2024-04-21 | End: 2024-04-23 | Stop reason: HOSPADM

## 2024-04-21 RX ORDER — LEVOTHYROXINE SODIUM 0.05 MG/1
50 TABLET ORAL
Status: DISCONTINUED | OUTPATIENT
Start: 2024-04-22 | End: 2024-04-23 | Stop reason: HOSPADM

## 2024-04-21 RX ORDER — FLUTICASONE PROPIONATE 110 UG/1
2 AEROSOL, METERED RESPIRATORY (INHALATION) 2 TIMES DAILY
Status: DISCONTINUED | OUTPATIENT
Start: 2024-04-21 | End: 2024-04-23 | Stop reason: HOSPADM

## 2024-04-21 RX ORDER — AMOXICILLIN AND CLAVULANATE POTASSIUM 875; 125 MG/1; MG/1
1 TABLET, FILM COATED ORAL 2 TIMES DAILY
Qty: 20 TABLET | Refills: 0 | Status: ACTIVE | OUTPATIENT
Start: 2024-04-21 | End: 2024-04-21

## 2024-04-21 RX ORDER — HEPARIN SODIUM 5000 [USP'U]/ML
5000 INJECTION, SOLUTION INTRAVENOUS; SUBCUTANEOUS EVERY 8 HOURS
Status: DISCONTINUED | OUTPATIENT
Start: 2024-04-22 | End: 2024-04-23 | Stop reason: HOSPADM

## 2024-04-21 RX ORDER — CLINDAMYCIN HYDROCHLORIDE 300 MG/1
300 CAPSULE ORAL 3 TIMES DAILY
Qty: 21 CAPSULE | Refills: 0 | Status: SHIPPED | OUTPATIENT
Start: 2024-04-21 | End: 2024-04-21

## 2024-04-21 RX ORDER — LABETALOL HYDROCHLORIDE 5 MG/ML
10 INJECTION, SOLUTION INTRAVENOUS EVERY 4 HOURS PRN
Status: DISCONTINUED | OUTPATIENT
Start: 2024-04-21 | End: 2024-04-23 | Stop reason: HOSPADM

## 2024-04-21 RX ORDER — ALBUTEROL SULFATE 90 UG/1
2 AEROSOL, METERED RESPIRATORY (INHALATION) EVERY 6 HOURS PRN
Status: DISCONTINUED | OUTPATIENT
Start: 2024-04-21 | End: 2024-04-23 | Stop reason: HOSPADM

## 2024-04-21 RX ADMIN — VANCOMYCIN HYDROCHLORIDE 3 G: 5 INJECTION, POWDER, LYOPHILIZED, FOR SOLUTION INTRAVENOUS at 21:32

## 2024-04-21 RX ADMIN — IOHEXOL 100 ML: 350 INJECTION, SOLUTION INTRAVENOUS at 17:55

## 2024-04-21 RX ADMIN — FLUTICASONE PROPIONATE 220 MCG: 110 AEROSOL, METERED RESPIRATORY (INHALATION) at 23:36

## 2024-04-21 RX ADMIN — PIPERACILLIN SODIUM AND TAZOBACTAM SODIUM 3.38 G: 3; .375 INJECTION, POWDER, LYOPHILIZED, FOR SOLUTION INTRAVENOUS at 20:24

## 2024-04-21 RX ADMIN — ACETAMINOPHEN 650 MG: 325 TABLET, FILM COATED ORAL at 21:36

## 2024-04-21 ASSESSMENT — ENCOUNTER SYMPTOMS
SINUS PAIN: 0
ABDOMINAL PAIN: 0
FEVER: 0
VOMITING: 0
HEADACHES: 0
PALPITATIONS: 0
CONSTIPATION: 0
MYALGIAS: 0
CHILLS: 0
HALLUCINATIONS: 0
DOUBLE VISION: 0
WHEEZING: 0
FALLS: 0
BLURRED VISION: 0
COUGH: 0
MEMORY LOSS: 0
DIARRHEA: 0
LOSS OF CONSCIOUSNESS: 0
NAUSEA: 1

## 2024-04-21 ASSESSMENT — LIFESTYLE VARIABLES
TOTAL SCORE: 0
EVER FELT BAD OR GUILTY ABOUT YOUR DRINKING: NO
HAVE YOU EVER FELT YOU SHOULD CUT DOWN ON YOUR DRINKING: NO
HAVE PEOPLE ANNOYED YOU BY CRITICIZING YOUR DRINKING: NO
TOTAL SCORE: 0
CONSUMPTION TOTAL: INCOMPLETE
EVER HAD A DRINK FIRST THING IN THE MORNING TO STEADY YOUR NERVES TO GET RID OF A HANGOVER: NO
DO YOU DRINK ALCOHOL: NO
TOTAL SCORE: 0

## 2024-04-21 ASSESSMENT — FIBROSIS 4 INDEX
FIB4 SCORE: 1.09
FIB4 SCORE: 1.13

## 2024-04-21 ASSESSMENT — PAIN DESCRIPTION - PAIN TYPE: TYPE: ACUTE PAIN

## 2024-04-21 ASSESSMENT — PATIENT HEALTH QUESTIONNAIRE - PHQ9
SUM OF ALL RESPONSES TO PHQ9 QUESTIONS 1 AND 2: 0
1. LITTLE INTEREST OR PLEASURE IN DOING THINGS: NOT AT ALL
2. FEELING DOWN, DEPRESSED, IRRITABLE, OR HOPELESS: NOT AT ALL

## 2024-04-21 NOTE — ED PROVIDER NOTES
ED Provider Note    CHIEF COMPLAINT  Chief Complaint   Patient presents with    Abdominal Pain     Right lower abdomen    Urinary Frequency     Started last night     HPI/ROS    Cecile Teixeira is a 78 y.o. female who presents for evaluation of urinary frequency and right lower quadrant abdominal pain.  This all began within the past 2 days, she is 3 weeks status post laparotomy and hernia repair, this was done at [facility].  The patient reports that she was very unhappy during the hospitalization after the surgery, she reports that she had to call the police because the nursing staff did not change her soiled undergarments quick enough.  She reports after her surgery she then went to an acute skilled nursing rehab facility where she left AGAINST MEDICAL ADVICE that she was unhappy with the care she was receiving there as well.  She developed increased urinary frequency over the past 2 days, denies burning or blood with urination, no fever but has had an increase in right lower quadrant pain over this timeframe.  She states that her urine appears darker than usual.    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    SURGICAL HISTORY   has a past surgical history that includes other (Left, [id number]).    FAMILY HISTORY  No family history on file.    SOCIAL HISTORY  Social History     Tobacco Use    Smoking status: Former     Types: Cigarettes    Smokeless tobacco: Never    Tobacco comments:     only 1.5 yr. only social   Vaping Use    Vaping Use: Never used   Substance and Sexual Activity    Alcohol use: Not Currently     Comment: rare     Drug use: Never    Sexual activity: Not on file       CURRENT MEDICATIONS  Home Medications       Reviewed by Sugar Mullins R.N. (Registered  Nurse) on 04/21/24 at 1512  Med List Status: Not Addressed     Medication Last Dose Status   acetaminophen (TYLENOL) 500 MG Tab  Active   albuterol 108 (90 Base) MCG/ACT Aero Soln inhalation aerosol  Active   amLODIPine (NORVASC) 5 MG Tab  Active   cloNIDine (CATAPRES) 0.1 MG Tab  Active   Dextromethorphan-guaiFENesin (TUSSIN DM)  MG/5ML Syrup  Active   FLOVENT  MCG/ACT Aerosol  Active   gabapentin (NEURONTIN) 100 MG Cap  Active   levothyroxine (SYNTHROID) 50 MCG Tab  Active   losartan (COZAAR) 50 MG Tab  Active   methocarbamol (ROBAXIN) 750 MG Tab  Active   metoprolol SR (TOPROL XL) 25 MG TABLET SR 24 HR  Active   naproxen (ALEVE) 220 MG tablet  Active   nystatin (MYCOSTATIN) powder  Active   Spacer/Aero-Holding Chambers Device  Active                    ALLERGIES  Allergies   Allergen Reactions    Doxycycline Hives    Lactose Unspecified     Lactose intolerance since a child     Levothyroxine Anxiety     Panic attack     Prednisone Unspecified     Diarrhea and very irritable     Sulfa Drugs Unspecified     Mental status change     Cephalexin Diarrhea     Diarrhea and mild rash     Montelukast Rash     Leg pain, back pain and rash     Spinach Unspecified     Skin allergy test    Cow's Milk [Lac Bovis]     Other Environmental      Trees except oak    Codeine Unspecified     Headaches and confusion        PHYSICAL EXAM  VITAL SIGNS: BP (!) 195/91   Pulse 79   Temp 37 °C (98.6 °F) (Temporal)   Resp 20   Ht 1.524 m (5')   Wt (!) 150 kg (330 lb)   SpO2 94%   BMI 64.45 kg/m²    Constitutional: Well appearing patient in no acute distress.  Awake and alert, not toxic nor ill in appearance.  HENT: Normocephalic, no obvious evidence of acute trauma.  Eyes: No scleral icterus. Normal conjunctiva   Thorax & Lungs: Normal nonlabored respirations. I appreciate no wheezing, rhonchi or rales. There is normal air movement.  Upon cardiac ascultation I appreciate a regular heart rhythm and a normal rate.    Abdomen: The abdomen is not visibly distended.  Laparotomy incision is intact, no surrounding erythema.  Focal right lower quadrant tenderness with no rebound or guarding.  Skin: The exposed portions of skin reveal no obvious rash or other abnormalities.  Extremities/Musculoskeletal: No obvious sign of acute trauma. No asymmetric calf tenderness or edema.   Neurologic: Alert & oriented. No focal deficits observed.      EKG/LABS  Results for orders placed or performed during the hospital encounter of 04/21/24   CBC with Differential   Result Value Ref Range    WBC 9.3 4.8 - 10.8 K/uL    RBC 4.28 4.20 - 5.40 M/uL    Hemoglobin 13.1 12.0 - 16.0 g/dL    Hematocrit 41.4 37.0 - 47.0 %    MCV 96.7 81.4 - 97.8 fL    MCH 30.6 27.0 - 33.0 pg    MCHC 31.6 (L) 32.2 - 35.5 g/dL    RDW 50.7 (H) 35.9 - 50.0 fL    Platelet Count 389 164 - 446 K/uL    MPV 9.1 9.0 - 12.9 fL    Neutrophils-Polys 72.40 (H) 44.00 - 72.00 %    Lymphocytes 15.20 (L) 22.00 - 41.00 %    Monocytes 7.50 0.00 - 13.40 %    Eosinophils 4.00 0.00 - 6.90 %    Basophils 0.50 0.00 - 1.80 %    Immature Granulocytes 0.40 0.00 - 0.90 %    Nucleated RBC 0.00 0.00 - 0.20 /100 WBC    Neutrophils (Absolute) 6.70 1.82 - 7.42 K/uL    Lymphs (Absolute) 1.41 1.00 - 4.80 K/uL    Monos (Absolute) 0.69 0.00 - 0.85 K/uL    Eos (Absolute) 0.37 0.00 - 0.51 K/uL    Baso (Absolute) 0.05 0.00 - 0.12 K/uL    Immature Granulocytes (abs) 0.04 0.00 - 0.11 K/uL    NRBC (Absolute) 0.00 K/uL   Complete Metabolic Panel   Result Value Ref Range    Sodium 139 135 - 145 mmol/L    Potassium 4.0 3.6 - 5.5 mmol/L    Chloride 102 96 - 112 mmol/L    Co2 24 20 - 33 mmol/L    Anion Gap 13.0 7.0 - 16.0    Glucose 114 (H) 65 - 99 mg/dL    Bun 20 8 - 22 mg/dL    Creatinine 0.86 0.50 - 1.40 mg/dL    Calcium 9.5 8.5 - 10.5 mg/dL    Correct Calcium 9.4 8.5 - 10.5 mg/dL    AST(SGOT) 21 12 - 45 U/L    ALT(SGPT) 15 2 - 50 U/L    Alkaline Phosphatase 76 30 - 99 U/L    Total Bilirubin 0.3 0.1 - 1.5 mg/dL     Albumin 4.1 3.2 - 4.9 g/dL    Total Protein 7.9 6.0 - 8.2 g/dL    Globulin 3.8 (H) 1.9 - 3.5 g/dL    A-G Ratio 1.1 g/dL   Lipase   Result Value Ref Range    Lipase 36 11 - 82 U/L   Urinalysis    Specimen: Urine   Result Value Ref Range    Color Yellow     Character Clear     Specific Gravity 1.010 <1.035    Ph 6.5 5.0 - 8.0    Glucose Negative Negative mg/dL    Ketones Negative Negative mg/dL    Protein Negative Negative mg/dL    Bilirubin Negative Negative    Urobilinogen, Urine 0.2 Negative    Nitrite Negative Negative    Leukocyte Esterase Negative Negative    Occult Blood Negative Negative    Micro Urine Req see below    ESTIMATED GFR   Result Value Ref Range    GFR (CKD-EPI) 69 >60 mL/min/1.73 m 2   POCT glucose device results   Result Value Ref Range    POC Glucose, Blood 135 (H) 65 - 99 mg/dL      RADIOLOGY/PROCEDURES   I have independently interpreted the diagnostic imaging associated with this visit and am waiting the final reading from the radiologist.   My preliminary interpretation is as follows: Abdominal wall abscess    Radiologist interpretation:  CT-ABDOMEN-PELVIS WITH   Final Result      1.  There is been repair of previously seen ventral hernia. There is skin thickening, subcutaneous fat stranding and 6.3 x 5.7 x 6.4 cm rim-enhancing fluid collection in the periumbilical region which is suspicious for infection.   2.  Nonobstructing renal stones.   3.  Colonic diverticulosis. Questionable minor stranding in the sigmoid mesocolon is age-indeterminate but cannot exclude early/mild acute diverticulitis.          COURSE & MEDICAL DECISION MAKING    ASSESSMENT, COURSE AND PLAN  Care Narrative: This is a 78-year-old female coming in with right lower quadrant abdominal pain and increased urinary frequency for the past couple days, 3 weeks status post open hernia repair at [facility], I have no access to these records at this time.  Here in the emergency department her vital signs reveal  hypertension which she has a history of, no tachycardia or fever.  Her blood work is reassuring, no leukocytosis or anemia, normal electrolytes, renal and hepatic function.  Normal lipase.  Her urinalysis is stone cold normal.  She does have significant focal tenderness in the right lower quadrant, this in the postoperative setting is concerning and she will undergo CT scan of the abdomen and pelvis    CT scan reveals abdominal wall abscess 6 x 6 cm.  I did page the on-call general surgeon Dr. Fraire and spoke to the OR nurse who relayed the message to her.  Unfortunately, the patient is unwilling to stay here in the emergency department any longer for evaluation.  I have encouraged her to stay multiple times, she could not be convinced.  I informed her that this certainly would not improve without drainage and from here she will likely only worsen and she expresses understanding.  At this point, I would like to treat her with antibiotics although I did make it clear to her that treatment is not antibiotics rather surgical drainage.  She has an allergy to doxycycline as well as sulfa-based medications and cephalosporins.  I long discussion with our clinical pharmacist, will treat her with linezolid and Augmentin.    Prescription for linezolid, Augmentin    The patient is leaving against medical advice.  I discussed with the patient the risks of leaving without receiving appropriate care to include permanent disability or death.  I have discussed possible alternatives.  The patient is not intoxicated.  The patient is a capable decision maker and understands the risks and benefits of their decision.  While I certainly disagree with the patient's decision, I respect the patient's autonomy and will not keep them here against their will.  They understand that their decision to leave can be reversed at any time and they can return to us at any time for any reason at all.            Patient now changed her mind and is  willing to stay.  She was evaluated by Dr. Fraire.  She will be admitted to the hospital.  The prescriptions have been discontinued, she will be treated with Zosyn and vancomycin        DISPOSITION AND DISCUSSIONS      Case discussed with Dr. Fraire, general surgery.  [facility] resident, admitting team    Discussion of management with other QHP or appropriate source(s): Pharmacy          FINAL DIAGNOSIS  1. Abdominal wall abscess    2. Postoperative abdominal pain           Electronically signed by: Quinton Velasquez M.D., 4/21/2024 4:30 PM

## 2024-04-21 NOTE — ED NOTES
Chief Complaint   Patient presents with    Abdominal Pain     Right lower abdomen    Urinary Frequency     Started last night     Pt bib ems from home with above complaints since last night. Denies dysuria , slight nausea but no vomiting.   Noted surgical site mid to lower abdomen, pt said that had hernia repair >3wks ago.   Fsbs 135  Protocol initiated.

## 2024-04-22 ENCOUNTER — HOME CARE VISIT (OUTPATIENT)
Dept: HOME HEALTH SERVICES | Facility: HOME HEALTHCARE | Age: 79
End: 2024-04-22
Payer: MEDICARE

## 2024-04-22 LAB
ALBUMIN SERPL BCP-MCNC: 3.7 G/DL (ref 3.2–4.9)
ALBUMIN/GLOB SERPL: 1.1 G/DL
ALP SERPL-CCNC: 69 U/L (ref 30–99)
ALT SERPL-CCNC: 12 U/L (ref 2–50)
ANION GAP SERPL CALC-SCNC: 13 MMOL/L (ref 7–16)
AST SERPL-CCNC: 21 U/L (ref 12–45)
BASOPHILS # BLD AUTO: 0.7 % (ref 0–1.8)
BASOPHILS # BLD: 0.05 K/UL (ref 0–0.12)
BILIRUB SERPL-MCNC: 0.5 MG/DL (ref 0.1–1.5)
BUN SERPL-MCNC: 16 MG/DL (ref 8–22)
CALCIUM ALBUM COR SERPL-MCNC: 9.2 MG/DL (ref 8.5–10.5)
CALCIUM SERPL-MCNC: 9 MG/DL (ref 8.5–10.5)
CHLORIDE SERPL-SCNC: 105 MMOL/L (ref 96–112)
CO2 SERPL-SCNC: 23 MMOL/L (ref 20–33)
CREAT SERPL-MCNC: 0.79 MG/DL (ref 0.5–1.4)
EOSINOPHIL # BLD AUTO: 0.43 K/UL (ref 0–0.51)
EOSINOPHIL NFR BLD: 6.1 % (ref 0–6.9)
ERYTHROCYTE [DISTWIDTH] IN BLOOD BY AUTOMATED COUNT: 49.8 FL (ref 35.9–50)
GFR SERPLBLD CREATININE-BSD FMLA CKD-EPI: 76 ML/MIN/1.73 M 2
GLOBULIN SER CALC-MCNC: 3.5 G/DL (ref 1.9–3.5)
GLUCOSE SERPL-MCNC: 98 MG/DL (ref 65–99)
HCT VFR BLD AUTO: 38.7 % (ref 37–47)
HGB BLD-MCNC: 12.6 G/DL (ref 12–16)
IMM GRANULOCYTES # BLD AUTO: 0.02 K/UL (ref 0–0.11)
IMM GRANULOCYTES NFR BLD AUTO: 0.3 % (ref 0–0.9)
LYMPHOCYTES # BLD AUTO: 1.52 K/UL (ref 1–4.8)
LYMPHOCYTES NFR BLD: 21.4 % (ref 22–41)
MCH RBC QN AUTO: 30.7 PG (ref 27–33)
MCHC RBC AUTO-ENTMCNC: 32.6 G/DL (ref 32.2–35.5)
MCV RBC AUTO: 94.4 FL (ref 81.4–97.8)
MONOCYTES # BLD AUTO: 0.66 K/UL (ref 0–0.85)
MONOCYTES NFR BLD AUTO: 9.3 % (ref 0–13.4)
NEUTROPHILS # BLD AUTO: 4.42 K/UL (ref 1.82–7.42)
NEUTROPHILS NFR BLD: 62.2 % (ref 44–72)
NRBC # BLD AUTO: 0 K/UL
NRBC BLD-RTO: 0 /100 WBC (ref 0–0.2)
PLATELET # BLD AUTO: 362 K/UL (ref 164–446)
PMV BLD AUTO: 9.2 FL (ref 9–12.9)
POTASSIUM SERPL-SCNC: 4 MMOL/L (ref 3.6–5.5)
PROT SERPL-MCNC: 7.2 G/DL (ref 6–8.2)
RBC # BLD AUTO: 4.1 M/UL (ref 4.2–5.4)
SODIUM SERPL-SCNC: 141 MMOL/L (ref 135–145)
TSH SERPL DL<=0.005 MIU/L-ACNC: 8.88 UIU/ML (ref 0.38–5.33)
WBC # BLD AUTO: 7.1 K/UL (ref 4.8–10.8)

## 2024-04-22 PROCEDURE — 700102 HCHG RX REV CODE 250 W/ 637 OVERRIDE(OP)

## 2024-04-22 PROCEDURE — 99232 SBSQ HOSP IP/OBS MODERATE 35: CPT | Performed by: PHYSICIAN ASSISTANT

## 2024-04-22 PROCEDURE — 700105 HCHG RX REV CODE 258

## 2024-04-22 PROCEDURE — 700111 HCHG RX REV CODE 636 W/ 250 OVERRIDE (IP)

## 2024-04-22 PROCEDURE — 99233 SBSQ HOSP IP/OBS HIGH 50: CPT | Performed by: INTERNAL MEDICINE

## 2024-04-22 PROCEDURE — 94660 CPAP INITIATION&MGMT: CPT

## 2024-04-22 PROCEDURE — 80053 COMPREHEN METABOLIC PANEL: CPT

## 2024-04-22 PROCEDURE — 85025 COMPLETE CBC W/AUTO DIFF WBC: CPT

## 2024-04-22 PROCEDURE — A9270 NON-COVERED ITEM OR SERVICE: HCPCS

## 2024-04-22 PROCEDURE — 84443 ASSAY THYROID STIM HORMONE: CPT

## 2024-04-22 PROCEDURE — 770001 HCHG ROOM/CARE - MED/SURG/GYN PRIV*

## 2024-04-22 PROCEDURE — 36415 COLL VENOUS BLD VENIPUNCTURE: CPT

## 2024-04-22 PROCEDURE — 700111 HCHG RX REV CODE 636 W/ 250 OVERRIDE (IP): Performed by: INTERNAL MEDICINE

## 2024-04-22 RX ORDER — LOSARTAN POTASSIUM 50 MG/1
50 TABLET ORAL
Status: DISCONTINUED | OUTPATIENT
Start: 2024-04-23 | End: 2024-04-23 | Stop reason: HOSPADM

## 2024-04-22 RX ADMIN — PIPERACILLIN AND TAZOBACTAM 3.38 G: 3; .375 INJECTION, POWDER, FOR SOLUTION INTRAVENOUS at 05:06

## 2024-04-22 RX ADMIN — FLUTICASONE PROPIONATE 220 MCG: 110 AEROSOL, METERED RESPIRATORY (INHALATION) at 05:09

## 2024-04-22 RX ADMIN — LOSARTAN POTASSIUM 25 MG: 50 TABLET, FILM COATED ORAL at 05:02

## 2024-04-22 RX ADMIN — LABETALOL HYDROCHLORIDE 10 MG: 5 INJECTION INTRAVENOUS at 20:29

## 2024-04-22 RX ADMIN — PIPERACILLIN AND TAZOBACTAM 3.38 G: 3; .375 INJECTION, POWDER, FOR SOLUTION INTRAVENOUS at 15:33

## 2024-04-22 RX ADMIN — FLUTICASONE PROPIONATE 220 MCG: 110 AEROSOL, METERED RESPIRATORY (INHALATION) at 17:14

## 2024-04-22 RX ADMIN — LEVOTHYROXINE SODIUM 50 MCG: 0.05 TABLET ORAL at 05:03

## 2024-04-22 RX ADMIN — METOPROLOL SUCCINATE 25 MG: 25 TABLET, EXTENDED RELEASE ORAL at 05:03

## 2024-04-22 RX ADMIN — VANCOMYCIN HYDROCHLORIDE 750 MG: 5 INJECTION, POWDER, LYOPHILIZED, FOR SOLUTION INTRAVENOUS at 09:03

## 2024-04-22 RX ADMIN — PIPERACILLIN AND TAZOBACTAM 3.38 G: 3; .375 INJECTION, POWDER, FOR SOLUTION INTRAVENOUS at 00:42

## 2024-04-22 RX ADMIN — VANCOMYCIN HYDROCHLORIDE 750 MG: 5 INJECTION, POWDER, LYOPHILIZED, FOR SOLUTION INTRAVENOUS at 20:25

## 2024-04-22 RX ADMIN — HEPARIN SODIUM 5000 UNITS: 5000 INJECTION, SOLUTION INTRAVENOUS; SUBCUTANEOUS at 22:49

## 2024-04-22 RX ADMIN — PIPERACILLIN AND TAZOBACTAM 3.38 G: 3; .375 INJECTION, POWDER, FOR SOLUTION INTRAVENOUS at 20:17

## 2024-04-22 RX ADMIN — ACETAMINOPHEN 650 MG: 325 TABLET, FILM COATED ORAL at 20:14

## 2024-04-22 RX ADMIN — LABETALOL HYDROCHLORIDE 10 MG: 5 INJECTION INTRAVENOUS at 15:35

## 2024-04-22 ASSESSMENT — ENCOUNTER SYMPTOMS
BLOOD IN STOOL: 0
DIARRHEA: 0
MYALGIAS: 0
VOMITING: 0
ABDOMINAL PAIN: 1
HEMOPTYSIS: 0
SEIZURES: 0
INSOMNIA: 0
SHORTNESS OF BREATH: 0
NERVOUS/ANXIOUS: 0
HEADACHES: 0
DIZZINESS: 0
CONSTIPATION: 0
FALLS: 0
PALPITATIONS: 0
EYE PAIN: 0
NAUSEA: 0
WHEEZING: 0
LOSS OF CONSCIOUSNESS: 0
FOCAL WEAKNESS: 0
FEVER: 0
WEAKNESS: 0
CHILLS: 0
EYE REDNESS: 0
COUGH: 0
TREMORS: 0

## 2024-04-22 ASSESSMENT — PAIN DESCRIPTION - PAIN TYPE
TYPE: ACUTE PAIN

## 2024-04-22 ASSESSMENT — COPD QUESTIONNAIRES
DO YOU EVER COUGH UP ANY MUCUS OR PHLEGM?: NO/ONLY WITH OCCASIONAL COLDS OR INFECTIONS
DURING THE PAST 4 WEEKS HOW MUCH DID YOU FEEL SHORT OF BREATH: SOME OF THE TIME
COPD SCREENING SCORE: 4
HAVE YOU SMOKED AT LEAST 100 CIGARETTES IN YOUR ENTIRE LIFE: NO/DON'T KNOW

## 2024-04-22 NOTE — Clinical Note
Hold home health services effective 4/22/24, pt admitted at [facility] on 4/21/24 for abdominal pain, fluid collection on CT, possible abscess.

## 2024-04-22 NOTE — PROGRESS NOTES
Bedside report received.  Assessment complete.  A&O x 4. Patient calls appropriately.  Per the patient she is scooter bound.    Patient has 0/10 pain. Pain managed with prescribed medications.  Denies N&V. Patient is NPO.  + void, purewick in place  Patient denies SOB.  Patient pleasant with staff and resting in bed.  Review plan with of care with patient. Call light and personal belongings within reach. Hourly rounding in place. All needs met at this time.

## 2024-04-22 NOTE — H&P
[facility] Internal Medicine History & Physical Note    Date of Service  4/21/2024    [facility] Team: [facility]   Attending: Charles Rodriguez M.d.  Senior Resident: Dr. Palacios    Contact Number: [phone]    Primary Care Physician  GERBER Lozano    Consultants  Surgery    Specialist Names: Dr. Fraire    Code Status  Full Code    Chief Complaint  Chief Complaint   Patient presents with    Abdominal Pain     Right lower abdomen    Urinary Frequency     Started last night       History of Presenting Illness (HPI):        Cecile Teixeira is a 78 y.o. female with a past medical history of hypothyroidism, morbid obesity, hypertension, obstructive sleep apnea on CPAP and asthma who presented 4/21/2024 with abdominal pain.       Patient underwent ex-lap with repair of incarcerated umbilical hernia causing obstruction and lysis of adhesion on 03/08/24 at [facility]. She notes going to a skilled nursing facility afterwards. She reports recovering well until about 2 days ago when she started to notice difficulty sleeping and increased urinary urgency. She notes chronic 2/10 dull right lower quadrant abdominal pain since her surgery with new left lower quadrant abdominal pain, 5/10 in intensity that is sharp in nature. No associate fevers, chills, diarrhea or constipation. She was brought in by EMS.        In the ED, she was found to be afebrile at 98.6F, heart rate of 81, respiratory rate of 20, blood pressure of 147/86 mmHg, satting 95% on room air. Labs showed a wbc of 9.3, hemoglobin of 13.1, platelets of 389. Sodium 139, potassium 4.0, bicarb 24, Bun 20, creatinine 0.86, lipase 36. Urinalysis unremarkable. CT abdomen/pelvis with IV contrast showed 6 x 6 cm rim enhancing fluid collection in the zina-umbilical region, suspicious for infection with questionable stranding in the sigmoid mesocolon (age indeterminate). Patient given vancomycin and zosyn. General surgery, Dr. Fraire, was consulted who recommended possible  drainage of abdominal fluid collection. Patient admitted to hospital for possible drainage of abdominal fluid collection. I discussed the plan of care with patient and nocturnist, Dr. Rodriguez .    Review of Systems  Review of Systems   Constitutional:  Negative for chills and fever.   HENT:  Negative for congestion and sinus pain.    Eyes:  Negative for blurred vision and double vision.   Respiratory:  Negative for cough and wheezing.    Cardiovascular:  Negative for chest pain and palpitations.   Gastrointestinal:  Positive for melena and nausea. Negative for abdominal pain, constipation, diarrhea and vomiting.   Genitourinary:  Positive for urgency. Negative for dysuria, frequency and hematuria.   Musculoskeletal:  Negative for falls and myalgias.   Skin:  Negative for itching and rash.   Neurological:  Negative for loss of consciousness and headaches.   Psychiatric/Behavioral:  Negative for hallucinations and memory loss.        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    Surgical History   has a past surgical history that includes other (Left, [id number]).     Family History  family history is not on file.   MI in mother in her 80s.     Social History  Tobacco: None  Alcohol: None  Recreational drugs (illegal or prescription): None  Primary Care Provider: Not Reviewed    Allergies  Allergies   Allergen Reactions    Doxycycline Hives    Lactose Unspecified     Lactose intolerance since a child     Levothyroxine Anxiety     Panic attack     Prednisone Unspecified     Diarrhea and very irritable     Sulfa Drugs Unspecified     Mental status change     Cephalexin Diarrhea     Diarrhea and mild rash     Montelukast Rash     Leg pain, back pain and rash     Spinach Unspecified     Skin allergy  test    Cow's Milk [Lac Bovis]     Other Environmental      Trees except oak    Codeine Unspecified     Headaches and confusion        Medications  Prior to Admission Medications   Prescriptions Last Dose Informant Patient Reported? Taking?   FLOVENT  MCG/ACT Aerosol 4/21/2024 at 0900 Patient No Yes   Sig: Inhale 2 Puffs 2 times a day.   albuterol 108 (90 Base) MCG/ACT Aero Soln inhalation aerosol 4/20/2024 at 1800 Patient No Yes   Sig: Inhale 2 Puffs every 6 hours as needed for Shortness of Breath. Indications: Asthma   levothyroxine (SYNTHROID) 50 MCG Tab 4/21/2024 at 0700 Patient No Yes   Sig: Take 1 Tablet by mouth every morning on an empty stomach.   Patient taking differently: Take 50 mcg by mouth every morning on an empty stomach. * takes brand name only*   losartan (COZAAR) 25 MG Tab 4/21/2024 at 0800 Patient Yes Yes   Sig: Take 25 mg by mouth every day.   metoprolol SR (TOPROL XL) 25 MG TABLET SR 24 HR 4/21/2024 at 0800 Patient Yes Yes   Sig: Take 25 mg by mouth every day. Hold for SBP under 100 or HR under 60  Indications: High Blood Pressure Disorder      Facility-Administered Medications: None       Physical Exam  Temp:  [37 °C (98.6 °F)] 37 °C (98.6 °F)  Pulse:  [79-84] 84  Resp:  [20] 20  BP: (147-211)/(78-91) 190/88  SpO2:  [92 %-95 %] 94 %  Blood Pressure : (!) 190/88   Temperature: 37 °C (98.6 °F)   Pulse: 84   Respiration: 20   Pulse Oximetry: 94 %       Physical Exam  Constitutional:       Appearance: Normal appearance.   HENT:      Head: Normocephalic and atraumatic.   Cardiovascular:      Rate and Rhythm: Normal rate and regular rhythm.      Heart sounds: No murmur heard.  Pulmonary:      Effort: Pulmonary effort is normal.      Breath sounds: Normal breath sounds. No wheezing.   Abdominal:      General: Abdomen is flat. Bowel sounds are normal.      Palpations: Abdomen is soft.      Tenderness: There is abdominal tenderness (left lower quadrant). There is no guarding or rebound.  "  Genitourinary:     Rectum: Normal. Guaiac result negative.   Musculoskeletal:      Right lower leg: Edema present.      Left lower leg: Edema present.   Skin:     General: Skin is warm and dry.   Neurological:      Mental Status: She is alert and oriented to person, place, and time. Mental status is at baseline.   Psychiatric:         Mood and Affect: Mood normal.         Behavior: Behavior normal.         Laboratory:  Recent Labs     04/21/24  1526   WBC 9.3   RBC 4.28   HEMOGLOBIN 13.1   HEMATOCRIT 41.4   MCV 96.7   MCH 30.6   MCHC 31.6*   RDW 50.7*   PLATELETCT 389   MPV 9.1     Recent Labs     04/21/24  1526   SODIUM 139   POTASSIUM 4.0   CHLORIDE 102   CO2 24   GLUCOSE 114*   BUN 20   CREATININE 0.86   CALCIUM 9.5     Recent Labs     04/21/24  1526   ALTSGPT 15   ASTSGOT 21   ALKPHOSPHAT 76   TBILIRUBIN 0.3   LIPASE 36   GLUCOSE 114*         No results for input(s): \"NTPROBNP\" in the last 72 hours.      No results for input(s): \"TROPONINT\" in the last 72 hours.    Imaging:  CT-ABDOMEN-PELVIS WITH   Final Result      1.  There is been repair of previously seen ventral hernia. There is skin thickening, subcutaneous fat stranding and 6.3 x 5.7 x 6.4 cm rim-enhancing fluid collection in the periumbilical region which is suspicious for infection.   2.  Nonobstructing renal stones.   3.  Colonic diverticulosis. Questionable minor stranding in the sigmoid mesocolon is age-indeterminate but cannot exclude early/mild acute diverticulitis.        Assessment/Plan:  Problem Representation:   I anticipate this patient will require at least two midnights for appropriate medical management, necessitating inpatient admission because of drainage of intra-abdominal fluid collection    Patient will need a Med/Surg bed on MEDICAL service .  The need is secondary to drainage of intra-abdominal fluid collection.    * Abdominal fluid collection- (present on admission)  Assessment & Plan  Umbilical hernia repair at [facility] " on 03/08/24.   New right lower quadrant abdominal pain. CT abdomen/pelvis with IV contrast showing 6 x 6 cm fluid collection in the zina-umbilical region. No fevers, chills or leukocytosis. Started on antibiotics for possible intra-abdominal abscess in the ER. Surgery consulted, noting possible surgical drainage in the morning.    Plan:  -NPO at midnight for potential surgical drainage in the morning.   -Continue IV antibiotics (vancomycin and zosyn)  -Abdominal fluid cultures if undergoing drainage  -Repeat CBC in the morning    Asthma  Assessment & Plan  Not in exacerbation.   Plan:  -Continue flovent    Hypertension- (present on admission)  Assessment & Plan  Not septic on admission.  Plan:  -Continue losartan 25 mg once daily and metoprolol 25 mg once daily (hold for SBP <100 mmHg)    YOLA (obstructive sleep apnea)- (present on admission)  Assessment & Plan  Reports CPAP adherence at night.   Plan:  -RT protocol, CPAP    Hypothyroidism- (present on admission)  Assessment & Plan  TSH 9.66 from 03/2024. On levothyroxine 50 mcg once daily.   Plan:  -TSH in morning  -Continue levothyroxine 50 mcg once daily    BMI 60.0-69.9, adult (HCC)- (present on admission)  Assessment & Plan  Morbid obesity. BMI 64.45.   Plan:  -Encourage diet and exercise for weight loss for improved health.         VTE prophylaxis: heparin ppx

## 2024-04-22 NOTE — CONSULTS
DATE OF CONSULTATION:  4/21/2024     REFERRING PHYSICIAN:   Quinton Wright M.D.     CONSULTING PHYSICIAN:  Gaye Fraire M.D.     REASON FOR CONSULTATION:  I have been asked by Dr. Wright to see the patient in surgical consultation for evaluation of Abdominal fluid collection.    HISTORY OF PRESENT ILLNESS: The patient is a 78 year-old White woman who presents to the Emergency Department with a increased urinary frequency and periumbilical pain. Pt underwent primary umbilical hernia repair for incarcerated hernia repair on 3/8/24 by Dr. Curtis at [facility].  CT here shows 6 cm in the periumbilical area..     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    PAST SURGICAL HISTORY:  has a past surgical history that includes other (Left, [id number]).    ALLERGIES:   Allergies   Allergen Reactions    Doxycycline Hives    Lactose Unspecified     Lactose intolerance since a child     Levothyroxine Anxiety     Panic attack     Prednisone Unspecified     Diarrhea and very irritable     Sulfa Drugs Unspecified     Mental status change     Cephalexin Diarrhea     Diarrhea and mild rash     Montelukast Rash     Leg pain, back pain and rash     Spinach Unspecified     Skin allergy test    Cow's Milk [Lac Bovis]     Other Environmental      Trees except oak    Codeine Unspecified     Headaches and confusion        CURRENT MEDICATIONS:    Home Medications       Reviewed by Aaron Pruett (Pharmacy Tech) on 04/21/24 at 1731  Med List Status: Complete     Medication Last Dose Status   albuterol 108 (90 Base) MCG/ACT Aero Soln inhalation aerosol 4/20/2024 Active   FLOVENT  MCG/ACT Aerosol 4/21/2024 Active   levothyroxine (SYNTHROID) 50 MCG Tab 4/21/2024 Active   losartan (COZAAR) 25 MG Tab  4/21/2024 Active   metoprolol SR (TOPROL XL) 25 MG TABLET SR 24 HR 4/21/2024 Active                    FAMILY HISTORY: family history is not on file.    SOCIAL HISTORY:  reports that she has quit smoking. Her smoking use included cigarettes. She has never used smokeless tobacco. She reports that she does not currently use alcohol. She reports that she does not use drugs.    REVIEW OF SYSTEMS: Comprehensive review of systems is not able to be elicited from the patient secondary to the acuity of the clinical situation.    PHYSICAL EXAMINATION:    Physical Exam  Pulmonary:      Effort: Pulmonary effort is normal.   Abdominal:      Palpations: Abdomen is soft.      Tenderness: There is abdominal tenderness.      Comments: Well healed periumbilical incision  Minimal erythema and fullness in the periumbilical area.   Neurological:      Mental Status: She is alert.         LABORATORY VALUES:   Recent Labs     04/21/24  1526   WBC 9.3   RBC 4.28   HEMOGLOBIN 13.1   HEMATOCRIT 41.4   MCV 96.7   MCH 30.6   MCHC 31.6*   RDW 50.7*   PLATELETCT 389   MPV 9.1     Recent Labs     04/21/24  1526   SODIUM 139   POTASSIUM 4.0   CHLORIDE 102   CO2 24   GLUCOSE 114*   BUN 20   CREATININE 0.86   CALCIUM 9.5     Recent Labs     04/21/24  1526   ASTSGOT 21   ALTSGPT 15   TBILIRUBIN 0.3   ALKPHOSPHAT 76   GLOBULIN 3.8*            IMAGING:   CT-ABDOMEN-PELVIS WITH   Final Result      1.  There is been repair of previously seen ventral hernia. There is skin thickening, subcutaneous fat stranding and 6.3 x 5.7 x 6.4 cm rim-enhancing fluid collection in the periumbilical region which is suspicious for infection.   2.  Nonobstructing renal stones.   3.  Colonic diverticulosis. Questionable minor stranding in the sigmoid mesocolon is age-indeterminate but cannot exclude early/mild acute diverticulitis.          ASSESSMENT AND PLAN:     Abdominal fluid collection  Assessment & Plan  HX of Umbilical hernia repair 3/8 at [facility] by Dr. Curtis  Now  with fluid collection  IV abx   Poss drainage        DISPOSITION: Medical evaluation and admission. The patient was admitted to the Medical Service prior to surgical consultation. [facility] [place] [facility] will follow.     ____________________________________     Gaye Fraire M.D.    DD: 4/21/2024  8:21 PM    AAST Grading System for EGS Conditions  ACS NSQIP Surgical Risk Calculator

## 2024-04-22 NOTE — CASE COMMUNICATION
noted.   ----- Message -----  From: Navin Samuel, PT  Sent: 4/21/2024   7:43 PM PDT  To: Mallika Espana R.N.; Jonathan Zavala      Patient requested visit next week after she finds out what will happen to her apartment.  PT will request additional HHPT viist 1w1 effective week of 04/21/2024 to reassess.

## 2024-04-22 NOTE — ASSESSMENT & PLAN NOTE
TSH 9.66 from 03/2024. On levothyroxine 50 mcg once daily.   Plan:  -TSH in morning  -Continue levothyroxine 50 mcg once daily

## 2024-04-22 NOTE — PROGRESS NOTES
4 Eyes Skin Assessment Completed by MEHRAN Cid and MEHRAN Soto.    Head WDL  Ears WDL  Nose WDL  Mouth WDL  Neck WDL  Breast/Chest WDL  Shoulder Blades WDL  Spine WDL  (R) Arm/Elbow/Hand large amount edema/swelling  (L) Arm/Elbow/Hand large amount edema/swelling  Abdomen healed MLI from previous surgery last month, scabbing and redness around lower portion  Groin Purewick in place  Scrotum/Coccyx/Buttocks WDL  (R) Leg large amount edema/swelling, scattered bruising  (L) Leg large amount edema/swelling, calf bruising  (R) Heel/Foot/Toe edema  (L) Heel/Foot/Toe edema          Devices In Places: Pulse ox, eye glasses, purewick, PIV, nasal cannula      Interventions In Place: Urine containment, N/C with ear foams, Q2H turns, extra pillows    Possible Skin Injury No    Pictures Uploaded Into Epic N/A  Wound Consult Placed N/A  RN Wound Prevention Protocol Ordered No

## 2024-04-22 NOTE — ASSESSMENT & PLAN NOTE
Umbilical hernia repair at [facility] on 03/08/24.   New right lower quadrant abdominal pain. CT abdomen/pelvis with IV contrast showing 6 x 6 cm fluid collection in the zina-umbilical region. No fevers, chills or leukocytosis. Started on antibiotics for possible intra-abdominal abscess in the ER. Surgery consulted, noting possible surgical drainage in the morning.    Plan:  -NPO at midnight for potential surgical drainage in the morning.   -Continue IV antibiotics (vancomycin and zosyn)  -Abdominal fluid cultures if undergoing drainage  -Repeat CBC in the morning    COnservative management and empiric IV abx for a few days  FOllow blood and urine cultures if obtained.

## 2024-04-22 NOTE — ED NOTES
Bedside report received from off going RN/tech: Sugar, assumed care of patient.  POC discussed with patient. Call light within reach, all needs addressed at this time.       Fall risk interventions in place: Move the patient closer to the nurse's station, Patient's personal possessions are with in their safe reach, Place socks on patient, Place fall risk sign on patient's door, and Keep floor surfaces clean and dry (all applicable per [facility] Fall risk assessment)   Continuous monitoring: Cardiac Leads, Pulse Ox, or Blood Pressure  IVF/IV medications: Not Applicable   Oxygen: Room Air  Bedside sitter: Not Applicable   Isolation: Not Applicable

## 2024-04-22 NOTE — PROGRESS NOTES
DATE: 4/22/2024    Hospital Day 1  admitted with fluid collection after umbilical hernia repair 3/8 .    INTERVAL EVENTS:  No complaints.  Abdomen soft and nontender.  No nausea.    ROS:  Review of systems (+10 systems) unremarkable except for concerns noted by patient or items listed.    Please see HPI for additional ROS.    PHYSICAL EXAMINATION:  Vital Signs: BP (!) 187/92   Pulse 83   Temp 36.8 °C (98.2 °F) (Temporal)   Resp 18   Ht 1.524 m (5')   Wt (!) 150 kg (330 lb)   SpO2 97%     Physical Exam  Vitals and nursing note reviewed.   Constitutional:       General: She is awake. She is not in acute distress.     Appearance: She is morbidly obese. She is not ill-appearing.   Abdominal:      General: A surgical scar is present. There is no distension.      Palpations: Abdomen is soft.      Tenderness: There is no abdominal tenderness.      Comments: Well healed periumbilical incision   Neurological:      Mental Status: She is alert.   Psychiatric:         Behavior: Behavior is cooperative.     LABORATORY VALUES:  Recent Labs     04/21/24  1526 04/22/24  0430   WBC 9.3 7.1   RBC 4.28 4.10*   HEMOGLOBIN 13.1 12.6   HEMATOCRIT 41.4 38.7   MCV 96.7 94.4   MCH 30.6 30.7   MCHC 31.6* 32.6   RDW 50.7* 49.8   PLATELETCT 389 362   MPV 9.1 9.2     Recent Labs     04/21/24  1526 04/22/24  0430   SODIUM 139 141   POTASSIUM 4.0 4.0   CHLORIDE 102 105   CO2 24 23   GLUCOSE 114* 98   BUN 20 16   CREATININE 0.86 0.79   CALCIUM 9.5 9.0     Recent Labs     04/21/24  1526 04/22/24  0430   ASTSGOT 21 21   ALTSGPT 15 12   TBILIRUBIN 0.3 0.5   ALKPHOSPHAT 76 69   GLOBULIN 3.8* 3.5            IMAGING:  CT-ABDOMEN-PELVIS WITH   Final Result      1.  There is been repair of previously seen ventral hernia. There is skin thickening, subcutaneous fat stranding and 6.3 x 5.7 x 6.4 cm rim-enhancing fluid collection in the periumbilical region which is suspicious for infection.   2.  Nonobstructing renal stones.   3.  Colonic  diverticulosis. Questionable minor stranding in the sigmoid mesocolon is age-indeterminate but cannot exclude early/mild acute diverticulitis.          ASSESSMENT AND PLAN:  * Abdominal fluid collection- (present on admission)  Assessment & Plan  HX of Umbilical hernia repair 3/8 at [facility] by Dr. Curtis  Now with fluid collection  IV abx   Poss drainage      - Continue IV abx  - No indication for surgery or drain at this point, continue observation  - Diet as tolerated       ____________________________________     Leonor Hall P.A.-C.    DD: 4/22/2024  9:20 AM

## 2024-04-22 NOTE — ED NOTES
The pt moved to floor by transport on the gurney. The pt is alert and oriented, calm, moves extremities, and talks with clear coherent speech. No indicators of non-vebral pain. Vitals stable on the monitor and on room air. Patent IV in the arm. Patent purewick in place.

## 2024-04-22 NOTE — ASSESSMENT & PLAN NOTE
Not septic on admission.  Plan:  -Continue losartan 25 mg once daily and metoprolol 25 mg once daily (hold for SBP <100 mmHg)  Vitals:    04/22/24 1720   BP: (!) 172/86   Pulse: 79   Resp:    Temp:    SpO2: 93%     Uncontrolled  Increased losartan

## 2024-04-22 NOTE — DISCHARGE PLANNING
HTH/SCP TCN chart review completed. Collaborated with JAROD Bolanos prior to meeting with the pt. The most current review of medical record, knowledge of pt's PLOF and social support, LACE+ score of 78 was considered.  No 6 clicks scores.  Per chart review, patient underwent abdominal hernia repair in March 2024.      Pt seen at bedside. Introduced TCN program. Provided education regarding post acute levels of care. Education provided regarding case management policy for blanket SNF referrals. Discussed HTH/SCP plan benefits. Pt verbalizes understanding.     Patient states she lives in a ground floor apartment, no steps and was modified independent with ADL's except for bathing as her daughter assists with bathing transfers to her shower chair.  She is non-ambulatory for the last 5 years and utilizes an electric W/C for mobility indoors and outdoors, has 2 of them.  She states that she is Modified independent with meal preparation and laundry management tasks.  She has a hired  for cleaning and some meal prep.  She has 2 electric W/C's, shower chair, grab bars in shower and next toilet, LH reacher and a 4WW she uses to transport objects while using her electric W/C.  She is on RA at home and uses a CPAP at night.  She is currently on 1 L/min O2, no O2 at her baseline.  She states she is not at her baseline and  is currently on service with [facility] and would like to resume her [facility] services at discharge.  JAROD and MD notified of patients request.     Choice proactively obtained for HH ([facility]), faxed to [facility] and given to JAROD. TCN will continue to follow and collaborate with discharge planning team as additional post acute needs arise. Thank you.     Completed today:  Choice obtained: HH (Resumption of [facility]).  SCP with [facility].  Referred to schedulers as patient is requesting establishment of a new PCP.  [place] [facility]  [place]P appointment scheduled for 4.25.24 at 1100am w Dr Howard at [facility]  location.

## 2024-04-22 NOTE — ED NOTES
The pt is alert and oriented. The pt states that she wants to leave. ERP at bedside. Bp elevated, other vitals stable

## 2024-04-22 NOTE — DISCHARGE PLANNING
Received choice form @: 0051  Agency/Facility name: [facility]  Sent referral per choice form @: No referral sent. Pending orders.

## 2024-04-22 NOTE — PROGRESS NOTES
Pt transferred from ED to [facility] T430 hypertensive with /84  Rechecking BP with larger cuff, PRN labetalol available  21:50 BP recheck 171/77 ; MAP = 108, below PRN parameter, will continue to monitor    Per pt, unable to ambulate, transferred from [facility] to bed  2 RN skin assessment complete  IV Vanco per MAR  Pt does not appear to be in distress, mild leg pain medicated with PRN Tylenol  Purewick for urine containment, d/t immobility and urinary frequency  All needs met at this time

## 2024-04-22 NOTE — ED NOTES
The pt is alert and oriented sitting in bed. The pt reports back pain due to position, RN repositioned. Vitals stable on the monitor

## 2024-04-22 NOTE — ASSESSMENT & PLAN NOTE
HX of Umbilical hernia repair 3/8 at [facility] by Dr. Curtis  Now with fluid collection  IV abx

## 2024-04-22 NOTE — CARE PLAN
Problem: Knowledge Deficit - Standard  Goal: Patient and family/care givers will demonstrate understanding of plan of care, disease process/condition, diagnostic tests and medications  Description: Target End Date:  1-3 days or as soon as patient condition allows    Document in Patient Education    1.  Patient and family/caregiver oriented to unit, equipment, visitation policy and means for communicating concern  2.  Complete/review Learning Assessment  3.  Assess knowledge level of disease process/condition, treatment plan, diagnostic tests and medications  4.  Explain disease process/condition, treatment plan, diagnostic tests and medications  Outcome: Progressing     Problem: Pain - Standard  Goal: Alleviation of pain or a reduction in pain to the patient’s comfort goal  Description: Target End Date:  Prior to discharge or change in level of care    Document on Vitals flowsheet    1.  Document pain using the appropriate pain scale per order or unit policy  2.  Educate and implement non-pharmacologic comfort measures (i.e. relaxation, distraction, massage, cold/heat therapy, etc.)  3.  Pain management medications as ordered  4.  Reassess pain after pain med administration per policy  5.  If opiods administered assess patient's response to pain medication is appropriate per POSS sedation scale  6.  Follow pain management plan developed in collaboration with patient and interdisciplinary team (including palliative care or pain specialists if applicable)  Outcome: Progressing   The patient is Stable - Low risk of patient condition declining or worsening    Shift Goals  Clinical Goals: Abscess drainage/IV abx  Patient Goals: rest    Progress made toward(s) clinical / shift goals:  Minimal c/o pain. NPO since MN for possible IR procedure    Patient is not progressing towards the following goals:

## 2024-04-22 NOTE — DIETARY
NUTRITION SERVICES: BMI - Pt with BMI >40 (=Body mass index is 64.45 kg/m².), Class III obesity. Weight loss counseling not appropriate in acute care setting.     RECOMMEND - If appropriate at DC please refer to outpatient nutrition services for weight management.

## 2024-04-22 NOTE — ED NOTES
Med rec updated and complete. Allergies reviewed. Confirmed name and date of birth. Pt was discharged from [facility] on 03/25/24. Pt was given her bubble pack medications. However, pt has not king taking them at home . Patient resumed her home medications.    Life care medications patient is not taking:  Amlodipine 5 mg daily, clonidine 0.1 mg prn, sbp > 160 q6hprn, gabapentin 200 mg TID, methocarbamol 750 mg TID and losartan 50 mg. Pt is currently taking her home dose of Losartan 25 mg.    Pt denies anticoagulant or antiplatelet medications.        Home pharmacy  [facility] = [phone]

## 2024-04-22 NOTE — ASSESSMENT & PLAN NOTE
Morbid obesity. BMI 64.45.   Plan:  -Encourage diet and exercise for weight loss for improved health.

## 2024-04-22 NOTE — ASSESSMENT & PLAN NOTE
Umbilical hernia repair at [facility] on 03/08/24.   New right lower quadrant abdominal pain. CT abdomen/pelvis with IV contrast showing 6 x 6 cm fluid collection in the zina-umbilical region. No fevers, chills or leukocytosis. Started on antibiotics for possible intra-abdominal abscess in the ER. Surgery consulted, noting possible surgical drainage in the morning.    Plan:  -Continue IV antibiotics (vancomycin and zosyn)  -Repeat CBC in the morning  -NPO at midnight for potential surgical drainage in the morning.

## 2024-04-22 NOTE — PROGRESS NOTES
18g PIV in LAC infiltrated, removed    This RN and Brittany RN started 2 new IV's, 20g LFA and 20g RFA for procedure and multiple IV abx

## 2024-04-22 NOTE — ASSESSMENT & PLAN NOTE
Umbilical hernia repair at [facility] on 03/08/24.   New right lower quadrant abdominal pain. CT abdomen/pelvis with IV contrast showing 6 x 6 cm fluid collection in the zina-umbilical region. No fevers, chills or leukocytosis. Started on antibiotics for possible intra-abdominal abscess in the ER. Surgery consulted, noting possible surgical drainage in the morning.    Plan:  -Continue IV antibiotics (vancomycin and zosyn)  -Repeat CBC in the morning  -NPO at midnight for potential surgical drainage in the morning.

## 2024-04-22 NOTE — PROGRESS NOTES
Pharmacy Vancomycin Kinetics Note for 4/21/2024     78 y.o. female on Vancomycin day # 1     Vancomycin Indication (AUC Dosing): Skin/skin structure infection    Provider specified end date: 04/24/24    Active Antibiotics (From admission, onward)      Ordered     Ordering Provider       Sun Apr 21, 2024  8:52 PM    04/21/24 2052  MD Alert...Vancomycin per Pharmacy  PHARMACY TO DOSE        Question:  Indication(s) for vancomycin?  Answer:  Intra-abdominal infection    Ron Palacios M.D.    04/21/24 2052  piperacillin-tazobactam (Zosyn) 3.375 g in  mL IVPB  (piperacillin-tazobactam (ZOSYN) IV (Extended-infusion) PANEL )  EVERY 8 HOURS         BARBARA Arthur Apr 21, 2024  7:59 PM    04/21/24 1959  piperacillin-tazobactam (Zosyn) 3.375 g in  mL IVPB  (piperacillin-tazobactam (ZOSYN) IV (Extended-infusion) PANEL )  ONCE         Quinton Velasquez M.D.    04/21/24 1959  vancomycin (Vancocin) 3 g in  mL IVPB  (vancomycin (VANCOCIN) IV (LD + Maintenance))  ONCE         Quinton Velasquez M.D.            Dosing Weight: 150 kg (330 lb 11 oz)      Admission History: Admitted on 4/21/2024 for Abdominal wall abscess [L02.211]  Pertinent history: Patient presenting with abdominal wall abscess, recent umbilical hernia repair on 3/8. Vancomycin initiated empirically.    Allergies:     Doxycycline, Lactose, Levothyroxine, Prednisone, Sulfa drugs, Cephalexin, Montelukast, Spinach, Cow's milk [lac bovis], Other environmental, and Codeine     Pertinent cultures to date:     Results       Procedure Component Value Units Date/Time    Urinalysis [[id number]] Collected: 04/21/24 1526    Order Status: Completed Specimen: Urine Updated: 04/21/24 1603     Color Yellow     Character Clear     Specific Gravity 1.010     Ph 6.5     Glucose Negative mg/dL      Ketones Negative mg/dL      Protein Negative mg/dL      Bilirubin Negative     Urobilinogen, Urine 0.2     Nitrite Negative     Leukocyte Esterase  Negative     Occult Blood Negative     Micro Urine Req see below     Comment: Microscopic examination not performed when specimen is clear  and chemically negative for protein, blood, leukocyte esterase  and nitrite.                 Labs:     Estimated Creatinine Clearance: 74.3 mL/min (by C-G formula based on SCr of 0.86 mg/dL).  Recent Labs     24  1526   WBC 9.3   NEUTSPOLYS 72.40*     Recent Labs     24  1526   BUN 20   CREATININE 0.86   ALBUMIN 4.1     No intake or output data in the 24 hours ending 24 2102   BP (!) 190/88   Pulse 84   Temp 37 °C (98.6 °F) (Temporal)   Resp 20   Ht 1.524 m (5')   Wt (!) 150 kg (330 lb)   SpO2 94%  Temp (24hrs), Av °C (98.6 °F), Min:37 °C (98.6 °F), Max:37 °C (98.6 °F)      List concerns for Vancomycin clearance:     Age;Obesity;Nephrotoxic drugs;Receipt of contrast dye    Pharmacokinetics:     AUC kinetics:   Ke (hr ^-1): 0.0567 hr^-1  Half life: 12.22 hr  Clearance: 3.277  Estimated TDD: 1638.5  Estimated Dose: 969  Estimated interval: 14.2    A/P:     -  Vancomycin dose: 750 mg Q12H    -  Next vancomycin level(s):    - 3rd - 5th dose or sooner if renal function changes    -  Predicted vancomycin AUC from initial AUC test calculator: 458 mg·hr/L    -  Comments: Several risk factor for drug accumulation. MRSA PCR ordered to guide narrowing therapy. Utilize fluid cultures to guide spectrum of therapy if abscess is drained. Pharmacy will monitor therapy and adjust dosing as appropriate.     Toño Orlando, TyD

## 2024-04-23 ENCOUNTER — HOME CARE VISIT (OUTPATIENT)
Dept: HOME HEALTH SERVICES | Facility: HOME HEALTHCARE | Age: 79
End: 2024-04-23
Payer: MEDICARE

## 2024-04-23 ENCOUNTER — PHARMACY VISIT (OUTPATIENT)
Dept: PHARMACY | Facility: MEDICAL CENTER | Age: 79
End: 2024-04-23
Payer: COMMERCIAL

## 2024-04-23 VITALS
WEIGHT: 293 LBS | RESPIRATION RATE: 18 BRPM | TEMPERATURE: 98.1 F | HEART RATE: 74 BPM | HEIGHT: 60 IN | BODY MASS INDEX: 57.52 KG/M2 | DIASTOLIC BLOOD PRESSURE: 89 MMHG | OXYGEN SATURATION: 97 % | SYSTOLIC BLOOD PRESSURE: 149 MMHG

## 2024-04-23 LAB
ALBUMIN SERPL BCP-MCNC: 3.7 G/DL (ref 3.2–4.9)
ANION GAP SERPL CALC-SCNC: 12 MMOL/L (ref 7–16)
BUN SERPL-MCNC: 21 MG/DL (ref 8–22)
CALCIUM ALBUM COR SERPL-MCNC: 9.3 MG/DL (ref 8.5–10.5)
CALCIUM SERPL-MCNC: 9.1 MG/DL (ref 8.5–10.5)
CHLORIDE SERPL-SCNC: 105 MMOL/L (ref 96–112)
CO2 SERPL-SCNC: 23 MMOL/L (ref 20–33)
CREAT SERPL-MCNC: 0.91 MG/DL (ref 0.5–1.4)
ERYTHROCYTE [DISTWIDTH] IN BLOOD BY AUTOMATED COUNT: 50.5 FL (ref 35.9–50)
GFR SERPLBLD CREATININE-BSD FMLA CKD-EPI: 64 ML/MIN/1.73 M 2
GLUCOSE SERPL-MCNC: 109 MG/DL (ref 65–99)
HCT VFR BLD AUTO: 38.3 % (ref 37–47)
HGB BLD-MCNC: 12.3 G/DL (ref 12–16)
MCH RBC QN AUTO: 30.4 PG (ref 27–33)
MCHC RBC AUTO-ENTMCNC: 32.1 G/DL (ref 32.2–35.5)
MCV RBC AUTO: 94.6 FL (ref 81.4–97.8)
PHOSPHATE SERPL-MCNC: 3.7 MG/DL (ref 2.5–4.5)
PLATELET # BLD AUTO: 345 K/UL (ref 164–446)
PMV BLD AUTO: 9.4 FL (ref 9–12.9)
POTASSIUM SERPL-SCNC: 4 MMOL/L (ref 3.6–5.5)
PROCALCITONIN SERPL-MCNC: <0.05 NG/ML
RBC # BLD AUTO: 4.05 M/UL (ref 4.2–5.4)
SODIUM SERPL-SCNC: 140 MMOL/L (ref 135–145)
WBC # BLD AUTO: 9.9 K/UL (ref 4.8–10.8)

## 2024-04-23 PROCEDURE — 700102 HCHG RX REV CODE 250 W/ 637 OVERRIDE(OP): Performed by: STUDENT IN AN ORGANIZED HEALTH CARE EDUCATION/TRAINING PROGRAM

## 2024-04-23 PROCEDURE — A9270 NON-COVERED ITEM OR SERVICE: HCPCS

## 2024-04-23 PROCEDURE — 99232 SBSQ HOSP IP/OBS MODERATE 35: CPT | Performed by: PHYSICIAN ASSISTANT

## 2024-04-23 PROCEDURE — 700102 HCHG RX REV CODE 250 W/ 637 OVERRIDE(OP)

## 2024-04-23 PROCEDURE — 99239 HOSP IP/OBS DSCHRG MGMT >30: CPT | Performed by: STUDENT IN AN ORGANIZED HEALTH CARE EDUCATION/TRAINING PROGRAM

## 2024-04-23 PROCEDURE — 80069 RENAL FUNCTION PANEL: CPT

## 2024-04-23 PROCEDURE — 700111 HCHG RX REV CODE 636 W/ 250 OVERRIDE (IP): Performed by: INTERNAL MEDICINE

## 2024-04-23 PROCEDURE — A9270 NON-COVERED ITEM OR SERVICE: HCPCS | Performed by: STUDENT IN AN ORGANIZED HEALTH CARE EDUCATION/TRAINING PROGRAM

## 2024-04-23 PROCEDURE — A9270 NON-COVERED ITEM OR SERVICE: HCPCS | Performed by: INTERNAL MEDICINE

## 2024-04-23 PROCEDURE — 36415 COLL VENOUS BLD VENIPUNCTURE: CPT

## 2024-04-23 PROCEDURE — 84145 PROCALCITONIN (PCT): CPT

## 2024-04-23 PROCEDURE — 700102 HCHG RX REV CODE 250 W/ 637 OVERRIDE(OP): Performed by: INTERNAL MEDICINE

## 2024-04-23 PROCEDURE — 700111 HCHG RX REV CODE 636 W/ 250 OVERRIDE (IP): Mod: JZ

## 2024-04-23 PROCEDURE — 85027 COMPLETE CBC AUTOMATED: CPT

## 2024-04-23 PROCEDURE — 700105 HCHG RX REV CODE 258

## 2024-04-23 PROCEDURE — 94660 CPAP INITIATION&MGMT: CPT

## 2024-04-23 PROCEDURE — RXMED WILLOW AMBULATORY MEDICATION CHARGE: Performed by: STUDENT IN AN ORGANIZED HEALTH CARE EDUCATION/TRAINING PROGRAM

## 2024-04-23 RX ORDER — AMOXICILLIN AND CLAVULANATE POTASSIUM 875; 125 MG/1; MG/1
1 TABLET, FILM COATED ORAL EVERY 12 HOURS
Qty: 14 TABLET | Refills: 0 | Status: ACTIVE | OUTPATIENT
Start: 2024-04-23 | End: 2024-04-30

## 2024-04-23 RX ORDER — AMOXICILLIN AND CLAVULANATE POTASSIUM 875; 125 MG/1; MG/1
1 TABLET, FILM COATED ORAL EVERY 12 HOURS
Status: DISCONTINUED | OUTPATIENT
Start: 2024-04-23 | End: 2024-04-23 | Stop reason: HOSPADM

## 2024-04-23 RX ADMIN — FLUTICASONE PROPIONATE 220 MCG: 110 AEROSOL, METERED RESPIRATORY (INHALATION) at 05:35

## 2024-04-23 RX ADMIN — AMOXICILLIN AND CLAVULANATE POTASSIUM 1 TABLET: 875; 125 TABLET, FILM COATED ORAL at 11:10

## 2024-04-23 RX ADMIN — METOPROLOL SUCCINATE 25 MG: 25 TABLET, EXTENDED RELEASE ORAL at 04:28

## 2024-04-23 RX ADMIN — ACETAMINOPHEN 650 MG: 325 TABLET, FILM COATED ORAL at 05:37

## 2024-04-23 RX ADMIN — LEVOTHYROXINE SODIUM 50 MCG: 0.05 TABLET ORAL at 04:28

## 2024-04-23 RX ADMIN — ACETAMINOPHEN 650 MG: 325 TABLET, FILM COATED ORAL at 11:10

## 2024-04-23 RX ADMIN — HEPARIN SODIUM 5000 UNITS: 5000 INJECTION, SOLUTION INTRAVENOUS; SUBCUTANEOUS at 04:29

## 2024-04-23 RX ADMIN — PIPERACILLIN AND TAZOBACTAM 3.38 G: 3; .375 INJECTION, POWDER, FOR SOLUTION INTRAVENOUS at 04:33

## 2024-04-23 RX ADMIN — LOSARTAN POTASSIUM 50 MG: 50 TABLET, FILM COATED ORAL at 05:35

## 2024-04-23 ASSESSMENT — PAIN DESCRIPTION - PAIN TYPE
TYPE: ACUTE PAIN

## 2024-04-23 ASSESSMENT — COGNITIVE AND FUNCTIONAL STATUS - GENERAL
MOVING FROM LYING ON BACK TO SITTING ON SIDE OF FLAT BED: A LITTLE
STANDING UP FROM CHAIR USING ARMS: A LITTLE
WALKING IN HOSPITAL ROOM: A LITTLE
TOILETING: A LITTLE
SUGGESTED CMS G CODE MODIFIER MOBILITY: CK
CLIMB 3 TO 5 STEPS WITH RAILING: A LITTLE
MOVING TO AND FROM BED TO CHAIR: A LITTLE
DRESSING REGULAR UPPER BODY CLOTHING: A LITTLE
DAILY ACTIVITIY SCORE: 22
TURNING FROM BACK TO SIDE WHILE IN FLAT BAD: A LITTLE
MOBILITY SCORE: 18
SUGGESTED CMS G CODE MODIFIER DAILY ACTIVITY: CJ

## 2024-04-23 ASSESSMENT — LIFESTYLE VARIABLES
HOW MANY TIMES IN THE PAST YEAR HAVE YOU HAD 5 OR MORE DRINKS IN A DAY: 0
CONSUMPTION TOTAL: NEGATIVE
AVERAGE NUMBER OF DAYS PER WEEK YOU HAVE A DRINK CONTAINING ALCOHOL: 0
TOTAL SCORE: 0
TOTAL SCORE: 0
HAVE YOU EVER FELT YOU SHOULD CUT DOWN ON YOUR DRINKING: NO
ON A TYPICAL DAY WHEN YOU DRINK ALCOHOL HOW MANY DRINKS DO YOU HAVE: 0
DOES PATIENT WANT TO STOP DRINKING: NO
HAVE PEOPLE ANNOYED YOU BY CRITICIZING YOUR DRINKING: NO
EVER FELT BAD OR GUILTY ABOUT YOUR DRINKING: NO
ALCOHOL_USE: NO
TOTAL SCORE: 0
EVER HAD A DRINK FIRST THING IN THE MORNING TO STEADY YOUR NERVES TO GET RID OF A HANGOVER: NO

## 2024-04-23 NOTE — DISCHARGE SUMMARY
Discharge Summary    CHIEF COMPLAINT ON ADMISSION  Chief Complaint   Patient presents with    Abdominal Pain     Right lower abdomen    Urinary Frequency     Started last night       Reason for Admission  EMS     Admission Date  4/21/2024    CODE STATUS  Prior    HPI & HOSPITAL COURSE    Cecile Teixeira is at 78 yr old female with PMHx of morbid obesity, HTN, YOLA, asthma, hypothyroidism.  Admitted 4/21 for abdominal pain.    On 3/8/2024 patient underwent ex lap repair of SBO secondary to incarcerated umbilical hernia at [facility].  Patient was recovering well.  Per patient she states that she came to the hospital for left lower extremity pain and bump to her shin.  She also had noted mild abdominal pain at that time.    CT A/P 4/21/2024:  There is been repair of previously seen ventral hernia. There is skin thickening, subcutaneous fat stranding and 6.3 x 5.7 x 6.4 cm rim-enhancing fluid collection in the periumbilical region which is suspicious for infection.     Patient was placed on IV vancomycin and IV cefepime during hospitalization.  General surgery was consulted.  They did not recommend any surgical or IR intervention.  Patient was transitioned to p.o. antibiotics.  She we will follow-up with Dr. Curtis in the outpatient setting.  She is discharged home with her daughter.  She is moving to an assisted living facility at the end of the month.    For her left lower extremity lump-I would recommend that she follow-up with her primary care physician for further intervention.    Therefore, she is discharged in good and stable condition to home with close outpatient follow-up.    The patient met 2-midnight criteria for an inpatient stay at the time of discharge.    Discharge Date  4/23/2024    FOLLOW UP ITEMS POST DISCHARGE  Follow up with Dr. Curtis 1 week  Follow up with PCP 1 week     DISCHARGE DIAGNOSES  Principal Problem:    Abdominal fluid collection (POA: Yes)      Overview:    Active  Problems:    BMI 60.0-69.9, adult (HCC) (POA: Yes)    Hypothyroidism (POA: Yes)    YOLA (obstructive sleep apnea) (POA: Yes)    Hypertension (POA: Yes)    Abdominal wall abscess (POA: Yes)    Asthma (POA: Unknown)  Resolved Problems:    * No resolved hospital problems. *      FOLLOW UP  Future Appointments   Date Time Provider Department Center   4/25/2024 11:00 AM Bg Howard M.D. [facility]  [address] [facility] [id number] [phone]    As needed, If symptoms worsen    Richie Curtis M.D.  [address] [facility] [id number] [phone]    Schedule an appointment as soon as possible for a visit in 1 week(s)  For wound re-check, For post operative follow up    [facility]  [address] [address]  [phone]        Linda Nowak, ATorresP.RTorresN.  [address] [facility] [id number] [phone]            MEDICATIONS ON DISCHARGE     Medication List        START taking these medications        Instructions   amoxicillin-clavulanate 875-125 MG Tabs  Commonly known as: Augmentin   Take 1 Tablet by mouth every 12 hours for 7 days.  Dose: 1 Tablet            CHANGE how you take these medications        Instructions   levothyroxine 50 MCG Tabs  What changed: additional instructions  Commonly known as: Synthroid   Take 1 Tablet by mouth every morning on an empty stomach.  Dose: 50 mcg            CONTINUE taking these medications        Instructions   albuterol 108 (90 Base) MCG/ACT Aers inhalation aerosol   Inhale 2 Puffs every 6 hours as needed for Shortness of Breath. Indications: Asthma  Dose: 2 Puff     Flovent  MCG/ACT Aero  Generic drug: fluticasone   Inhale 2 Puffs 2 times a day.  Dose: 2 Puff     losartan 25 MG Tabs  Commonly known as: Cozaar   Take 25 mg by mouth every day.  Dose: 25 mg     metoprolol SR 25 MG Tb24  Commonly known as: Toprol XL   Take 25 mg by mouth every day.  Hold for SBP under 100 or HR under 60  Indications: High Blood Pressure Disorder  Dose: 25 mg              Allergies  Allergies   Allergen Reactions    Doxycycline Hives    Lactose Unspecified     Lactose intolerance since a child     Levothyroxine Anxiety     Panic attack     Prednisone Unspecified     Diarrhea and very irritable     Sulfa Drugs Unspecified     Mental status change     Cephalexin Diarrhea     Diarrhea and mild rash     Montelukast Rash     Leg pain, back pain and rash     Spinach Unspecified     Skin allergy test    Cow's Milk [Lac Bovis]     Other Environmental      Trees except oak    Codeine Unspecified     Headaches and confusion        DIET  No orders of the defined types were placed in this encounter.      ACTIVITY  As tolerated.  Weight bearing as tolerated    CONSULTATIONS  General Surgery     PROCEDURES  None     LABORATORY  Lab Results   Component Value Date    SODIUM 140 04/23/2024    POTASSIUM 4.0 04/23/2024    CHLORIDE 105 04/23/2024    CO2 23 04/23/2024    GLUCOSE 109 (H) 04/23/2024    BUN 21 04/23/2024    CREATININE 0.91 04/23/2024        Lab Results   Component Value Date    WBC 9.9 04/23/2024    HEMOGLOBIN 12.3 04/23/2024    HEMATOCRIT 38.3 04/23/2024    PLATELETCT 345 04/23/2024        Total time of the discharge process exceeds 40 minutes.

## 2024-04-23 NOTE — PROGRESS NOTES
DATE: 4/23/2024    Hospital Day 2  admitted with fluid collection after umbilical hernia repair 3/8 .    INTERVAL EVENTS:  No acute overnight events.  Abdomen soft and nontender.  No nausea.    ROS:  Review of systems (+10 systems) unremarkable except for concerns noted by patient or items listed.    Please see HPI for additional ROS.    PHYSICAL EXAMINATION:  Vital Signs: BP (!) 157/81   Pulse 65   Temp 36.2 °C (97.2 °F) (Temporal)   Resp 18   Ht 1.524 m (5')   Wt (!) 150 kg (330 lb)   SpO2 93%     Physical Exam  Vitals and nursing note reviewed.   Constitutional:       General: She is awake. She is not in acute distress.     Appearance: She is morbidly obese. She is not ill-appearing.   Abdominal:      General: A surgical scar is present. There is no distension.      Palpations: Abdomen is soft.      Tenderness: There is no abdominal tenderness.      Comments: Well healed periumbilical incision   Neurological:      Mental Status: She is alert.   Psychiatric:         Behavior: Behavior is cooperative.       LABORATORY VALUES:  Recent Labs     04/21/24  1526 04/22/24  0430 04/23/24  0240   WBC 9.3 7.1 9.9   RBC 4.28 4.10* 4.05*   HEMOGLOBIN 13.1 12.6 12.3   HEMATOCRIT 41.4 38.7 38.3   MCV 96.7 94.4 94.6   MCH 30.6 30.7 30.4   MCHC 31.6* 32.6 32.1*   RDW 50.7* 49.8 50.5*   PLATELETCT 389 362 345   MPV 9.1 9.2 9.4     Recent Labs     04/21/24  1526 04/22/24  0430 04/23/24  0240   SODIUM 139 141 140   POTASSIUM 4.0 4.0 4.0   CHLORIDE 102 105 105   CO2 24 23 23   GLUCOSE 114* 98 109*   BUN 20 16 21   CREATININE 0.86 0.79 0.91   CALCIUM 9.5 9.0 9.1     Recent Labs     04/21/24  1526 04/22/24  0430   ASTSGOT 21 21   ALTSGPT 15 12   TBILIRUBIN 0.3 0.5   ALKPHOSPHAT 76 69   GLOBULIN 3.8* 3.5            IMAGING:    ASSESSMENT AND PLAN:  * Abdominal fluid collection- (present on admission)  Assessment & Plan  HX of Umbilical hernia repair 3/8 at [facility] by Dr. Curtis  Now with fluid collection  IV abx       -  Transition to PO abx  - Okay to discharge from surgical perspective  - Follow up with Dr. Curtis  - Discussed with Dr. Colette Vazquez       ____________________________________     Leonor Hall P.A.-C.    DD: 4/22/2024  9:20 AM

## 2024-04-23 NOTE — CARE PLAN
The patient is Stable - Low risk of patient condition declining or worsening    Shift Goals  Clinical Goals: IV Abx, pain control  Patient Goals: rest  Family Goals: MIKE    Progress made toward(s) clinical / shift goals:    Problem: Pain - Standard  Goal: Alleviation of pain or a reduction in pain to the patient’s comfort goal  Description: Target End Date:  Prior to discharge or change in level of care    Document on Vitals flowsheet    1.  Document pain using the appropriate pain scale per order or unit policy  2.  Educate and implement non-pharmacologic comfort measures (i.e. relaxation, distraction, massage, cold/heat therapy, etc.)  3.  Pain management medications as ordered  4.  Reassess pain after pain med administration per policy  5.  If opiods administered assess patient's response to pain medication is appropriate per POSS sedation scale  6.  Follow pain management plan developed in collaboration with patient and interdisciplinary team (including palliative care or pain specialists if applicable)  Outcome: Progressing     Problem: Knowledge Deficit - Standard  Goal: Patient and family/care givers will demonstrate understanding of plan of care, disease process/condition, diagnostic tests and medications  Description: Target End Date:  1-3 days or as soon as patient condition allows    Document in Patient Education    1.  Patient and family/caregiver oriented to unit, equipment, visitation policy and means for communicating concern  2.  Complete/review Learning Assessment  3.  Assess knowledge level of disease process/condition, treatment plan, diagnostic tests and medications  4.  Explain disease process/condition, treatment plan, diagnostic tests and medications  Outcome: Progressing     Pt verbalized importance of finishing antibiotics, and accepted to receiving a new IV. After the previous IV infiltrated.     Patient is not progressing towards the following goals:

## 2024-04-23 NOTE — DOCUMENTATION QUERY
[facility]                                                                       Query Response Note      PATIENT:               LUCIANA MUELLER  ACCT #:                  [id number]  MRN:                     [id number]  :                      1945  ADMIT DATE:       2024 3:04 PM  DISCH DATE:        2024 1:58 PM  RESPONDING  PROVIDER #:        [id number]           QUERY TEXT:    Please clarify in documentation the relationship, if any, between intra-abdominal fluid collection and umbilical hernia repair procedure from 24:    The patient's Clinical Indicators include:  Findings:  --Patient admitted for intra-abdominal fluid collection  --Per H&P, patient is s/p umbilical hernia repair on 24  --CT abd/pelvis on  admission:  There is skin thickening, subcutaneous fat stranding and 6.3x5.7x6.4      cm rim-enhancing fluid collection in the periumbilical region which is suspicious for infection    Treatment:  --General surgery consult  --Possible drainage of intra-abdominal fluid collection  --IV Zosyn 3.375g every 8 hours (discontinued)  --IV Vancomycin 750mg every 12 hours (discontinued)  --Augmentin 875-125mg tablet po every 12 hours    Risk Factors:  --S/p umbilical hernia repair on 24  --Current intra abdominal fluid collection    Thank you,  Deja Lay RN, BSN  Clinical   Connect via [employer]  Options provided:   -- Intra-abdominal fluid collection is due to or associated with umbilical hernia repair procedure from 24   -- Intra-abdominal fluid collection is not due to or associated with umbilical hernia repair procedure from 24   -- Other explanation, please specify   -- Unable to determine      Query created by: Deja Lay on 2024 11:08 AM    RESPONSE TEXT:    Intra-abdominal fluid collection is due to or associated with umbilical hernia repair  procedure from 03/08/24          Electronically signed by:  BIB FIGUEROA MD 4/23/2024 2:37 PM

## 2024-04-23 NOTE — PROGRESS NOTES
4 Eyes Skin Assessment Completed by MEHRAN Barnes and Courtney RN.    Head WDL  Ears WDL  Nose WDL  Mouth WDL  Neck WDL  Breast/Chest WDL  Shoulder Blades WDL  Spine WDL  (R) Arm/Elbow/Hand scattered bruising, Swelling and Edema, x2 20G PIV.  (L) Arm/Elbow/Hand scattered bruising, Swelling and Edema  Abdomen Healed MLI from previous surgery in march, redness/scabbing around lower portion.   Groin Purewick in place  Scrotum/Coccyx/Buttocks Redness and Blanching  (R) Leg scattered Bruising, Swelling, and Edema  (L) Leg scattered Bruising, Swelling, and Edema  (R) Heel/Foot/Toe Edema  (L) Heel/Foot/Toe Edema          Devices In Places Blood Pressure Cuff and Pulse Ox, purewick, PIV x2      Interventions In Place Pillows and Heels Loaded W/Pillows, Q2hr turns, urine containment device (purewick)    Possible Skin Injury No    Pictures Uploaded Into Epic N/A  Wound Consult Placed N/A  RN Wound Prevention Protocol Ordered No

## 2024-04-23 NOTE — PROGRESS NOTES
Discharge medications delivered to patient. Patient educated on medications, verbalizes understanding. All questions answered at this time.  Home meds given back to patient at this time.

## 2024-04-23 NOTE — CASE COMMUNICATION
Quality Review for Transfer OASIS by ELIAN Raymond, RN on  April 23, 2024       Edits completed by ELIAN Ramyond, RN:  1. Changed  E to yees per the plan of care  2. Changed  ot flu vaccine received from another healthcare provider per Epic  3. Changed  to yes, reconciled med list sent to subsequent provider by () EHR

## 2024-04-23 NOTE — CARE PLAN
The patient is Stable - Low risk of patient condition declining or worsening    Shift Goals  Clinical Goals: IV abx  Patient Goals: Discharge  Family Goals: MIKE    Progress made toward(s) clinical / shift goals:  Medicated per MAR, Discharged

## 2024-04-23 NOTE — DISCHARGE PLANNING
Case Management Discharge Planning    Admission Date: 4/21/2024  GMLOS: 2.6  ALOS: 2    6-Clicks ADL Score: 22  6-Clicks Mobility Score: 18      Anticipated Discharge Dispo: Discharge Disposition: D/T to home under HHA care in anticipation of covered skilled care (06)    DME Needed: No    Action(s) Taken: DC Assessment Complete (See below) and Choice obtained    Escalations Completed: None    Medically Clear: No    Next Steps: CM to follow up with IDT regarding discharge planning needs/barriers.     Barriers to Discharge: Medical clearance      Care Transition Team Assessment    Case management role discussed with patient; patient verbalized understanding of case management role  Demographics on facesheet verified  Patient is a 78 year old female admitted to [facility] for abdominal pain/urinary frequency  Patient states she lives in an apt on the 1st floor alone  Patient's preferred pharmacy is the [facility] in [place], [place]  Prior to admission, patient states she needed assistance with most IADLS/ADLS; states she uses an electric WC at baseline; denies home O2 use (uses CPAP at [facility])  Patient states she is on service with [facility] include 234 beds at the [facility]; RNCM requested new HH referral from provider  Patient states she will need assistance with transportation home; RNCM confirmed with patient's daughter (per patient's request) address for transport    1030: HH referral noted in EPIC; RNCM sent referral to [facility] per patient's choice (resumption of services)    1115: Patient has been accepted by [facility] include 234 beds at the [facility]; RNCM reached out to bedside RN re: when to request for transport    1200: RNCM requested transport via [employer] order in EPIC; 4/23 at 1400    1315: Transport confirmation received via Voalte for 1400; Lia Coordinator requesting $306.43 via ASF from [facility] leadership; GMT to provide transport    Verbal approval from [facility] leadership for transport received; ASF faxed to Lia Coordinator    Information  Source  Orientation Level: Oriented X4  Information Given By: Patient  Who is responsible for making decisions for patient? : Patient    Readmission Evaluation  Is this a readmission?: No    Elopement Risk  Legal Hold: No  Ambulatory or Self Mobile in Wheelchair: No-Not an Elopement Risk  Elopement Risk: Not at Risk for Elopement    Interdisciplinary Discharge Planning  Does Admitting Nurse Feel This Could be a Complex Discharge?: No  Primary Care Physician: RUT Chilel  Lives with - Patient's Self Care Capacity: Alone and Able to Care For Self  Patient or legal guardian wants to designate a caregiver: No  Support Systems: Children  Housing / Facility: 1 Story Apartment / Condo  Do You Take your Prescribed Medications Regularly: Yes  Able to Return to Previous ADL's: Yes  Mobility Issues: Yes  Prior Services: None, Home-Independent  Assistance Needed: No  Durable Medical Equipment: Other - Specify (Electric WC)    Discharge Preparedness  What is your plan after discharge?: Home health care  What are your discharge supports?: Child  Prior Functional Level: Needs Assist with Activities of Daily Living, Needs Assist with Medication Management, Uses Wheelchair, Uses Cane  Difficulity with ADLs: Walking  Difficulity with IADLs: None    Functional Assesment  Prior Functional Level: Needs Assist with Activities of Daily Living, Needs Assist with Medication Management, Uses Wheelchair, Uses Cane    Finances  Financial Barriers to Discharge: No  Prescription Coverage: Yes    Vision / Hearing Impairment  Vision Impairment : Yes  Right Eye Vision: Impaired, Wears Glasses  Left Eye Vision: Impaired, Wears Glasses  Hearing Impairment : No    Values / Beliefs / Concerns  Values / Beliefs Concerns : No    Advance Directive  Advance Directive?: None  Advance Directive offered?: AD Booklet refused    Domestic Abuse  Have you ever been the victim of abuse or violence?: No  Physical Abuse or Sexual Abuse: No  Verbal Abuse  or Emotional Abuse: No  Possible Abuse/Neglect Reported to:: Not Applicable    Psychological Assessment  History of Substance Abuse: None  History of Psychiatric Problems: No  Non-compliant with Treatment: No  Newly Diagnosed Illness: Yes    Discharge Risks or Barriers  Discharge risks or barriers?: Complex medical needs  Patient risk factors: Cognitive / sensory / physical deficit, Complex medical needs, Vulnerable adult    Anticipated Discharge Information  Discharge Disposition: D/T to home under A care in anticipation of covered skilled care (06)

## 2024-04-23 NOTE — PROGRESS NOTES
Patient discharged home per MD orders. Patient at baseline assistance, on RA saturating >90%. Antibiotic discharge education provided, follow up appointments, medication safety, patient demonstrated and verbalized understanding. All questions answered. Tolerating diet. Voiding without difficulty. IVs removed. All patient belongings with patient at this time. Patient educated to call MD or return to ED for concerns.

## 2024-04-23 NOTE — DISCHARGE PLANNING
HTH/SCP TCN chart review completed. Collaborated with JAROD Bolanos. Current discharge considerations are for Home with possible Resumption of [facility] when medically cleared.  Per chart review, [facility] has accepted.   TCN will continue to follow and collaborate with discharge planning team as additional post acute needs arise. Thank you.    Completed:  Choice obtained: HH (Resumption of [facility]).  SCP with [facility].  Referred to schedulers as patient is requesting establishment of a new PCP.  Currently with [facility].  PCP appointment scheduled for 4.25.24 at 1100am w Dr Howard at [facility].

## 2024-04-23 NOTE — CASE COMMUNICATION
noted  ----- Message -----  From: Navin Samuel, PT  Sent: 4/22/2024   8:50 PM PDT  To: Mallika Espana R.N.; Jes Aguiar R.N.; *      Hold home health services effective 4/22/24, pt admitted at [facility] on 4/21/24 for abdominal pain, fluid collection on CT, possible abscess.

## 2024-04-23 NOTE — PROGRESS NOTES
Bedside report received.  Assessment complete.  A&O x 4. Patient calls appropriately.  Per the patient she is scooter bound.    Patient has 0/10 pain. Pain managed with prescribed medications.  Denies N&V. Tolerating diet.  + void, purewick in place  Patient denies SOB.  Patient pleasant with staff and resting in bed.  Review plan with of care with patient. Call light and personal belongings within reach. Hourly rounding in place. All needs met at this time.

## 2024-04-23 NOTE — DISCHARGE PLANNING
ATTN: Case Management  RE: Referral for Home Health    As of 4.23.24, we have accepted the Home Health referral for the patient listed above.    A [facility] clinician will be out to see the patient within 48 hours. If you have any questions or concerns regarding the patient’s transition to Home Health, please do not hesitate to contact us at [phone].      We look forward to collaborating with you,  [facility] Team

## 2024-04-23 NOTE — CARE PLAN
The patient is Stable - Low risk of patient condition declining or worsening    Shift Goals  Clinical Goals: IV abx, Maintain skin integirty  Patient Goals: Rest    Progress made toward(s) clinical / shift goals:  Medicated per MAR, Patient turning self in bed

## 2024-04-23 NOTE — DISCHARGE PLANNING
DC Transport Scheduled    Transport Company Scheduled:  [facility]  Spoke with Dora at [facility] to schedule transport.    Scheduled Date: 4/23/2024  Scheduled Time: 1400    Destination: Patient's home   Destination address: [address] [place] [address] [id number] [place] [id number]    Notified care team of scheduled transport via Voalte.     If there are any changes needed to the DC transportation scheduled, please contact [facility] at ext. [phone] between the hours of 1896-1173 Mon-Fri. If outside those hours, contact the ED Case Manager at ext. [phone].

## 2024-04-23 NOTE — PROGRESS NOTES
4 Eyes Skin Assessment Completed      Head WDL  Ears WDL  Nose WDL  Mouth WDL  Neck WDL  Breast/Chest WDL  Shoulder Blades WDL  Spine WDL  (R) Arm/Elbow/Hand scattered bruising, Swelling and Edema.  (L) Arm/Elbow/Hand scattered bruising, Swelling and Edema.  Abdomen Healed MLI from previous surgery in march, redness/scabbing. Moisture related redness to panus.   Coccyx/Buttocks Redness and Blanching  (R) Leg scattered Bruising, Swelling, and Edema  (L) Leg scattered Bruising, Swelling, and Edema  (R) Heel/Foot/Toe Edema  (L) Heel/Foot/Toe Edema     Devices In Places Blood Pressure Cuff and Pulse Ox, purewick, PIV x2     Interventions In Place Pillows and Heels Loaded W/Pillows, Q2hr turns, urine containment device (purewick)     Possible Skin Injury No     Pictures Uploaded Into Epic N/A  Wound Consult Placed N/A  RN Wound Prevention Protocol Ordered No

## 2024-04-23 NOTE — PROGRESS NOTES
Bedside report received from day shift nurse. Assumed care at 1845.   Pt A&Ox4  Tolerating regular diet, denies n/v. Hypoactive bowel sounds, + flatus, LBM PTA. IV access through 20G LFA that is infusing and secondary IV access through 20G LFA that is infusing.  Saturating >90% on RA.  Purewick in place, +void.  Per pt, she is scooter bound.  Pain is controlled through medication orders. Updated on plan of care. Safety education provided. Bed locked in low. Call light within reach. Rounding in place.

## 2024-04-24 NOTE — CASE COMMUNICATION
I agree with the changes, thanks  ----- Message -----  From: Kellen Raymond R.N.  Sent: 4/23/2024   8:10 AM PDT  To: Navin Samuel, PT      Quality Review for Transfer OASIS by ELIAN Raymond, RN on  April 23, 2024       Edits completed by ELIAN Raymond, RN:  1. Changed  E to yees per the plan of care  2. Changed  ot flu vaccine received from another healthcare provider per Epic  3. Changed  to yes, reconciled med  list sent to subsequent provider by () EHR

## 2024-04-24 NOTE — HOSPITAL COURSE
Cecile Teixeira is at 78 yr old female with PMHx of morbid obesity, HTN, YOLA, asthma, hypothyroidism.  Admitted 4/21 for abdominal pain.    On 3/8/2024 patient underwent ex lap repair of SBO secondary to incarcerated umbilical hernia at [facility].  Patient was recovering well.  Per patient she states that she came to the hospital for left lower extremity pain and bump to her shin.  She also had noted mild abdominal pain at that time.    CT A/P 4/21/2024:  There is been repair of previously seen ventral hernia. There is skin thickening, subcutaneous fat stranding and 6.3 x 5.7 x 6.4 cm rim-enhancing fluid collection in the periumbilical region which is suspicious for infection.     Patient was placed on IV vancomycin and IV cefepime during hospitalization.  General surgery was consulted.  They did not recommend any surgical or IR intervention.  Patient was transitioned to p.o. antibiotics.  She we will follow-up with Dr. Curtis in the outpatient setting.  She is discharged home with her daughter.  She is moving to an assisted living facility at the end of the month.    For her left lower extremity lump-I would recommend that she follow-up with her primary care physician for further intervention.

## 2024-04-25 ENCOUNTER — HOME CARE VISIT (OUTPATIENT)
Dept: HOME HEALTH SERVICES | Facility: HOME HEALTHCARE | Age: 79
End: 2024-04-25
Payer: MEDICARE

## 2024-04-25 NOTE — CASE COMMUNICATION
noted  ----- Message -----  From: Zazmam Stewart R.N.  Sent: 4/25/2024   1:30 PM PDT  To: Mallika Espana R.N.; Jes Aguiar R.N.; *      Patient is refusing resumption to Home Health. States she will be moving into new [facility] on 4/30 and will no longer need Home Health. She is at baseline with her mobility and is knowledgeable on her medications. Discussed contacting her doctor if she feels Home Health is needed once she gets settled i nto her new home environment.

## 2024-04-25 NOTE — Clinical Note
Patient is refusing resumption to Home Health. States she will be moving into new [facility] on 4/30 and will no longer need Home Health. She is at baseline with her mobility and is knowledgeable on her medications. Discussed contacting her doctor if she feels Home Health is needed once she gets settled into her new home environment.

## 2024-05-15 PROBLEM — E11.9 TYPE 2 DIABETES MELLITUS, WITHOUT LONG-TERM CURRENT USE OF INSULIN (HCC): Status: ACTIVE | Noted: 2024-05-15

## 2024-07-02 ENCOUNTER — APPOINTMENT (OUTPATIENT)
Dept: RADIOLOGY | Facility: MEDICAL CENTER | Age: 79
End: 2024-07-02
Attending: EMERGENCY MEDICINE
Payer: MEDICARE

## 2024-07-02 ENCOUNTER — HOSPITAL ENCOUNTER (EMERGENCY)
Facility: MEDICAL CENTER | Age: 79
End: 2024-07-02
Attending: EMERGENCY MEDICINE
Payer: MEDICARE

## 2024-07-02 VITALS
WEIGHT: 293 LBS | SYSTOLIC BLOOD PRESSURE: 165 MMHG | OXYGEN SATURATION: 95 % | TEMPERATURE: 98 F | DIASTOLIC BLOOD PRESSURE: 70 MMHG | RESPIRATION RATE: 17 BRPM | BODY MASS INDEX: 57.52 KG/M2 | HEART RATE: 72 BPM | HEIGHT: 60 IN

## 2024-07-02 DIAGNOSIS — M25.551 RIGHT HIP PAIN: ICD-10-CM

## 2024-07-02 DIAGNOSIS — M54.31 SCIATICA OF RIGHT SIDE: ICD-10-CM

## 2024-07-02 LAB
ACANTHOCYTES BLD QL SMEAR: NORMAL
ALBUMIN SERPL BCP-MCNC: 4.2 G/DL (ref 3.2–4.9)
ALBUMIN/GLOB SERPL: 1 G/DL
ALP SERPL-CCNC: 87 U/L (ref 30–99)
ALT SERPL-CCNC: 11 U/L (ref 2–50)
ANION GAP SERPL CALC-SCNC: 14 MMOL/L (ref 7–16)
ANISOCYTOSIS BLD QL SMEAR: NORMAL
AST SERPL-CCNC: 21 U/L (ref 12–45)
AUER BODIES BLD QL SMEAR: NORMAL
BASO STIPL BLD QL SMEAR: NORMAL
BASOPHILS # BLD AUTO: NORMAL % (ref 0–1.8)
BASOPHILS # BLD: NORMAL K/UL (ref 0–0.12)
BILIRUB SERPL-MCNC: 0.4 MG/DL (ref 0.1–1.5)
BLASTS NFR BLD MANUAL: NORMAL %
BUN SERPL-MCNC: 30 MG/DL (ref 8–22)
BURR CELLS BLD QL SMEAR: NORMAL
CALCIUM ALBUM COR SERPL-MCNC: 10 MG/DL (ref 8.5–10.5)
CALCIUM SERPL-MCNC: 10.2 MG/DL (ref 8.5–10.5)
CHLORIDE SERPL-SCNC: 102 MMOL/L (ref 96–112)
CO2 SERPL-SCNC: 21 MMOL/L (ref 20–33)
COMMENT 1642: NORMAL
COMMENT NL1176: NORMAL
CREAT SERPL-MCNC: 0.82 MG/DL (ref 0.5–1.4)
DACRYOCYTES BLD QL SMEAR: NORMAL
DOHLE BOD BLD QL SMEAR: NORMAL
EOSINOPHIL # BLD AUTO: NORMAL K/UL (ref 0–0.51)
EOSINOPHIL NFR BLD: NORMAL % (ref 0–6.9)
ERYTHROCYTE [DISTWIDTH] IN BLOOD BY AUTOMATED COUNT: 50.1 FL (ref 35.9–50)
ERYTHROCYTE [DISTWIDTH] IN BLOOD BY AUTOMATED COUNT: NORMAL FL (ref 35.9–50)
GFR SERPLBLD CREATININE-BSD FMLA CKD-EPI: 73 ML/MIN/1.73 M 2
GIANT PLATELETS BLD QL SMEAR: NORMAL
GLOBULIN SER CALC-MCNC: 4.1 G/DL (ref 1.9–3.5)
GLUCOSE SERPL-MCNC: 86 MG/DL (ref 65–99)
HCT VFR BLD AUTO: 38.6 % (ref 37–47)
HCT VFR BLD AUTO: NORMAL % (ref 37–47)
HGB BLD-MCNC: 12.9 G/DL (ref 12–16)
HGB BLD-MCNC: NORMAL G/DL (ref 12–16)
HOWELL-JOLLY BOD BLD QL SMEAR: NORMAL
HYPOCHROMIA BLD QL SMEAR: NORMAL
LG PLATELETS BLD QL SMEAR: NORMAL
LIPASE SERPL-CCNC: 63 U/L (ref 11–82)
LYMPHOCYTES # BLD AUTO: NORMAL K/UL (ref 1–4.8)
LYMPHOCYTES NFR BLD: NORMAL % (ref 22–41)
MACROCYTES BLD QL SMEAR: NORMAL
MANUAL DIFF BLD: NORMAL
MCH RBC QN AUTO: 30.7 PG (ref 27–33)
MCH RBC QN AUTO: NORMAL PG (ref 27–33)
MCHC RBC AUTO-ENTMCNC: 33.4 G/DL (ref 32.2–35.5)
MCHC RBC AUTO-ENTMCNC: NORMAL G/DL (ref 32.2–35.5)
MCV RBC AUTO: 91.9 FL (ref 81.4–97.8)
MCV RBC AUTO: NORMAL FL (ref 81.4–97.8)
METAMYELOCYTES NFR BLD MANUAL: NORMAL %
MICROCYTES BLD QL SMEAR: NORMAL
MONOCYTES # BLD AUTO: NORMAL K/UL (ref 0–0.85)
MONOCYTES NFR BLD AUTO: NORMAL % (ref 0–13.4)
MORPHOLOGY BLD-IMP: NORMAL
MYELOCYTES NFR BLD MANUAL: NORMAL %
NEUTROPHILS # BLD AUTO: NORMAL K/UL (ref 1.82–7.42)
NEUTROPHILS NFR BLD: NORMAL % (ref 44–72)
NEUTS BAND NFR BLD MANUAL: NORMAL % (ref 0–10)
NEUTS HYPERSEG BLD QL SMEAR: NORMAL
NRBC # BLD AUTO: NORMAL K/UL
NRBC BLD-RTO: NORMAL /100 WBC (ref 0–0.2)
OVALOCYTES BLD QL SMEAR: NORMAL
PLATELET # BLD AUTO: 283 K/UL (ref 164–446)
PLATELET # BLD AUTO: NORMAL K/UL (ref 164–446)
PLATELET BLD QL SMEAR: NORMAL
PLATELETS.RETICULATED NFR BLD AUTO: NORMAL % (ref 0.6–13.1)
PMV BLD AUTO: 9.9 FL (ref 9–12.9)
PMV BLD AUTO: NORMAL FL (ref 9–12.9)
POIKILOCYTOSIS BLD QL SMEAR: NORMAL
POLYCHROMASIA BLD QL SMEAR: NORMAL
POTASSIUM SERPL-SCNC: 5 MMOL/L (ref 3.6–5.5)
PROMYELOCYTES NFR BLD MANUAL: NORMAL %
PROT SERPL-MCNC: 8.3 G/DL (ref 6–8.2)
RBC # BLD AUTO: 4.2 M/UL (ref 4.2–5.4)
RBC # BLD AUTO: NORMAL M/UL (ref 4.2–5.4)
RBC BLD AUTO: NORMAL
ROULEAUX BLD QL SMEAR: NORMAL
SCHISTOCYTES BLD QL SMEAR: NORMAL
SICKLE CELLS BLD QL SMEAR: NORMAL
SMUDGE CELLS BLD QL SMEAR: NORMAL
SODIUM SERPL-SCNC: 137 MMOL/L (ref 135–145)
SPHEROCYTES BLD QL SMEAR: NORMAL
STOMATOCYTES BLD QL SMEAR: NORMAL
TARGETS BLD QL SMEAR: NORMAL
TOXIC GRANULES BLD QL SMEAR: NORMAL
VARIANT LYMPHS BLD QL SMEAR: NORMAL
WBC # BLD AUTO: 10.6 K/UL (ref 4.8–10.8)
WBC # BLD AUTO: NORMAL K/UL (ref 4.8–10.8)
WBC OTHER NFR BLD MANUAL: NORMAL %
WBC TOXIC VACUOLES BLD QL SMEAR: NORMAL

## 2024-07-02 PROCEDURE — 700102 HCHG RX REV CODE 250 W/ 637 OVERRIDE(OP): Performed by: EMERGENCY MEDICINE

## 2024-07-02 PROCEDURE — 85027 COMPLETE CBC AUTOMATED: CPT | Mod: 91

## 2024-07-02 PROCEDURE — 85007 BL SMEAR W/DIFF WBC COUNT: CPT

## 2024-07-02 PROCEDURE — 36415 COLL VENOUS BLD VENIPUNCTURE: CPT

## 2024-07-02 PROCEDURE — A9270 NON-COVERED ITEM OR SERVICE: HCPCS | Performed by: EMERGENCY MEDICINE

## 2024-07-02 PROCEDURE — 73501 X-RAY EXAM HIP UNI 1 VIEW: CPT | Mod: RT

## 2024-07-02 PROCEDURE — 85055 RETICULATED PLATELET ASSAY: CPT

## 2024-07-02 PROCEDURE — 99285 EMERGENCY DEPT VISIT HI MDM: CPT

## 2024-07-02 PROCEDURE — 83690 ASSAY OF LIPASE: CPT

## 2024-07-02 PROCEDURE — 93971 EXTREMITY STUDY: CPT | Mod: RT

## 2024-07-02 PROCEDURE — 80053 COMPREHEN METABOLIC PANEL: CPT

## 2024-07-02 RX ORDER — HYDROCODONE BITARTRATE AND ACETAMINOPHEN 5; 325 MG/1; MG/1
1 TABLET ORAL ONCE
Status: COMPLETED | OUTPATIENT
Start: 2024-07-02 | End: 2024-07-02

## 2024-07-02 RX ORDER — HYDROCODONE BITARTRATE AND ACETAMINOPHEN 5; 325 MG/1; MG/1
1 TABLET ORAL EVERY 6 HOURS PRN
Qty: 12 TABLET | Refills: 0 | Status: SHIPPED | OUTPATIENT
Start: 2024-07-02 | End: 2024-07-07

## 2024-07-02 RX ORDER — ACETAMINOPHEN 325 MG/1
650 TABLET ORAL EVERY 6 HOURS PRN
Qty: 30 TABLET | Refills: 0 | Status: SHIPPED | OUTPATIENT
Start: 2024-07-02

## 2024-07-02 RX ORDER — ASPIRIN 81 MG/1
81 TABLET ORAL DAILY
Status: SHIPPED | COMMUNITY
End: 2024-07-12 | Stop reason: SDUPTHER

## 2024-07-02 RX ADMIN — HYDROCODONE BITARTRATE AND ACETAMINOPHEN 1 TABLET: 5; 325 TABLET ORAL at 16:31

## 2024-07-02 ASSESSMENT — FIBROSIS 4 INDEX: FIB4 SCORE: 1.37

## 2024-07-12 PROBLEM — M15.9 PRIMARY OSTEOARTHRITIS INVOLVING MULTIPLE JOINTS: Status: ACTIVE | Noted: 2024-07-12

## 2024-07-12 PROBLEM — M15.0 PRIMARY OSTEOARTHRITIS INVOLVING MULTIPLE JOINTS: Status: ACTIVE | Noted: 2024-07-12

## 2024-07-20 ENCOUNTER — APPOINTMENT (OUTPATIENT)
Dept: RADIOLOGY | Facility: MEDICAL CENTER | Age: 79
DRG: 202 | End: 2024-07-20
Payer: MEDICARE

## 2024-07-20 ENCOUNTER — APPOINTMENT (OUTPATIENT)
Dept: CARDIOLOGY | Facility: MEDICAL CENTER | Age: 79
DRG: 202 | End: 2024-07-20
Attending: INTERNAL MEDICINE
Payer: MEDICARE

## 2024-07-20 ENCOUNTER — HOSPITAL ENCOUNTER (INPATIENT)
Facility: MEDICAL CENTER | Age: 79
LOS: 1 days | DRG: 202 | End: 2024-07-21
Attending: EMERGENCY MEDICINE | Admitting: INTERNAL MEDICINE
Payer: MEDICARE

## 2024-07-20 ENCOUNTER — APPOINTMENT (OUTPATIENT)
Dept: URGENT CARE | Facility: CLINIC | Age: 79
End: 2024-07-20
Payer: MEDICARE

## 2024-07-20 ENCOUNTER — APPOINTMENT (OUTPATIENT)
Dept: RADIOLOGY | Facility: MEDICAL CENTER | Age: 79
DRG: 202 | End: 2024-07-20
Attending: EMERGENCY MEDICINE
Payer: MEDICARE

## 2024-07-20 DIAGNOSIS — J45.31 MILD PERSISTENT ASTHMA WITH EXACERBATION: ICD-10-CM

## 2024-07-20 DIAGNOSIS — J44.9 CHRONIC OBSTRUCTIVE PULMONARY DISEASE, UNSPECIFIED COPD TYPE (HCC): ICD-10-CM

## 2024-07-20 DIAGNOSIS — I10 PRIMARY HYPERTENSION: ICD-10-CM

## 2024-07-20 DIAGNOSIS — R79.89 ELEVATED TROPONIN: ICD-10-CM

## 2024-07-20 DIAGNOSIS — R60.0 PERIPHERAL EDEMA: ICD-10-CM

## 2024-07-20 DIAGNOSIS — R06.02 SHORTNESS OF BREATH: ICD-10-CM

## 2024-07-20 DIAGNOSIS — J96.01 ACUTE RESPIRATORY FAILURE WITH HYPOXIA (HCC): ICD-10-CM

## 2024-07-20 DIAGNOSIS — E66.01 MORBID OBESITY (HCC): ICD-10-CM

## 2024-07-20 PROBLEM — Z71.89 ADVANCE CARE PLANNING: Status: ACTIVE | Noted: 2023-04-13

## 2024-07-20 PROBLEM — R07.9 CHEST PAIN: Status: ACTIVE | Noted: 2024-07-20

## 2024-07-20 LAB
ALBUMIN SERPL BCP-MCNC: 3.9 G/DL (ref 3.2–4.9)
ALBUMIN/GLOB SERPL: 1.1 G/DL
ALP SERPL-CCNC: 80 U/L (ref 30–99)
ALT SERPL-CCNC: 10 U/L (ref 2–50)
ANION GAP SERPL CALC-SCNC: 15 MMOL/L (ref 7–16)
AST SERPL-CCNC: 16 U/L (ref 12–45)
BASOPHILS # BLD AUTO: 0.6 % (ref 0–1.8)
BASOPHILS # BLD: 0.05 K/UL (ref 0–0.12)
BILIRUB SERPL-MCNC: 0.4 MG/DL (ref 0.1–1.5)
BUN SERPL-MCNC: 20 MG/DL (ref 8–22)
CALCIUM ALBUM COR SERPL-MCNC: 9.9 MG/DL (ref 8.5–10.5)
CALCIUM SERPL-MCNC: 9.8 MG/DL (ref 8.5–10.5)
CHLORIDE SERPL-SCNC: 101 MMOL/L (ref 96–112)
CO2 SERPL-SCNC: 21 MMOL/L (ref 20–33)
CREAT SERPL-MCNC: 0.87 MG/DL (ref 0.5–1.4)
D DIMER PPP IA.FEU-MCNC: 2.31 UG/ML (FEU) (ref 0–0.5)
EKG IMPRESSION: NORMAL
EOSINOPHIL # BLD AUTO: 0.49 K/UL (ref 0–0.51)
EOSINOPHIL NFR BLD: 5.9 % (ref 0–6.9)
ERYTHROCYTE [DISTWIDTH] IN BLOOD BY AUTOMATED COUNT: 50.4 FL (ref 35.9–50)
FLUAV RNA SPEC QL NAA+PROBE: NEGATIVE
FLUBV RNA SPEC QL NAA+PROBE: NEGATIVE
GFR SERPLBLD CREATININE-BSD FMLA CKD-EPI: 68 ML/MIN/1.73 M 2
GLOBULIN SER CALC-MCNC: 3.6 G/DL (ref 1.9–3.5)
GLUCOSE SERPL-MCNC: 117 MG/DL (ref 65–99)
HCT VFR BLD AUTO: 38.9 % (ref 37–47)
HGB BLD-MCNC: 12.8 G/DL (ref 12–16)
IMM GRANULOCYTES # BLD AUTO: 0.03 K/UL (ref 0–0.11)
IMM GRANULOCYTES NFR BLD AUTO: 0.4 % (ref 0–0.9)
LV EJECT FRACT  99904: 65
LV EJECT FRACT MOD 2C 99903: 68
LV EJECT FRACT MOD 4C 99902: 73.79
LV EJECT FRACT MOD BP 99901: 71.45
LYMPHOCYTES # BLD AUTO: 2.04 K/UL (ref 1–4.8)
LYMPHOCYTES NFR BLD: 24.6 % (ref 22–41)
MCH RBC QN AUTO: 30.5 PG (ref 27–33)
MCHC RBC AUTO-ENTMCNC: 32.9 G/DL (ref 32.2–35.5)
MCV RBC AUTO: 92.6 FL (ref 81.4–97.8)
MONOCYTES # BLD AUTO: 0.81 K/UL (ref 0–0.85)
MONOCYTES NFR BLD AUTO: 9.8 % (ref 0–13.4)
NEUTROPHILS # BLD AUTO: 4.87 K/UL (ref 1.82–7.42)
NEUTROPHILS NFR BLD: 58.7 % (ref 44–72)
NRBC # BLD AUTO: 0 K/UL
NRBC BLD-RTO: 0 /100 WBC (ref 0–0.2)
NT-PROBNP SERPL IA-MCNC: 60 PG/ML (ref 0–125)
PLATELET # BLD AUTO: 334 K/UL (ref 164–446)
PMV BLD AUTO: 9.1 FL (ref 9–12.9)
POTASSIUM SERPL-SCNC: 4.1 MMOL/L (ref 3.6–5.5)
PROCALCITONIN SERPL-MCNC: <0.05 NG/ML
PROT SERPL-MCNC: 7.5 G/DL (ref 6–8.2)
RBC # BLD AUTO: 4.2 M/UL (ref 4.2–5.4)
RSV RNA SPEC QL NAA+PROBE: NEGATIVE
SARS-COV-2 RNA RESP QL NAA+PROBE: NOTDETECTED
SODIUM SERPL-SCNC: 137 MMOL/L (ref 135–145)
TROPONIN T SERPL-MCNC: 18 NG/L (ref 6–19)
TROPONIN T SERPL-MCNC: 20 NG/L (ref 6–19)
TROPONIN T SERPL-MCNC: 22 NG/L (ref 6–19)
TSH SERPL DL<=0.005 MIU/L-ACNC: 4.55 UIU/ML (ref 0.38–5.33)
WBC # BLD AUTO: 8.3 K/UL (ref 4.8–10.8)

## 2024-07-20 PROCEDURE — 700111 HCHG RX REV CODE 636 W/ 250 OVERRIDE (IP): Mod: JZ | Performed by: EMERGENCY MEDICINE

## 2024-07-20 PROCEDURE — 36415 COLL VENOUS BLD VENIPUNCTURE: CPT

## 2024-07-20 PROCEDURE — 93005 ELECTROCARDIOGRAM TRACING: CPT

## 2024-07-20 PROCEDURE — 83880 ASSAY OF NATRIURETIC PEPTIDE: CPT

## 2024-07-20 PROCEDURE — 93306 TTE W/DOPPLER COMPLETE: CPT

## 2024-07-20 PROCEDURE — 84484 ASSAY OF TROPONIN QUANT: CPT

## 2024-07-20 PROCEDURE — 0241U HCHG SARS-COV-2 COVID-19 NFCT DS RESP RNA 4 TRGT ED POC: CPT

## 2024-07-20 PROCEDURE — 99999 PR NO CHARGE: CPT | Performed by: NURSE PRACTITIONER

## 2024-07-20 PROCEDURE — 96374 THER/PROPH/DIAG INJ IV PUSH: CPT

## 2024-07-20 PROCEDURE — 99285 EMERGENCY DEPT VISIT HI MDM: CPT

## 2024-07-20 PROCEDURE — 71045 X-RAY EXAM CHEST 1 VIEW: CPT

## 2024-07-20 PROCEDURE — A9270 NON-COVERED ITEM OR SERVICE: HCPCS | Performed by: INTERNAL MEDICINE

## 2024-07-20 PROCEDURE — 85025 COMPLETE CBC W/AUTO DIFF WBC: CPT

## 2024-07-20 PROCEDURE — 770020 HCHG ROOM/CARE - TELE (206)

## 2024-07-20 PROCEDURE — 84145 PROCALCITONIN (PCT): CPT

## 2024-07-20 PROCEDURE — 700117 HCHG RX CONTRAST REV CODE 255: Performed by: INTERNAL MEDICINE

## 2024-07-20 PROCEDURE — 700111 HCHG RX REV CODE 636 W/ 250 OVERRIDE (IP): Mod: JZ | Performed by: INTERNAL MEDICINE

## 2024-07-20 PROCEDURE — 85379 FIBRIN DEGRADATION QUANT: CPT

## 2024-07-20 PROCEDURE — 93005 ELECTROCARDIOGRAM TRACING: CPT | Performed by: EMERGENCY MEDICINE

## 2024-07-20 PROCEDURE — 80053 COMPREHEN METABOLIC PANEL: CPT

## 2024-07-20 PROCEDURE — 700102 HCHG RX REV CODE 250 W/ 637 OVERRIDE(OP): Performed by: INTERNAL MEDICINE

## 2024-07-20 PROCEDURE — 700101 HCHG RX REV CODE 250: Performed by: INTERNAL MEDICINE

## 2024-07-20 PROCEDURE — 96375 TX/PRO/DX INJ NEW DRUG ADDON: CPT

## 2024-07-20 PROCEDURE — 94640 AIRWAY INHALATION TREATMENT: CPT

## 2024-07-20 PROCEDURE — 93306 TTE W/DOPPLER COMPLETE: CPT | Mod: 26 | Performed by: INTERNAL MEDICINE

## 2024-07-20 PROCEDURE — 84443 ASSAY THYROID STIM HORMONE: CPT

## 2024-07-20 RX ORDER — AMLODIPINE BESYLATE 10 MG/1
10 TABLET ORAL DAILY
Status: DISCONTINUED | OUTPATIENT
Start: 2024-07-20 | End: 2024-07-21 | Stop reason: HOSPADM

## 2024-07-20 RX ORDER — ASPIRIN 81 MG/1
81 TABLET ORAL DAILY
Status: DISCONTINUED | OUTPATIENT
Start: 2024-07-20 | End: 2024-07-21 | Stop reason: HOSPADM

## 2024-07-20 RX ORDER — METHYLPREDNISOLONE SODIUM SUCCINATE 125 MG/2ML
125 INJECTION, POWDER, LYOPHILIZED, FOR SOLUTION INTRAMUSCULAR; INTRAVENOUS ONCE
Status: COMPLETED | OUTPATIENT
Start: 2024-07-20 | End: 2024-07-20

## 2024-07-20 RX ORDER — LEVOTHYROXINE SODIUM 0.07 MG/1
75 TABLET ORAL
Status: DISCONTINUED | OUTPATIENT
Start: 2024-07-21 | End: 2024-07-21 | Stop reason: HOSPADM

## 2024-07-20 RX ORDER — ENOXAPARIN SODIUM 100 MG/ML
40 INJECTION SUBCUTANEOUS DAILY
Status: DISCONTINUED | OUTPATIENT
Start: 2024-07-20 | End: 2024-07-20

## 2024-07-20 RX ORDER — REGADENOSON 0.08 MG/ML
0.4 INJECTION, SOLUTION INTRAVENOUS
Status: DISCONTINUED | OUTPATIENT
Start: 2024-07-20 | End: 2024-07-21 | Stop reason: HOSPADM

## 2024-07-20 RX ORDER — LOSARTAN POTASSIUM 50 MG/1
50 TABLET ORAL EVERY EVENING
Status: DISCONTINUED | OUTPATIENT
Start: 2024-07-20 | End: 2024-07-21 | Stop reason: HOSPADM

## 2024-07-20 RX ORDER — FUROSEMIDE 10 MG/ML
40 INJECTION INTRAMUSCULAR; INTRAVENOUS
Status: DISCONTINUED | OUTPATIENT
Start: 2024-07-20 | End: 2024-07-21 | Stop reason: HOSPADM

## 2024-07-20 RX ORDER — ACETAMINOPHEN 325 MG/1
650 TABLET ORAL EVERY 6 HOURS PRN
Status: DISCONTINUED | OUTPATIENT
Start: 2024-07-20 | End: 2024-07-21 | Stop reason: HOSPADM

## 2024-07-20 RX ORDER — IPRATROPIUM BROMIDE AND ALBUTEROL SULFATE 2.5; .5 MG/3ML; MG/3ML
3 SOLUTION RESPIRATORY (INHALATION)
Status: DISCONTINUED | OUTPATIENT
Start: 2024-07-20 | End: 2024-07-21 | Stop reason: HOSPADM

## 2024-07-20 RX ORDER — FUROSEMIDE 10 MG/ML
40 INJECTION INTRAMUSCULAR; INTRAVENOUS
Status: DISCONTINUED | OUTPATIENT
Start: 2024-07-20 | End: 2024-07-20

## 2024-07-20 RX ORDER — CYCLOBENZAPRINE HCL 10 MG
10 TABLET ORAL 3 TIMES DAILY PRN
Status: DISCONTINUED | OUTPATIENT
Start: 2024-07-20 | End: 2024-07-21 | Stop reason: HOSPADM

## 2024-07-20 RX ORDER — FUROSEMIDE 10 MG/ML
40 INJECTION INTRAMUSCULAR; INTRAVENOUS ONCE
Status: COMPLETED | OUTPATIENT
Start: 2024-07-20 | End: 2024-07-20

## 2024-07-20 RX ORDER — NYSTATIN 100000 [USP'U]/G
POWDER TOPICAL 2 TIMES DAILY
Status: DISCONTINUED | OUTPATIENT
Start: 2024-07-21 | End: 2024-07-21 | Stop reason: HOSPADM

## 2024-07-20 RX ORDER — AMINOPHYLLINE 25 MG/ML
100 INJECTION, SOLUTION INTRAVENOUS
Status: DISCONTINUED | OUTPATIENT
Start: 2024-07-20 | End: 2024-07-21 | Stop reason: HOSPADM

## 2024-07-20 RX ORDER — METHYLPREDNISOLONE SODIUM SUCCINATE 40 MG/ML
40 INJECTION, POWDER, LYOPHILIZED, FOR SOLUTION INTRAMUSCULAR; INTRAVENOUS EVERY EVENING
Status: DISCONTINUED | OUTPATIENT
Start: 2024-07-21 | End: 2024-07-21 | Stop reason: HOSPADM

## 2024-07-20 RX ORDER — HYDRALAZINE HYDROCHLORIDE 20 MG/ML
10 INJECTION INTRAMUSCULAR; INTRAVENOUS EVERY 4 HOURS PRN
Status: DISCONTINUED | OUTPATIENT
Start: 2024-07-20 | End: 2024-07-21 | Stop reason: HOSPADM

## 2024-07-20 RX ADMIN — FUROSEMIDE 40 MG: 10 INJECTION INTRAMUSCULAR; INTRAVENOUS at 18:05

## 2024-07-20 RX ADMIN — HUMAN ALBUMIN MICROSPHERES AND PERFLUTREN 3 ML: 10; .22 INJECTION, SOLUTION INTRAVENOUS at 17:45

## 2024-07-20 RX ADMIN — AMLODIPINE BESYLATE 10 MG: 5 TABLET ORAL at 16:25

## 2024-07-20 RX ADMIN — ASPIRIN 81 MG: 81 TABLET, COATED ORAL at 16:25

## 2024-07-20 RX ADMIN — HYDRALAZINE HYDROCHLORIDE 10 MG: 20 INJECTION, SOLUTION INTRAMUSCULAR; INTRAVENOUS at 20:01

## 2024-07-20 RX ADMIN — RIVAROXABAN 10 MG: 10 TABLET, FILM COATED ORAL at 18:04

## 2024-07-20 RX ADMIN — LOSARTAN POTASSIUM 50 MG: 50 TABLET, FILM COATED ORAL at 18:05

## 2024-07-20 RX ADMIN — FUROSEMIDE 40 MG: 10 INJECTION INTRAMUSCULAR; INTRAVENOUS at 12:12

## 2024-07-20 RX ADMIN — IPRATROPIUM BROMIDE AND ALBUTEROL SULFATE 3 ML: 2.5; .5 SOLUTION RESPIRATORY (INHALATION) at 21:35

## 2024-07-20 RX ADMIN — ACETAMINOPHEN 650 MG: 325 TABLET ORAL at 15:14

## 2024-07-20 RX ADMIN — METHYLPREDNISOLONE SODIUM SUCCINATE 125 MG: 125 INJECTION, POWDER, FOR SOLUTION INTRAMUSCULAR; INTRAVENOUS at 12:11

## 2024-07-20 SDOH — ECONOMIC STABILITY: TRANSPORTATION INSECURITY
IN THE PAST 12 MONTHS, HAS THE LACK OF TRANSPORTATION KEPT YOU FROM MEDICAL APPOINTMENTS OR FROM GETTING MEDICATIONS?: NO

## 2024-07-20 SDOH — ECONOMIC STABILITY: TRANSPORTATION INSECURITY
IN THE PAST 12 MONTHS, HAS LACK OF RELIABLE TRANSPORTATION KEPT YOU FROM MEDICAL APPOINTMENTS, MEETINGS, WORK OR FROM GETTING THINGS NEEDED FOR DAILY LIVING?: NO

## 2024-07-20 ASSESSMENT — SOCIAL DETERMINANTS OF HEALTH (SDOH)

## 2024-07-20 ASSESSMENT — COGNITIVE AND FUNCTIONAL STATUS - GENERAL
EATING MEALS: A LITTLE
MOVING FROM LYING ON BACK TO SITTING ON SIDE OF FLAT BED: A LOT
SUGGESTED CMS G CODE MODIFIER MOBILITY: CL
TOILETING: A LOT
CLIMB 3 TO 5 STEPS WITH RAILING: TOTAL
STANDING UP FROM CHAIR USING ARMS: A LOT
DAILY ACTIVITIY SCORE: 14
MOVING TO AND FROM BED TO CHAIR: A LOT
WALKING IN HOSPITAL ROOM: TOTAL
PERSONAL GROOMING: A LITTLE
TURNING FROM BACK TO SIDE WHILE IN FLAT BAD: A LOT
HELP NEEDED FOR BATHING: A LOT
SUGGESTED CMS G CODE MODIFIER DAILY ACTIVITY: CK
MOBILITY SCORE: 10
DRESSING REGULAR UPPER BODY CLOTHING: A LOT
DRESSING REGULAR LOWER BODY CLOTHING: A LOT

## 2024-07-20 ASSESSMENT — LIFESTYLE VARIABLES
HAVE YOU EVER FELT YOU SHOULD CUT DOWN ON YOUR DRINKING: NO
DOES PATIENT WANT TO STOP DRINKING: NO
EVER HAD A DRINK FIRST THING IN THE MORNING TO STEADY YOUR NERVES TO GET RID OF A HANGOVER: NO
TOTAL SCORE: 0
AVERAGE NUMBER OF DAYS PER WEEK YOU HAVE A DRINK CONTAINING ALCOHOL: 0.1
ALCOHOL_USE: NO
TOTAL SCORE: 0
CONSUMPTION TOTAL: POSITIVE
TOTAL SCORE: 0
ON A TYPICAL DAY WHEN YOU DRINK ALCOHOL HOW MANY DRINKS DO YOU HAVE: 0.01
EVER FELT BAD OR GUILTY ABOUT YOUR DRINKING: NO
HOW MANY TIMES IN THE PAST YEAR HAVE YOU HAD 5 OR MORE DRINKS IN A DAY: 1
HAVE PEOPLE ANNOYED YOU BY CRITICIZING YOUR DRINKING: NO

## 2024-07-20 ASSESSMENT — COPD QUESTIONNAIRES
DO YOU EVER COUGH UP ANY MUCUS OR PHLEGM?: YES, A FEW DAYS A WEEK OR MONTH
DURING THE PAST 4 WEEKS HOW MUCH DID YOU FEEL SHORT OF BREATH: NONE/LITTLE OF THE TIME
COPD SCREENING SCORE: 4
HAVE YOU SMOKED AT LEAST 100 CIGARETTES IN YOUR ENTIRE LIFE: NO/DON'T KNOW

## 2024-07-20 ASSESSMENT — FIBROSIS 4 INDEX
FIB4 SCORE: 1.2
FIB4 SCORE: 1.77

## 2024-07-20 ASSESSMENT — ENCOUNTER SYMPTOMS
FEVER: 0
WHEEZING: 1
SHORTNESS OF BREATH: 1
COUGH: 1
CHILLS: 0

## 2024-07-20 ASSESSMENT — PAIN DESCRIPTION - PAIN TYPE: TYPE: ACUTE PAIN

## 2024-07-21 ENCOUNTER — APPOINTMENT (OUTPATIENT)
Dept: RADIOLOGY | Facility: MEDICAL CENTER | Age: 79
DRG: 202 | End: 2024-07-21
Attending: INTERNAL MEDICINE
Payer: MEDICARE

## 2024-07-21 ENCOUNTER — PHARMACY VISIT (OUTPATIENT)
Dept: PHARMACY | Facility: MEDICAL CENTER | Age: 79
End: 2024-07-21
Payer: COMMERCIAL

## 2024-07-21 ENCOUNTER — HOME HEALTH ADMISSION (OUTPATIENT)
Dept: HOME HEALTH SERVICES | Facility: HOME HEALTHCARE | Age: 79
End: 2024-07-21
Payer: MEDICARE

## 2024-07-21 ENCOUNTER — APPOINTMENT (OUTPATIENT)
Dept: RADIOLOGY | Facility: MEDICAL CENTER | Age: 79
DRG: 202 | End: 2024-07-21
Payer: MEDICARE

## 2024-07-21 VITALS
OXYGEN SATURATION: 96 % | WEIGHT: 286.6 LBS | BODY MASS INDEX: 56.27 KG/M2 | HEART RATE: 84 BPM | TEMPERATURE: 97.9 F | SYSTOLIC BLOOD PRESSURE: 113 MMHG | DIASTOLIC BLOOD PRESSURE: 84 MMHG | HEIGHT: 60 IN | RESPIRATION RATE: 18 BRPM

## 2024-07-21 PROBLEM — R07.81 PLEURODYNIA: Status: ACTIVE | Noted: 2024-07-20

## 2024-07-21 PROBLEM — E66.01 SEVERE OBESITY (BMI >= 40) (HCC): Status: ACTIVE | Noted: 2021-12-21

## 2024-07-21 PROBLEM — Z71.89 ADVANCE CARE PLANNING: Status: RESOLVED | Noted: 2023-04-13 | Resolved: 2024-07-21

## 2024-07-21 LAB
CHOLEST SERPL-MCNC: 228 MG/DL (ref 100–199)
EST. AVERAGE GLUCOSE BLD GHB EST-MCNC: 131 MG/DL
HBA1C MFR BLD: 6.2 % (ref 4–5.6)
HDLC SERPL-MCNC: 67 MG/DL
LDLC SERPL CALC-MCNC: 150 MG/DL
TRIGL SERPL-MCNC: 55 MG/DL (ref 0–149)

## 2024-07-21 PROCEDURE — 80061 LIPID PANEL: CPT

## 2024-07-21 PROCEDURE — 36415 COLL VENOUS BLD VENIPUNCTURE: CPT

## 2024-07-21 PROCEDURE — RXMED WILLOW AMBULATORY MEDICATION CHARGE: Performed by: STUDENT IN AN ORGANIZED HEALTH CARE EDUCATION/TRAINING PROGRAM

## 2024-07-21 PROCEDURE — 700111 HCHG RX REV CODE 636 W/ 250 OVERRIDE (IP): Mod: JZ | Performed by: INTERNAL MEDICINE

## 2024-07-21 PROCEDURE — 700102 HCHG RX REV CODE 250 W/ 637 OVERRIDE(OP): Performed by: INTERNAL MEDICINE

## 2024-07-21 PROCEDURE — 93970 EXTREMITY STUDY: CPT | Mod: 26 | Performed by: INTERNAL MEDICINE

## 2024-07-21 PROCEDURE — A9270 NON-COVERED ITEM OR SERVICE: HCPCS

## 2024-07-21 PROCEDURE — 94660 CPAP INITIATION&MGMT: CPT

## 2024-07-21 PROCEDURE — 700101 HCHG RX REV CODE 250: Performed by: INTERNAL MEDICINE

## 2024-07-21 PROCEDURE — 83036 HEMOGLOBIN GLYCOSYLATED A1C: CPT

## 2024-07-21 PROCEDURE — 94640 AIRWAY INHALATION TREATMENT: CPT

## 2024-07-21 PROCEDURE — 700102 HCHG RX REV CODE 250 W/ 637 OVERRIDE(OP)

## 2024-07-21 PROCEDURE — 700117 HCHG RX CONTRAST REV CODE 255

## 2024-07-21 PROCEDURE — A9270 NON-COVERED ITEM OR SERVICE: HCPCS | Performed by: INTERNAL MEDICINE

## 2024-07-21 PROCEDURE — 99239 HOSP IP/OBS DSCHRG MGMT >30: CPT | Performed by: STUDENT IN AN ORGANIZED HEALTH CARE EDUCATION/TRAINING PROGRAM

## 2024-07-21 PROCEDURE — 93970 EXTREMITY STUDY: CPT

## 2024-07-21 RX ORDER — AMLODIPINE BESYLATE 10 MG/1
10 TABLET ORAL DAILY
Qty: 30 TABLET | Refills: 1 | Status: SHIPPED | OUTPATIENT
Start: 2024-07-22

## 2024-07-21 RX ORDER — METHYLPREDNISOLONE 4 MG/1
TABLET ORAL
Qty: 70 TABLET | Refills: 0 | Status: SHIPPED | OUTPATIENT
Start: 2024-07-21 | End: 2024-08-02

## 2024-07-21 RX ADMIN — NYSTATIN: 100000 POWDER TOPICAL at 05:16

## 2024-07-21 RX ADMIN — IOHEXOL 50 ML: 350 INJECTION, SOLUTION INTRAVENOUS at 01:09

## 2024-07-21 RX ADMIN — AMLODIPINE BESYLATE 10 MG: 5 TABLET ORAL at 05:16

## 2024-07-21 RX ADMIN — ASPIRIN 81 MG: 81 TABLET, COATED ORAL at 05:16

## 2024-07-21 RX ADMIN — LEVOTHYROXINE SODIUM 75 MCG: 0.07 TABLET ORAL at 05:16

## 2024-07-21 RX ADMIN — IPRATROPIUM BROMIDE AND ALBUTEROL SULFATE 3 ML: 2.5; .5 SOLUTION RESPIRATORY (INHALATION) at 02:39

## 2024-07-21 RX ADMIN — FUROSEMIDE 40 MG: 10 INJECTION INTRAMUSCULAR; INTRAVENOUS at 05:16

## 2024-07-21 ASSESSMENT — PAIN DESCRIPTION - PAIN TYPE
TYPE: ACUTE PAIN
TYPE: ACUTE PAIN

## 2024-07-21 ASSESSMENT — FIBROSIS 4 INDEX: FIB4 SCORE: 1.2

## 2024-07-25 ENCOUNTER — HOME CARE VISIT (OUTPATIENT)
Dept: HOME HEALTH SERVICES | Facility: HOME HEALTHCARE | Age: 79
End: 2024-07-25
Payer: MEDICARE

## 2024-07-25 ENCOUNTER — DOCUMENTATION (OUTPATIENT)
Dept: MEDICAL GROUP | Facility: PHYSICIAN GROUP | Age: 79
End: 2024-07-25
Payer: MEDICARE

## 2024-07-25 VITALS
HEART RATE: 84 BPM | TEMPERATURE: 97.6 F | DIASTOLIC BLOOD PRESSURE: 84 MMHG | OXYGEN SATURATION: 95 % | RESPIRATION RATE: 22 BRPM | SYSTOLIC BLOOD PRESSURE: 137 MMHG

## 2024-07-25 PROCEDURE — G0493 RN CARE EA 15 MIN HH/HOSPICE: HCPCS

## 2024-07-25 PROCEDURE — 665005 NO-PAY RAP - HOME HEALTH

## 2024-07-25 ASSESSMENT — ENCOUNTER SYMPTOMS
VOMITING: DENIES
LAST BOWEL MOVEMENT: 67045
STOOL FREQUENCY: LESS THAN DAILY
DYSPNEA ACTIVITY LEVEL: AT REST
NAUSEA: DENIES
DENIES PAIN: 1
PERSON REPORTING PAIN: PATIENT
HYPERTENSION: 1
COUGH CHARACTERISTICS: PRODUCTIVE
COUGH: 1
BOWEL PATTERN NORMAL: 1
LOWER EXTREMITY EDEMA: 1
ORTHOPNEA: 1

## 2024-07-25 ASSESSMENT — ACTIVITIES OF DAILY LIVING (ADL): OASIS_M1830: 03

## 2024-07-29 ENCOUNTER — HOME CARE VISIT (OUTPATIENT)
Dept: HOME HEALTH SERVICES | Facility: HOME HEALTHCARE | Age: 79
End: 2024-07-29
Payer: MEDICARE

## 2024-07-30 ENCOUNTER — HOME CARE VISIT (OUTPATIENT)
Dept: HOME HEALTH SERVICES | Facility: HOME HEALTHCARE | Age: 79
End: 2024-07-30
Payer: MEDICARE

## 2024-08-06 ENCOUNTER — HOME CARE VISIT (OUTPATIENT)
Dept: HOME HEALTH SERVICES | Facility: HOME HEALTHCARE | Age: 79
End: 2024-08-06
Payer: MEDICARE

## 2024-08-06 NOTE — Clinical Note
Noted.  ----- Message -----  From: Jes Aguiar R.N.  Sent: 8/7/2024   3:14 PM PDT  To: Shayla Kang, PT; *      It was brought to the attention of leadership that this patient has not had any visits since the SOC on 7/25/24, PT has called and left multiple messages and has not heard back from pt. Per RN Case manager, she did a drive by on 7/30/24 and pt refused visit, stated to nurse to get out as she was a foreigner and a Libertarian. Pt has in the past fired 2 nurses who were of [place] ethnicity. This supervisor called pt today, unable to reach pt, left a lengthy message on her voicemail that HH cannot accommodate specific demands for certain political affiliation or nationality. Stated the need for pt to call this Supervisor and discuss HH needs. Left call back number for return call.

## 2024-08-07 ENCOUNTER — HOME CARE VISIT (OUTPATIENT)
Dept: HOME HEALTH SERVICES | Facility: HOME HEALTHCARE | Age: 79
End: 2024-08-07
Payer: MEDICARE

## 2024-08-07 NOTE — CASE COMMUNICATION
Detailed message left yesterday by this Supervisor, many messages left by field clinicians attempting to schedule visits, all without return call. SN arrived at pt home last week for a drive by as pt was not answering calls, and pt refused visit and made rude comments toward nurse. Due to no call back today after detailed message with call back number yesterday, and request that pt call back ASAP to discuss POC, will now assume pt no lo nger wants services. All visits cancelled. Additional call placed today by this Supervisor, was not able to reach patient, left message that pt was being discontinued from HH services and to call her PCP with any problems or questions.

## 2024-08-07 NOTE — Clinical Note
Noted  ----- Message -----  From: Jes Aguiar R.N.  Sent: 8/7/2024   4:41 PM PDT  To: Shayla Kang, PT; Gisela Mijares; *      Detailed message left yesterday by this Supervisor, many messages left by field clinicians attempting to schedule visits, all without return call. SN arrived at pt home last week for a drive by as pt was not answering calls, and pt refused visit and made rude comments toward nurse. Due to no call back today after detailed message with call back number yesterday, and request that pt call back ASAP to discuss POC, will now assume pt no longer wants services. All visits cancelled. Additional call placed today by this Supervisor, was not able to reach patient, left message that pt was being discontinued from  services and to call her PCP with any problems or questions.

## 2024-08-07 NOTE — CASE COMMUNICATION
It was brought to the attention of leadership that this patient has not had any visits since the SOC on 7/25/24, PT has called and left multiple messages and has not heard back from pt. Per RN Case manager, she did a drive by on 7/30/24 and pt refused visit, stated to nurse to get out as she was a foreigner and a Constitution party. Pt has in the past fired 2 nurses who were of [place] ethnicity. This supervisor called pt today, unable to reac h pt, left a lengthy message on her voicemail that HH cannot accommodate specific demands for certain political affiliation or nationality. Stated the need for pt to call this Supervisor and discuss HH needs. Left call back number for return call.

## 2024-08-13 ENCOUNTER — HOME CARE VISIT (OUTPATIENT)
Dept: HOME HEALTH SERVICES | Facility: HOME HEALTHCARE | Age: 79
End: 2024-08-13
Payer: MEDICARE

## 2024-08-13 NOTE — Clinical Note
I agree with these changes  ----- Message -----  From: Svetlana Holliday R.N.  Sent: 8/13/2024  11:51 AM PDT  To: Molly Rogel R.N.      Quality Review for 7.25.24 SOC OASIS performed on by NAINA Holliday RN on 8.13.2024:    Edits completed by NAINA Holliday RN:  1.  and  dx coding updated per chart review.   2. Added severe taxing effort to leave the home to HB status per narrative  3. Changed  to 7.25.24 per LSOC order  4. Added #6 to  per BMI  5. Changed [id number] to 09, pt did not ambulate  6. Added indication to  per chart review. Added H per MAR pt is on Norco  7. Added incontinence to functional limitations  8. Added proper med use to safety measures. Added ADA diet to nutritional requirements  9. Changed  to 0

## 2024-08-13 NOTE — CASE COMMUNICATION
Quality Review for 7.25.24 SOC OASIS performed on by NAINA Holliday RN on 8.13.2024:    Edits completed by NAINA Holliday RN:  1.  and  dx coding updated per chart review.   2. Added severe taxing effort to leave the home to HB status per narrative  3. Changed  to 7.25.24 per LSOC order  4. Added #6 to  per BMI  5. Changed [id number] to 09, pt did not ambulate  6. Added indication to  per chart review. Added H per MAR pt is  on Norco  7. Added incontinence to functional limitations  8. Added proper med use to safety measures. Added ADA diet to nutritional requirements  9. Changed  to 0

## 2024-09-04 ENCOUNTER — PATIENT MESSAGE (OUTPATIENT)
Dept: HEALTH INFORMATION MANAGEMENT | Facility: OTHER | Age: 79
End: 2024-09-04

## 2024-09-11 PROBLEM — I10 PRIMARY HYPERTENSION: Chronic | Status: ACTIVE | Noted: 2022-01-12

## 2024-09-11 PROBLEM — F39 MOOD DISORDER (HCC): Chronic | Status: ACTIVE | Noted: 2024-01-04

## 2024-09-11 PROBLEM — R07.81 PLEURODYNIA: Status: RESOLVED | Noted: 2024-07-20 | Resolved: 2024-09-11

## 2024-09-11 PROBLEM — J45.909 UNCOMPLICATED ASTHMA: Chronic | Status: ACTIVE | Noted: 2024-04-21

## 2024-09-11 PROBLEM — E11.9 TYPE 2 DIABETES MELLITUS, WITHOUT LONG-TERM CURRENT USE OF INSULIN (HCC): Chronic | Status: ACTIVE | Noted: 2024-05-15

## 2024-09-11 PROBLEM — E66.01 SEVERE OBESITY (BMI >= 40) (HCC): Chronic | Status: ACTIVE | Noted: 2021-12-21

## 2024-09-11 PROBLEM — F32.1 MODERATE MAJOR DEPRESSION (HCC): Chronic | Status: ACTIVE | Noted: 2021-12-21

## 2024-09-11 PROBLEM — E03.9 HYPOTHYROIDISM: Chronic | Status: ACTIVE | Noted: 2021-12-21

## 2024-09-11 PROBLEM — E66.01 MORBID OBESITY (HCC): Chronic | Status: ACTIVE | Noted: 2021-12-21

## 2024-09-11 PROBLEM — R05.3 CHRONIC COUGH: Status: ACTIVE | Noted: 2024-09-11

## 2024-09-11 PROBLEM — J96.01 ACUTE RESPIRATORY FAILURE WITH HYPOXIA (HCC): Chronic | Status: ACTIVE | Noted: 2024-07-20

## 2024-09-11 PROBLEM — G63 POLYNEUROPATHY IN OTHER DISEASES CLASSIFIED ELSEWHERE (HCC): Chronic | Status: ACTIVE | Noted: 2021-12-21

## 2024-09-11 PROBLEM — R05.3 CHRONIC COUGH: Chronic | Status: ACTIVE | Noted: 2024-09-11

## 2024-09-13 ENCOUNTER — DOCUMENTATION (OUTPATIENT)
Dept: HEALTH INFORMATION MANAGEMENT | Facility: OTHER | Age: 79
End: 2024-09-13
Payer: MEDICARE

## 2024-11-03 ENCOUNTER — APPOINTMENT (OUTPATIENT)
Dept: RADIOLOGY | Facility: MEDICAL CENTER | Age: 79
DRG: 202 | End: 2024-11-03
Attending: EMERGENCY MEDICINE
Payer: MEDICARE

## 2024-11-03 ENCOUNTER — HOSPITAL ENCOUNTER (INPATIENT)
Facility: MEDICAL CENTER | Age: 79
LOS: 5 days | DRG: 202 | End: 2024-11-08
Attending: EMERGENCY MEDICINE | Admitting: HOSPITALIST
Payer: MEDICARE

## 2024-11-03 ENCOUNTER — APPOINTMENT (OUTPATIENT)
Dept: RADIOLOGY | Facility: MEDICAL CENTER | Age: 79
DRG: 202 | End: 2024-11-03
Attending: HOSPITALIST
Payer: MEDICARE

## 2024-11-03 ENCOUNTER — APPOINTMENT (OUTPATIENT)
Dept: RADIOLOGY | Facility: MEDICAL CENTER | Age: 79
DRG: 202 | End: 2024-11-03
Payer: MEDICARE

## 2024-11-03 DIAGNOSIS — E66.9 OBESITY, UNSPECIFIED CLASS, UNSPECIFIED OBESITY TYPE, UNSPECIFIED WHETHER SERIOUS COMORBIDITY PRESENT: ICD-10-CM

## 2024-11-03 DIAGNOSIS — I10 PRIMARY HYPERTENSION: Chronic | ICD-10-CM

## 2024-11-03 DIAGNOSIS — F39 MOOD DISORDER (HCC): Chronic | ICD-10-CM

## 2024-11-03 DIAGNOSIS — Z74.09 IMPAIRED FUNCTIONAL MOBILITY, BALANCE, GAIT, AND ENDURANCE: ICD-10-CM

## 2024-11-03 DIAGNOSIS — R46.89 BEHAVIORAL CHANGE: ICD-10-CM

## 2024-11-03 DIAGNOSIS — J96.01 ACUTE HYPOXEMIC RESPIRATORY FAILURE (HCC): ICD-10-CM

## 2024-11-03 DIAGNOSIS — I89.0 LYMPHEDEMA ASSOCIATED WITH OBESITY: ICD-10-CM

## 2024-11-03 DIAGNOSIS — E66.01 SEVERE OBESITY (BMI >= 40) (HCC): Chronic | ICD-10-CM

## 2024-11-03 DIAGNOSIS — E66.9 LYMPHEDEMA ASSOCIATED WITH OBESITY: ICD-10-CM

## 2024-11-03 DIAGNOSIS — J45.909 UNCOMPLICATED ASTHMA, UNSPECIFIED ASTHMA SEVERITY, UNSPECIFIED WHETHER PERSISTENT: ICD-10-CM

## 2024-11-03 PROBLEM — J45.901 ACUTE ASTHMA EXACERBATION: Status: ACTIVE | Noted: 2024-11-03

## 2024-11-03 PROBLEM — R79.89 ELEVATED TROPONIN: Status: ACTIVE | Noted: 2024-11-03

## 2024-11-03 LAB
ALBUMIN SERPL BCP-MCNC: 4.3 G/DL (ref 3.2–4.9)
ALBUMIN/GLOB SERPL: 1 G/DL
ALP SERPL-CCNC: 92 U/L (ref 30–99)
ALT SERPL-CCNC: 11 U/L (ref 2–50)
ANION GAP SERPL CALC-SCNC: 12 MMOL/L (ref 7–16)
APPEARANCE UR: CLEAR
AST SERPL-CCNC: 22 U/L (ref 12–45)
BACTERIA #/AREA URNS HPF: ABNORMAL /HPF
BASE EXCESS BLDA CALC-SCNC: 0 MMOL/L (ref -4–3)
BASOPHILS # BLD AUTO: 0.8 % (ref 0–1.8)
BASOPHILS # BLD: 0.07 K/UL (ref 0–0.12)
BILIRUB SERPL-MCNC: 0.5 MG/DL (ref 0.1–1.5)
BILIRUB UR QL STRIP.AUTO: NEGATIVE
BODY TEMPERATURE: 37 CENTIGRADE
BUN SERPL-MCNC: 19 MG/DL (ref 8–22)
CALCIUM ALBUM COR SERPL-MCNC: 9.3 MG/DL (ref 8.5–10.5)
CALCIUM SERPL-MCNC: 9.5 MG/DL (ref 8.5–10.5)
CASTS URNS QL MICRO: ABNORMAL /LPF (ref 0–2)
CHLORIDE SERPL-SCNC: 103 MMOL/L (ref 96–112)
CO2 SERPL-SCNC: 24 MMOL/L (ref 20–33)
COLOR UR: YELLOW
CREAT SERPL-MCNC: 0.75 MG/DL (ref 0.5–1.4)
EKG IMPRESSION: NORMAL
EOSINOPHIL # BLD AUTO: 0.89 K/UL (ref 0–0.51)
EOSINOPHIL NFR BLD: 9.8 % (ref 0–6.9)
EPITHELIAL CELLS 1715: ABNORMAL /HPF (ref 0–5)
ERYTHROCYTE [DISTWIDTH] IN BLOOD BY AUTOMATED COUNT: 49.7 FL (ref 35.9–50)
FLUAV RNA SPEC QL NAA+PROBE: NEGATIVE
FLUAV RNA SPEC QL NAA+PROBE: NEGATIVE
FLUBV RNA SPEC QL NAA+PROBE: NEGATIVE
FLUBV RNA SPEC QL NAA+PROBE: NEGATIVE
GFR SERPLBLD CREATININE-BSD FMLA CKD-EPI: 81 ML/MIN/1.73 M 2
GLOBULIN SER CALC-MCNC: 4.1 G/DL (ref 1.9–3.5)
GLUCOSE SERPL-MCNC: 140 MG/DL (ref 65–99)
GLUCOSE UR STRIP.AUTO-MCNC: NEGATIVE MG/DL
HCO3 BLDA-SCNC: 25 MMOL/L (ref 17–25)
HCT VFR BLD AUTO: 43.2 % (ref 37–47)
HGB BLD-MCNC: 13.9 G/DL (ref 12–16)
IMM GRANULOCYTES # BLD AUTO: 0.04 K/UL (ref 0–0.11)
IMM GRANULOCYTES NFR BLD AUTO: 0.4 % (ref 0–0.9)
INHALED O2 FLOW RATE: 40 L/MIN (ref 2–10)
INHALED O2 FLOW RATE: ABNORMAL L/MIN
KETONES UR STRIP.AUTO-MCNC: NEGATIVE MG/DL
LACTATE SERPL-SCNC: 1.1 MMOL/L (ref 0.5–2)
LEUKOCYTE ESTERASE UR QL STRIP.AUTO: NEGATIVE
LYMPHOCYTES # BLD AUTO: 2.25 K/UL (ref 1–4.8)
LYMPHOCYTES NFR BLD: 24.9 % (ref 22–41)
MAGNESIUM SERPL-MCNC: 2.4 MG/DL (ref 1.5–2.5)
MCH RBC QN AUTO: 30.5 PG (ref 27–33)
MCHC RBC AUTO-ENTMCNC: 32.2 G/DL (ref 32.2–35.5)
MCV RBC AUTO: 94.9 FL (ref 81.4–97.8)
MICRO URNS: ABNORMAL
MONOCYTES # BLD AUTO: 0.76 K/UL (ref 0–0.85)
MONOCYTES NFR BLD AUTO: 8.4 % (ref 0–13.4)
NEUTROPHILS # BLD AUTO: 5.04 K/UL (ref 1.82–7.42)
NEUTROPHILS NFR BLD: 55.7 % (ref 44–72)
NITRITE UR QL STRIP.AUTO: NEGATIVE
NRBC # BLD AUTO: 0 K/UL
NRBC BLD-RTO: 0 /100 WBC (ref 0–0.2)
NT-PROBNP SERPL IA-MCNC: 75 PG/ML (ref 0–125)
PCO2 BLDA: 38.7 MMHG (ref 26–37)
PCO2 TEMP ADJ BLDA: 38.7 MMHG (ref 26–37)
PH BLDA: 7.42 [PH] (ref 7.4–7.5)
PH TEMP ADJ BLDA: 7.42 [PH] (ref 7.4–7.5)
PH UR STRIP.AUTO: 7 [PH] (ref 5–8)
PLATELET # BLD AUTO: 349 K/UL (ref 164–446)
PMV BLD AUTO: 9 FL (ref 9–12.9)
PO2 BLDA: 135.1 MMHG (ref 64–87)
PO2 TEMP ADJ BLDA: 135.1 MMHG (ref 64–87)
POTASSIUM SERPL-SCNC: 4 MMOL/L (ref 3.6–5.5)
PROCALCITONIN SERPL-MCNC: 0.05 NG/ML
PROT SERPL-MCNC: 8.4 G/DL (ref 6–8.2)
PROT UR QL STRIP: NEGATIVE MG/DL
RBC # BLD AUTO: 4.55 M/UL (ref 4.2–5.4)
RBC # URNS HPF: >100 /HPF (ref 0–2)
RBC UR QL AUTO: ABNORMAL
RSV RNA SPEC QL NAA+PROBE: NEGATIVE
RSV RNA SPEC QL NAA+PROBE: NEGATIVE
SAO2 % BLDA: 98.8 % (ref 93–99)
SARS-COV-2 RNA RESP QL NAA+PROBE: NOTDETECTED
SARS-COV-2 RNA RESP QL NAA+PROBE: NOTDETECTED
SODIUM SERPL-SCNC: 139 MMOL/L (ref 135–145)
SP GR UR STRIP.AUTO: 1.01
SPECIMEN SOURCE: NORMAL
TROPONIN T SERPL-MCNC: 20 NG/L (ref 6–19)
TROPONIN T SERPL-MCNC: 22 NG/L (ref 6–19)
TROPONIN T SERPL-MCNC: 23 NG/L (ref 6–19)
UROBILINOGEN UR STRIP.AUTO-MCNC: 0.2 EU/DL
WBC # BLD AUTO: 9.1 K/UL (ref 4.8–10.8)
WBC #/AREA URNS HPF: ABNORMAL /HPF

## 2024-11-03 PROCEDURE — 85025 COMPLETE CBC W/AUTO DIFF WBC: CPT

## 2024-11-03 PROCEDURE — 99291 CRITICAL CARE FIRST HOUR: CPT | Mod: AI | Performed by: HOSPITALIST

## 2024-11-03 PROCEDURE — 36415 COLL VENOUS BLD VENIPUNCTURE: CPT

## 2024-11-03 PROCEDURE — 96374 THER/PROPH/DIAG INJ IV PUSH: CPT

## 2024-11-03 PROCEDURE — 0241U HCHG SARS-COV-2 COVID-19 NFCT DS RESP RNA 4 TRGT MIC: CPT

## 2024-11-03 PROCEDURE — 82803 BLOOD GASES ANY COMBINATION: CPT

## 2024-11-03 PROCEDURE — 94645 CONT INHLJ TX EACH ADDL HOUR: CPT

## 2024-11-03 PROCEDURE — 99285 EMERGENCY DEPT VISIT HI MDM: CPT

## 2024-11-03 PROCEDURE — 0241U HCHG SARS-COV-2 COVID-19 NFCT DS RESP RNA 4 TRGT ED POC: CPT

## 2024-11-03 PROCEDURE — 700105 HCHG RX REV CODE 258: Performed by: INTERNAL MEDICINE

## 2024-11-03 PROCEDURE — 700111 HCHG RX REV CODE 636 W/ 250 OVERRIDE (IP): Mod: JZ | Performed by: EMERGENCY MEDICINE

## 2024-11-03 PROCEDURE — 80053 COMPREHEN METABOLIC PANEL: CPT

## 2024-11-03 PROCEDURE — 94640 AIRWAY INHALATION TREATMENT: CPT

## 2024-11-03 PROCEDURE — 770000 HCHG ROOM/CARE - INTERMEDIATE ICU *

## 2024-11-03 PROCEDURE — 700102 HCHG RX REV CODE 250 W/ 637 OVERRIDE(OP): Performed by: HOSPITALIST

## 2024-11-03 PROCEDURE — 99291 CRITICAL CARE FIRST HOUR: CPT | Performed by: INTERNAL MEDICINE

## 2024-11-03 PROCEDURE — 84145 PROCALCITONIN (PCT): CPT

## 2024-11-03 PROCEDURE — 83605 ASSAY OF LACTIC ACID: CPT

## 2024-11-03 PROCEDURE — 87077 CULTURE AEROBIC IDENTIFY: CPT

## 2024-11-03 PROCEDURE — 96375 TX/PRO/DX INJ NEW DRUG ADDON: CPT

## 2024-11-03 PROCEDURE — 87040 BLOOD CULTURE FOR BACTERIA: CPT | Mod: 91

## 2024-11-03 PROCEDURE — 700101 HCHG RX REV CODE 250: Performed by: EMERGENCY MEDICINE

## 2024-11-03 PROCEDURE — 700111 HCHG RX REV CODE 636 W/ 250 OVERRIDE (IP): Performed by: HOSPITALIST

## 2024-11-03 PROCEDURE — 700101 HCHG RX REV CODE 250: Performed by: INTERNAL MEDICINE

## 2024-11-03 PROCEDURE — 93970 EXTREMITY STUDY: CPT | Mod: 26 | Performed by: INTERNAL MEDICINE

## 2024-11-03 PROCEDURE — 87086 URINE CULTURE/COLONY COUNT: CPT

## 2024-11-03 PROCEDURE — 84484 ASSAY OF TROPONIN QUANT: CPT

## 2024-11-03 PROCEDURE — 71045 X-RAY EXAM CHEST 1 VIEW: CPT

## 2024-11-03 PROCEDURE — 82962 GLUCOSE BLOOD TEST: CPT

## 2024-11-03 PROCEDURE — 93005 ELECTROCARDIOGRAM TRACING: CPT | Performed by: EMERGENCY MEDICINE

## 2024-11-03 PROCEDURE — 93005 ELECTROCARDIOGRAM TRACING: CPT | Performed by: HOSPITALIST

## 2024-11-03 PROCEDURE — 83880 ASSAY OF NATRIURETIC PEPTIDE: CPT

## 2024-11-03 PROCEDURE — 81003 URINALYSIS AUTO W/O SCOPE: CPT

## 2024-11-03 PROCEDURE — 700101 HCHG RX REV CODE 250: Performed by: HOSPITALIST

## 2024-11-03 PROCEDURE — 87186 SC STD MICRODIL/AGAR DIL: CPT

## 2024-11-03 PROCEDURE — 700111 HCHG RX REV CODE 636 W/ 250 OVERRIDE (IP): Performed by: INTERNAL MEDICINE

## 2024-11-03 PROCEDURE — 700101 HCHG RX REV CODE 250: Performed by: STUDENT IN AN ORGANIZED HEALTH CARE EDUCATION/TRAINING PROGRAM

## 2024-11-03 PROCEDURE — A9270 NON-COVERED ITEM OR SERVICE: HCPCS | Performed by: HOSPITALIST

## 2024-11-03 PROCEDURE — 93970 EXTREMITY STUDY: CPT

## 2024-11-03 PROCEDURE — 94644 CONT INHLJ TX 1ST HOUR: CPT

## 2024-11-03 PROCEDURE — 83735 ASSAY OF MAGNESIUM: CPT

## 2024-11-03 PROCEDURE — 94660 CPAP INITIATION&MGMT: CPT

## 2024-11-03 PROCEDURE — 81015 MICROSCOPIC EXAM OF URINE: CPT

## 2024-11-03 PROCEDURE — 93005 ELECTROCARDIOGRAM TRACING: CPT

## 2024-11-03 RX ORDER — HYDRALAZINE HYDROCHLORIDE 20 MG/ML
20 INJECTION INTRAMUSCULAR; INTRAVENOUS EVERY 4 HOURS PRN
Status: DISCONTINUED | OUTPATIENT
Start: 2024-11-03 | End: 2024-11-08 | Stop reason: HOSPADM

## 2024-11-03 RX ORDER — DEXTROSE MONOHYDRATE 25 G/50ML
25 INJECTION, SOLUTION INTRAVENOUS
Status: DISCONTINUED | OUTPATIENT
Start: 2024-11-03 | End: 2024-11-08 | Stop reason: HOSPADM

## 2024-11-03 RX ORDER — NYSTATIN 10B UNIT
1 POWDER (EA) MISCELLANEOUS 2 TIMES DAILY PRN
Status: DISCONTINUED | OUTPATIENT
Start: 2024-11-03 | End: 2024-11-03

## 2024-11-03 RX ORDER — INSULIN LISPRO 100 [IU]/ML
2-9 INJECTION, SOLUTION INTRAVENOUS; SUBCUTANEOUS
Status: DISCONTINUED | OUTPATIENT
Start: 2024-11-03 | End: 2024-11-08 | Stop reason: HOSPADM

## 2024-11-03 RX ORDER — METHYLPREDNISOLONE SODIUM SUCCINATE 125 MG/2ML
125 INJECTION, POWDER, LYOPHILIZED, FOR SOLUTION INTRAMUSCULAR; INTRAVENOUS ONCE
Status: COMPLETED | OUTPATIENT
Start: 2024-11-03 | End: 2024-11-03

## 2024-11-03 RX ORDER — GUAIFENESIN/DEXTROMETHORPHAN 100-10MG/5
10 SYRUP ORAL EVERY 6 HOURS PRN
Status: DISCONTINUED | OUTPATIENT
Start: 2024-11-03 | End: 2024-11-08 | Stop reason: HOSPADM

## 2024-11-03 RX ORDER — BENZONATATE 100 MG/1
100 CAPSULE ORAL 3 TIMES DAILY PRN
Status: DISCONTINUED | OUTPATIENT
Start: 2024-11-03 | End: 2024-11-08 | Stop reason: HOSPADM

## 2024-11-03 RX ORDER — IPRATROPIUM BROMIDE AND ALBUTEROL SULFATE 2.5; .5 MG/3ML; MG/3ML
3 SOLUTION RESPIRATORY (INHALATION)
Status: DISCONTINUED | OUTPATIENT
Start: 2024-11-03 | End: 2024-11-08 | Stop reason: HOSPADM

## 2024-11-03 RX ORDER — ALBUTEROL SULFATE 90 UG/1
2 INHALANT RESPIRATORY (INHALATION) EVERY 6 HOURS PRN
Status: DISCONTINUED | OUTPATIENT
Start: 2024-11-03 | End: 2024-11-08 | Stop reason: HOSPADM

## 2024-11-03 RX ORDER — OXYCODONE HYDROCHLORIDE 5 MG/1
5 TABLET ORAL EVERY 4 HOURS PRN
Status: DISCONTINUED | OUTPATIENT
Start: 2024-11-03 | End: 2024-11-08 | Stop reason: HOSPADM

## 2024-11-03 RX ORDER — LEVOTHYROXINE SODIUM 88 UG/1
88 TABLET ORAL
Status: DISCONTINUED | OUTPATIENT
Start: 2024-11-03 | End: 2024-11-08 | Stop reason: HOSPADM

## 2024-11-03 RX ORDER — ASPIRIN 81 MG/1
81 TABLET ORAL DAILY
Status: DISCONTINUED | OUTPATIENT
Start: 2024-11-03 | End: 2024-11-08 | Stop reason: HOSPADM

## 2024-11-03 RX ORDER — CETIRIZINE HYDROCHLORIDE 10 MG/1
10 TABLET ORAL
Status: DISCONTINUED | OUTPATIENT
Start: 2024-11-03 | End: 2024-11-08 | Stop reason: HOSPADM

## 2024-11-03 RX ORDER — METOPROLOL SUCCINATE 25 MG/1
25 TABLET, EXTENDED RELEASE ORAL DAILY
Status: DISCONTINUED | OUTPATIENT
Start: 2024-11-03 | End: 2024-11-08

## 2024-11-03 RX ORDER — LORAZEPAM 2 MG/ML
0.5 INJECTION INTRAMUSCULAR EVERY 6 HOURS PRN
Status: DISCONTINUED | OUTPATIENT
Start: 2024-11-03 | End: 2024-11-03

## 2024-11-03 RX ORDER — LORAZEPAM 2 MG/ML
1-2 INJECTION INTRAMUSCULAR EVERY 4 HOURS PRN
Status: DISCONTINUED | OUTPATIENT
Start: 2024-11-03 | End: 2024-11-05

## 2024-11-03 RX ORDER — ENOXAPARIN SODIUM 100 MG/ML
30 INJECTION SUBCUTANEOUS EVERY 12 HOURS
Status: DISCONTINUED | OUTPATIENT
Start: 2024-11-03 | End: 2024-11-05

## 2024-11-03 RX ORDER — METHYLPREDNISOLONE SODIUM SUCCINATE 40 MG/ML
40 INJECTION, POWDER, LYOPHILIZED, FOR SOLUTION INTRAMUSCULAR; INTRAVENOUS EVERY 6 HOURS
Status: DISCONTINUED | OUTPATIENT
Start: 2024-11-03 | End: 2024-11-03

## 2024-11-03 RX ORDER — ONDANSETRON 2 MG/ML
4 INJECTION INTRAMUSCULAR; INTRAVENOUS EVERY 4 HOURS PRN
Status: DISCONTINUED | OUTPATIENT
Start: 2024-11-03 | End: 2024-11-08 | Stop reason: HOSPADM

## 2024-11-03 RX ORDER — FLUTICASONE PROPIONATE 110 UG/1
2 AEROSOL, METERED RESPIRATORY (INHALATION) 2 TIMES DAILY
Status: DISCONTINUED | OUTPATIENT
Start: 2024-11-03 | End: 2024-11-08 | Stop reason: HOSPADM

## 2024-11-03 RX ORDER — DEXMEDETOMIDINE HYDROCHLORIDE 4 UG/ML
.1-1.5 INJECTION, SOLUTION INTRAVENOUS CONTINUOUS
Status: DISCONTINUED | OUTPATIENT
Start: 2024-11-03 | End: 2024-11-06

## 2024-11-03 RX ORDER — LOSARTAN POTASSIUM 50 MG/1
100 TABLET ORAL DAILY
Status: DISCONTINUED | OUTPATIENT
Start: 2024-11-03 | End: 2024-11-08 | Stop reason: HOSPADM

## 2024-11-03 RX ORDER — LORAZEPAM 2 MG/ML
1 INJECTION INTRAMUSCULAR ONCE
Status: COMPLETED | OUTPATIENT
Start: 2024-11-03 | End: 2024-11-03

## 2024-11-03 RX ORDER — ALBUTEROL SULFATE 5 MG/ML
2.5 SOLUTION RESPIRATORY (INHALATION) ONCE
Status: COMPLETED | OUTPATIENT
Start: 2024-11-03 | End: 2024-11-03

## 2024-11-03 RX ORDER — AMLODIPINE BESYLATE 10 MG/1
10 TABLET ORAL DAILY
Status: DISCONTINUED | OUTPATIENT
Start: 2024-11-03 | End: 2024-11-08 | Stop reason: HOSPADM

## 2024-11-03 RX ORDER — AZITHROMYCIN 250 MG/1
500 TABLET, FILM COATED ORAL DAILY
Status: COMPLETED | OUTPATIENT
Start: 2024-11-04 | End: 2024-11-06

## 2024-11-03 RX ORDER — NYSTATIN 100000 [USP'U]/G
POWDER TOPICAL 2 TIMES DAILY PRN
Status: DISCONTINUED | OUTPATIENT
Start: 2024-11-03 | End: 2024-11-08 | Stop reason: HOSPADM

## 2024-11-03 RX ORDER — MAGNESIUM SULFATE HEPTAHYDRATE 40 MG/ML
2 INJECTION, SOLUTION INTRAVENOUS ONCE
Status: COMPLETED | OUTPATIENT
Start: 2024-11-03 | End: 2024-11-03

## 2024-11-03 RX ORDER — IPRATROPIUM BROMIDE AND ALBUTEROL SULFATE 2.5; .5 MG/3ML; MG/3ML
3 SOLUTION RESPIRATORY (INHALATION)
Status: DISCONTINUED | OUTPATIENT
Start: 2024-11-03 | End: 2024-11-07

## 2024-11-03 RX ORDER — METHYLPREDNISOLONE SODIUM SUCCINATE 40 MG/ML
40 INJECTION, POWDER, LYOPHILIZED, FOR SOLUTION INTRAMUSCULAR; INTRAVENOUS EVERY 6 HOURS
Status: DISCONTINUED | OUTPATIENT
Start: 2024-11-03 | End: 2024-11-08

## 2024-11-03 RX ORDER — ACETAMINOPHEN 325 MG/1
650 TABLET ORAL EVERY 6 HOURS PRN
Status: DISCONTINUED | OUTPATIENT
Start: 2024-11-03 | End: 2024-11-08 | Stop reason: HOSPADM

## 2024-11-03 RX ORDER — FLUTICASONE PROPIONATE 50 MCG
1 SPRAY, SUSPENSION (ML) NASAL DAILY
Status: DISCONTINUED | OUTPATIENT
Start: 2024-11-03 | End: 2024-11-03

## 2024-11-03 RX ORDER — LORAZEPAM 2 MG/ML
0.5 INJECTION INTRAMUSCULAR
Status: COMPLETED | OUTPATIENT
Start: 2024-11-03 | End: 2024-11-03

## 2024-11-03 RX ORDER — ONDANSETRON 4 MG/1
4 TABLET, ORALLY DISINTEGRATING ORAL EVERY 4 HOURS PRN
Status: DISCONTINUED | OUTPATIENT
Start: 2024-11-03 | End: 2024-11-08 | Stop reason: HOSPADM

## 2024-11-03 RX ORDER — HYDRALAZINE HYDROCHLORIDE 20 MG/ML
10 INJECTION INTRAMUSCULAR; INTRAVENOUS EVERY 4 HOURS PRN
Status: DISCONTINUED | OUTPATIENT
Start: 2024-11-03 | End: 2024-11-03

## 2024-11-03 RX ADMIN — METOPROLOL SUCCINATE 25 MG: 25 TABLET, EXTENDED RELEASE ORAL at 15:15

## 2024-11-03 RX ADMIN — INSULIN LISPRO 3 UNITS: 100 INJECTION, SOLUTION INTRAVENOUS; SUBCUTANEOUS at 20:19

## 2024-11-03 RX ADMIN — IPRATROPIUM BROMIDE AND ALBUTEROL SULFATE 3 ML: .5; 2.5 SOLUTION RESPIRATORY (INHALATION) at 16:46

## 2024-11-03 RX ADMIN — IPRATROPIUM BROMIDE AND ALBUTEROL SULFATE 3 ML: 2.5; .5 SOLUTION RESPIRATORY (INHALATION) at 18:36

## 2024-11-03 RX ADMIN — METHYLPREDNISOLONE SODIUM SUCCINATE 40 MG: 40 INJECTION, POWDER, FOR SOLUTION INTRAMUSCULAR; INTRAVENOUS at 16:59

## 2024-11-03 RX ADMIN — HYDRALAZINE HYDROCHLORIDE 20 MG: 20 INJECTION INTRAMUSCULAR; INTRAVENOUS at 17:04

## 2024-11-03 RX ADMIN — HYDRALAZINE HYDROCHLORIDE 10 MG: 20 INJECTION INTRAMUSCULAR; INTRAVENOUS at 14:15

## 2024-11-03 RX ADMIN — LORAZEPAM 1 MG: 2 INJECTION INTRAMUSCULAR; INTRAVENOUS at 20:09

## 2024-11-03 RX ADMIN — CETIRIZINE HYDROCHLORIDE 10 MG: 10 TABLET, FILM COATED ORAL at 20:16

## 2024-11-03 RX ADMIN — NYSTATIN: 100000 POWDER TOPICAL at 22:25

## 2024-11-03 RX ADMIN — ALBUTEROL SULFATE 2.5 MG: 2.5 SOLUTION RESPIRATORY (INHALATION) at 13:09

## 2024-11-03 RX ADMIN — LORAZEPAM 0.5 MG: 2 INJECTION INTRAMUSCULAR; INTRAVENOUS at 18:41

## 2024-11-03 RX ADMIN — ASPIRIN 81 MG: 81 TABLET, COATED ORAL at 15:15

## 2024-11-03 RX ADMIN — ALBUTEROL SULFATE 2 PUFF: 90 AEROSOL, METERED RESPIRATORY (INHALATION) at 16:26

## 2024-11-03 RX ADMIN — FLUTICASONE PROPIONATE 220 MCG: 110 AEROSOL, METERED RESPIRATORY (INHALATION) at 16:44

## 2024-11-03 RX ADMIN — AMLODIPINE BESYLATE 10 MG: 10 TABLET ORAL at 14:14

## 2024-11-03 RX ADMIN — ACETAMINOPHEN 650 MG: 325 TABLET ORAL at 17:39

## 2024-11-03 RX ADMIN — RACEPINEPHRINE HYDROCHLORIDE 0.5 ML: 11.25 SOLUTION RESPIRATORY (INHALATION) at 17:28

## 2024-11-03 RX ADMIN — IPRATROPIUM BROMIDE AND ALBUTEROL SULFATE 3 ML: 2.5; .5 SOLUTION RESPIRATORY (INHALATION) at 21:46

## 2024-11-03 RX ADMIN — METHYLPREDNISOLONE SODIUM SUCCINATE 125 MG: 125 INJECTION, POWDER, FOR SOLUTION INTRAMUSCULAR; INTRAVENOUS at 10:30

## 2024-11-03 RX ADMIN — DEXMEDETOMIDINE HYDROCHLORIDE 0.2 MCG/KG/HR: 4 INJECTION, SOLUTION INTRAVENOUS at 20:01

## 2024-11-03 RX ADMIN — Medication 15 MG/HR: at 19:12

## 2024-11-03 RX ADMIN — OXYCODONE 5 MG: 5 TABLET ORAL at 15:52

## 2024-11-03 RX ADMIN — MAGNESIUM SULFATE HEPTAHYDRATE 2 G: 2 INJECTION, SOLUTION INTRAVENOUS at 18:46

## 2024-11-03 RX ADMIN — LOSARTAN POTASSIUM 100 MG: 50 TABLET, FILM COATED ORAL at 14:15

## 2024-11-03 RX ADMIN — LORAZEPAM 0.5 MG: 2 INJECTION INTRAMUSCULAR; INTRAVENOUS at 11:36

## 2024-11-03 RX ADMIN — LEVOTHYROXINE SODIUM 88 MCG: 0.09 TABLET ORAL at 15:15

## 2024-11-03 RX ADMIN — IPRATROPIUM BROMIDE AND ALBUTEROL SULFATE 3 ML: 2.5; .5 SOLUTION RESPIRATORY (INHALATION) at 14:36

## 2024-11-03 ASSESSMENT — SOCIAL DETERMINANTS OF HEALTH (SDOH)
WITHIN THE LAST YEAR, HAVE YOU BEEN AFRAID OF YOUR PARTNER OR EX-PARTNER?: NO
IN THE PAST 12 MONTHS, HAS THE ELECTRIC, GAS, OIL, OR WATER COMPANY THREATENED TO SHUT OFF SERVICE IN YOUR HOME?: NO
WITHIN THE LAST YEAR, HAVE TO BEEN RAPED OR FORCED TO HAVE ANY KIND OF SEXUAL ACTIVITY BY YOUR PARTNER OR EX-PARTNER?: NO
WITHIN THE LAST YEAR, HAVE YOU BEEN HUMILIATED OR EMOTIONALLY ABUSED IN OTHER WAYS BY YOUR PARTNER OR EX-PARTNER?: NO
WITHIN THE PAST 12 MONTHS, THE FOOD YOU BOUGHT JUST DIDN'T LAST AND YOU DIDN'T HAVE MONEY TO GET MORE: NEVER TRUE
WITHIN THE PAST 12 MONTHS, YOU WORRIED THAT YOUR FOOD WOULD RUN OUT BEFORE YOU GOT THE MONEY TO BUY MORE: NEVER TRUE
WITHIN THE PAST 12 MONTHS, THE FOOD YOU BOUGHT JUST DIDN'T LAST AND YOU DIDN'T HAVE MONEY TO GET MORE: NEVER TRUE
IN THE PAST 12 MONTHS, HAS THE ELECTRIC, GAS, OIL, OR WATER COMPANY THREATENED TO SHUT OFF SERVICE IN YOUR HOME?: NO
WITHIN THE PAST 12 MONTHS, YOU WORRIED THAT YOUR FOOD WOULD RUN OUT BEFORE YOU GOT THE MONEY TO BUY MORE: NEVER TRUE
WITHIN THE LAST YEAR, HAVE YOU BEEN KICKED, HIT, SLAPPED, OR OTHERWISE PHYSICALLY HURT BY YOUR PARTNER OR EX-PARTNER?: NO

## 2024-11-03 ASSESSMENT — COGNITIVE AND FUNCTIONAL STATUS - GENERAL
STANDING UP FROM CHAIR USING ARMS: A LOT
MOVING FROM LYING ON BACK TO SITTING ON SIDE OF FLAT BED: A LOT
MOVING TO AND FROM BED TO CHAIR: A LOT
SUGGESTED CMS G CODE MODIFIER MOBILITY: CL
TURNING FROM BACK TO SIDE WHILE IN FLAT BAD: A LOT
DRESSING REGULAR LOWER BODY CLOTHING: A LOT
EATING MEALS: A LOT
SUGGESTED CMS G CODE MODIFIER DAILY ACTIVITY: CL
WALKING IN HOSPITAL ROOM: A LOT
CLIMB 3 TO 5 STEPS WITH RAILING: A LOT
HELP NEEDED FOR BATHING: A LOT
PERSONAL GROOMING: A LOT
TOILETING: A LOT
DRESSING REGULAR UPPER BODY CLOTHING: A LOT
MOBILITY SCORE: 12
DAILY ACTIVITIY SCORE: 12

## 2024-11-03 ASSESSMENT — LIFESTYLE VARIABLES
EVER HAD A DRINK FIRST THING IN THE MORNING TO STEADY YOUR NERVES TO GET RID OF A HANGOVER: NO
HOW MANY TIMES IN THE PAST YEAR HAVE YOU HAD 5 OR MORE DRINKS IN A DAY: 0
HAVE YOU EVER FELT YOU SHOULD CUT DOWN ON YOUR DRINKING: NO
HAVE PEOPLE ANNOYED YOU BY CRITICIZING YOUR DRINKING: NO
TOTAL SCORE: 0
ALCOHOL_USE: NO
EVER FELT BAD OR GUILTY ABOUT YOUR DRINKING: NO
AVERAGE NUMBER OF DAYS PER WEEK YOU HAVE A DRINK CONTAINING ALCOHOL: 0
DOES PATIENT WANT TO STOP DRINKING: NO
TOTAL SCORE: 0
ON A TYPICAL DAY WHEN YOU DRINK ALCOHOL HOW MANY DRINKS DO YOU HAVE: 0
CONSUMPTION TOTAL: NEGATIVE
TOTAL SCORE: 0

## 2024-11-03 ASSESSMENT — PAIN DESCRIPTION - PAIN TYPE
TYPE: ACUTE PAIN

## 2024-11-03 ASSESSMENT — FIBROSIS 4 INDEX: FIB4 SCORE: 1.2

## 2024-11-03 ASSESSMENT — PULMONARY FUNCTION TESTS: EPAP_CMH2O: 8

## 2024-11-03 NOTE — ED NOTES
Rounded on patient after breathing treatment. Resting comfortably with calm, unlabored breathing and eyes closed. No further needs at this time.

## 2024-11-03 NOTE — FLOWSHEET NOTE
11/03/24 1309   Events/Summary/Plan   Events/Summary/Plan Albuterol SVN   Chest Exam   Work Of Breathing / Effort Increased Work of Breathing   Breath Sounds   RUL Breath Sounds Fine Crackles   RML Breath Sounds Fine Crackles   RLL Breath Sounds Fine Crackles   CHRIS Breath Sounds Fine Crackles   LLL Breath Sounds Fine Crackles   Secretions   Cough Congested;Non Productive   Oxygen   O2 (LPM) 4   O2 Delivery Device Silicone Nasal Cannula

## 2024-11-03 NOTE — ED NOTES
Bedside report to Jess. Pt on cardiac monitor. Pt completing breathing treatment administered by EMS.

## 2024-11-03 NOTE — ED PROVIDER NOTES
ED Provider Note    ED PHYSICIAN NOTE    CHIEF COMPLAINT  Chief Complaint   Patient presents with    Shortness of Breath     Pt BIBA SOB, asthma. EMS administered albuterol and duo-neb treatment x 2. GCS 15.       EXTERNAL RECORDS REVIEWED  Inpatient Notes admission July 2024 for respiratory failure with hypoxia,    HPI/ROS  LIMITATION TO HISTORY   Select: : None  OUTSIDE HISTORIAN(S):  none    Cecile Teixeira is a 79 y.o. female who presents with shortness of breath and cough.  Patient reports she actually called the police because she had urinated on herself although EMS arrived and she was found to be short of breath and with cough.  Patient states she always feels this way states it never changes.  She does endorse some hot and cold sensations although no known fever.  Cough has been productive of some yellow sputum.  She reports no chest pain, no abdominal pain nausea or vomiting, she does have some chronic leg swelling that she states is better than usual.    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 only social   Vaping Use    Vaping status: Never Used   Substance Use Topics    Alcohol use: Not Currently     Comment: rare     Drug use: Never       CURRENT MEDICATIONS  Home Medications       Reviewed by Aaron Davalos (Pharmacy Tech) on 11/03/24 at 1223  Med List Status: Complete     Medication Last Dose Status   acetaminophen (TYLENOL) 325 MG Tab  Active   albuterol 108 (90 Base) MCG/ACT Aero Soln inhalation aerosol 11/3/2024 Active   amLODIPine  (NORVASC) 10 MG Tab 10/31/2024 Active   aspirin 81 MG EC tablet 10/31/2024 Active   benzonatate (TESSALON) 100 MG Cap 10/31/2024 Active   cetirizine (ZYRTEC) 10 MG Tab 10/31/2024 Active   fluticasone (FLONASE) 50 MCG/ACT nasal spray 10/31/2024 Active   fluticasone (FLOVENT HFA) 110 MCG/ACT Aerosol 11/2/2024 Active   levothyroxine (SYNTHROID) 88 MCG Tab 10/31/2024 Active   losartan (COZAAR) 100 MG Tab 10/31/2024 Active   metoprolol SR (TOPROL XL) 25 MG TABLET SR 24 HR 10/31/2024 Active   Multiple Vitamins-Minerals (CENTRUM ADULT 50+ MULTIGUMMIES PO) 10/31/2024 Active   Nystatin Powder 11/2/2024 Active                  Audit from Redirected Encounters    **Home medications have not yet been reviewed for this encounter**         ALLERGIES  Allergies   Allergen Reactions    Doxycycline Hives    Prednisone Unspecified     Diarrhea and very irritable     Cephalexin Diarrhea     Diarrhea and mild rash     Montelukast Rash     Leg pain, back pain and rash     Spinach Unspecified     Skin allergy test    Cow's Milk [Lac Bovis]     Other Environmental      Trees except oak    Codeine Unspecified     Headaches and confusion     Lactose Unspecified     Lactose intolerance since a child     Levothyroxine Anxiety     Panic attack     Cannot take generic    Sulfa Drugs Unspecified     Mental status change        PHYSICAL EXAM  VITAL SIGNS: BP (!) 186/83   Pulse (!) 101   Temp (!) 35.4 °C (95.7 °F) (Oral)   Resp (!) 28   Ht 1.524 m (5')   Wt (!) 150 kg (330 lb)   SpO2 95%   BMI 64.45 kg/m²    Constitutional: Awake and alert moderate respiratory distress  HENT: Normal inspection, no signs of trauma  Eyes: Normal inspection, Pupils equal, non-icteric  Neck: Grossly normal range of motion. No stridor  Cardiovascular: Regular rate and rhythm, no murmurs.  Symmetric peripheral pulses at radial  Thorax & Lungs: Rhonchorous with scattered wheezing.   Abdomen:  soft, non-distended, nontender, no mass  Skin: No obvious rash. Warm.  Dry.   Back: No tenderness, No CVA tenderness.   Extremities: No cyanosis, plus bilateral lower extremity edema  Neurologic: AO3, Grossly normal,  Psychiatric: Agitated        DIAGNOSTIC STUDIES / PROCEDURES  LABS/EKG  Results for orders placed or performed during the hospital encounter of 24   EKG    Collection Time: 24 10:03 AM   Result Value Ref Range    Report       [facility] Emergency Dept.    Test Date:  2024  Pt Name:    LUCIANA MUELLER                   Department: ER  MRN:        [id number]                      Room:        10  Gender:     Female                       Technician: [id number]  :        [id number]                   Requested By:ER TRIAGE PROTOCOL  Order #:    [id number]                    Reading MD: RICH JAMES MD    Measurements  Intervals                                Axis  Rate:       88                           P:          -17  WV:         174                          QRS:        45  QRSD:       95                           T:          52  QT:         385  QTc:        466    Interpretive Statements  Sinus rhythm  Low voltage, precordial leads  Compared to ECG 2024 11:12:16  Low QRS voltage now present  T-wave abnormality no longer present  Electronically Signed On 2024 10:28:28 PST by RICH JAMES MD     Lactic Acid    Collection Time: 24 10:15 AM   Result Value Ref Range    Lactic Acid 1.1 0.5 - 2.0 mmol/L   CBC with Differential    Collection Time: 24 10:15 AM   Result Value Ref Range    WBC 9.1 4.8 - 10.8 K/uL    RBC 4.55 4.20 - 5.40 M/uL    Hemoglobin 13.9 12.0 - 16.0 g/dL    Hematocrit 43.2 37.0 - 47.0 %    MCV 94.9 81.4 - 97.8 fL    MCH 30.5 27.0 - 33.0 pg    MCHC 32.2 32.2 - 35.5 g/dL    RDW 49.7 35.9 - 50.0 fL    Platelet Count 349 164 - 446 K/uL    MPV 9.0 9.0 - 12.9 fL    Neutrophils-Polys 55.70 44.00 - 72.00 %    Lymphocytes 24.90 22.00 - 41.00 %    Monocytes 8.40 0.00 - 13.40 %    Eosinophils 9.80 (H) 0.00 - 6.90  %    Basophils 0.80 0.00 - 1.80 %    Immature Granulocytes 0.40 0.00 - 0.90 %    Nucleated RBC 0.00 0.00 - 0.20 /100 WBC    Neutrophils (Absolute) 5.04 1.82 - 7.42 K/uL    Lymphs (Absolute) 2.25 1.00 - 4.80 K/uL    Monos (Absolute) 0.76 0.00 - 0.85 K/uL    Eos (Absolute) 0.89 (H) 0.00 - 0.51 K/uL    Baso (Absolute) 0.07 0.00 - 0.12 K/uL    Immature Granulocytes (abs) 0.04 0.00 - 0.11 K/uL    NRBC (Absolute) 0.00 K/uL   Complete Metabolic Panel    Collection Time: 11/03/24 10:15 AM   Result Value Ref Range    Sodium 139 135 - 145 mmol/L    Potassium 4.0 3.6 - 5.5 mmol/L    Chloride 103 96 - 112 mmol/L    Co2 24 20 - 33 mmol/L    Anion Gap 12.0 7.0 - 16.0    Glucose 140 (H) 65 - 99 mg/dL    Bun 19 8 - 22 mg/dL    Creatinine 0.75 0.50 - 1.40 mg/dL    Calcium 9.5 8.5 - 10.5 mg/dL    Correct Calcium 9.3 8.5 - 10.5 mg/dL    AST(SGOT) 22 12 - 45 U/L    ALT(SGPT) 11 2 - 50 U/L    Alkaline Phosphatase 92 30 - 99 U/L    Total Bilirubin 0.5 0.1 - 1.5 mg/dL    Albumin 4.3 3.2 - 4.9 g/dL    Total Protein 8.4 (H) 6.0 - 8.2 g/dL    Globulin 4.1 (H) 1.9 - 3.5 g/dL    A-G Ratio 1.0 g/dL   Magnesium    Collection Time: 11/03/24 10:15 AM   Result Value Ref Range    Magnesium 2.4 1.5 - 2.5 mg/dL   Procalcitonin    Collection Time: 11/03/24 10:15 AM   Result Value Ref Range    Procalcitonin 0.05 <0.25 ng/mL   proBrain Natriuretic Peptide, NT    Collection Time: 11/03/24 10:15 AM   Result Value Ref Range    NT-proBNP 75 0 - 125 pg/mL   TROPONIN    Collection Time: 11/03/24 10:15 AM   Result Value Ref Range    Troponin T 23 (H) 6 - 19 ng/L   ESTIMATED GFR    Collection Time: 11/03/24 10:15 AM   Result Value Ref Range    GFR (CKD-EPI) 81 >60 mL/min/1.73 m 2   Blood Culture - Draw one from central line and one from peripheral site    Collection Time: 11/03/24 10:19 AM    Specimen: Peripheral; Blood   Result Value Ref Range    Significant Indicator NEG     Source BLD     Site PERIPHERAL     Culture Result       No Growth  Note: Blood  cultures are incubated for 5 days and  are monitored continuously.Positive blood cultures  are called to the RN and reported as soon as  they are identified.     POC CoV-2, FLU A/B, RSV by PCR    Collection Time: 11/03/24 10:34 AM   Result Value Ref Range    POC Influenza A RNA, PCR Negative Negative    POC Influenza B RNA, PCR Negative Negative    POC RSV, by PCR Negative Negative    POC SARS-CoV-2, PCR NotDetected NotDetected   Urinalysis    Collection Time: 11/03/24 11:15 AM    Specimen: Urine   Result Value Ref Range    Color Yellow     Character Clear     Specific Gravity 1.009 <1.035    Ph 7.0 5.0 - 8.0    Glucose Negative Negative mg/dL    Ketones Negative Negative mg/dL    Protein Negative Negative mg/dL    Bilirubin Negative Negative    Urobilinogen, Urine 0.2 <=1.0 EU/dL    Nitrite Negative Negative    Leukocyte Esterase Negative Negative    Occult Blood Large (A) Negative    Micro Urine Req Microscopic    URINE MICROSCOPIC (W/UA)    Collection Time: 11/03/24 11:15 AM   Result Value Ref Range    WBC 0-2 /hpf    RBC >100 (A) 0 - 2 /hpf    Bacteria None Seen None /hpf    Epithelial Cells 0-2 0 - 5 /hpf    Urine Casts 0-2 0 - 2 /lpf       I have independently interpreted this EKG as documented above    RADIOLOGY  I have independently interpreted the diagnostic imaging associated with this visit and am waiting the final reading from the radiologist.   My preliminary interpretation is as follows:     DX-CHEST-PORTABLE (1 VIEW)   Final Result      No acute process.      CT-CTA CHEST PULMONARY ARTERY W/ RECONS    (Results Pending)   US-EXTREMITY VENOUS LOWER BILAT    (Results Pending)         COURSE & MEDICAL DECISION MAKING    INITIAL ASSESSMENT, COURSE AND PLAN  Care Narrative: 10:07 AM  Patient presents with shortness of breath and cough.  At this point consideration for acute asthma exacerbation, pneumonia, arrhythmia, anemia, electrolyte or metabolic derangement, pulmonary edema.    Bedside ultrasound  performed no pulmonary edema is noted    She is receiving breathing treatment from EMS have ordered Solu-Medrol    Patient is reevaluated, certainly some improvement in her respiratory distress although she is still tachypneic.     Continue breathing treatments.    Case is discussed with hospitalist for admission    Interventions  Medications   albuterol (Proventil) 2.5mg/0.5ml nebulizer solution 2.5 mg (has no administration in time range)   methylPREDNISolone sod succ (SOLU-MEDROL) 125 MG injection 125 mg (125 mg Intravenous Given 11/3/24 1030)   LORazepam (Ativan) injection 0.5 mg (0.5 mg Intravenous Given 11/3/24 1136)            PROBLEM LIST    # Acute hypoxemic respiratory failure.  I think likely secondary to asthma exacerbation.  She is given Solu-Medrol here, breathing treatment.  Her x-ray shows no findings of pneumonia, no leukocytosis negative procalcitonin as well.  Her viral swabs are negative.  She did have prior history of DVT although not on any blood thinners so CTA was ordered.  However she could not tolerate laying down for this I think and some component with her current respiratory symptoms as well as her body habitus.  While this does overall seem most consistent with asthma exacerbation open for further evaluation of this within patient with bilateral leg ultrasounds and further treatment as needed    # Morbid obesity      DISPOSITION AND DISCUSSIONS  I have discussed management of the patient with the following physicians and SAGAR's:  as noted above    Patient is admitted in stable condition    FINAL DIAGNOSIS  1. Acute hypoxemic respiratory failure (HCC)        2. Uncomplicated asthma, unspecified asthma severity, unspecified whether persistent               This dictation was created using voice recognition software. The accuracy of the dictation is limited to the abilities of the software. I expect there may be some errors of grammar and possibly content. The nursing notes were reviewed  and certain aspects of this information were incorporated into this note.    Electronically signed by: Frank Saunders M.D., 11/3/2024

## 2024-11-03 NOTE — ED NOTES
Bedside report received from off going RN/tech: Cassie, assumed care of patient.  POC discussed with patient. Call light within reach, all needs addressed at this time.       Fall risk interventions in place: Move the patient closer to the nurse's station, Patient's personal possessions are with in their safe reach, Place fall risk sign on patient's door, and Keep floor surfaces clean and dry (all applicable per [facility] Fall risk assessment)   Continuous monitoring: Cardiac Leads, Pulse Ox, or Blood Pressure  IVF/IV medications: Not Applicable   Oxygen: How many liters 4L  Bedside sitter: Not Applicable   Isolation: Not Applicable

## 2024-11-03 NOTE — ED NOTES
Med rec is complete per patient at bedside  Patient states it has been several days since she has taken her oral home medications, but she has consistently used her inhalers.  Outpatient antibiotics within the last 30 days: NONE  Anticoagulants: NONE    Allegra Diaz PhT

## 2024-11-03 NOTE — ED NOTES
Patient returned from CT, unable to tolerate procedure d/t being unable to lay flat. ERP notified. Patient hooked back up to monitor.

## 2024-11-03 NOTE — RESPIRATORY CARE
" COPD EDUCATION by COPD CLINICAL EDUCATOR  11/3/2024 at 3:54 PM by Jonathan Rosario, RRT     Patient reviewed by COPD education team. Patient does not have a history or diagnosis of COPD and is a non-smoker.  Therefore, patient does not qualify for the COPD program.      Meds to Beds  [facility] provides bedside medication delivery for all eligible patients at discharge and you have been automatically enrolled in the Meds to Beds Program. Would you like to opt out of this program for any reason?: No - Stay Opted In     MY COPD ACTION PLAN     It is recommended that patients and physicians /healthcare providers complete this action plan together. This plan should be discussed at each physician visit and updated as needed.    The green, yellow and red zones show groups of symptoms of COPD. This list of symptoms is not comprehensive, and you may experience other symptoms. In the \"Actions\" column, your healthcare provider has recommended actions for you to take based on your symptoms.    Patient Name: Cecile Teixeira   YOB: 1945   Last Updated on:     Green Zone:  I am doing well today Actions     Usual activitiy and exercise level   Take daily medications     Usual amounts of cough and phlegm/mucus   Use oxygen as prescribed     Sleep well at night   Continue regular exercise/diet plan     Appetite is good   At all times avoid cigarette smoke, inhaled irritants     Daily Medications (these medications are taken every day):                Yellow Zone:  I am having a bad day or a COPD flare Actions     More breathless than usual   Continue daily medications     I have less energy for my daily activities   Use quick relief inhaler as ordered     Increased or thicker phlegm/mucus   Use oxygen as prescribed     Using quick relief inhaler/nebulizer more often   Get plenty of rest     Swelling of ankles more than usual   Use pursed lip breathing     More coughing than usual   At all times avoid cigarette smoke, " "inhaled irritants     I feel like I have a \"chest cold\"     Poor sleep and my symptoms woke me up     My appetite is not good     My medicine is not helping      Call provider immediately if symptoms don’t improve     Continue daily medications, add rescue medications:               Medications to be used during a flare up, (as Discussed with Provider):              Red Zone:  I need urgent medical care Actions     Severe shortness of breath even at rest   Call 911 or seek medical care immediately     Not able to do any activity because of breathing      Fever or shaking chills      Feeling confused or very drowsy       Chest pains      Coughing up blood                  "

## 2024-11-03 NOTE — PROGRESS NOTES
Assumed care of patient. Patient A n O x4. Patient Placed on monitor. Monitors notified. Patient on 4L NC. Patient has increased work of breathing. Patient continuing to talk and have conversations with RN. RN educated patient to focus on breathing. Patient continuing to talk and exert herself. Patient sating 96%. Patient reports 8/10 pain in ribs. Discussed POC with patient. Fall precautions in place. Call light in reach.

## 2024-11-03 NOTE — ED TRIAGE NOTES
Patient to [address] from home for   Chief Complaint   Patient presents with    Shortness of Breath     Pt BIBA SOB, asthma. EMS administered albuterol and duo-neb treatment x 2. GCS 15.

## 2024-11-04 ENCOUNTER — APPOINTMENT (OUTPATIENT)
Dept: CARDIOLOGY | Facility: MEDICAL CENTER | Age: 79
DRG: 202 | End: 2024-11-04
Attending: HOSPITALIST
Payer: MEDICARE

## 2024-11-04 ENCOUNTER — APPOINTMENT (OUTPATIENT)
Dept: RADIOLOGY | Facility: MEDICAL CENTER | Age: 79
DRG: 202 | End: 2024-11-04
Attending: EMERGENCY MEDICINE
Payer: MEDICARE

## 2024-11-04 LAB
ANION GAP SERPL CALC-SCNC: 17 MMOL/L (ref 7–16)
ARTERIAL PATENCY WRIST A: ABNORMAL
BASE EXCESS BLDA CALC-SCNC: 1 MMOL/L (ref -4–3)
BODY TEMPERATURE: ABNORMAL DEGREES
BUN SERPL-MCNC: 27 MG/DL (ref 8–22)
CALCIUM SERPL-MCNC: 8.8 MG/DL (ref 8.5–10.5)
CHLORIDE SERPL-SCNC: 101 MMOL/L (ref 96–112)
CO2 BLDA-SCNC: 29 MMOL/L (ref 20–33)
CO2 SERPL-SCNC: 20 MMOL/L (ref 20–33)
CREAT SERPL-MCNC: 1.29 MG/DL (ref 0.5–1.4)
DELSYS IDSYS: ABNORMAL
EKG IMPRESSION: NORMAL
ERYTHROCYTE [DISTWIDTH] IN BLOOD BY AUTOMATED COUNT: 51.6 FL (ref 35.9–50)
GFR SERPLBLD CREATININE-BSD FMLA CKD-EPI: 42 ML/MIN/1.73 M 2
GLUCOSE BLD STRIP.AUTO-MCNC: 143 MG/DL (ref 65–99)
GLUCOSE BLD STRIP.AUTO-MCNC: 144 MG/DL (ref 65–99)
GLUCOSE BLD STRIP.AUTO-MCNC: 154 MG/DL (ref 65–99)
GLUCOSE BLD STRIP.AUTO-MCNC: 181 MG/DL (ref 65–99)
GLUCOSE BLD STRIP.AUTO-MCNC: 225 MG/DL (ref 65–99)
GLUCOSE SERPL-MCNC: 225 MG/DL (ref 65–99)
HCO3 BLDA-SCNC: 27.4 MMOL/L (ref 17–25)
HCT VFR BLD AUTO: 39.4 % (ref 37–47)
HGB BLD-MCNC: 12.2 G/DL (ref 12–16)
LACTATE BLD-SCNC: 1.2 MMOL/L (ref 0.5–2)
LPM ILPM: 5 LPM
MCH RBC QN AUTO: 29.9 PG (ref 27–33)
MCHC RBC AUTO-ENTMCNC: 31 G/DL (ref 32.2–35.5)
MCV RBC AUTO: 96.6 FL (ref 81.4–97.8)
PCO2 BLDA: 52.2 MMHG (ref 26–37)
PCO2 TEMP ADJ BLDA: 49.7 MMHG (ref 26–37)
PH BLDA: 7.33 [PH] (ref 7.4–7.5)
PH TEMP ADJ BLDA: 7.34 [PH] (ref 7.4–7.5)
PLATELET # BLD AUTO: 322 K/UL (ref 164–446)
PMV BLD AUTO: 8.9 FL (ref 9–12.9)
PO2 BLDA: 86 MMHG (ref 64–87)
PO2 TEMP ADJ BLDA: 80 MMHG (ref 64–87)
POTASSIUM SERPL-SCNC: 4.1 MMOL/L (ref 3.6–5.5)
RBC # BLD AUTO: 4.08 M/UL (ref 4.2–5.4)
SAO2 % BLDA: 96 % (ref 93–99)
SODIUM SERPL-SCNC: 138 MMOL/L (ref 135–145)
SPECIMEN DRAWN FROM PATIENT: ABNORMAL
WBC # BLD AUTO: 14.5 K/UL (ref 4.8–10.8)

## 2024-11-04 PROCEDURE — 700101 HCHG RX REV CODE 250: Performed by: STUDENT IN AN ORGANIZED HEALTH CARE EDUCATION/TRAINING PROGRAM

## 2024-11-04 PROCEDURE — 700101 HCHG RX REV CODE 250: Performed by: HOSPITALIST

## 2024-11-04 PROCEDURE — A9270 NON-COVERED ITEM OR SERVICE: HCPCS | Performed by: HOSPITALIST

## 2024-11-04 PROCEDURE — 82962 GLUCOSE BLOOD TEST: CPT

## 2024-11-04 PROCEDURE — 770000 HCHG ROOM/CARE - INTERMEDIATE ICU *

## 2024-11-04 PROCEDURE — 99233 SBSQ HOSP IP/OBS HIGH 50: CPT | Performed by: HOSPITALIST

## 2024-11-04 PROCEDURE — 83605 ASSAY OF LACTIC ACID: CPT

## 2024-11-04 PROCEDURE — 93010 ELECTROCARDIOGRAM REPORT: CPT | Performed by: INTERNAL MEDICINE

## 2024-11-04 PROCEDURE — 92610 EVALUATE SWALLOWING FUNCTION: CPT

## 2024-11-04 PROCEDURE — 94669 MECHANICAL CHEST WALL OSCILL: CPT

## 2024-11-04 PROCEDURE — 94640 AIRWAY INHALATION TREATMENT: CPT

## 2024-11-04 PROCEDURE — 93306 TTE W/DOPPLER COMPLETE: CPT

## 2024-11-04 PROCEDURE — 700117 HCHG RX CONTRAST REV CODE 255: Performed by: HOSPITALIST

## 2024-11-04 PROCEDURE — 97602 WOUND(S) CARE NON-SELECTIVE: CPT

## 2024-11-04 PROCEDURE — 700111 HCHG RX REV CODE 636 W/ 250 OVERRIDE (IP): Mod: JZ | Performed by: HOSPITALIST

## 2024-11-04 PROCEDURE — 36600 WITHDRAWAL OF ARTERIAL BLOOD: CPT

## 2024-11-04 PROCEDURE — 80048 BASIC METABOLIC PNL TOTAL CA: CPT

## 2024-11-04 PROCEDURE — 82803 BLOOD GASES ANY COMBINATION: CPT

## 2024-11-04 PROCEDURE — 307059 PAD,EAR PROTECTOR: Performed by: HOSPITALIST

## 2024-11-04 PROCEDURE — 85027 COMPLETE CBC AUTOMATED: CPT

## 2024-11-04 PROCEDURE — 94645 CONT INHLJ TX EACH ADDL HOUR: CPT

## 2024-11-04 PROCEDURE — 700102 HCHG RX REV CODE 250 W/ 637 OVERRIDE(OP): Performed by: HOSPITALIST

## 2024-11-04 RX ADMIN — IPRATROPIUM BROMIDE AND ALBUTEROL SULFATE 3 ML: 2.5; .5 SOLUTION RESPIRATORY (INHALATION) at 10:17

## 2024-11-04 RX ADMIN — LEVOTHYROXINE SODIUM 88 MCG: 0.09 TABLET ORAL at 05:22

## 2024-11-04 RX ADMIN — IPRATROPIUM BROMIDE AND ALBUTEROL SULFATE 3 ML: 2.5; .5 SOLUTION RESPIRATORY (INHALATION) at 14:03

## 2024-11-04 RX ADMIN — ENOXAPARIN SODIUM 30 MG: 100 INJECTION SUBCUTANEOUS at 18:11

## 2024-11-04 RX ADMIN — CETIRIZINE HYDROCHLORIDE 10 MG: 10 TABLET, FILM COATED ORAL at 21:51

## 2024-11-04 RX ADMIN — METOPROLOL SUCCINATE 25 MG: 25 TABLET, EXTENDED RELEASE ORAL at 05:26

## 2024-11-04 RX ADMIN — INSULIN LISPRO 2 UNITS: 100 INJECTION, SOLUTION INTRAVENOUS; SUBCUTANEOUS at 11:18

## 2024-11-04 RX ADMIN — METHYLPREDNISOLONE SODIUM SUCCINATE 40 MG: 40 INJECTION, POWDER, FOR SOLUTION INTRAMUSCULAR; INTRAVENOUS at 00:23

## 2024-11-04 RX ADMIN — ENOXAPARIN SODIUM 30 MG: 100 INJECTION SUBCUTANEOUS at 05:22

## 2024-11-04 RX ADMIN — METHYLPREDNISOLONE SODIUM SUCCINATE 40 MG: 40 INJECTION, POWDER, FOR SOLUTION INTRAMUSCULAR; INTRAVENOUS at 18:11

## 2024-11-04 RX ADMIN — METHYLPREDNISOLONE SODIUM SUCCINATE 40 MG: 40 INJECTION, POWDER, FOR SOLUTION INTRAMUSCULAR; INTRAVENOUS at 11:18

## 2024-11-04 RX ADMIN — AZITHROMYCIN DIHYDRATE 500 MG: 250 TABLET ORAL at 05:22

## 2024-11-04 RX ADMIN — HUMAN ALBUMIN MICROSPHERES AND PERFLUTREN 3 ML: 10; .22 INJECTION, SOLUTION INTRAVENOUS at 19:45

## 2024-11-04 RX ADMIN — IPRATROPIUM BROMIDE AND ALBUTEROL SULFATE 3 ML: .5; 2.5 SOLUTION RESPIRATORY (INHALATION) at 14:44

## 2024-11-04 RX ADMIN — LOSARTAN POTASSIUM 100 MG: 50 TABLET, FILM COATED ORAL at 05:26

## 2024-11-04 RX ADMIN — INSULIN LISPRO 2 UNITS: 100 INJECTION, SOLUTION INTRAVENOUS; SUBCUTANEOUS at 07:45

## 2024-11-04 RX ADMIN — METHYLPREDNISOLONE SODIUM SUCCINATE 40 MG: 40 INJECTION, POWDER, FOR SOLUTION INTRAMUSCULAR; INTRAVENOUS at 05:23

## 2024-11-04 RX ADMIN — ASPIRIN 81 MG: 81 TABLET, COATED ORAL at 05:22

## 2024-11-04 RX ADMIN — FLUTICASONE PROPIONATE 220 MCG: 110 AEROSOL, METERED RESPIRATORY (INHALATION) at 18:12

## 2024-11-04 RX ADMIN — IPRATROPIUM BROMIDE AND ALBUTEROL SULFATE 3 ML: 2.5; .5 SOLUTION RESPIRATORY (INHALATION) at 22:03

## 2024-11-04 RX ADMIN — IPRATROPIUM BROMIDE AND ALBUTEROL SULFATE 3 ML: 2.5; .5 SOLUTION RESPIRATORY (INHALATION) at 06:21

## 2024-11-04 RX ADMIN — IPRATROPIUM BROMIDE AND ALBUTEROL SULFATE 3 ML: 2.5; .5 SOLUTION RESPIRATORY (INHALATION) at 01:01

## 2024-11-04 RX ADMIN — IPRATROPIUM BROMIDE AND ALBUTEROL SULFATE 3 ML: 2.5; .5 SOLUTION RESPIRATORY (INHALATION) at 18:54

## 2024-11-04 RX ADMIN — DEXMEDETOMIDINE HYDROCHLORIDE 0.4 MCG/KG/HR: 4 INJECTION, SOLUTION INTRAVENOUS at 14:15

## 2024-11-04 RX ADMIN — AMLODIPINE BESYLATE 10 MG: 10 TABLET ORAL at 05:26

## 2024-11-04 ASSESSMENT — PAIN DESCRIPTION - PAIN TYPE
TYPE: ACUTE PAIN

## 2024-11-04 ASSESSMENT — ENCOUNTER SYMPTOMS
WHEEZING: 1
NERVOUS/ANXIOUS: 0

## 2024-11-04 NOTE — CONSULTS
Critical Care Consultation    Date of consult: 11/3/2024    Referring Physician  Blayne Pickett M.D.    Reason for Consultation  Respiratory distress, asthma exacerbation    History of Presenting Illness  79 y.o. female, PMH obesity, asthma (undetermined subtype), GERD, hypothyroidism, YOLA on CPAP, prior stroke and wheelchair-bound since her 30s who presented 11/3/2024 after she called the police because she had urinated on herself.  On EMS arrival she was dyspneic with cough which she attributed to change in the weather.  She denied fever but had increased productive cough with yellow sputum.  She was initially admitted to the telemetry floor.  I was called urgently for rapid response with patient and increased respiratory distress, sitting up in bed with diffuse wheezing not moving air well and in moderate respiratory distress.  She had been placed on high flow nasal cannula and has received multiple nebulized bronchodilators.    Code Status  Full Code    Review of Systems  Review of Systems   Unable to perform ROS: Acuity of condition       Past Medical History   has a past medical history of Asthma, Blood clots in brain (2000), Chest pain, Cough, GERD (gastroesophageal reflux disease), Hypothyroidism, Osteoarthritis, Painful breathing, Pleurodynia (07/20/2024), Shortness of breath, Sleep apnea, Sputum production, and Wheezing.    Surgical History   has a past surgical history that includes other (Left, [id number]).    Family History  family history is not on file.    Social History   reports that she has quit smoking. Her smoking use included cigarettes. She has never used smokeless tobacco. She reports that she does not currently use alcohol. She reports that she does not use drugs.    Medications  Home Medications       Reviewed by Aaron Davalos (Pharmacy Tech) on 11/03/24 at 1223  Med List Status: Complete     Medication Last Dose Status   acetaminophen (TYLENOL) 325 MG Tab  Active   albuterol  108 (90 Base) MCG/ACT Aero Soln inhalation aerosol 11/3/2024 Active   amLODIPine (NORVASC) 10 MG Tab 10/31/2024 Active   aspirin 81 MG EC tablet 10/31/2024 Active   benzonatate (TESSALON) 100 MG Cap 10/31/2024 Active   cetirizine (ZYRTEC) 10 MG Tab 10/31/2024 Active   fluticasone (FLONASE) 50 MCG/ACT nasal spray 10/31/2024 Active   fluticasone (FLOVENT HFA) 110 MCG/ACT Aerosol 11/2/2024 Active   levothyroxine (SYNTHROID) 88 MCG Tab 10/31/2024 Active   losartan (COZAAR) 100 MG Tab 10/31/2024 Active   metoprolol SR (TOPROL XL) 25 MG TABLET SR 24 HR 10/31/2024 Active   Multiple Vitamins-Minerals (CENTRUM ADULT 50+ MULTIGUMMIES PO) 10/31/2024 Active   Nystatin Powder 11/2/2024 Active                  Audit from Redirected Encounters    **Home medications have not yet been reviewed for this encounter**       Current Facility-Administered Medications   Medication Dose Route Frequency Provider Last Rate Last Admin    albuterol inhaler 2 Puff  2 Puff Inhalation Q6HRS PRN Blayne Pickett M.D.   2 Puff at 11/03/24 1626    amLODIPine (Norvasc) tablet 10 mg  10 mg Oral DAILY Blayne Pickett M.D.   10 mg at 11/03/24 1414    aspirin EC tablet 81 mg  81 mg Oral DAILY Blayne Pickett M.D.   81 mg at 11/03/24 1515    benzonatate (Tessalon) capsule 100 mg  100 mg Oral TID PRN Blayne Pickett M.D.        cetirizine (ZyrTEC) tablet 10 mg  10 mg Oral QHS Blayne Pickett M.D.        fluticasone (Flovent HFA) 110 MCG/ACT inhaler 220 mcg  2 Puff Inhalation BID Blayne Pickett M.D.   220 mcg at 11/03/24 1644    levothyroxine (Synthroid) tablet 88 mcg  88 mcg Oral AM ES Blayne Pickett M.D.   88 mcg at 11/03/24 1515    losartan (Cozaar) tablet 100 mg  100 mg Oral DAILY Blayne Pickett M.D.   100 mg at 11/03/24 1415    metoprolol SR (Toprol XL) tablet 25 mg  25 mg Oral DAILY Blayne Pickett M.D.   25 mg at 11/03/24 1515    ipratropium-albuterol (DUONEB) nebulizer solution  3 mL Nebulization Q4HRS  (RT) Blayne Pickett M.D.   3 mL at 11/03/24 1836    ipratropium-albuterol (DUONEB) nebulizer solution  3 mL Nebulization Q2HRS PRN (RT) Blayne Pickett M.D.   3 mL at 11/03/24 1646    enoxaparin (Lovenox) inj 30 mg  30 mg Subcutaneous Q12HRS Blayne Pickett M.D.   30 mg at 11/03/24 1704    acetaminophen (Tylenol) tablet 650 mg  650 mg Oral Q6HRS PRN Blayne Pickett M.D.   650 mg at 11/03/24 1739    ondansetron (Zofran) syringe/vial injection 4 mg  4 mg Intravenous Q4HRS PRN Blayne Pickett M.D.        ondansetron (Zofran ODT) dispertab 4 mg  4 mg Oral Q4HRS PRN Blayne Pickett M.D.        guaiFENesin dextromethorphan (Robitussin DM) 100-10 MG/5ML syrup 10 mL  10 mL Oral Q6HRS PRN Blayne Pickett M.D.        nystatin (Mycostatin) powder   Topical BID PRN Blayne Pickett M.D.        oxyCODONE immediate-release (Roxicodone) tablet 5 mg  5 mg Oral Q4HRS PRN Blayne Pickett M.D.   5 mg at 11/03/24 1552    hydrALAZINE (Apresoline) injection 20 mg  20 mg Intravenous Q4HRS PRN Blayne Pickett M.D.   20 mg at 11/03/24 1704    methylPREDNISolone (SOLU-MEDROL) 40 MG injection 40 mg  40 mg Intravenous Q6HRS Blayne Pickett M.D.   40 mg at 11/03/24 1659    racepinephrine (Micronefrin) 2.25 % nebulizer solution 0.5 mL  0.5 mL Nebulization Q30MIN PRN (RT) Blayne Pickett M.D.   0.5 mL at 11/03/24 1728    Respiratory Therapy Consult   Nebulization Continuous RT Blayne Pickett M.D.        [START ON 11/4/2024] azithromycin (Zithromax) tablet 500 mg  500 mg Oral DAILY Blayne Pickett M.D.        LORazepam (Ativan) injection 0.5 mg  0.5 mg Intravenous Q6HRS PRN Blayne Pickett M.D.           Allergies  Allergies   Allergen Reactions    Doxycycline Hives    Prednisone Unspecified     Diarrhea and very irritable     Cephalexin Diarrhea     Diarrhea and mild rash     Montelukast Rash     Leg pain, back pain and rash     Spinach Unspecified     Skin allergy test     Cow's Milk [Lac Bovis]     Other Environmental      Trees except oak    Codeine Unspecified     Headaches and confusion     Lactose Unspecified     Lactose intolerance since a child     Levothyroxine Anxiety     Panic attack     Cannot take generic    Sulfa Drugs Unspecified     Mental status change        Vital Signs last 24 hours  Temp:  [35.4 °C (95.7 °F)-37 °C (98.6 °F)] 37 °C (98.6 °F)  Pulse:  [] 112  Resp:  [19-99] 24  BP: (118-220)/(49-99) 146/73  SpO2:  [15 %-100 %] 99 %    Physical Exam  Physical Exam  Vitals and nursing note reviewed.   Constitutional:       General: She is in acute distress.      Appearance: She is ill-appearing.      Comments: Obese, sitting up in bed in moderate respiratory distress   HENT:      Head: Normocephalic and atraumatic.      Nose: Nose normal.      Mouth/Throat:      Mouth: Mucous membranes are moist.   Eyes:      Pupils: Pupils are equal, round, and reactive to light.   Cardiovascular:      Rate and Rhythm: Regular rhythm. Tachycardia present.   Pulmonary:      Comments: Diminished breath sounds with scattered expiratory wheeze throughout  Abdominal:      General: There is distension.      Palpations: Abdomen is soft.      Tenderness: There is no abdominal tenderness.   Musculoskeletal:         General: Swelling present.      Cervical back: Neck supple.   Skin:     General: Skin is warm and dry.      Capillary Refill: Capillary refill takes less than 2 seconds.   Neurological:      General: No focal deficit present.      Mental Status: She is oriented to person, place, and time.         Fluids    Intake/Output Summary (Last 24 hours) at 11/3/2024 1837  Last data filed at 11/3/2024 1700  Gross per 24 hour   Intake 240 ml   Output 200 ml   Net 40 ml       Laboratory  Recent Results (from the past 48 hours)   EKG    Collection Time: 11/03/24 10:03 AM   Result Value Ref Range    Report       [facility]    Test Date:  2024-11-03  Pt  Name:    LUCIANA MUELLER                   Department: ER  MRN:        [id number]                      Room:        10  Gender:     Female                       Technician: [id number]  :        1945                   Requested By:ER TRIAGE PROTOCOL  Order #:    [id number]                    Reading MD: RICH JAMES MD    Measurements  Intervals                                Axis  Rate:       88                           P:          -17  MD:         174                          QRS:        45  QRSD:       95                           T:          52  QT:         385  QTc:        466    Interpretive Statements  Sinus rhythm  Low voltage, precordial leads  Compared to ECG 2024 11:12:16  Low QRS voltage now present  T-wave abnormality no longer present  Electronically Signed On 2024 10:28:28 PST by RICH JAMES MD     Lactic Acid    Collection Time: 24 10:15 AM   Result Value Ref Range    Lactic Acid 1.1 0.5 - 2.0 mmol/L   CBC with Differential    Collection Time: 24 10:15 AM   Result Value Ref Range    WBC 9.1 4.8 - 10.8 K/uL    RBC 4.55 4.20 - 5.40 M/uL    Hemoglobin 13.9 12.0 - 16.0 g/dL    Hematocrit 43.2 37.0 - 47.0 %    MCV 94.9 81.4 - 97.8 fL    MCH 30.5 27.0 - 33.0 pg    MCHC 32.2 32.2 - 35.5 g/dL    RDW 49.7 35.9 - 50.0 fL    Platelet Count 349 164 - 446 K/uL    MPV 9.0 9.0 - 12.9 fL    Neutrophils-Polys 55.70 44.00 - 72.00 %    Lymphocytes 24.90 22.00 - 41.00 %    Monocytes 8.40 0.00 - 13.40 %    Eosinophils 9.80 (H) 0.00 - 6.90 %    Basophils 0.80 0.00 - 1.80 %    Immature Granulocytes 0.40 0.00 - 0.90 %    Nucleated RBC 0.00 0.00 - 0.20 /100 WBC    Neutrophils (Absolute) 5.04 1.82 - 7.42 K/uL    Lymphs (Absolute) 2.25 1.00 - 4.80 K/uL    Monos (Absolute) 0.76 0.00 - 0.85 K/uL    Eos (Absolute) 0.89 (H) 0.00 - 0.51 K/uL    Baso (Absolute) 0.07 0.00 - 0.12 K/uL    Immature Granulocytes (abs) 0.04 0.00 - 0.11 K/uL    NRBC (Absolute) 0.00 K/uL   Complete Metabolic Panel    Collection  Time: 11/03/24 10:15 AM   Result Value Ref Range    Sodium 139 135 - 145 mmol/L    Potassium 4.0 3.6 - 5.5 mmol/L    Chloride 103 96 - 112 mmol/L    Co2 24 20 - 33 mmol/L    Anion Gap 12.0 7.0 - 16.0    Glucose 140 (H) 65 - 99 mg/dL    Bun 19 8 - 22 mg/dL    Creatinine 0.75 0.50 - 1.40 mg/dL    Calcium 9.5 8.5 - 10.5 mg/dL    Correct Calcium 9.3 8.5 - 10.5 mg/dL    AST(SGOT) 22 12 - 45 U/L    ALT(SGPT) 11 2 - 50 U/L    Alkaline Phosphatase 92 30 - 99 U/L    Total Bilirubin 0.5 0.1 - 1.5 mg/dL    Albumin 4.3 3.2 - 4.9 g/dL    Total Protein 8.4 (H) 6.0 - 8.2 g/dL    Globulin 4.1 (H) 1.9 - 3.5 g/dL    A-G Ratio 1.0 g/dL   Magnesium    Collection Time: 11/03/24 10:15 AM   Result Value Ref Range    Magnesium 2.4 1.5 - 2.5 mg/dL   Procalcitonin    Collection Time: 11/03/24 10:15 AM   Result Value Ref Range    Procalcitonin 0.05 <0.25 ng/mL   proBrain Natriuretic Peptide, NT    Collection Time: 11/03/24 10:15 AM   Result Value Ref Range    NT-proBNP 75 0 - 125 pg/mL   TROPONIN    Collection Time: 11/03/24 10:15 AM   Result Value Ref Range    Troponin T 23 (H) 6 - 19 ng/L   ESTIMATED GFR    Collection Time: 11/03/24 10:15 AM   Result Value Ref Range    GFR (CKD-EPI) 81 >60 mL/min/1.73 m 2   Blood Culture - Draw one from central line and one from peripheral site    Collection Time: 11/03/24 10:19 AM    Specimen: Peripheral; Blood   Result Value Ref Range    Significant Indicator NEG     Source BLD     Site PERIPHERAL     Culture Result       No Growth  Note: Blood cultures are incubated for 5 days and  are monitored continuously.Positive blood cultures  are called to the RN and reported as soon as  they are identified.     POC CoV-2, FLU A/B, RSV by PCR    Collection Time: 11/03/24 10:34 AM   Result Value Ref Range    POC Influenza A RNA, PCR Negative Negative    POC Influenza B RNA, PCR Negative Negative    POC RSV, by PCR Negative Negative    POC SARS-CoV-2, PCR NotDetected NotDetected   Blood Culture - Draw one from  central line and one from peripheral site    Collection Time: 11/03/24 10:49 AM    Specimen: Line; Blood   Result Value Ref Range    Significant Indicator NEG     Source BLD     Site Peripheral     Culture Result       No Growth  Note: Blood cultures are incubated for 5 days and  are monitored continuously.Positive blood cultures  are called to the RN and reported as soon as  they are identified.     Urinalysis    Collection Time: 11/03/24 11:15 AM    Specimen: Urine   Result Value Ref Range    Color Yellow     Character Clear     Specific Gravity 1.009 <1.035    Ph 7.0 5.0 - 8.0    Glucose Negative Negative mg/dL    Ketones Negative Negative mg/dL    Protein Negative Negative mg/dL    Bilirubin Negative Negative    Urobilinogen, Urine 0.2 <=1.0 EU/dL    Nitrite Negative Negative    Leukocyte Esterase Negative Negative    Occult Blood Large (A) Negative    Micro Urine Req Microscopic    URINE MICROSCOPIC (W/UA)    Collection Time: 11/03/24 11:15 AM   Result Value Ref Range    WBC 0-2 /hpf    RBC >100 (A) 0 - 2 /hpf    Bacteria None Seen None /hpf    Epithelial Cells 0-2 0 - 5 /hpf    Urine Casts 0-2 0 - 2 /lpf   TROPONIN    Collection Time: 11/03/24  3:20 PM   Result Value Ref Range    Troponin T 20 (H) 6 - 19 ng/L   Arterial Blood Gas    Collection Time: 11/03/24  5:28 PM   Result Value Ref Range    Ph 7.42 7.40 - 7.50    Pco2 38.7 (H) 26.0 - 37.0 mmHg    Po2 135.1 (H) 64.0 - 87.0 mmHg    O2 Saturation 98.8 93.0 - 99.0 %    Hco3 25 17 - 25 mmol/L    Base Excess 0 -4 - 3 mmol/L    Body Temp 37.0 Centigrade    O2 Therapy 40L     O2 Therapy 40.0 (H) 2.0 - 10.0 L/min    Ph -TC 7.42 7.40 - 7.50    Pco2 -TC 38.7 (H) 26.0 - 37.0 mmHg    Po2 -.1 (H) 64.0 - 87.0 mmHg   Troponin    Collection Time: 11/03/24  5:28 PM   Result Value Ref Range    Troponin T 22 (H) 6 - 19 ng/L   EKG    Collection Time: 11/03/24  5:33 PM   Result Value Ref Range    Report       [facility]    Test Date:  2024-11-03  Pt Name:    LUCIANA  ERVIN                   Department: 183  MRN:        [id number]                      Room:       T802  Gender:     Female                       Technician: TANIA  :        1945                   Requested By:ERIC MICHAEL  Order #:    [id number]                    Reading MD:    Measurements  Intervals                                Axis  Rate:       122                          P:          6  CT:         150                          QRS:        53  QRSD:       89                           T:          -70  QT:         308  QTc:        439    Interpretive Statements  Sinus tachycardia  Low voltage, precordial leads  Consider anterior infarct  Borderline repolarization abnormality  Compared to ECG 2024 10:03:53  Myocardial infarct finding now present  Sinus rhythm no longer present         Imaging  DX-CHEST-PORTABLE (1 VIEW)   Final Result      Cardiac silhouette enlargement. No definite acute abnormality.      US-EXTREMITY VENOUS LOWER BILAT   Final Result      DX-CHEST-PORTABLE (1 VIEW)   Final Result      No acute process.      CT-CTA CHEST PULMONARY ARTERY W/ RECONS    (Results Pending)   EC-ECHOCARDIOGRAM COMPLETE W/ CONT    (Results Pending)       Assessment/Plan  Acute respiratory failure  -Associated with hypoxia  -Suspect secondary to respiratory tract infection with exacerbation of asthma  -Transfer to IMCU under the care of the hospitalist  -Initiate continuous nebulized albuterol, IV magnesium, as needed NIV/BiPAP  -IV corticosteroids  -Empiric antibiotics with azithromycin, follow-up viral studies and culture results  -Will need outpatient follow-up with pulmonary and further evaluation for asthma etiology and treatment  -Unlikely require intubation at this time but does warrant closer monitoring in IMCU  -Pulmonary consultation in a.m.  -CC available as needed    Discussed patient condition and risk of morbidity and/or mortality with RN, RT, and Pharmacy.    The patient remains critically  ill.  Critical care time = 35 minutes in directly providing and coordinating critical care and extensive data review.  No time overlap and excludes procedures.

## 2024-11-04 NOTE — ASSESSMENT & PLAN NOTE
Hemoglobin A1c 6.2 in past three months  Monitor accuchecks and cover with SSI  Hypoglycemic protocol  On steroids and likely to induce hyperglycemia  Encourage weight loss

## 2024-11-04 NOTE — H&P
Hospital Medicine History & Physical Note    Date of Service  11/3/2024    Primary Care Physician  CHARLES Sood.    Consultants  critical care    Specialist Names: Dr Hickey    Code Status  Full Code    Chief Complaint  Chief Complaint   Patient presents with    Shortness of Breath     Pt BIBA SOB, asthma. EMS administered albuterol and duo-neb treatment x 2. GCS 15.       History of Presenting Illness  Cecile Teixeira is a 79 y.o. female who presented 11/3/2024 with dyspnea and frequent urination.  She is poor historian and history is limited secondary to her dyspnea.  She reports that she has a history of asthma has been having increased dyspnea over the past few days and cough productive of sputum.  Denies any chest pain she denies of consciousness she denies any nausea vomiting.  She has a history of chronic edema and prior history of DVT.  Reports being compliant with her medications    I discussed the plan of care with patient, bedside RN, and critical care and physician .    Review of Systems  Review of Systems   Unable to perform ROS: Acuity of condition       Past Medical History   has a past medical history of Asthma, Blood clots in brain (2000), Chest pain, Cough, GERD (gastroesophageal reflux disease), Hypothyroidism, Osteoarthritis, Painful breathing, Pleurodynia (07/20/2024), Shortness of breath, Sleep apnea, Sputum production, and Wheezing.    Surgical History   has a past surgical history that includes other (Left, [id number]).     Family History    Family history reviewed with patient. There is no family history that is pertinent to the chief complaint.     Social History   reports that she has quit smoking. Her smoking use included cigarettes. She has never used smokeless tobacco. She reports that she does not currently use alcohol. She reports that she does not use drugs.    Allergies  Allergies   Allergen Reactions    Doxycycline Hives    Prednisone Unspecified     Diarrhea and very  irritable     Cephalexin Diarrhea     Diarrhea and mild rash     Montelukast Rash     Leg pain, back pain and rash     Spinach Unspecified     Skin allergy test    Cow's Milk [Lac Bovis]     Other Environmental      Trees except oak    Codeine Unspecified     Headaches and confusion     Lactose Unspecified     Lactose intolerance since a child     Levothyroxine Anxiety     Panic attack     Cannot take generic    Sulfa Drugs Unspecified     Mental status change        Medications  Prior to Admission Medications   Prescriptions Last Dose Informant Patient Reported? Taking?   Multiple Vitamins-Minerals (CENTRUM ADULT 50+ MULTIGUMMIES PO) 10/31/2024  Yes No   Sig: Take 1 Tablet by mouth every day. Indications: supplement   Nystatin Powder 11/2/2024  No Yes   Sig: Apply 1 Application topically 2 times a day as needed (skin breakdown).   acetaminophen (TYLENOL) 325 MG Tab   No No   Sig: Take 2 Tablets by mouth every 6 hours as needed for Moderate Pain.   albuterol 108 (90 Base) MCG/ACT Aero Soln inhalation aerosol 11/3/2024 Morning  No Yes   Sig: Inhale 2 Puffs every 6 hours as needed for Shortness of Breath. Indications: Asthma   amLODIPine (NORVASC) 10 MG Tab 10/31/2024  No No   Sig: Take 1 Tablet by mouth every day. Indications: High Blood Pressure   aspirin 81 MG EC tablet 10/31/2024  No No   Sig: Take 1 Tablet by mouth every day.   benzonatate (TESSALON) 100 MG Cap 10/31/2024  No No   Sig: Take 1 Capsule by mouth 3 times a day as needed for Cough.   cetirizine (ZYRTEC) 10 MG Tab 10/31/2024  No No   Sig: Take 1 Tablet by mouth at bedtime. FOR ALLERGIES   fluticasone (FLONASE) 50 MCG/ACT nasal spray 10/31/2024  No No   Sig: ADMINISTER 1 SPRAY INTO AFFECTED NOSTRIL(S) EVERY DAY.   fluticasone (FLOVENT HFA) 110 MCG/ACT Aerosol 11/2/2024 Evening  No Yes   Sig: Inhale 2 Puffs 2 times a day. Indications: Asthma   levothyroxine (SYNTHROID) 88 MCG Tab 10/31/2024  No No   Sig: Take 1 Tablet by mouth every morning on an empty  stomach.   losartan (COZAAR) 100 MG Tab 10/31/2024  No No   Sig: Take 1 Tablet by mouth every day.   metoprolol SR (TOPROL XL) 25 MG TABLET SR 24 HR 10/31/2024  No No   Sig: Take 1 Tablet by mouth every day.      Facility-Administered Medications: None       Physical Exam  Temp:  [35.4 °C (95.7 °F)-37 °C (98.6 °F)] 37 °C (98.6 °F)  Pulse:  [] 112  Resp:  [19-99] 24  BP: (118-220)/(49-99) 146/73  SpO2:  [15 %-100 %] 99 %  Blood Pressure : (!) 146/73   Temperature: 37 °C (98.6 °F)   Pulse: (!) 112   Respiration: (!) 24   Pulse Oximetry: 99 %       Physical Exam  Vitals and nursing note reviewed.   Constitutional:       General: She is not in acute distress.     Appearance: She is ill-appearing.   HENT:      Head: Normocephalic and atraumatic.      Nose: Nose normal. No rhinorrhea.      Mouth/Throat:      Pharynx: No oropharyngeal exudate or posterior oropharyngeal erythema.   Eyes:      General:         Right eye: No discharge.         Left eye: No discharge.   Cardiovascular:      Rate and Rhythm: Normal rate and regular rhythm.      Heart sounds: Normal heart sounds. No murmur heard.     No friction rub. No gallop.   Pulmonary:      Effort: Tachypnea present.      Breath sounds: Decreased breath sounds, wheezing and rhonchi present. No rales.   Chest:      Chest wall: No tenderness.   Abdominal:      General: Bowel sounds are normal. There is no distension.      Palpations: Abdomen is soft. There is no mass.      Tenderness: There is no abdominal tenderness. There is no rebound.   Musculoskeletal:         General: Swelling present. No tenderness.      Cervical back: Neck supple. No rigidity.   Skin:     General: Skin is warm and dry.      Coloration: Skin is not cyanotic or jaundiced.      Nails: There is no clubbing.   Neurological:      General: No focal deficit present.      Mental Status: She is alert and oriented to person, place, and time.      Cranial Nerves: No cranial nerve deficit.      Motor: No  weakness.   Psychiatric:      Comments: Anxious         Laboratory:  Recent Labs     11/03/24  1015   WBC 9.1   RBC 4.55   HEMOGLOBIN 13.9   HEMATOCRIT 43.2   MCV 94.9   MCH 30.5   MCHC 32.2   RDW 49.7   PLATELETCT 349   MPV 9.0     Recent Labs     11/03/24  1015   SODIUM 139   POTASSIUM 4.0   CHLORIDE 103   CO2 24   GLUCOSE 140*   BUN 19   CREATININE 0.75   CALCIUM 9.5     Recent Labs     11/03/24  1015   ALTSGPT 11   ASTSGOT 22   ALKPHOSPHAT 92   TBILIRUBIN 0.5   GLUCOSE 140*         Recent Labs     11/03/24  1015   NTPROBNP 75         Recent Labs     11/03/24  1015 11/03/24  1520 11/03/24  1728   TROPONINT 23* 20* 22*       Imaging:  DX-CHEST-PORTABLE (1 VIEW)   Final Result      Cardiac silhouette enlargement. No definite acute abnormality.      US-EXTREMITY VENOUS LOWER BILAT         DX-CHEST-PORTABLE (1 VIEW)   Final Result      No acute process.      CT-CTA CHEST PULMONARY ARTERY W/ RECONS    (Results Pending)   EC-ECHOCARDIOGRAM COMPLETE W/ CONT    (Results Pending)         Assessment/Plan:  Justification for Admission Status  I anticipate this patient will require at least two midnights for appropriate medical management, necessitating inpatient admission because asthma exacerbation and hypoxia    Patient will need a Intermediate Care (Adult and Pediatrics) bed on MEDICAL service .  The need is secondary to increasing hypoxia and asthma exacerbation.    * Acute asthma exacerbation- (present on admission)  Assessment & Plan  Patient will be started on IV Solu-Medrol  I ordered bronchodilators per RT protocol  Reviewed chest x-ray with no consolidation given her productive cough I will empirically treat her with azithromycin  Patient was unable to tolerate CTA in the emergency department    Elevated troponin  Assessment & Plan  Likely demand ischemia in the setting of hypoxia  I ordered repeat troponin and echocardiogram continue aspirin      Acute respiratory failure with hypoxia (HCC)- (present on  admission)  Assessment & Plan  Secondary to asthma exacerbation  Patient will be started on IV steroids bronchodilators and oxygen she will be admitted to telemetry with close clinical monitoring  Suspect secondary to her asthma exacerbation  Patient did not tolerate CTA due to her dyspnea and orthopnea lower extremity duplex is negative for DVT   I ordered echo if she has any significant changes in her RV pressures will consider consider empiric anticoagulation    Type 2 diabetes mellitus, without long-term current use of insulin ([facility])- (present on admission)  Assessment & Plan  Hemoglobin A1c 6.2  I ordered insulin sliding scale  Monitor CBGs with steroid therapy    Primary hypertension- (present on admission)  Assessment & Plan  With hypertensive urgency    Continue home dose of metoprolol losartan and amlodipine I ordered as needed IV hydralazine    Monitor blood pressure adjust accordingly    Lymphedema associated with obesity- (present on admission)  Assessment & Plan  Lower extremity duplex reviewed negative for DVT      Hypothyroidism- (present on admission)  Assessment & Plan  Continue levothyroxine      Severe obesity (BMI >= 40) ([facility])- (present on admission)  Assessment & Plan  Body mass index is 64.45 kg/m².         Addendum:  Notified by nursing staff patient have increased work of breathing ordered breathing treatment via nebulizer presented to the bedside to evaluate the patient repeat chest x-ray ordered and reviewed with no acute changes.  Patient with increased work of breathing currently on high flow nasal cannula ABG ordered and reviewed.  I consulted critical care and discussed with Dr. Hickey patient will be moved to [facility] for close clinical monitoring      Total critical care time spent today was 40 minutes    >Patient is critically ill.   >The patient is having : Acute respiratory failure hypoxia  >The vital organ system that is effected is the: Pulmonary  >If untreated there is a high chance  of deterioration into: Worsening hypoxia and death  >The critical care that I am providing today is: IV steroids antibiotics high flow nasal cannula bronchodilators  >The critical care that has been undertaken is medically complex.   >There has been no overlap in critical care time.   Critical care time not including procedures, no overlap: 40 minutes      VTE prophylaxis: enoxaparin ppx

## 2024-11-04 NOTE — PROGRESS NOTES
4 Eyes Skin Assessment Completed by MEHRAN Lewis and prabhakar RN.    Head WDL  Ears Redness behind ears  Nose WDL  Mouth WDL  Neck WDL  Breast/Chest Redness and Excoriation under breasts  Shoulder Blades WDL  Spine WDL  (R) Arm/Elbow/Hand WDL dryness  (L) Arm/Elbow/Hand WDL   Abdomen Redness, Rash, and Scar  Groin WDL  Scrotum/Coccyx/Buttocks Redness and Non-Blanching  (R) Leg Scab, Bruising, Swelling, and Edema  (L) Leg Scab, Bruising, Swelling, Shiny, and Edema  (R) Heel/Foot/Toe Discoloration dry  (L) Heel/Foot/Toe Discoloration dry          Devices In Places ECG, Blood Pressure Cuff, Pulse Ox, SCD's, and HFNC, PRN BIPAP      Interventions In Place Pillows, Q2 Turns, Low Air Loss Mattress, and Heels Loaded W/Pillows    Possible Skin Injury Yes    Pictures Uploaded Into Epic Yes  Wound Consult Placed Yes  RN Wound Prevention Protocol Ordered Yes

## 2024-11-04 NOTE — PROGRESS NOTES
4 Eyes Skin Assessment Completed by MEHRAN Herman and MEHRAN Estrada.    Head WDL  Ears Redness and Blanching, scab to L ear   Nose WDL  Mouth WDL  Neck WDL  Breast/Chest Redness, Rash, and Excoriation under both breasts and on bilateral folds of flanks  Shoulder Blades WDL  Spine WDL  (R) Arm/Elbow/Hand Bruising  (L) Arm/Elbow/Hand WDL  Abdomen rash, redness, scar, moisture, excoriation   Groin could not assess due to respiratory status while attempting to stand pt or attempt to lay flat   Scrotum/Coccyx/Buttocks could not assess due to respiratory status while attempting to stand pt or lay flat   (R) Leg redness, blanching, edema, unable to assess back of legs while attempting to stand or lie flat for assessment   (L) Leg redness, blanching, edema, unable to assess back of legs while attempting to stand or lie flat for assessment.   (R) Heel/Foot/Toe Redness and Blanching to great toe and heel, boggy, swelling, edema   (L) Heel/Foot/Toe Redness and Blanching to heel, boggy, swelling, edema          Devices In Places ECG, Blood Pressure Cuff, Pulse Ox, and Nasal Cannula      Interventions In Place NC W/Ear Foams, TAP System, Pillows, Q2 Turns, Low Air Loss Mattress, Barrier Cream, Heels Loaded W/Pillows, and Pressure Redistribution Mattress, interdry    Possible Skin Injury Yes    Pictures Uploaded Into Epic Yes  Wound Consult Placed Yes  RN Wound Prevention Protocol Ordered Yes

## 2024-11-04 NOTE — DIETARY
NUTRITION SERVICES: BMI - Pt with BMI >40 (=Body mass index is 64.45 kg/m².), Class III obesity. Weight is via other healthcare, generalized edema noted. Weight loss counseling not appropriate in acute care setting. RECOMMEND - If appropriate at DC please refer to outpatient nutrition services for weight management.

## 2024-11-04 NOTE — PROGRESS NOTES
Patient transferred to  602 with ACLS RN and CCT. RN notified patient's daughter of move. All belongings with patient.

## 2024-11-04 NOTE — PROGRESS NOTES
4 Eyes Skin Assessment Completed by MEHRAN Knight and JIA Plascencia.    Head WDL  Ears Redness and Blanching dry behind  Nose WDL  Mouth WDL  Neck WDL  Breast/Chest Redness, Blanching, and Excoriation  Shoulder Blades WDL  Spine WDL  (R) Arm/Elbow/Hand Redness, Blanching, and Bruising  (L) Arm/Elbow/Hand Redness, Blanching, and Bruising  Abdomen Redness and Blanching, moisture and excoriation  Groin WDL  Scrotum/Coccyx/Buttocks Redness, Blanching, and Excoriation, moisture  (R) Leg Redness, Blanching, and Edema, redness and open moisture related wounds behind knees, bruising  (L) Leg Redness, Blanching, and Edema, redness and open moisture related wounds behind knees, bruising  (R) Heel/Foot/Toe Redness, Blanching, and Boggy  (L) Heel/Foot/Toe Redness, Blanching, and Boggy          Devices In Places Tele Box, Pulse Ox, and Nasal Cannula      Interventions In Place NC W/Ear Foams, TAP System, Pillows, and ZFlo Pillow    Possible Skin Injury Yes    Pictures Uploaded Into Epic Yes  Wound Consult Placed No  RN Wound Prevention Protocol Ordered Yes

## 2024-11-04 NOTE — DISCHARGE PLANNING
HTH/SCP chart review completed. Note, due to medical acuity, pt is requiring ICU level of care. TCN services are not indicated at this time and therefore will defer to hospital CM. TCN will monitor pt for transfer to another floor or can be reached by Voalte if collaboration with the hospital discharge team is indicated. Thank you!

## 2024-11-04 NOTE — PROGRESS NOTES
Patient was eating a late breakfast and started coughing after eating a bite of eggs. Patient desated to 86%. Oxygen turned up and patient sat on the edge of bed. RN made patient NPO and notified Dr. Crawford. New orders for a speech eval.

## 2024-11-04 NOTE — ASSESSMENT & PLAN NOTE
RT per protocol  Titrate oxygen as tolerates. 11/4 on HFNC  Inhalers, nebs  Asthma exacerbation, steroids  Steroids  Forced diuresis

## 2024-11-04 NOTE — PROGRESS NOTES
Due to patient's continuous upper airway gurgling, labored work of breathing, anxiety and mild confusion. MD Crawford gave RN orders for a STAT ABG. MD Anna at bedside as well.

## 2024-11-04 NOTE — CARE PLAN
The patient is Watcher - Medium risk of patient condition declining or worsening    Shift Goals  Clinical Goals: stable oxygenation/ventilation, SBP < 180, continuous BiPAP, anxiety management  Patient Goals: snack, call daughter  Family Goals: MIKE, none present    Progress made toward(s) clinical / shift goals:      Patient continues to refuse BiPAP. Education provided. Precedex gtt started per MAR for anxiety/agitation. Patient mobilized to EOB. Q2 turns with pillows, PRN Nystatin administered. Wound Care consult placed.     Problem: Pain - Standard  Goal: Alleviation of pain or a reduction in pain to the patient’s comfort goal  Outcome: Progressing     Problem: Fall Risk  Goal: Patient will remain free from falls  Outcome: Progressing     Problem: Skin Integrity  Goal: Skin integrity is maintained or improved  Outcome: Progressing     Problem: Knowledge Deficit - Standard  Goal: Patient and family/care givers will demonstrate understanding of plan of care, disease process/condition, diagnostic tests and medications  Outcome: Progressing    Patient is not progressing towards the following goals:    Problem: Self Care  Goal: Patient will have the ability to perform ADLs independently or with assistance (bathe, groom, dress, toilet and feed)  Outcome: Not Progressing

## 2024-11-04 NOTE — WOUND TEAM
[facility]  Inpatient Services  Initial Wound and Skin Care Evaluation    Admission Date: 11/3/2024     Last order of IP CONSULT TO WOUND CARE was found on 11/3/2024 from Hospital Encounter on 11/3/2024     HPI, PMH, SH: Reviewed    Past Surgical History:   Procedure Laterality Date    OTHER Left [id number]    LT LEG SURGERY      Social History     Tobacco Use    Smoking status: Former     Types: Cigarettes    Smokeless tobacco: Never    Tobacco comments:     only 1.5 yr. only social   Substance Use Topics    Alcohol use: Not Currently     Comment: rare      Chief Complaint   Patient presents with    Shortness of Breath     Pt BIBA SOB, asthma. EMS administered albuterol and duo-neb treatment x 2. GCS 15.     Diagnosis: Acute asthma exacerbation [J45.901]    Unit where seen by Wound Team: R108/00     WOUND CONSULT RELATED TO:  skin folds breasts, abdomen, pannus    WOUND TEAM PLAN OF CARE - Frequency of Follow-up:   Nursing to follow dressing orders written for wound care. Contact wound team if area fails to progress, deteriorates or with any questions/concerns if something comes up before next scheduled follow up (See below as to whether wound is following and frequency of wound follow up)   Not following, consult as needed  - skin folds breasts, abdomen, pannus    WOUND HISTORY:   Pt admitted with redness to skin folds of trunk and posterior L knee.       WOUND ASSESSMENT/LDA  Moisture Associated Skin Damage 11/03/24 Inferior Breast;Panus (Active)   First Observed Date: 11/03/24   Present on Original Admission: Yes  Wound Orientation: Inferior  Wound Location : Breast;Panus      Assessments 11/4/2024 12:00 PM   Wound Image        NEXT Weekly Photo (Inpatient Only) 11/10/24   Drainage Amount None   Periwound Assessment Pink   IAD Cleansing Foam Cleanser/Washcloth   Periwound Protectant Interdry   Length (cm) 29   Width (cm) 64       Moisture Associated Skin Damage 11/03/24 Posterior Other (comment)  (Active)   First Observed Date: 11/03/24   Wound Orientation: Posterior  Wound Location : (c) Other (comment)      Assessments 11/4/2024 12:00 PM   Wound Image      NEXT Weekly Photo (Inpatient Only) 11/10/24   Drainage Amount None   Periwound Assessment Intact   IAD Cleansing Foam Cleanser/Washcloth   Periwound Protectant Interdry   Length (cm) 3   Width (cm) 16        Vascular:    LESLYE:   No results found.    Lab Values:    Lab Results   Component Value Date/Time    WBC 14.5 (H) 11/04/2024 02:30 AM    RBC 4.08 (L) 11/04/2024 02:30 AM    HEMOGLOBIN 12.2 11/04/2024 02:30 AM    HEMATOCRIT 39.4 11/04/2024 02:30 AM    HBA1C 6.2 (H) 07/21/2024 12:32 AM         Culture Results show:  No results found for this or any previous visit (from the past 720 hours).    Pain Level/Medicated:  None, Tolerated without pain medication       INTERVENTIONS BY WOUND TEAM:  Chart and images reviewed. Discussed with bedside RN. All areas of concern (based on picture review, LDA review and discussion with bedside RN) have been thoroughly assessed. Documentation of areas based on significant findings. This RN in to assess patient. Performed standard wound care which includes appropriate positioning, dressing removal and non-selective debridement. Pictures and measurements obtained weekly if/when required.    Wound:  skin folds  Preparation for Dressing removal: Removed without difficulty  Cleansed/Non-selectively Debrided with:  No rinse foam soap and Moist warm washcloth  Primary Dressing:  wicking cloth under skin folds  Secondary (Outer) Dressing: viscopaste strips or barrier paste to lateral pannus at hip level    Advanced Wound Care Discharge Planning  Number of Clinicians necessary to complete wound care: 2  Is patient requiring IV pain medications for dressing changes:  No   Length of time for dressing change 40 min. (This does not include chart review, pre-medication time, set up, clean up or time spent  charting.)    Interdisciplinary consultation: Patient, Bedside RN (Brian), .  Pressure injury and staging reviewed with N/A.    EVALUATION / RATIONALE FOR TREATMENT:     Date:  11/04/24  Wound Status:  Initial evaluation    Pt admitted with redness to skin folds of breasts, abdominal panniculus and posterior L knee. Wicking cloth with hydrogen peroxide component. It also katie away the moisture under skin folds so that it can evaporate on edges not placed/folded into the wound bed.         Goals: Steady decrease in wound area and depth weekly.    NURSING PLAN OF CARE ORDERS:  Dressing changes: See Dressing Care orders  RN Prevention Protocol    NUTRITION RECOMMENDATIONS   Wound Team Recommendations:  N/A    DIET ORDERS (From admission to next 24h)       Start     Ordered    11/03/24 1401  Diet Order Diet: Cardiac  ALL MEALS        Question:  Diet:  Answer:  Cardiac    11/03/24 1401                    PREVENTATIVE INTERVENTIONS:    Q shift Stevenson - performed per nursing policy  Q shift pressure point assessments - performed per nursing policy    Surface/Positioning  ICU Low Airloss - Currently in Place  Reposition q 2 hours - Currently in Place  Move and Transfer System (MATs) - Currently in Place  Mepilx lite to be placed under high flow attachment straps over face.  - Ordered    Offloading/Redistribution  Sacral offloading dressing (Silicone dressing) - Ordered  Heel offloading dressing (Silicone dressing) - Ordered      Respiratory  High flow offloading Clip - Currently in Place    Containment/Moisture Prevention    Dri-tanner pad - Currently in Place  Purwick/Condom Cath - Currently in Place  Barrier paste - Currently in Place  Interdry - Currently in Place      Anticipated discharge plans:  TBD        Vac Discharge Needs:  Vac Discharge plan is purely a recommendation from wound team and not a requirement for discharge unless otherwise stated by physician.  Not Applicable Pt not on a wound vac

## 2024-11-04 NOTE — ASSESSMENT & PLAN NOTE
Continue IV Solu-Medrol, titrate  I ordered bronchodilators per RT protocol  Reviewed chest x-ray with no consolidation given her productive cough, yellow sputum, started antibiotics, sputum culture   patient was unable to tolerate CTA in the emergency department

## 2024-11-04 NOTE — DISCHARGE PLANNING
Care Transition Team Assessment    Information Source  Orientation Level: Oriented X4    Readmission Evaluation  Is this a readmission?: No    Elopement Risk  Legal Hold: No  Ambulatory or Self Mobile in Wheelchair: No-Not an Elopement Risk  Elopement Risk: Not at Risk for Elopement    Interdisciplinary Discharge Planning  Lives with - Patient's Self Care Capacity: Alone and Unable to Care For Self  Patient or legal guardian wants to designate a caregiver: No  Support Systems: Family Member(s)  Housing / Facility: [address]    Discharge Preparedness  What is your plan after discharge?: Home with help, Home health care, Skilled nursing facility  What are your discharge supports?: Child  Prior Functional Level: Ambulatory, Independent with Activities of Daily Living, Independent with Medication Management, Needs Assist with Activities of Daily Living, Uses Cane  Difficulity with ADLs: Bathing, Dressing  Difficulty with ADLs Comment: Daughter comes and helps her  Difficulity with IADLs: Driving, Keeping track of finances, Shopping  Difficulity with IADL Comments: Her daughter helps her    Functional Assesment  Prior Functional Level: Ambulatory, Independent with Activities of Daily Living, Independent with Medication Management, Needs Assist with Activities of Daily Living, Uses Cane    Finances  Financial Barriers to Discharge: No  Prescription Coverage: Yes    Vision / Hearing Impairment  Vision Impairment : Yes  Right Eye Vision: Wears Glasses  Left Eye Vision: Wears Glasses  Hearing Impairment : No    Domestic Abuse  Have you ever been the victim of abuse or violence?: No  Possible Abuse/Neglect Reported to:: Not Applicable    Psychological Assessment  History of Substance Abuse: None    Discharge Risks or Barriers  Discharge risks or barriers?: Post-acute placement / services, Lives alone, no community support  Patient risk factors: Lack of outside supports, Lives alone and no community support, Readmission,  Vulnerable adult    Anticipated Discharge Information  Discharge Disposition: D/T to SNF with Medicare cert in anticipation of skilled care (03)    Case Management Discharge Planning    Admission Date: 11/3/2024  GMLOS: 2.9  ALOS: 1    6-Clicks ADL Score: 12  6-Clicks Mobility Score: 12  PT and/or OT Eval ordered: Yes  Post-acute Referrals Ordered: No  Post-acute Choice Obtained: No  Has referral(s) been sent to post-acute provider:  No      Anticipated Discharge Dispo: Discharge Disposition: D/T to SNF with Medicare cert in anticipation of skilled care (03)    JAROD met with Cecile at bedside.  She is AOX4.  She is on hi flow O2.  Her immediate concern at this time is being able to get some food.  Cecile tells me that she is on O2 at home and Sara is the provider.  She uses CPAP as well, thinks the setting is 24/18, is not aware of what O2 setting she uses.  She tells me that whenever she is lying down she uses this.    She lives alone in a single level home with no steps.  She has a walk in shower, no shower chair, and her daughter assists with the shower and getting dressed.    Cecile uses a cane, 'not to walk' but to pull items she needs to her or to lift her legs.    Cecile tells me that she has been in a SNF before b/c she is not always able to assist herself.  PT/OT orders placed.    JAROD discussed a SNF setting on discharge and she is agreeable; she is agreeable to  as well.    DME Needed: No    Action(s) Taken: Updated Provider/Nurse on Discharge Plan    Escalations Completed: None    Medically Clear: No    Next Steps: PT/OT evaluations secondary to six clicks <17.    Barriers to Discharge: Medical clearance and Pending PT Evaluation

## 2024-11-04 NOTE — PROGRESS NOTES
Rapid Response Summary     Rapid response called at 1705 for: Shortness of breath  and Increased oxygen demand     VS: Tachycardia  and Tachypneic  (See Vitals Flowsheet)  Additional info: Asthma  MD Paged: Ruperto Mendoza  Interventions:    Imaging/Tests: Chest X-ray and EKG    Labs: ABG  and Troponin   Medications:  DuoNeb, Racemic Epi   Other: HHFNC 40L 60%  Disposition: Improved with rapid response team interventions. Primary RN updated on plan of care. Patient transferred to [facility]. overflow

## 2024-11-04 NOTE — PROGRESS NOTES
"1433: Patient appears to be in respiratory distress. Patient wheezing and stating \" I can't breath help me. Patient on 4L NC sating at 96%. Patient blood pressure 188/99. Notified MD. New orders placed. RN administered PRN albuterol, solumedrol and hydralazine.      1445: Respiratory therapist at bedside administering breathing treatments. Patient continues to be in respiratory distress. Patient unable to speak in full sentences. Respiratory therapist called RAPID.     1450 RAPID team at bedside. Dr Taye Pickett and Dr figueredo at beside to evaluate patient. Patient being transferred to ICU.     Report called to ICU RN.   "

## 2024-11-04 NOTE — RESPIRATORY CARE
Dr. Stone presented to bedside for respiratory distress. Patient is currently on high flow receiving 40L, 60%. Plan is to transfer to [facility]. Initiate continuous albuterol and BiPAP PRN.

## 2024-11-04 NOTE — PROGRESS NOTES
Hospital Medicine Daily Progress Note    Date of Service  11/4/2024    Chief Complaint  Short of breath    Hospital Course  Cecile Teixeira is a 79 y.o. female wheelchair bound since her 30's with a hx of asthma, hypothyroid, YOLA on CPAP, CVA, morbid obesity, admitted 11/3/2024 with acute respiatory failiure requiring high flow oxygen and frequent nebulizer treatments.    Interval Problem Update  11/4: wbc:14.5, on HFNC 40L and 50% FiO2. Sleeping but moves all extremities.    I have discussed this patient's plan of care and discharge plan at IDT rounds today with Case Management, Nursing, Nursing leadership, and other members of the IDT team.    Consultants/Specialty  critical care    Code Status  Full Code    Disposition  The patient is not medically cleared for discharge to home or a post-acute facility.      I have placed the appropriate orders for post-discharge needs.    Review of Systems  Review of Systems   Reason unable to perform ROS: sleeping/lethargy.   Respiratory:  Positive for wheezing.    Psychiatric/Behavioral:  The patient is not nervous/anxious.         Physical Exam  Temp:  [35.4 °C (95.7 °F)-37 °C (98.6 °F)] 36.6 °C (97.9 °F)  Pulse:  [] 83  Resp:  [14-99] 15  BP: ()/(46-99) 116/53  SpO2:  [15 %-100 %] 98 %    Physical Exam  Vitals reviewed.   Constitutional:       General: She is sleeping. She is not in acute distress.     Appearance: She is obese. She is not diaphoretic.   HENT:      Head: Normocephalic.      Nose: Nose normal.   Eyes:      General:         Right eye: No discharge.         Left eye: No discharge.      Conjunctiva/sclera: Conjunctivae normal.   Cardiovascular:      Rate and Rhythm: Normal rate and regular rhythm.      Pulses: Normal pulses.      Heart sounds: No murmur heard.  Pulmonary:      Effort: Pulmonary effort is normal. No respiratory distress.      Breath sounds: Wheezing present.   Abdominal:      General: Bowel sounds are normal. There is no distension.       Palpations: Abdomen is soft.   Musculoskeletal:         General: Swelling present.      Right lower leg: Edema (lymphedema) present.      Left lower leg: Edema present.   Lymphadenopathy:      Cervical: No cervical adenopathy.   Skin:     General: Skin is dry.   Neurological:      Mental Status: She is lethargic.   Psychiatric:         Behavior: Behavior is cooperative.         Fluids    Intake/Output Summary (Last 24 hours) at 11/4/2024 0752  Last data filed at 11/4/2024 0600  Gross per 24 hour   Intake 613.02 ml   Output 200 ml   Net 413.02 ml        Laboratory  Recent Labs     11/03/24  1015 11/04/24  0230   WBC 9.1 14.5*   RBC 4.55 4.08*   HEMOGLOBIN 13.9 12.2   HEMATOCRIT 43.2 39.4   MCV 94.9 96.6   MCH 30.5 29.9   MCHC 32.2 31.0*   RDW 49.7 51.6*   PLATELETCT 349 322   MPV 9.0 8.9*     Recent Labs     11/03/24  1015 11/04/24  0230   SODIUM 139 138   POTASSIUM 4.0 4.1   CHLORIDE 103 101   CO2 24 20   GLUCOSE 140* 225*   BUN 19 27*   CREATININE 0.75 1.29   CALCIUM 9.5 8.8                   Imaging  DX-CHEST-PORTABLE (1 VIEW)   Final Result      Cardiac silhouette enlargement. No definite acute abnormality.      US-EXTREMITY VENOUS LOWER BILAT   Final Result      DX-CHEST-PORTABLE (1 VIEW)   Final Result      No acute process.      CT-CTA CHEST PULMONARY ARTERY W/ RECONS    (Results Pending)   EC-ECHOCARDIOGRAM COMPLETE W/ CONT    (Results Pending)        Assessment/Plan  * Acute asthma exacerbation- (present on admission)  Assessment & Plan  Patient will be started on IV Solu-Medrol  I ordered bronchodilators per RT protocol  Reviewed chest x-ray with no consolidation given her productive cough I will empirically treat her with azithromycin  Patient was unable to tolerate CTA in the emergency department    Type 2 diabetes mellitus, without long-term current use of insulin (HCC)- (present on admission)  Assessment & Plan  Hemoglobin A1c 6.2 in past three months  Monitor accuchecks and cover with  SSI  Hypoglycemic protocol  On steroids and likely to induce hyperglycemia  Encourage weight loss    Elevated troponin  Assessment & Plan  Likely demand ischemia in the setting of hypoxia  Repeat troponin plateau/flattend  ASA      Acute respiratory failure with hypoxia (HCC)- (present on admission)  Assessment & Plan  RT per protocol  Titrate oxygen as tolerates. 11/4 on HFNC  Inhalers, nebs  Asthma exacerbation  Steroids    Primary hypertension- (present on admission)  Assessment & Plan  Amlodipine 10mg, losartan 100mg qd, metoprolol SR 25mg qd  Monitor vitals   PRN antihypertensives    Lymphedema associated with obesity- (present on admission)  Assessment & Plan  Lower extremity duplex reviewed negative for DVT      YOLA (obstructive sleep apnea)- (present on admission)  Assessment & Plan  Did not tolerates BiPAP overnight and on HFNC as of 11/4 am.  Monitor.    Hypothyroidism- (present on admission)  Assessment & Plan  Continue levothyroxine 88mcg qd  TSH:4.5 in past three months    Severe obesity (BMI >= 40) (HCC)- (present on admission)  Assessment & Plan  Body mass index is 64.45 kg/m².          VTE prophylaxis:   SCDs/TEDs   enoxaparin ppx      I have performed a physical exam and reviewed and updated ROS and Plan today (11/4/2024). In review of yesterday's note (11/3/2024), there are no changes except as documented above.

## 2024-11-05 ENCOUNTER — APPOINTMENT (OUTPATIENT)
Dept: RADIOLOGY | Facility: MEDICAL CENTER | Age: 79
DRG: 202 | End: 2024-11-05
Attending: EMERGENCY MEDICINE
Payer: MEDICARE

## 2024-11-05 LAB
ANION GAP SERPL CALC-SCNC: 9 MMOL/L (ref 7–16)
BACTERIA UR CULT: ABNORMAL
BACTERIA UR CULT: ABNORMAL
BUN SERPL-MCNC: 40 MG/DL (ref 8–22)
CALCIUM SERPL-MCNC: 8.9 MG/DL (ref 8.5–10.5)
CHLORIDE SERPL-SCNC: 102 MMOL/L (ref 96–112)
CO2 SERPL-SCNC: 23 MMOL/L (ref 20–33)
CREAT SERPL-MCNC: 1.26 MG/DL (ref 0.5–1.4)
ERYTHROCYTE [DISTWIDTH] IN BLOOD BY AUTOMATED COUNT: 54.3 FL (ref 35.9–50)
GFR SERPLBLD CREATININE-BSD FMLA CKD-EPI: 43 ML/MIN/1.73 M 2
GLUCOSE BLD STRIP.AUTO-MCNC: 146 MG/DL (ref 65–99)
GLUCOSE BLD STRIP.AUTO-MCNC: 148 MG/DL (ref 65–99)
GLUCOSE BLD STRIP.AUTO-MCNC: 162 MG/DL (ref 65–99)
GLUCOSE BLD STRIP.AUTO-MCNC: 172 MG/DL (ref 65–99)
GLUCOSE SERPL-MCNC: 165 MG/DL (ref 65–99)
HCT VFR BLD AUTO: 38.2 % (ref 37–47)
HGB BLD-MCNC: 11.7 G/DL (ref 12–16)
LV EJECT FRACT MOD 2C 99903: 60.43
LV EJECT FRACT MOD 4C 99902: 61.7
LV EJECT FRACT MOD BP 99901: 58.98
MCH RBC QN AUTO: 30.3 PG (ref 27–33)
MCHC RBC AUTO-ENTMCNC: 30.6 G/DL (ref 32.2–35.5)
MCV RBC AUTO: 99 FL (ref 81.4–97.8)
PLATELET # BLD AUTO: 249 K/UL (ref 164–446)
PMV BLD AUTO: 9.1 FL (ref 9–12.9)
POTASSIUM SERPL-SCNC: 4.8 MMOL/L (ref 3.6–5.5)
RBC # BLD AUTO: 3.86 M/UL (ref 4.2–5.4)
SIGNIFICANT IND 70042: ABNORMAL
SITE SITE: ABNORMAL
SODIUM SERPL-SCNC: 134 MMOL/L (ref 135–145)
SOURCE SOURCE: ABNORMAL
WBC # BLD AUTO: 19.4 K/UL (ref 4.8–10.8)

## 2024-11-05 PROCEDURE — 700101 HCHG RX REV CODE 250: Performed by: HOSPITALIST

## 2024-11-05 PROCEDURE — 97163 PT EVAL HIGH COMPLEX 45 MIN: CPT

## 2024-11-05 PROCEDURE — 700111 HCHG RX REV CODE 636 W/ 250 OVERRIDE (IP): Performed by: HOSPITALIST

## 2024-11-05 PROCEDURE — 700105 HCHG RX REV CODE 258: Performed by: HOSPITALIST

## 2024-11-05 PROCEDURE — A9270 NON-COVERED ITEM OR SERVICE: HCPCS | Performed by: HOSPITALIST

## 2024-11-05 PROCEDURE — 80048 BASIC METABOLIC PNL TOTAL CA: CPT

## 2024-11-05 PROCEDURE — 94664 DEMO&/EVAL PT USE INHALER: CPT

## 2024-11-05 PROCEDURE — 93306 TTE W/DOPPLER COMPLETE: CPT | Mod: 26 | Performed by: INTERNAL MEDICINE

## 2024-11-05 PROCEDURE — 85027 COMPLETE CBC AUTOMATED: CPT

## 2024-11-05 PROCEDURE — 94669 MECHANICAL CHEST WALL OSCILL: CPT

## 2024-11-05 PROCEDURE — 770000 HCHG ROOM/CARE - INTERMEDIATE ICU *

## 2024-11-05 PROCEDURE — 82962 GLUCOSE BLOOD TEST: CPT

## 2024-11-05 PROCEDURE — 97167 OT EVAL HIGH COMPLEX 60 MIN: CPT

## 2024-11-05 PROCEDURE — 92612 ENDOSCOPY SWALLOW (FEES) VID: CPT

## 2024-11-05 PROCEDURE — 700102 HCHG RX REV CODE 250 W/ 637 OVERRIDE(OP): Performed by: HOSPITALIST

## 2024-11-05 PROCEDURE — 99233 SBSQ HOSP IP/OBS HIGH 50: CPT | Performed by: HOSPITALIST

## 2024-11-05 PROCEDURE — 94640 AIRWAY INHALATION TREATMENT: CPT

## 2024-11-05 RX ORDER — ENOXAPARIN SODIUM 100 MG/ML
60 INJECTION SUBCUTANEOUS EVERY 12 HOURS
Status: DISCONTINUED | OUTPATIENT
Start: 2024-11-05 | End: 2024-11-08 | Stop reason: HOSPADM

## 2024-11-05 RX ORDER — FUROSEMIDE 10 MG/ML
40 INJECTION INTRAMUSCULAR; INTRAVENOUS ONCE
Status: COMPLETED | OUTPATIENT
Start: 2024-11-05 | End: 2024-11-05

## 2024-11-05 RX ORDER — LORAZEPAM 2 MG/ML
1 INJECTION INTRAMUSCULAR EVERY 4 HOURS PRN
Status: DISCONTINUED | OUTPATIENT
Start: 2024-11-05 | End: 2024-11-08 | Stop reason: HOSPADM

## 2024-11-05 RX ADMIN — METHYLPREDNISOLONE SODIUM SUCCINATE 40 MG: 40 INJECTION, POWDER, FOR SOLUTION INTRAMUSCULAR; INTRAVENOUS at 11:41

## 2024-11-05 RX ADMIN — IPRATROPIUM BROMIDE AND ALBUTEROL SULFATE 3 ML: 2.5; .5 SOLUTION RESPIRATORY (INHALATION) at 06:53

## 2024-11-05 RX ADMIN — CEFTRIAXONE SODIUM 2000 MG: 10 INJECTION, POWDER, FOR SOLUTION INTRAVENOUS at 12:30

## 2024-11-05 RX ADMIN — CETIRIZINE HYDROCHLORIDE 10 MG: 10 TABLET, FILM COATED ORAL at 21:10

## 2024-11-05 RX ADMIN — IPRATROPIUM BROMIDE AND ALBUTEROL SULFATE 3 ML: 2.5; .5 SOLUTION RESPIRATORY (INHALATION) at 22:18

## 2024-11-05 RX ADMIN — AZITHROMYCIN DIHYDRATE 500 MG: 250 TABLET ORAL at 05:14

## 2024-11-05 RX ADMIN — LOSARTAN POTASSIUM 100 MG: 50 TABLET, FILM COATED ORAL at 05:14

## 2024-11-05 RX ADMIN — IPRATROPIUM BROMIDE AND ALBUTEROL SULFATE 3 ML: 2.5; .5 SOLUTION RESPIRATORY (INHALATION) at 10:11

## 2024-11-05 RX ADMIN — INSULIN LISPRO 2 UNITS: 100 INJECTION, SOLUTION INTRAVENOUS; SUBCUTANEOUS at 17:12

## 2024-11-05 RX ADMIN — METHYLPREDNISOLONE SODIUM SUCCINATE 40 MG: 40 INJECTION, POWDER, FOR SOLUTION INTRAMUSCULAR; INTRAVENOUS at 00:00

## 2024-11-05 RX ADMIN — ASPIRIN 81 MG: 81 TABLET, COATED ORAL at 05:14

## 2024-11-05 RX ADMIN — ACETAMINOPHEN 650 MG: 325 TABLET ORAL at 19:42

## 2024-11-05 RX ADMIN — IPRATROPIUM BROMIDE AND ALBUTEROL SULFATE 3 ML: 2.5; .5 SOLUTION RESPIRATORY (INHALATION) at 18:45

## 2024-11-05 RX ADMIN — FLUTICASONE PROPIONATE 220 MCG: 110 AEROSOL, METERED RESPIRATORY (INHALATION) at 17:16

## 2024-11-05 RX ADMIN — METHYLPREDNISOLONE SODIUM SUCCINATE 40 MG: 40 INJECTION, POWDER, FOR SOLUTION INTRAMUSCULAR; INTRAVENOUS at 05:13

## 2024-11-05 RX ADMIN — IPRATROPIUM BROMIDE AND ALBUTEROL SULFATE 3 ML: 2.5; .5 SOLUTION RESPIRATORY (INHALATION) at 14:46

## 2024-11-05 RX ADMIN — ENOXAPARIN SODIUM 60 MG: 100 INJECTION SUBCUTANEOUS at 17:12

## 2024-11-05 RX ADMIN — FUROSEMIDE 40 MG: 10 INJECTION INTRAMUSCULAR; INTRAVENOUS at 11:27

## 2024-11-05 RX ADMIN — ENOXAPARIN SODIUM 30 MG: 100 INJECTION SUBCUTANEOUS at 05:13

## 2024-11-05 RX ADMIN — LEVOTHYROXINE SODIUM 88 MCG: 0.09 TABLET ORAL at 05:14

## 2024-11-05 RX ADMIN — METOPROLOL SUCCINATE 25 MG: 25 TABLET, EXTENDED RELEASE ORAL at 05:14

## 2024-11-05 RX ADMIN — FLUTICASONE PROPIONATE 220 MCG: 110 AEROSOL, METERED RESPIRATORY (INHALATION) at 05:15

## 2024-11-05 RX ADMIN — INSULIN LISPRO 2 UNITS: 100 INJECTION, SOLUTION INTRAVENOUS; SUBCUTANEOUS at 08:50

## 2024-11-05 RX ADMIN — AMLODIPINE BESYLATE 10 MG: 10 TABLET ORAL at 05:14

## 2024-11-05 RX ADMIN — METHYLPREDNISOLONE SODIUM SUCCINATE 40 MG: 40 INJECTION, POWDER, FOR SOLUTION INTRAMUSCULAR; INTRAVENOUS at 23:36

## 2024-11-05 RX ADMIN — METHYLPREDNISOLONE SODIUM SUCCINATE 40 MG: 40 INJECTION, POWDER, FOR SOLUTION INTRAMUSCULAR; INTRAVENOUS at 17:12

## 2024-11-05 ASSESSMENT — ENCOUNTER SYMPTOMS
HEARTBURN: 0
FOCAL WEAKNESS: 0
BRUISES/BLEEDS EASILY: 0
SPUTUM PRODUCTION: 1
FEVER: 0
CHILLS: 0
EYES NEGATIVE: 1
CARDIOVASCULAR NEGATIVE: 1
VOMITING: 0
COUGH: 1
HEADACHES: 0
DEPRESSION: 0
MUSCULOSKELETAL NEGATIVE: 1
WEAKNESS: 1
NERVOUS/ANXIOUS: 1
NECK PAIN: 0
PALPITATIONS: 0
WHEEZING: 1
NAUSEA: 0
GASTROINTESTINAL NEGATIVE: 1
DIZZINESS: 0
POLYDIPSIA: 0
BACK PAIN: 0

## 2024-11-05 ASSESSMENT — GAIT ASSESSMENTS: GAIT LEVEL OF ASSIST: UNABLE TO PARTICIPATE

## 2024-11-05 ASSESSMENT — COGNITIVE AND FUNCTIONAL STATUS - GENERAL
MOVING TO AND FROM BED TO CHAIR: TOTAL
HELP NEEDED FOR BATHING: A LOT
MOBILITY SCORE: 9
PERSONAL GROOMING: A LITTLE
SUGGESTED CMS G CODE MODIFIER MOBILITY: CM
STANDING UP FROM CHAIR USING ARMS: TOTAL
TURNING FROM BACK TO SIDE WHILE IN FLAT BAD: A LOT
EATING MEALS: A LITTLE
TOILETING: A LOT
DRESSING REGULAR LOWER BODY CLOTHING: A LOT
DRESSING REGULAR UPPER BODY CLOTHING: A LOT
SUGGESTED CMS G CODE MODIFIER DAILY ACTIVITY: CK
MOVING FROM LYING ON BACK TO SITTING ON SIDE OF FLAT BED: A LITTLE
CLIMB 3 TO 5 STEPS WITH RAILING: TOTAL
DAILY ACTIVITIY SCORE: 14
WALKING IN HOSPITAL ROOM: TOTAL

## 2024-11-05 ASSESSMENT — PAIN DESCRIPTION - PAIN TYPE
TYPE: ACUTE PAIN

## 2024-11-05 ASSESSMENT — ACTIVITIES OF DAILY LIVING (ADL): TOILETING: INDEPENDENT

## 2024-11-05 ASSESSMENT — FIBROSIS 4 INDEX: FIB4 SCORE: 1.63

## 2024-11-05 NOTE — CARE PLAN
The patient is Watcher - Medium risk of patient condition declining or worsening    Shift Goals  Clinical Goals: Hemodynamic stability, Improve respiratpry pattern  Patient Goals: Eat and drink  Family Goals: MIKE    Progress made toward(s) clinical / shift goals:    Problem: Pain - Standard  Goal: Alleviation of pain or a reduction in pain to the patient’s comfort goal  Outcome: Progressing     Problem: Impaired Gas Exchange  Goal: Patient will demonstrate improved ventilation and adequate oxygenation and participate in treatment regimen within the level of ability/situation.  Outcome: Progressing     Problem: Risk for Aspiration  Goal: Patient's risk for aspiration will be absent or decrease  Outcome: Progressing       Patient is not progressing towards the following goals:

## 2024-11-05 NOTE — PROGRESS NOTES
4 Eyes Skin Assessment Completed by Charles RN and Israel RN.     Head WDL  Ears Redness and Blanching, scab to L ear   Nose WDL  Mouth WDL  Neck WDL  Breast/Chest Redness, Rash, and Excoriation under both breasts and on bilateral folds of flanks  Shoulder Blades WDL  Spine WDL  (R) Arm/Elbow/Hand Bruising  (L) Arm/Elbow/Hand WDL  Abdomen rash, redness, scar, moisture, excoriation   Groin could not assess due to respiratory status while attempting to stand pt or attempt to lay flat   Scrotum/Coccyx/Buttocks redness non blanching    (R) Leg redness, blanching, edema, back of the knee redness, non blanching    (L) Leg redness, blanching, edema, back of the knee and redness abrasion      (R) Heel/Foot/Toe Redness and Blanching to great toe and heel, boggy, swelling, edema   (L) Heel/Foot/Toe Redness and Blanching to heel, boggy, swelling, edema              Devices In Places ECG, Blood Pressure Cuff, Pulse Ox, and Nasal Cannula        Interventions In Place NC W/Ear Foams, TAP System, Pillows, Q2 Turns, Low Air Loss Mattress, Barrier Cream, Heels Loaded W/Pillows, and Pressure Redistribution Mattress, interdry, barrier paste      Possible Skin Injury Yes     Pictures Uploaded Into Epic Yes  Wound Consult Placed Yes  RN Wound Prevention Protocol Ordered Yes

## 2024-11-05 NOTE — RESPIRATORY CARE
Went to visit patient at 1440 to talk about new respiratory medications for discharge. Found patient angry because she wanted to eat but in respiratory distress because she aspirated at late lunch this am. Audible inspiratory and expiratory wheezes noted with fingers blue in color. Gave a PRN duoneb and encouarged patient to work on flutter valve to see if she could dislodge food. After twenty minutes at bedside patient states she was breathing better but inspiratory wheezes still heard. Stat ABG drawn, MD aware of situation.    Went to revisit patient at 1700, respirations regular, patient sleeping, Oxygenation and work of breathing much improved.

## 2024-11-05 NOTE — THERAPY
"Speech Language Pathology   Flexible Endoscopic Evaluation of Swallowing (FEES)        Patient Name: Cecile Teixeira  AGE:  79 y.o., SEX:  female  Medical Record #: [id number]  Date of Service: 11/5/2024    History of Present Illness  78 y/o female admitted 11/3 with dyspnea and frequent urination. Admitted for acute asthma exacerbation. Rapid response called 11/3; patient made NPO on 11/4 after \"Patient was eating a late breakfast and started coughing after eating a bite of eggs. Patient desated to 86%.\"     CMHx: Asthma exacerbation, acute respiratory failure  PMHx: DM2, HTN, YOLA, obesity, anxiety, MCI, baseline wheelchair-bound     No hx SLP in [facility].     CXR 11/3:  \"Cardiac silhouette enlargement. No definite acute abnormality.\"    Pertinent Information  Current Method of Nutrition: NPO until cleared by speech pathology  Patient Behaviors: Irritable   Dentition: Good, Natural dentition   Feeding Tube: None   Tracheostomy: No        Factor(s) Affecting Performance: None     Discussed the risks, benefits, and alternatives of the FEES procedure. Patient/family acknowledged and agreed to proceed.    Assessment  Flexible Endoscopic Evaluation of Swallowing (FEES) completed at bedside today. The endoscope was passed transnasally via Left nare to evaluate the anatomy and physiology of swallowing. Pt tolerated the procedure for ~ 5 minutes. She then pulled the scope out and asked to terminate the exam. After encouragement, pt agreed to participate in the remainder of the exam and scope was re-inserted    Anatomic Findings: WFL    Vocal Fold Motion: Bilateral movement  Secretion Management: Adequate  PO Trials: Thin Liquid, Mildly Thick Liquid, Liquidised, Pudding, Soft & Bite Sized, Regular Solid    Consistency PAS Score Timing Residue Comments   Thin Liquid 6 Pre Swallow Vallecular Residue: Trace (1%-5%)  Pyriform Sinus Residue: Trace (1%-5%)    Mildly Thick 2 Pre Swallow Vallecular Residue: Trace (1%-5%)  Pyriform Sinus " Call to Jyotsna.  No answer. Left message for patient to call office.   Residue: Trace (1%-5%)    Liquidised 1 N/A Vallecular Residue: Mild (5%-25%)  Pyriform Sinus Residue: Mild (5%-25%)    Pudding 1 N/A Vallecular Residue: Mild (5%-25%)  Pyriform Sinus Residue: Mild (5%-25%)    Soft & Bite Sized 1 N/A Vallecular Residue: Mild (5%-25%)  Pyriform Sinus Residue: Moderate (25%-50%)    Regular Solid 1 N/A Vallecular Residue: Mild (5%-25%)  Pyriform Sinus Residue: Mild (5%-25%)      Penetration-Aspiration Scale (PAS)  1     No contrast enters airway  2     Contrast enters the airway, remains above the vocal folds, and is ejected from the airway (not seen in the airway at the end of the swallow).  3     Contrast enters the airway, remains above the vocal folds, and is not ejected from the airway (is seen in the airway after the swallow).  4     Contrast enters the airway, contacts the vocal folds, and is ejected from the airway.  5     Contrast enters the airway, contacts the vocal folds, and is not ejected from the airway  6     Contrast enters the airway, crosses the plane of the vocal folds, and is ejected from the airway.  7     Contrast enters the airway, crosses the plane of the vocal folds, and is not ejected from the airway despite effort.  8     Contrast enters the airway, crosses the plane of the vocal folds, is not ejected from the airway and there is no response to aspiration.    Oral phase: Bolus stripping and containment was fair- for solids this was impacted by increased respiratory rate. Reduced respiratory endurance for prolonged mastication of more advanced solids. AP transit presumed to be timely; no gross oral residue noted after the swallow.     Pharyngeal phase: There was a delay b/w oropharyngeal bolus transit and initiation of airway closure w/ airway invasion events occurring before the swallow. Complete base of tongue retraction and epiglottic inversion were achieved. Bolus transit efficiency appeared timely and functional. No gross residue remained in the pharynx  after the swallow. Airway invasion events appeared related to poor swallow-breath coordination- they happened intermittently and appeared related to inhaling immediately before or after the swallow. Pt appeared sensate to penetration and aspiration events. 1x episode of aspiration of thin liquids prompted an immediate cough which cleared the airway effectively.     Compensatory Strategies: Bolus hold did not reduced the frequency and depth of penetration. Pt's RR was too high to attempt a supraglottic swallow. Pt's waning participation (I.e., she pulled the scope out of her nose) precluded trials of other compensatory swallow strategies.     Severity Rating:  Severity Rating: GAB     GAB: Mild    Clinical Impressions    The pt presents with mild pharyngeal dysphagia. Pt's swallow safety is largely impacted but reduced swallow-breath coordination. Episodes of airway invasion that were visualized during today's FEES appeared related to inhalation immediately before or after the swallow. The pt's respiratory status easily rises above 30 with any exertion, extended speaking intermittent coughing episodes. Therefore risk for aspiration on PO cannot be completely eliminated, even with diet textures modification. Pt expressed understanding of this and elected to initiate a regular solids, thin/all liquids diet despite risk. Pt consulted w/ MD and to order same.   Pt agreeable to ST f/u for swallow-breath coordination training/education and diet tolerance.     Recommendations  Diet Consistency: Thin/all Liquids, Soft & Bite-Sized Solids (Per pt request, a regular diet will be ordered. Pt expressed understanding of risk for aspiration)  Medication: One pill at a time, Whole with puree  Supervision: Assist with meal tray set up, Close supervision - patient may be left alone for less than 5 minutes at a time  Positioning: Fully upright and midline during oral intake  Strategies: Small bites/sips, Slow rate of intake (Try to  "maintain RR < 30 while eating)  Oral Care: BID  Additional Instrumentation: None     SLP Treatment Plan  Treatment Plan: Dysphagia Treatment  SLP Frequency: 2x Per Week  Estimated Duration: Until Therapy Goals Met    Anticipated Discharge Needs  Discharge Recommendations: Anticipate that the patient will have no further speech therapy needs after discharge from the hospital   Therapy Recommendations Upon DC: Not Indicated     Patient / Family Goals  Patient / Family Goal #1: \"Can I have water?\"  Goal #1 Outcome: Goal met  Short Term Goal # 1: Pt will complete FEES w SLP to further evaluate swallow function and inform POC.  Goal Outcome # 1: Goal met  Short Term Goal # 2: Pt will tolerate the safest, least restrictive diet w/ no signs of aspiration related pulmonary complications  Short Term Goal # 3: Pt will participate in training on swallow-breath coordination & associated aspiration precautions    JASON Benavides  "

## 2024-11-05 NOTE — THERAPY
"Speech Language Pathology   Clinical Swallow Evaluation     Patient Name: Cecile Teixeira  AGE:  79 y.o., SEX:  female  Medical Record #: [id number]  Date of Service: 11/4/2024      History of Present Illness  78 y/o female admitted 11/3 with dyspnea and frequent urination. Admitted for acute asthma exacerbation. Rapid response called 11/3; patient made NPO on 11/4 after \"Patient was eating a late breakfast and started coughing after eating a bite of eggs. Patient desated to 86%.\"    CMHx: Asthma exacerbation, acute respiratory failure  PMHx: DM2, HTN, YOLA, obesity, anxiety, MCI, baseline wheelchair-bound    No hx SLP in [facility].    CXR 11/3:  \"Cardiac silhouette enlargement. No definite acute abnormality.\"      General Information:  Vitals  Pulse Oximetry: 96 %  O2 (LPM): 5  O2 Delivery Device: Nasal Cannula  Vitals Comments: O2 dipped in 80s during conversation; per RT, patient is refusing HHFNC  Level of Consciousness: Alert, Awake  Patient Behaviors: Irritable, Fatigue  Orientation: Oriented x 4  Follows Directives: Yes      Prior Living Situation & Level of Function:  Housing / Facility: [address] [place] [address]  Lives with - Patient's Self Care Capacity: Alone and Unable to Care For Self  Communication: WFL  Swallowing: Pt does not report dysphagia history also she states she does intermittently get globus sensation       Oral Mechanism Evaluation:  Dentition: Good, Natural dentition   Facial Symmetry: Equal  Facial Sensation: Equal     Labial Observations: WFL   Lingual Observations: Midline  Motor Speech: WFL (patient talking around flutter valve in her mouth, which did negatively impact intelligibility but when this was removed, her speech was WFL)         Laryngeal Function:  Secretion Management: Adequate  Voice Quality: Hoarse  Cough: Perceptually WNL  Comments: Cough largely not productive despite being explosive in nature with reddening of the face and at time forward rocking motion      Subjective  Pt agreeable and " "cooprative with SLP evaluation tasks. Just had long conversation with RT and RN about her current congested cough and oxygen needs while working with RT for breathing treatment. Pt with inconsistently productive, explosive, frequent cough and congested breath sounds. She is expressing frustration with NPO status.       Assessment  Current Method of Nutrition: NPO until cleared by speech pathology  - Made NPO after choking on eggs per RN or on cream of wheat per patient  Positioning: Eddy's (60-90 degrees)  Bolus Administration: Patient  O2 (LPM): 5 O2 Delivery Device: Nasal Cannula  Factor(s) Affecting Performance: None  Tracheostomy : No      Swallowing Trials:  Thin Liquid (TN0): Impaired      Comments:   Explosive cough following swab with TN0 liquids with RT. She reports that she choked on cream of wheat and it \"went up my nose.\" Extensive discussion about options for dysphagia management.      Clinical Impressions  Pt presents with clinical indicators of and is at elevated risk for oropharyngeal dysphagia given asthma in exacerbation and possible aspiration event earlier today. Pt would benefit from further evaluation of swallow using FEES prior to meaningful initiation of PO. Pt may have ice chips after oral care with assistance from trained staff to mitigate the impacts of xerostomia and disuse atrophy as well as to provide comfort and aid with secretion management.       Recommendations  NPO / ice chips  Instrumentation: FEES  Medication: Defer to D.O.  Supervision: Close supervision with ice chips   Oral Care: Q4h       SLP Treatment Plan  Treatment Plan: Dysphagia Treatment, Patient/Family/Caregiver Training  SLP Frequency: 4x Per Week  Estimated Duration: Until Therapy Goals Met      Anticipated Discharge Needs  Discharge Recommendations: Recommend post-acute placement for additional speech therapy services prior to discharge home (changes pending FEES)   Therapy Recommendations Upon DC: Dysphagia " "Training, Patient / Family / Caregiver Education, Community Re-Integration        Patient / Family Goals  Patient / Family Goal #1: \"Can I have water?\"  Short Term Goals  Short Term Goal # 1: Pt will complete FEES w SLP to further evaluate swallow function and inform POC.      Verona Terrazas, SLP   "

## 2024-11-05 NOTE — PROGRESS NOTES
Pt respiratory status declined during afternoon 4 eyes skin assessment. Pt became increasingly wheezy, anxious, tachypneic with increasing bouts of coughing in attempts to stand patient and lie patient flat. Therefore parts of the 4 eyes were not able to be completed. Pt was reassessed in reattempt to complete skin check and skin check was again not able to be completed. NOC primary RN and charge RN notified for attempt of reassessment to complete 4 eyes during NOC shift.

## 2024-11-05 NOTE — PROGRESS NOTES
Discussed with MD Alan that Pt is NPO but has PO meds ordered. MD Alan gave the ok to give PO meds.

## 2024-11-05 NOTE — PROGRESS NOTES
Hospital Medicine Daily Progress Note    Date of Service  11/5/2024    Chief Complaint  Short of breath    Hospital Course  Cecile Teixeira is a 79 y.o. female wheelchair bound since her 30's with a hx of asthma, hypothyroid, YOLA on CPAP, CVA, morbid obesity, admitted 11/3/2024 with acute respiatory failiure requiring high flow oxygen and frequent nebulizer treatments.    Interval Problem Update  Patient seen and examined today.  Data, Medication data reviewed.  Case discussed with nursing as available.  Plan of Care reviewed with patient and notified of changes.  11/4: wbc:14.5, on HFNC 40L and 50% FiO2. Sleeping but moves all extremities.  11/5 the patient still complaining of significant shortness of breath, especially worsening when mobilizing, currently afebrile, heart rate in the 70s to 80s, respiration unlabored, blood pressure in the 120s over 60s, positive productive cough, white second 19.4, hemoglobin 11.7, hematocrit 38.2, platelet count 249, sodium 134, potassium 4.8, chloride 102, glucose 165 echocardiogram with preserved ejection fraction, right side unfortunately not well-visualized, negative lower extremity Doppler  I have discussed this patient's plan of care and discharge plan at IDT rounds today with Case Management, Nursing, Nursing leadership, and other members of the IDT team.    Consultants/Specialty  critical care    Code Status  Full Code    Disposition  The patient is not medically cleared for discharge to home or a post-acute facility.  Anticipate discharge to: home with close outpatient follow-up    I have placed the appropriate orders for post-discharge needs.    Review of Systems  Review of Systems   Reason unable to perform ROS: sleeping/lethargy.   Constitutional:  Positive for malaise/fatigue. Negative for chills and fever.   HENT: Negative.     Eyes: Negative.    Respiratory:  Positive for cough, sputum production and wheezing.    Cardiovascular: Negative.  Negative for chest  pain and palpitations.   Gastrointestinal: Negative.  Negative for heartburn, nausea and vomiting.   Genitourinary: Negative.  Negative for dysuria and frequency.   Musculoskeletal: Negative.  Negative for back pain and neck pain.   Skin: Negative.  Negative for itching and rash.   Neurological:  Positive for weakness. Negative for dizziness, focal weakness and headaches.   Endo/Heme/Allergies: Negative.  Negative for polydipsia. Does not bruise/bleed easily.   Psychiatric/Behavioral:  Negative for depression. The patient is nervous/anxious.         Physical Exam  Temp:  [35.8 °C (96.5 °F)-37 °C (98.6 °F)] 37 °C (98.6 °F)  Pulse:  [62-97] 78  Resp:  [13-39] 39  BP: (109-155)/(53-96) 128/88  SpO2:  [85 %-100 %] 95 %    Physical Exam  Vitals reviewed.   Constitutional:       General: She is sleeping. She is not in acute distress.     Appearance: She is obese. She is not diaphoretic.   HENT:      Head: Normocephalic.      Nose: Nose normal.   Eyes:      General:         Right eye: No discharge.         Left eye: No discharge.      Conjunctiva/sclera: Conjunctivae normal.   Neck:      Comments: Large neck circumference  Cardiovascular:      Rate and Rhythm: Normal rate and regular rhythm.      Pulses: Normal pulses.      Heart sounds: No murmur heard.  Pulmonary:      Effort: Pulmonary effort is normal. No respiratory distress.      Breath sounds: Wheezing, rhonchi and rales present.   Abdominal:      General: Bowel sounds are normal. There is no distension.      Palpations: Abdomen is soft.   Musculoskeletal:         General: Swelling present.      Right lower leg: Edema (lymphedema) present.      Left lower leg: Edema present.   Lymphadenopathy:      Cervical: No cervical adenopathy.   Skin:     General: Skin is dry.   Neurological:      Mental Status: She is lethargic.      Motor: Weakness present.   Psychiatric:         Behavior: Behavior is cooperative.         Fluids    Intake/Output Summary (Last 24 hours) at  11/5/2024 0756  Last data filed at 11/5/2024 0600  Gross per 24 hour   Intake 223.19 ml   Output 700 ml   Net -476.81 ml        Laboratory  Recent Labs     11/03/24  1015 11/04/24  0230 11/05/24  0230   WBC 9.1 14.5* 19.4*   RBC 4.55 4.08* 3.86*   HEMOGLOBIN 13.9 12.2 11.7*   HEMATOCRIT 43.2 39.4 38.2   MCV 94.9 96.6 99.0*   MCH 30.5 29.9 30.3   MCHC 32.2 31.0* 30.6*   RDW 49.7 51.6* 54.3*   PLATELETCT 349 322 249   MPV 9.0 8.9* 9.1     Recent Labs     11/03/24  1015 11/04/24  0230 11/05/24  0230   SODIUM 139 138 134*   POTASSIUM 4.0 4.1 4.8   CHLORIDE 103 101 102   CO2 24 20 23   GLUCOSE 140* 225* 165*   BUN 19 27* 40*   CREATININE 0.75 1.29 1.26   CALCIUM 9.5 8.8 8.9                   Imaging  EC-ECHOCARDIOGRAM COMPLETE W/ CONT   Final Result      DX-CHEST-PORTABLE (1 VIEW)   Final Result      Cardiac silhouette enlargement. No definite acute abnormality.      US-EXTREMITY VENOUS LOWER BILAT   Final Result      DX-CHEST-PORTABLE (1 VIEW)   Final Result      No acute process.      CT-CTA CHEST PULMONARY ARTERY W/ RECONS    (Results Pending)        Assessment/Plan  * Acute asthma exacerbation- (present on admission)  Assessment & Plan  Patient will be started on IV Solu-Medrol  I ordered bronchodilators per RT protocol  Reviewed chest x-ray with no consolidation given her productive cough, yellow sputum, started antibiotics, sputum culture   patient was unable to tolerate CTA in the emergency department    Elevated troponin  Assessment & Plan  Likely demand ischemia in the setting of hypoxia  Repeat troponin plateau/flattend  ASA      Acute respiratory failure with hypoxia (HCC)- (present on admission)  Assessment & Plan  RT per protocol  Titrate oxygen as tolerates. 11/4 on HFNC  Inhalers, nebs  Asthma exacerbation, steroids  Steroids  Forced diuresis    Type 2 diabetes mellitus, without long-term current use of insulin (HCC)- (present on admission)  Assessment & Plan  Hemoglobin A1c 6.2 in past three  months  Monitor accuchecks and cover with SSI  Hypoglycemic protocol  On steroids and likely to induce hyperglycemia  Encourage weight loss    Primary hypertension- (present on admission)  Assessment & Plan  Amlodipine 10mg, losartan 100mg qd, metoprolol SR 25mg qd  Monitor vitals   PRN antihypertensives    Lymphedema associated with obesity- (present on admission)  Assessment & Plan  Lower extremity duplex reviewed negative for DVT      YOLA (obstructive sleep apnea)- (present on admission)  Assessment & Plan  Rechallenge to start BiPAP at home settings, 24/18  Monitor.    Hypothyroidism- (present on admission)  Assessment & Plan  Continue levothyroxine 88mcg qd  TSH:4.5 in past three months    Severe obesity (BMI >= 40) ([facility])- (present on admission)  Assessment & Plan  Body mass index is 64.45 kg/m².     Plan  11/5  Patient with ongoing wheezing, difficulty breathing, appears to be somewhat fluid overloaded, challenge with Lasix, ongoing steroids, broaden antibiotic therapy, sputum culture  BiPAP when resting and nocturnally  Monitor closely for sedation needs  Close laboratory follow-up  See orders  Patient is has a high medical complexity, complex decision making and is at high risk for complication, morbidity, and mortality.  I spent 58 minutes, reviewing the chart, obtaining and/or reviewing separately obtained history. Performing a medically appropriate examination and evaluation.  Counseling and educating the patient. Ordering and reviewing medications, tests, or procedures.   Documenting clinical information in EPIC. Independently interpreting results and communicating results to patient. Discussing future disposition of care with patient, RN and case management.    VTE prophylaxis:    enoxaparin ppx      I have performed a physical exam and reviewed and updated ROS and Plan today (11/5/2024). In review of yesterday's note (11/4/2024), there are no changes except as documented above.      Please note that  this dictation was created using voice recognition software. I have made every reasonable attempt to correct obvious errors, but I expect that there are errors of grammar and possibly context that I did not discover before finalizing the note.

## 2024-11-05 NOTE — THERAPY
Physical Therapy   Initial Evaluation     Patient Name: Cecile Teixeira  Age:  79 y.o., Sex:  female  Medical Record #: [id number]  Today's Date: 11/5/2024     Precautions  Precautions: Fall Risk;Swallow Precautions  Comments: anxious, does not tolerate lying flat with BTB for scoot up in bed.     Assessment  Patient is 79 y.o. female with a diagnosis of acute asthma exacerbation,  PMH obesity, asthma (undetermined subtype), GERD, hypothyroidism, YOLA on CPAP, prior stroke and wheelchair-bound since her 30s . .  Additional factors influencing patient status / progress : today, pt shows limited tolerance for activity due to heavy coughing when assist to EOB with min A (HOB up, used rail). Pt reports using wc at baseline, but unable to describe how she does her transfers, but does say she does not use a slide board. Pt lives alone, but relies on assist from daughter and grand daughter who visit about 3x/week. See details below, PT to follow. .      Plan    Physical Therapy Initial Treatment Plan   Treatment Plan : Bed Mobility, Neuro Re-Education / Balance, Therapeutic Activities, Therapeutic Exercise, Self Care / Home Evaluation  Treatment Frequency: 3 Times per Week  Duration: Until Therapy Goals Met    DC Equipment Recommendations: Unable to determine at this time  Discharge Recommendations: Recommend post-acute placement for additional physical therapy services prior to discharge home          Objective       11/05/24 1041   Initial Contact Note    Initial Contact Note Order Received and Verified, Physical Therapy Evaluation in Progress with Full Report to Follow.   Precautions   Precautions Fall Risk;Swallow Precautions   Comments anxious   Vitals   Pulse Oximetry 99 %  (at start, unable to obtain reliable readings during activity, but SOB, anxious)   O2 (LPM) 4  (increased to 6, then nsg in to place NRB mask at end)   O2 Delivery Device Silicone Nasal Cannula   Vitals Comments Desats with activity, needed NRB mask  "placed by RN to recover.   Pain 0 - 10 Group   Therapist Pain Assessment   (no c/o pain)   Prior Living Situation   Prior Services Intermittent Physical Support for ADL Per Family  (gr daughter visits 3x/week, daughter visits 3x/week)   Housing / Facility 1 Story Apartment / Condo   Steps Into Home 0   Steps In Home 0   Equipment Owned Power Wheel Chair;Single Point Cane   Lives with - Patient's Self Care Capacity Alone and Unable to Care For Self   Comments dght and gr dght help with shower, dressing, cleaning, \"everything\" per pt.   Prior Level of Functional Mobility   Bed Mobility Independent  (sleeps in recliner, dghtr wants her to get a lift chair.)   Transfer Status Independent  (pt unable to describe her transfer technique, but does not  use a slide board.)   Ambulation Dependent   Assistive Devices Used Power Wheel Chair   Comments pt feels that she will need to start using a slide board for transfers soon.   History of Falls   History of Falls Yes   Date of Last Fall   (pt estimates 10 falls total, no recent.)   Cognition    Cognition / Consciousness X   Level of Consciousness Alert   Comments anxious, fearful with BTB   Active ROM Lower Body    Active ROM Lower Body  X   Comments B LE limited by weakness, edema   Strength Lower Body   Lower Body Strength  X   Comments unable to flex knees in bed, unable to attempt standing for linen change due to weakness and respiratory issues.   Balance Assessment   Sitting Balance (Static) Fair -  (able to sit EOB, asks for bs table to lean on.)   Sitting Balance (Dynamic) Poor +   Weight Shift Sitting Poor   Bed Mobility    Supine to Sit Minimal Assist  (with HOB up)   Sit to Supine Maximal Assist  (pt anxious with BTB/lying flat for scoot up in bed, improved once HOB up.Desats with activity, needed NRB mask placed by RN to recover.)   Scooting Moderate Assist   Rolling Maximal Assist to Rt.;Maximum Assist to Lt.   Gait Analysis   Gait Level Of Assist Unable to " Participate   Functional Mobility   Sit to Stand Unable to Participate  (attempted sit to stand, but unable due to weakness)   Bed, Chair, Wheelchair Transfer Unable to Participate   Comments pt has purewik but leaking. Nsg in to assist with linen change, but unable to stand so assisted BTB to roll off dirty linen and place new.   6 Clicks Assessment - How much HELP from from another person do you currently need... (If the patient hasn't done an activity recently, how much help from another person do you think he/she would need if he/she tried?)   Turning from your back to your side while in a flat bed without using bedrails? 2   Moving from lying on your back to sitting on the side of a flat bed without using bedrails? 3   Moving to and from a bed to a chair (including a wheelchair)? 1   Standing up from a chair using your arms (e.g., wheelchair, or bedside chair)? 1   Walking in hospital room? 1   Climbing 3-5 steps with a railing? 1   6 clicks Mobility Score 9   Activity Tolerance   Sitting Edge of Bed 5 min   Edema / Skin Assessment   Edema / Skin  X   Comments B LE lymphedema, chronic.   Short Term Goals    Short Term Goal # 1 Pt will perform bed mobility with HOB and rail as needed with supervision in 6 visits.   Short Term Goal # 2 Pt will transfer bed to  via seated slide board vs squat pivot with mod assist in 6 visits to improve functional indep.   Education Group   Education Provided Role of Physical Therapist   Role of Physical Therapist Patient Response Patient;Acceptance;Explanation;Verbal Demonstration   Physical Therapy Initial Treatment Plan    Treatment Plan  Bed Mobility;Neuro Re-Education / Balance;Therapeutic Activities;Therapeutic Exercise;Self Care / Home Evaluation   Treatment Frequency 3 Times per Week   Duration Until Therapy Goals Met   Problem List    Problems Impaired Bed Mobility;Impaired Transfers;Functional Strength Deficit;Impaired Balance;Impaired Ambulation;Decreased Activity  Tolerance   Anticipated Discharge Equipment and Recommendations   DC Equipment Recommendations Unable to determine at this time   Discharge Recommendations Recommend post-acute placement for additional physical therapy services prior to discharge home   Interdisciplinary Plan of Care Collaboration   IDT Collaboration with  Nursing   Patient Position at End of Therapy In Bed;Call Light within Reach;Tray Table within Reach;Phone within Reach;Bed Alarm On   Collaboration Comments nsg updated   Session Information   Date / Session Number  11/5-1 (1/3, 11/11)

## 2024-11-05 NOTE — CARE PLAN
The patient is Watcher - Medium risk of patient condition declining or worsening    Shift Goals  Clinical Goals: Hemodynamic stability, Improve respiratpry pattern  Patient Goals: Eat and drink  Family Goals: MIKE    Progress made toward(s) clinical / shift goals:      Problem: Pain - Standard  Goal: Alleviation of pain or a reduction in pain to the patient’s comfort goal  Outcome: Progressing     Problem: Knowledge Deficit - COPD  Goal: Patient/significant other demonstrates understanding of disease process, utilization of the Action Plan, medications and discharge instruction  Outcome: Progressing     Problem: Ineffective Airway Clearance  Goal: Patient will maintain patent airway with clear/clearing breath sounds  Outcome: Progressing     Problem: Impaired Gas Exchange  Goal: Patient will demonstrate improved ventilation and adequate oxygenation and participate in treatment regimen within the level of ability/situation.  Outcome: Progressing     Problem: Knowledge Deficit - Standard  Goal: Patient and family/care givers will demonstrate understanding of plan of care, disease process/condition, diagnostic tests and medications  Outcome: Progressing

## 2024-11-06 ENCOUNTER — APPOINTMENT (OUTPATIENT)
Dept: RADIOLOGY | Facility: MEDICAL CENTER | Age: 79
DRG: 202 | End: 2024-11-06
Attending: EMERGENCY MEDICINE
Payer: MEDICARE

## 2024-11-06 LAB
ALBUMIN SERPL BCP-MCNC: 4.2 G/DL (ref 3.2–4.9)
ALBUMIN/GLOB SERPL: 1.1 G/DL
ALP SERPL-CCNC: 77 U/L (ref 30–99)
ALT SERPL-CCNC: 15 U/L (ref 2–50)
ANION GAP SERPL CALC-SCNC: 14 MMOL/L (ref 7–16)
AST SERPL-CCNC: 23 U/L (ref 12–45)
BILIRUB SERPL-MCNC: 0.2 MG/DL (ref 0.1–1.5)
BUN SERPL-MCNC: 59 MG/DL (ref 8–22)
CALCIUM ALBUM COR SERPL-MCNC: 9.5 MG/DL (ref 8.5–10.5)
CALCIUM SERPL-MCNC: 9.7 MG/DL (ref 8.5–10.5)
CHLORIDE SERPL-SCNC: 101 MMOL/L (ref 96–112)
CO2 SERPL-SCNC: 21 MMOL/L (ref 20–33)
CREAT SERPL-MCNC: 1.33 MG/DL (ref 0.5–1.4)
ERYTHROCYTE [DISTWIDTH] IN BLOOD BY AUTOMATED COUNT: 54.3 FL (ref 35.9–50)
GFR SERPLBLD CREATININE-BSD FMLA CKD-EPI: 41 ML/MIN/1.73 M 2
GLOBULIN SER CALC-MCNC: 4 G/DL (ref 1.9–3.5)
GLUCOSE BLD STRIP.AUTO-MCNC: 150 MG/DL (ref 65–99)
GLUCOSE BLD STRIP.AUTO-MCNC: 154 MG/DL (ref 65–99)
GLUCOSE BLD STRIP.AUTO-MCNC: 162 MG/DL (ref 65–99)
GLUCOSE BLD STRIP.AUTO-MCNC: 197 MG/DL (ref 65–99)
GLUCOSE SERPL-MCNC: 186 MG/DL (ref 65–99)
HCT VFR BLD AUTO: 44.1 % (ref 37–47)
HGB BLD-MCNC: 13.6 G/DL (ref 12–16)
MAGNESIUM SERPL-MCNC: 3.1 MG/DL (ref 1.5–2.5)
MCH RBC QN AUTO: 30.1 PG (ref 27–33)
MCHC RBC AUTO-ENTMCNC: 30.8 G/DL (ref 32.2–35.5)
MCV RBC AUTO: 97.6 FL (ref 81.4–97.8)
NT-PROBNP SERPL IA-MCNC: 996 PG/ML (ref 0–125)
PHOSPHATE SERPL-MCNC: 3.4 MG/DL (ref 2.5–4.5)
PLATELET # BLD AUTO: 387 K/UL (ref 164–446)
PMV BLD AUTO: 9.3 FL (ref 9–12.9)
POTASSIUM SERPL-SCNC: 4.6 MMOL/L (ref 3.6–5.5)
PROT SERPL-MCNC: 8.2 G/DL (ref 6–8.2)
RBC # BLD AUTO: 4.52 M/UL (ref 4.2–5.4)
SODIUM SERPL-SCNC: 136 MMOL/L (ref 135–145)
WBC # BLD AUTO: 18.2 K/UL (ref 4.8–10.8)

## 2024-11-06 PROCEDURE — 700105 HCHG RX REV CODE 258: Performed by: HOSPITALIST

## 2024-11-06 PROCEDURE — 700111 HCHG RX REV CODE 636 W/ 250 OVERRIDE (IP): Performed by: HOSPITALIST

## 2024-11-06 PROCEDURE — 82962 GLUCOSE BLOOD TEST: CPT

## 2024-11-06 PROCEDURE — 700102 HCHG RX REV CODE 250 W/ 637 OVERRIDE(OP): Performed by: HOSPITALIST

## 2024-11-06 PROCEDURE — 94669 MECHANICAL CHEST WALL OSCILL: CPT

## 2024-11-06 PROCEDURE — 99233 SBSQ HOSP IP/OBS HIGH 50: CPT | Performed by: HOSPITALIST

## 2024-11-06 PROCEDURE — 94640 AIRWAY INHALATION TREATMENT: CPT

## 2024-11-06 PROCEDURE — 80053 COMPREHEN METABOLIC PANEL: CPT

## 2024-11-06 PROCEDURE — 92526 ORAL FUNCTION THERAPY: CPT

## 2024-11-06 PROCEDURE — A9270 NON-COVERED ITEM OR SERVICE: HCPCS | Performed by: HOSPITALIST

## 2024-11-06 PROCEDURE — 85027 COMPLETE CBC AUTOMATED: CPT

## 2024-11-06 PROCEDURE — 83735 ASSAY OF MAGNESIUM: CPT

## 2024-11-06 PROCEDURE — 700111 HCHG RX REV CODE 636 W/ 250 OVERRIDE (IP): Mod: JZ | Performed by: HOSPITALIST

## 2024-11-06 PROCEDURE — 84100 ASSAY OF PHOSPHORUS: CPT

## 2024-11-06 PROCEDURE — 700101 HCHG RX REV CODE 250: Performed by: HOSPITALIST

## 2024-11-06 PROCEDURE — 770020 HCHG ROOM/CARE - TELE (206)

## 2024-11-06 PROCEDURE — 83880 ASSAY OF NATRIURETIC PEPTIDE: CPT

## 2024-11-06 RX ADMIN — IPRATROPIUM BROMIDE AND ALBUTEROL SULFATE 3 ML: 2.5; .5 SOLUTION RESPIRATORY (INHALATION) at 23:02

## 2024-11-06 RX ADMIN — INSULIN LISPRO 2 UNITS: 100 INJECTION, SOLUTION INTRAVENOUS; SUBCUTANEOUS at 11:47

## 2024-11-06 RX ADMIN — INSULIN LISPRO 2 UNITS: 100 INJECTION, SOLUTION INTRAVENOUS; SUBCUTANEOUS at 21:13

## 2024-11-06 RX ADMIN — METHYLPREDNISOLONE SODIUM SUCCINATE 40 MG: 40 INJECTION, POWDER, FOR SOLUTION INTRAMUSCULAR; INTRAVENOUS at 17:44

## 2024-11-06 RX ADMIN — FLUTICASONE PROPIONATE 220 MCG: 110 AEROSOL, METERED RESPIRATORY (INHALATION) at 15:50

## 2024-11-06 RX ADMIN — ENOXAPARIN SODIUM 60 MG: 100 INJECTION SUBCUTANEOUS at 17:42

## 2024-11-06 RX ADMIN — ALBUTEROL SULFATE 2 PUFF: 90 AEROSOL, METERED RESPIRATORY (INHALATION) at 12:54

## 2024-11-06 RX ADMIN — INSULIN LISPRO 2 UNITS: 100 INJECTION, SOLUTION INTRAVENOUS; SUBCUTANEOUS at 08:46

## 2024-11-06 RX ADMIN — IPRATROPIUM BROMIDE AND ALBUTEROL SULFATE 3 ML: 2.5; .5 SOLUTION RESPIRATORY (INHALATION) at 09:49

## 2024-11-06 RX ADMIN — ACETAMINOPHEN 650 MG: 325 TABLET ORAL at 04:08

## 2024-11-06 RX ADMIN — METHYLPREDNISOLONE SODIUM SUCCINATE 40 MG: 40 INJECTION, POWDER, FOR SOLUTION INTRAMUSCULAR; INTRAVENOUS at 05:38

## 2024-11-06 RX ADMIN — LEVOTHYROXINE SODIUM 88 MCG: 0.09 TABLET ORAL at 05:39

## 2024-11-06 RX ADMIN — IPRATROPIUM BROMIDE AND ALBUTEROL SULFATE 3 ML: 2.5; .5 SOLUTION RESPIRATORY (INHALATION) at 13:02

## 2024-11-06 RX ADMIN — IPRATROPIUM BROMIDE AND ALBUTEROL SULFATE 3 ML: 2.5; .5 SOLUTION RESPIRATORY (INHALATION) at 06:33

## 2024-11-06 RX ADMIN — IPRATROPIUM BROMIDE AND ALBUTEROL SULFATE 3 ML: 2.5; .5 SOLUTION RESPIRATORY (INHALATION) at 20:30

## 2024-11-06 RX ADMIN — CEFTRIAXONE SODIUM 2000 MG: 10 INJECTION, POWDER, FOR SOLUTION INTRAVENOUS at 05:36

## 2024-11-06 RX ADMIN — IPRATROPIUM BROMIDE AND ALBUTEROL SULFATE 3 ML: 2.5; .5 SOLUTION RESPIRATORY (INHALATION) at 02:31

## 2024-11-06 RX ADMIN — ASPIRIN 81 MG: 81 TABLET, COATED ORAL at 05:38

## 2024-11-06 RX ADMIN — OXYCODONE 5 MG: 5 TABLET ORAL at 08:42

## 2024-11-06 RX ADMIN — LOSARTAN POTASSIUM 100 MG: 50 TABLET, FILM COATED ORAL at 05:38

## 2024-11-06 RX ADMIN — ENOXAPARIN SODIUM 60 MG: 100 INJECTION SUBCUTANEOUS at 05:37

## 2024-11-06 RX ADMIN — AMLODIPINE BESYLATE 10 MG: 10 TABLET ORAL at 05:39

## 2024-11-06 RX ADMIN — CETIRIZINE HYDROCHLORIDE 10 MG: 10 TABLET, FILM COATED ORAL at 21:12

## 2024-11-06 RX ADMIN — AZITHROMYCIN DIHYDRATE 500 MG: 250 TABLET ORAL at 05:38

## 2024-11-06 RX ADMIN — METOPROLOL SUCCINATE 25 MG: 25 TABLET, EXTENDED RELEASE ORAL at 05:39

## 2024-11-06 RX ADMIN — METHYLPREDNISOLONE SODIUM SUCCINATE 40 MG: 40 INJECTION, POWDER, FOR SOLUTION INTRAMUSCULAR; INTRAVENOUS at 11:47

## 2024-11-06 ASSESSMENT — COGNITIVE AND FUNCTIONAL STATUS - GENERAL
PERSONAL GROOMING: A LITTLE
CLIMB 3 TO 5 STEPS WITH RAILING: TOTAL
MOVING TO AND FROM BED TO CHAIR: TOTAL
HELP NEEDED FOR BATHING: A LOT
MOBILITY SCORE: 8
DAILY ACTIVITIY SCORE: 14
TOILETING: A LOT
EATING MEALS: A LITTLE
SUGGESTED CMS G CODE MODIFIER MOBILITY: CM
MOVING FROM LYING ON BACK TO SITTING ON SIDE OF FLAT BED: A LOT
SUGGESTED CMS G CODE MODIFIER DAILY ACTIVITY: CK
DRESSING REGULAR UPPER BODY CLOTHING: A LOT
STANDING UP FROM CHAIR USING ARMS: TOTAL
DRESSING REGULAR LOWER BODY CLOTHING: A LOT
WALKING IN HOSPITAL ROOM: TOTAL
TURNING FROM BACK TO SIDE WHILE IN FLAT BAD: A LOT

## 2024-11-06 ASSESSMENT — ENCOUNTER SYMPTOMS
WEAKNESS: 1
HEADACHES: 1
DIZZINESS: 0
MUSCULOSKELETAL NEGATIVE: 1
BRUISES/BLEEDS EASILY: 0
POLYDIPSIA: 0
CHILLS: 0
DEPRESSION: 0
GASTROINTESTINAL NEGATIVE: 1
SPUTUM PRODUCTION: 1
NERVOUS/ANXIOUS: 1
NECK PAIN: 0
WHEEZING: 1
PALPITATIONS: 0
FEVER: 0
CARDIOVASCULAR NEGATIVE: 1
FOCAL WEAKNESS: 0
HEARTBURN: 0
EYES NEGATIVE: 1
NAUSEA: 0
COUGH: 1
VOMITING: 0
BACK PAIN: 0

## 2024-11-06 ASSESSMENT — FIBROSIS 4 INDEX
FIB4 SCORE: 1.21
FIB4 SCORE: 1.21
FIB4 SCORE: 2.1

## 2024-11-06 ASSESSMENT — PAIN DESCRIPTION - PAIN TYPE
TYPE: ACUTE PAIN

## 2024-11-06 NOTE — PROGRESS NOTES
Patient transported to room T811, via bed on 6L o2, all belongings accompanied, with RN and CNA accompanying, report given to oncoming nurse, all questions answered, patient in room sitting at edge of bed, PRN albuterol treatment given, o2 sat 96%, oncoming RN assumed care.

## 2024-11-06 NOTE — PROGRESS NOTES
Patient demanded to sit at the edge of bed for better breathing and has been at the edge of the bed for 2 hours. Asked if she wants to go back to bed but patient refused.

## 2024-11-06 NOTE — THERAPY
Occupational Therapy   Initial Evaluation     Patient Name: Cecile Teixeira  Age:  79 y.o., Sex:  female  Medical Record #: [id number]  Today's Date: 11/5/2024     Precautions: Fall Risk, Swallow Precautions  Comments: anxious    Assessment  Patient is 79 y.o. female admitted with SOB, cough, and frequent urination. Diagnosed with acute asthma exacerbation and elevated troponin likely due to demand ischemia. PMHx of chronic edema, DVT, GERD, YOLA, hypothyroidism, HTN, type 2 diabetes, MMD, prior CVA, and anxiety. Pt seen for OT eval, Pt currently resides alone in a ground floor apartment and was independent with most ADLs at a WC level. Pt's daughter and granddaughter come over daily for 1-3 hours to assist with ADLs and IADLs.    During OT eval, pt presented with deficits in self-care tasks, balance, functional mobility, strength, and activity tolerance. She required supv to complete seated g/h tasks and self-feeding and max A to complete pericare and LB dressing. Pt reported that she only wears dresses with slip-on shoes at baseline. Pt desats with activity necessitating increased cues to complete pursed lip breathing; reports that she only wears O2 at night with her CPAP machine. Currently recommend post-acute placement prior to DC home. Will continue to follow for ongoing acute OT services.     Plan    Occupational Therapy Initial Treatment Plan   Treatment Interventions: Self Care / Activities of Daily Living, Adaptive Equipment, Neuro Re-Education / Balance, Therapeutic Exercises, Therapeutic Activity, Family / Caregiver Training  Treatment Frequency: 3 Times per Week  Duration: Until Therapy Goals Met    DC Equipment Recommendations: Unable to determine at this time  Discharge Recommendations: Recommend post-acute placement for additional occupational therapy services prior to discharge home      Objective    Prior Living Situation   Prior Services Intermittent Physical Support for ADL Per  Family  (Granddaughter comes over 3x/week and daughter comes over 3x/week. They usually stay for 1-3 hours/day)   Housing / Facility 1 Story Apartment / Condo   Steps Into Home 0   Steps In Home 0   Bathroom Set up Walk In Shower;Grab Bars;Shower Chair   Equipment Owned Single Point Cane;Power Wheel Chair;Tub / Shower Seat;Grab Bar(s) In Tub / Shower;Grab Bar(s) By Toilet;Reacher;Hand Held Shower;Oxygen   Lives with - Patient's Self Care Capacity Alone and Unable to Care For Self   Comments Pt currently resides alone, but reports that either her daughter and granddaughter come by daily for 1-3 hours to assist with ADLs and IADLs. Reports that she sleeps in a recliner at baseline. Uses grab bars around her house to complete squat pivot txfs, usually will sit on arm rest (of recliner or chair) half way through txf   Prior Level of ADL Function   Self Feeding Independent   Grooming / Hygiene Independent   Bathing Requires Assist   Dressing Requires Assist  (Reports that she primarily wears dresses and nightgowns and only slip-on shoes)   Toileting Independent  (Pt reports that she does not wear underwear at baseline. During the day she is able to control her bladder to make it to the toilet. At night she uses disposable pads and cotton chucks due to incontinence.)   Prior Level of IADL Function   Medication Management Independent   Laundry Requires Assist  (Able to put clothes into washer, but requires assist transferring clothes into dryer)   Kitchen Mobility Independent  (Primarily makes meals using microwave, toaster oven, and plug-in skillet)   Finances Independent   Home Management Requires Assist   Shopping Independent  (Drives power [employer] to grocery store that is 5 blocks away)   Prior Level Of Mobility Uses Wheel Chair for Community Mobility;Uses Wheel Chair for in Home Mobility   Driving / Transportation Other (Comments)  (Uses power WC to go grocery shopping; has medical transporation for DR jacobs)   Comments  Reports that ADL and IADL tasks take her 5x longer to complete which leaves her a limited amount of time to complete leisure tasks.   History of Falls   History of Falls Yes   Date of Last Fall   (Pt endorses having a hx of falls, but none recently)   Precautions   Precautions Fall Risk;Swallow Precautions   Vitals   O2 (LPM) 5   O2 Delivery Device Silicone Nasal Cannula   Vitals Comments Desats with activity   Pain 0 - 10 Group   Therapist Pain Assessment Post Activity Pain Same as Prior to Activity;Nurse Notified  (Not rated, reported an increase in generalized pain with activity)   Cognition    Level of Consciousness Alert   Comments Pleasant and cooperative; demo increase anxiety with activity   Strength Upper Body   Upper Body Strength  X   Gross Strength Generalized Weakness, Equal Bilaterally.    Balance Assessment   Sitting Balance (Static) Fair   Sitting Balance (Dynamic) Poor +   Weight Shift Sitting Poor   Bed Mobility    Supine to Sit   (Up at EOB pre)   Sit to Supine Maximal Assist   Scooting Moderate Assist   Rolling Maximal Assist to Rt.;Maximum Assist to Lt.   Comments Desats with HOB flat; x2 person assist for safety   ADL Assessment   Eating Supervision   Grooming Supervision;Seated   Upper Body Dressing Moderate Assist   Lower Body Dressing Maximal Assist   Toileting Maximal Assist   How much help from another person does the patient currently need...   6 Clicks Daily Activity Score 14   Functional Mobility   Sit to Stand Unable to Participate   Mobility EOB ADLs   Activity Tolerance   Sitting Edge of Bed Up to EOB pre   Patient / Family Goals   Patient / Family Goal #1 To go home   Short Term Goals   Short Term Goal # 1 Pt will complete ADL txfs with mod A   Short Term Goal # 2 Pt will complete toileting ADLs with mod A   Education Group   Education Provided Role of Occupational Therapist   Role of Occupational Therapist Patient Response Patient;Acceptance;Explanation;Verbal Demonstration

## 2024-11-06 NOTE — THERAPY
"Speech Language Pathology   Daily Treatment     Patient Name: Cecile Teixeira  AGE:  79 y.o., SEX:  female  Medical Record #: [id number]  Date of Service: 11/6/2024    Precautions:  Precautions: Fall Risk, Swallow Precautions     Subjective  Pt sitting edge of bed saturating on 3L NC finishing her breakfast upon entry.     Assessment  Provided verbal and visual education on respiratory-swallow coordination including normal swallow-breath patterning w/ the swallow occurring the exhalation phase of respiration. Completed ~ 3x PO trials w/ cueing on same. Pt receptive to education but stating \"I know all of this already. I'm still going to eat fast because I'm starving\".     Clinical Impressions  - Per results of FEES 11/5- pt presents with mild pharyngeal dysphagia. Pt's swallow safety is largely impacted but reduced swallow-breath coordination. Episodes of airway invasion that were visualized during FEES appeared related to inhalation immediately before or after the swallow. The pt's respiratory status easily rises above 30 with any exertion, extended speaking intermittent coughing episodes. Therefore risk for aspiration on PO cannot be completely eliminated, even with diet textures modification.     - Pt has participated in education r/t swallow function, risk for aspiration and swallow-breath patterning. She has elevated to continue a regular diet despite risk. No further needs are identified- ST to sign off.      Recommendations  Treatment Completed: Dysphagia Treatment     Dysphagia Treatment  Diet Consistency: Thin/all Liquids, Soft & Bite-Sized Solids (Per pt request, a regular diet will be ordered. Pt expressed understanding of risk for aspiration)  Instrumentation: None indicated at this time  Medication: Whole with puree  Supervision: Assist with meal tray set up  Positioning: Fully upright and midline during oral intake, Meals sitting upright in a chair, as tolerated  Risk Management : Small bites/sips, Slow " "rate of intake  Oral Care: BID    SLP Treatment Plan  Treatment Plan: None Indicated  SLP Frequency: N/A - Evaluation Only  Estimated Duration: N/A - Evaluation Only    Anticipated Discharge Needs  Discharge Recommendations: Anticipate that the patient will have no further speech therapy needs after discharge from the hospital  Therapy Recommendations Upon DC: Not Indicated    Patient / Family Goals  Patient / Family Goal #1: \"Can I have water?\"  Goal #1 Outcome: Goal met  Short Term Goals  Short Term Goal # 1: Pt will complete FEES w SLP to further evaluate swallow function and inform POC.  Goal Outcome # 1: Goal met  Short Term Goal # 2: Pt will tolerate the safest, least restrictive diet w/ no signs of aspiration related pulmonary complications  Goal Outcome # 2 : Goal met  Short Term Goal # 3: Pt will participate in training on swallow-breath coordination & associated aspiration precautions      JASON Benavides  "

## 2024-11-06 NOTE — RESPIRATORY CARE
" EDUCATION by RT CLINICAL EDUCATOR  11/5/2024 at 5:30 PM by Hiral Hollis, RRT     Patient seen at the requested of her admitting team to discuss her Asthma exacerbation.  She see's [facility] for her YOLA. She utilizes CPAP +18 (no O2) thru [facility].  She has never smoked. Provided list and reviewed common \"triggers' that can lead to an exacerbation.  Lung Assessment  Clinical Specialists ONLY   Education Initiated: Yes--Short Intervention (ordeeset use pt is True Asthma sees [facility] reviw of triggers and medications never smoked has YOLA on CPAP +18 full mask see's [facility] and PCP manages)  DME Company: [facility] Equipment Type: CPAP + 18  full mask  Pulmonologist Name: [facility]  Referrals Initiated: Yes  Smoking Cessation: N/A (never smoked)  Home Health Care:  (TBD at this encounter)  [facility]: Yes (call placed (LM) for hospital dschrg appt)  $ Demo/Eval of SVN's, MDI's and Aerosols: Yes (review of Asthma meds and triggers; care cleaning of nebulizer/CPAP)  (OP) Pulmonary Function Testing:  (none in EMR)  Interdisciplinary Rounds: Attendance at Rounds (30 Min)    PFT Results  No results found for: \"PFT\"    Meds to Beds  [facility] provides bedside medication delivery for all eligible patients at discharge and you have been automatically enrolled in the Meds to Beds Program. Would you like to opt out of this program for any reason?: No - Stay Opted In     MY ASTHMA ACTION PLAN     It is recommended that patients and physicians /healthcare providers complete this action plan together. This plan should be discussed at each physician visit and updated as needed.    The green, yellow and red zones show groups of symptoms of Asthma This list of symptoms is not comprehensive, and you may experience other symptoms. In the \"Actions\" column, your healthcare provider has recommended actions for you to take based on your symptoms.    Patient Name: Cecile Teixeira   Date of Birth: " "1945   Last Updated on:     Green Zone:  I am doing well today Actions     Usual activitiy and exercise level   Take daily medications     Usual amounts of cough and phlegm/mucus   Use oxygen as prescribed     Sleep well at night   Continue regular exercise/diet plan     Appetite is good   At all times avoid cigarette smoke, inhaled irritants     Daily Medications (these medications are taken every day):    Flovent  2 puffs  Twice dailyalbuterol        Yellow Zone:  I am having a bad day or a COPD flare Actions     More breathless than usual   Continue daily medications     I have less energy for my daily activities   Use quick relief inhaler as ordered     Increased or thicker phlegm/mucus   Use oxygen as prescribed     Using quick relief inhaler/nebulizer more often   Get plenty of rest     Swelling of ankles more than usual   Use pursed lip breathing     More coughing than usual   At all times avoid cigarette smoke, inhaled irritants     I feel like I have a \"chest cold\"     Poor sleep and my symptoms woke me up     My appetite is not good     My medicine is not helping      Call provider immediately if symptoms don’t improve     Continue daily medications, add rescue medications:    albuterol  2puffs  As directed       Medications to be used during a flare up, (as Discussed with Provider):              Red Zone:  I need urgent medical care Actions     Severe shortness of breath even at rest   Call 911 or seek medical care immediately     Not able to do any activity because of breathing      Fever or shaking chills      Feeling confused or very drowsy       Chest pains      Coughing up blood                  "

## 2024-11-06 NOTE — CARE PLAN
Problem: Pain - Standard  Goal: Alleviation of pain or a reduction in pain to the patient’s comfort goal     The patient is Watcher - Medium risk of patient condition declining or worsening    Shift Goals  Clinical Goals: monitor and maintain respiratory status, hemodynamic stability  Patient Goals: sleep  Family Goals: Ann    Progress made toward(s) clinical / shift goals:    Problem: Impaired Gas Exchange  Goal: Patient will demonstrate improved ventilation and adequate oxygenation and participate in treatment regimen within the level of ability/situation.  Outcome: Progressing     Problem: Risk for Aspiration  Goal: Patient's risk for aspiration will be absent or decrease  Outcome: Progressing     Problem: Self Care  Goal: Patient will have the ability to perform ADLs independently or with assistance (bathe, groom, dress, toilet and feed)  Outcome: Progressing     Problem: Fall Risk  Goal: Patient will remain free from falls  Outcome: Progressing     Problem: Skin Integrity  Goal: Skin integrity is maintained or improved  Outcome: Progressing     Problem: Knowledge Deficit - Standard  Goal: Patient and family/care givers will demonstrate understanding of plan of care, disease process/condition, diagnostic tests and medications  Outcome: Progressing     Problem: Pain - Standard  Goal: Alleviation of pain or a reduction in pain to the patient’s comfort goal  Outcome: Progressing     Problem: Knowledge Deficit - COPD  Goal: Patient/significant other demonstrates understanding of disease process, utilization of the Action Plan, medications and discharge instruction  Outcome: Progressing     Problem: Risk for Infection - COPD  Goal: Patient will remain free from signs and symptoms of infection  Outcome: Progressing       Patient is not progressing towards the following goals:

## 2024-11-06 NOTE — PROGRESS NOTES
4 Eyes Skin Assessment Completed by MEHRAN Ceja and MEHRAN Sadler.    Head WDL  Ears WDL  Nose WDL  Mouth WDL  Neck WDL  Breast/Chest Redness and Blanching moisture dermatitis under breasts  Shoulder Blades WDL  Spine WDL  (R) Arm/Elbow/Hand WDL  (L) Arm/Elbow/Hand WDL  Abdomen WDL  Groin Redness and Blanching moisture dermatitis in skin folds  Scrotum/Coccyx/Buttocks Redness and Blanching  (R) Leg Redness and Blanching moisture dermatitis behind knees  (L) Leg Redness and Blanching moisture dermatitis behind knees  (R) Heel/Foot/Toe Redness and Blanching  (L) Heel/Foot/Toe Redness and Blanching          Devices In Places Tele Box, Pulse Ox, and Nasal Cannula      Interventions In Place Gray Ear Foams, Heel Mepilex, Pillows, and Q2 Turns    Possible Skin Injury Yes    Pictures Uploaded Into Epic N/A  Wound Consult Placed N/A  RN Wound Prevention Protocol Ordered No already ordered

## 2024-11-06 NOTE — CARE PLAN
Problem: Bronchoconstriction  Goal: Improve in air movement and diminished wheezing  Description: Target End Date:  2 to 3 days    1.  Implement inhaled treatments  2.  Evaluate and manage medication effects  Outcome: Not Met   Duoneb q4

## 2024-11-06 NOTE — PROGRESS NOTES
"Patient alert and oriented x 4. Patient states \"I want to leave AMA\". Patient was educated regarding the risks of leaving AMA and she agrees to stay until home oxygen and a ride is set up for her to go home. She also agrees to stay until the day shift doctor looks at the drainage from her nose that she is keeping on the side table. Will update daytime case management and social work regarding the patient's requests to leave AMA.   "

## 2024-11-07 ENCOUNTER — APPOINTMENT (OUTPATIENT)
Dept: RADIOLOGY | Facility: MEDICAL CENTER | Age: 79
DRG: 202 | End: 2024-11-07
Attending: EMERGENCY MEDICINE
Payer: MEDICARE

## 2024-11-07 ENCOUNTER — APPOINTMENT (OUTPATIENT)
Dept: RADIOLOGY | Facility: MEDICAL CENTER | Age: 79
DRG: 202 | End: 2024-11-07
Attending: HOSPITALIST
Payer: MEDICARE

## 2024-11-07 PROBLEM — F05 DELIRIUM OF MIXED ORIGIN: Status: ACTIVE | Noted: 2024-11-07

## 2024-11-07 LAB
ALBUMIN SERPL BCP-MCNC: 4.1 G/DL (ref 3.2–4.9)
ALBUMIN/GLOB SERPL: 1.1 G/DL
ALP SERPL-CCNC: 66 U/L (ref 30–99)
ALT SERPL-CCNC: 18 U/L (ref 2–50)
ANION GAP SERPL CALC-SCNC: 12 MMOL/L (ref 7–16)
AST SERPL-CCNC: 22 U/L (ref 12–45)
BASE EXCESS BLDA CALC-SCNC: 1 MMOL/L (ref -4–3)
BILIRUB SERPL-MCNC: 0.3 MG/DL (ref 0.1–1.5)
BODY TEMPERATURE: 36.7 CENTIGRADE
BUN SERPL-MCNC: 65 MG/DL (ref 8–22)
CALCIUM ALBUM COR SERPL-MCNC: 9.3 MG/DL (ref 8.5–10.5)
CALCIUM SERPL-MCNC: 9.4 MG/DL (ref 8.5–10.5)
CHLORIDE SERPL-SCNC: 102 MMOL/L (ref 96–112)
CO2 SERPL-SCNC: 24 MMOL/L (ref 20–33)
CREAT SERPL-MCNC: 1.22 MG/DL (ref 0.5–1.4)
ERYTHROCYTE [DISTWIDTH] IN BLOOD BY AUTOMATED COUNT: 51.7 FL (ref 35.9–50)
GFR SERPLBLD CREATININE-BSD FMLA CKD-EPI: 45 ML/MIN/1.73 M 2
GLOBULIN SER CALC-MCNC: 3.6 G/DL (ref 1.9–3.5)
GLUCOSE BLD STRIP.AUTO-MCNC: 130 MG/DL (ref 65–99)
GLUCOSE BLD STRIP.AUTO-MCNC: 146 MG/DL (ref 65–99)
GLUCOSE BLD STRIP.AUTO-MCNC: 205 MG/DL (ref 65–99)
GLUCOSE SERPL-MCNC: 202 MG/DL (ref 65–99)
HCO3 BLDA-SCNC: 27 MMOL/L (ref 17–25)
HCT VFR BLD AUTO: 41.6 % (ref 37–47)
HGB BLD-MCNC: 13.3 G/DL (ref 12–16)
INHALED O2 FLOW RATE: 6 L/MIN (ref 2–10)
INHALED O2 FLOW RATE: ABNORMAL L/MIN
MAGNESIUM SERPL-MCNC: 3 MG/DL (ref 1.5–2.5)
MCH RBC QN AUTO: 30.2 PG (ref 27–33)
MCHC RBC AUTO-ENTMCNC: 32 G/DL (ref 32.2–35.5)
MCV RBC AUTO: 94.3 FL (ref 81.4–97.8)
NT-PROBNP SERPL IA-MCNC: 503 PG/ML (ref 0–125)
PCO2 BLDA: 47.8 MMHG (ref 26–37)
PCO2 TEMP ADJ BLDA: 47.2 MMHG (ref 26–37)
PH BLDA: 7.36 [PH] (ref 7.4–7.5)
PH TEMP ADJ BLDA: 7.37 [PH] (ref 7.4–7.5)
PHOSPHATE SERPL-MCNC: 3.5 MG/DL (ref 2.5–4.5)
PLATELET # BLD AUTO: 360 K/UL (ref 164–446)
PMV BLD AUTO: 9.2 FL (ref 9–12.9)
PO2 BLDA: 77.2 MMHG (ref 64–87)
PO2 TEMP ADJ BLDA: 75.7 MMHG (ref 64–87)
POTASSIUM SERPL-SCNC: 5 MMOL/L (ref 3.6–5.5)
PROT SERPL-MCNC: 7.7 G/DL (ref 6–8.2)
RBC # BLD AUTO: 4.41 M/UL (ref 4.2–5.4)
SAO2 % BLDA: 94.4 % (ref 93–99)
SODIUM SERPL-SCNC: 138 MMOL/L (ref 135–145)
WBC # BLD AUTO: 11.4 K/UL (ref 4.8–10.8)

## 2024-11-07 PROCEDURE — 85027 COMPLETE CBC AUTOMATED: CPT

## 2024-11-07 PROCEDURE — 700101 HCHG RX REV CODE 250: Performed by: HOSPITALIST

## 2024-11-07 PROCEDURE — 82962 GLUCOSE BLOOD TEST: CPT

## 2024-11-07 PROCEDURE — 80053 COMPREHEN METABOLIC PANEL: CPT

## 2024-11-07 PROCEDURE — 700111 HCHG RX REV CODE 636 W/ 250 OVERRIDE (IP): Mod: JZ | Performed by: HOSPITALIST

## 2024-11-07 PROCEDURE — 94640 AIRWAY INHALATION TREATMENT: CPT

## 2024-11-07 PROCEDURE — 700111 HCHG RX REV CODE 636 W/ 250 OVERRIDE (IP): Performed by: HOSPITALIST

## 2024-11-07 PROCEDURE — 700105 HCHG RX REV CODE 258: Performed by: HOSPITALIST

## 2024-11-07 PROCEDURE — 82803 BLOOD GASES ANY COMBINATION: CPT

## 2024-11-07 PROCEDURE — 700111 HCHG RX REV CODE 636 W/ 250 OVERRIDE (IP): Mod: JZ

## 2024-11-07 PROCEDURE — 83880 ASSAY OF NATRIURETIC PEPTIDE: CPT

## 2024-11-07 PROCEDURE — A9270 NON-COVERED ITEM OR SERVICE: HCPCS | Performed by: HOSPITALIST

## 2024-11-07 PROCEDURE — 700102 HCHG RX REV CODE 250 W/ 637 OVERRIDE(OP): Performed by: HOSPITALIST

## 2024-11-07 PROCEDURE — 83735 ASSAY OF MAGNESIUM: CPT

## 2024-11-07 PROCEDURE — 99291 CRITICAL CARE FIRST HOUR: CPT | Performed by: HOSPITALIST

## 2024-11-07 PROCEDURE — 770000 HCHG ROOM/CARE - INTERMEDIATE ICU *

## 2024-11-07 PROCEDURE — 84100 ASSAY OF PHOSPHORUS: CPT

## 2024-11-07 RX ORDER — METHYLPREDNISOLONE SODIUM SUCCINATE 40 MG/ML
40 INJECTION, POWDER, LYOPHILIZED, FOR SOLUTION INTRAMUSCULAR; INTRAVENOUS ONCE
Status: COMPLETED | OUTPATIENT
Start: 2024-11-07 | End: 2024-11-07

## 2024-11-07 RX ORDER — HALOPERIDOL 5 MG/ML
2 INJECTION INTRAMUSCULAR ONCE
Status: COMPLETED | OUTPATIENT
Start: 2024-11-07 | End: 2024-11-07

## 2024-11-07 RX ORDER — LORAZEPAM 2 MG/ML
1 INJECTION INTRAMUSCULAR ONCE
Status: DISCONTINUED | OUTPATIENT
Start: 2024-11-07 | End: 2024-11-07

## 2024-11-07 RX ORDER — HALOPERIDOL 5 MG/ML
5 INJECTION INTRAMUSCULAR EVERY 4 HOURS PRN
Status: DISCONTINUED | OUTPATIENT
Start: 2024-11-07 | End: 2024-11-08 | Stop reason: HOSPADM

## 2024-11-07 RX ORDER — QUETIAPINE FUMARATE 25 MG/1
50 TABLET, FILM COATED ORAL 2 TIMES DAILY
Status: DISCONTINUED | OUTPATIENT
Start: 2024-11-07 | End: 2024-11-07

## 2024-11-07 RX ORDER — NYSTATIN 100000 [USP'U]/G
POWDER TOPICAL 2 TIMES DAILY
Status: DISCONTINUED | OUTPATIENT
Start: 2024-11-07 | End: 2024-11-08 | Stop reason: HOSPADM

## 2024-11-07 RX ORDER — ZIPRASIDONE HYDROCHLORIDE 20 MG/1
20 CAPSULE ORAL 2 TIMES DAILY
Status: DISCONTINUED | OUTPATIENT
Start: 2024-11-07 | End: 2024-11-08 | Stop reason: HOSPADM

## 2024-11-07 RX ORDER — DEXMEDETOMIDINE HYDROCHLORIDE 4 UG/ML
.1-1.5 INJECTION, SOLUTION INTRAVENOUS CONTINUOUS
Status: DISCONTINUED | OUTPATIENT
Start: 2024-11-07 | End: 2024-11-08 | Stop reason: HOSPADM

## 2024-11-07 RX ORDER — LORAZEPAM 2 MG/ML
1 INJECTION INTRAMUSCULAR ONCE
Status: COMPLETED | OUTPATIENT
Start: 2024-11-07 | End: 2024-11-07

## 2024-11-07 RX ADMIN — FLUTICASONE PROPIONATE 220 MCG: 110 AEROSOL, METERED RESPIRATORY (INHALATION) at 17:28

## 2024-11-07 RX ADMIN — IPRATROPIUM BROMIDE AND ALBUTEROL SULFATE 3 ML: 2.5; .5 SOLUTION RESPIRATORY (INHALATION) at 02:53

## 2024-11-07 RX ADMIN — METHYLPREDNISOLONE SODIUM SUCCINATE 40 MG: 40 INJECTION, POWDER, FOR SOLUTION INTRAMUSCULAR; INTRAVENOUS at 01:32

## 2024-11-07 RX ADMIN — METHYLPREDNISOLONE SODIUM SUCCINATE 40 MG: 40 INJECTION, POWDER, FOR SOLUTION INTRAMUSCULAR; INTRAVENOUS at 15:20

## 2024-11-07 RX ADMIN — HALOPERIDOL LACTATE 2 MG: 5 INJECTION, SOLUTION INTRAMUSCULAR at 08:05

## 2024-11-07 RX ADMIN — DEXMEDETOMIDINE HYDROCHLORIDE 0.5 MCG/KG/HR: 4 INJECTION, SOLUTION INTRAVENOUS at 19:41

## 2024-11-07 RX ADMIN — ENOXAPARIN SODIUM 60 MG: 100 INJECTION SUBCUTANEOUS at 17:20

## 2024-11-07 RX ADMIN — LORAZEPAM 1 MG: 2 INJECTION INTRAMUSCULAR; INTRAVENOUS at 04:20

## 2024-11-07 RX ADMIN — LORAZEPAM 1 MG: 2 INJECTION INTRAMUSCULAR; INTRAVENOUS at 07:15

## 2024-11-07 RX ADMIN — METHYLPREDNISOLONE SODIUM SUCCINATE 40 MG: 40 INJECTION, POWDER, FOR SOLUTION INTRAMUSCULAR; INTRAVENOUS at 20:16

## 2024-11-07 RX ADMIN — DEXMEDETOMIDINE HYDROCHLORIDE 0.1 MCG/KG/HR: 4 INJECTION, SOLUTION INTRAVENOUS at 17:32

## 2024-11-07 RX ADMIN — METHYLPREDNISOLONE SODIUM SUCCINATE 40 MG: 40 INJECTION, POWDER, FOR SOLUTION INTRAMUSCULAR; INTRAVENOUS at 08:23

## 2024-11-07 RX ADMIN — CEFTRIAXONE SODIUM 2000 MG: 10 INJECTION, POWDER, FOR SOLUTION INTRAVENOUS at 05:09

## 2024-11-07 RX ADMIN — NYSTATIN: 100000 POWDER TOPICAL at 17:28

## 2024-11-07 RX ADMIN — METHYLPREDNISOLONE SODIUM SUCCINATE 40 MG: 40 INJECTION, POWDER, FOR SOLUTION INTRAMUSCULAR; INTRAVENOUS at 05:09

## 2024-11-07 RX ADMIN — INSULIN LISPRO 3 UNITS: 100 INJECTION, SOLUTION INTRAVENOUS; SUBCUTANEOUS at 08:02

## 2024-11-07 RX ADMIN — DEXMEDETOMIDINE HYDROCHLORIDE 0.1 MCG/KG/HR: 4 INJECTION, SOLUTION INTRAVENOUS at 08:36

## 2024-11-07 RX ADMIN — ENOXAPARIN SODIUM 60 MG: 100 INJECTION SUBCUTANEOUS at 05:09

## 2024-11-07 ASSESSMENT — PAIN DESCRIPTION - PAIN TYPE
TYPE: ACUTE PAIN

## 2024-11-07 ASSESSMENT — FIBROSIS 4 INDEX: FIB4 SCORE: 1.21

## 2024-11-07 NOTE — PROGRESS NOTES
Monitor Summary  Rhythm: Normal Sinus Rhythm and Sinus Tachycardia  Rate: 83 - 103  Ectopy: PVCs, PACs  .17 / .08 / .35

## 2024-11-07 NOTE — PROGRESS NOTES
Bedside report received from off going RN/tech: MEHRAN Ceja, assumed care of patient. Pt A&O x4, currently denies pain. Pt states no further needs at this time. Call light within reach with bed in lowest locked position. Pt currently in recliner in room. Will continue to monitor pt for duration of shift or while pt is in the care of this RN.    Fall Risk Score: HIGH RISK  Fall risk interventions in place: Place yellow fall risk ID band on patient, Provide patient/family education based on risk assessment, Educate patient/family to call staff for assistance when getting out of bed, Place fall precaution signage outside patient door, Utilize bed/chair fall alarm, Notify charge of high risk for huddle, and Bed alarm connected correctly  Bed type: Regular (Stevenson Score less than 17 interventions in place)  Patient on cardiac monitor: Yes  IVF/IV medications: Not Applicable   Oxygen: How many liters 3L  Bedside sitter: Not Applicable   Isolation: Not applicable

## 2024-11-07 NOTE — PROGRESS NOTES
Monitor Summary    Rhythm: SR-ST  Rate:   Ectopy: (R) PVC  Measurements: 0.17/0.07/0.33

## 2024-11-07 NOTE — PROGRESS NOTES
RN into room due to patient screaming. Patient had removed CPAP and was sitting at edge of bed. Patient yelling for staff to get out of room. Patient was given CPAP mask, which she did take to use. Patient continued to be agitated, in which the decision was made to give Ativan, per MAR. Med given, CPAP fully placed back on patient. Staff assisted to get patient back in bed.

## 2024-11-07 NOTE — PROGRESS NOTES
Hospital Medicine Daily Progress Note    Date of Service  11/6/2024    Chief Complaint  Short of breath    Hospital Course  Cecile Teixeira is a 79 y.o. female wheelchair bound since her 30's with a hx of asthma, hypothyroid, YOLA on CPAP, CVA, morbid obesity, admitted 11/3/2024 with acute respiatory failiure requiring high flow oxygen and frequent nebulizer treatments.    Interval Problem Update  Patient seen and examined today.  Data, Medication data reviewed.  Case discussed with nursing as available.  Plan of Care reviewed with patient and notified of changes.  11/4: wbc:14.5, on HFNC 40L and 50% FiO2. Sleeping but moves all extremities.  11/5 the patient still complaining of significant shortness of breath, especially worsening when mobilizing, currently afebrile, heart rate in the 70s to 80s, respiration unlabored, blood pressure in the 120s over 60s, positive productive cough, white second 19.4, hemoglobin 11.7, hematocrit 38.2, platelet count 249, sodium 134, potassium 4.8, chloride 102, glucose 165 echocardiogram with preserved ejection fraction, right side unfortunately not well-visualized, negative lower extremity Doppler  11/6 the patient with multiple complaints this morning, did apparently not do well overnight, is complaining of a headache and complaints of bleeding from her brain, she shows me some nasal encrustation with minor amount of blood on it, patient is somewhat perseverating around the subject,  Afebrile, heart rate in the 80s, respiration unlabored, the patient is currently saturating in the mid 90s on 3 L which is her home oxygen dose, blood pressure at times elevated with later improvement to the 120s over 60s, white cell count still elevated 18.2, hemoglobin 13.6, platelet count 387, sodium 136, potassium 4.6, chloride 101, bicarb 21, glucose 186, BUN 59, creatinine 1.33, BNP at 996  Patient with overall improvement  I have discussed this patient's plan of care and discharge plan at IDT  rounds today with Case Management, Nursing, Nursing leadership, and other members of the IDT team.    Consultants/Specialty  critical care    Code Status  Full Code    Disposition  The patient is not medically cleared for discharge to home or a post-acute facility.  Anticipate discharge to: home with close outpatient follow-up    I have placed the appropriate orders for post-discharge needs.    Review of Systems  Review of Systems   Constitutional:  Positive for malaise/fatigue. Negative for chills and fever.   HENT:  Positive for nosebleeds.    Eyes: Negative.    Respiratory:  Positive for cough, sputum production and wheezing.    Cardiovascular: Negative.  Negative for chest pain and palpitations.   Gastrointestinal: Negative.  Negative for heartburn, nausea and vomiting.   Genitourinary: Negative.  Negative for dysuria and frequency.   Musculoskeletal: Negative.  Negative for back pain and neck pain.   Skin: Negative.  Negative for itching and rash.   Neurological:  Positive for weakness and headaches. Negative for dizziness and focal weakness.   Endo/Heme/Allergies: Negative.  Negative for polydipsia. Does not bruise/bleed easily.   Psychiatric/Behavioral:  Negative for depression. The patient is nervous/anxious.         Physical Exam  Temp:  [36.4 °C (97.6 °F)-36.7 °C (98.1 °F)] 36.7 °C (98.1 °F)  Pulse:  [] 86  Resp:  [18-52] 20  BP: (120-192)/(56-87) 126/67  SpO2:  [94 %-98 %] 95 %    Physical Exam  Vitals reviewed.   Constitutional:       General: She is sleeping. She is not in acute distress.     Appearance: She is obese. She is not diaphoretic.   HENT:      Head: Normocephalic.      Nose: Nose normal.   Eyes:      General:         Right eye: No discharge.         Left eye: No discharge.      Conjunctiva/sclera: Conjunctivae normal.   Neck:      Comments: Large neck circumference  Cardiovascular:      Rate and Rhythm: Normal rate and regular rhythm.      Pulses: Normal pulses.      Heart sounds: No  murmur heard.  Pulmonary:      Effort: Pulmonary effort is normal. No respiratory distress.      Breath sounds: Rhonchi present.   Abdominal:      General: Bowel sounds are normal. There is no distension.      Palpations: Abdomen is soft.   Musculoskeletal:         General: Swelling present.      Right lower leg: Edema (lymphedema) present.      Left lower leg: Edema present.   Lymphadenopathy:      Cervical: No cervical adenopathy.   Skin:     General: Skin is dry.   Neurological:      Motor: Weakness present.   Psychiatric:         Behavior: Behavior is cooperative.         Fluids    Intake/Output Summary (Last 24 hours) at 11/6/2024 1649  Last data filed at 11/6/2024 1200  Gross per 24 hour   Intake --   Output 1000 ml   Net -1000 ml        Laboratory  Recent Labs     11/04/24  0230 11/05/24  0230 11/06/24  0357   WBC 14.5* 19.4* 18.2*   RBC 4.08* 3.86* 4.52   HEMOGLOBIN 12.2 11.7* 13.6   HEMATOCRIT 39.4 38.2 44.1   MCV 96.6 99.0* 97.6   MCH 29.9 30.3 30.1   MCHC 31.0* 30.6* 30.8*   RDW 51.6* 54.3* 54.3*   PLATELETCT 322 249 387   MPV 8.9* 9.1 9.3     Recent Labs     11/04/24  0230 11/05/24  0230 11/06/24  0357   SODIUM 138 134* 136   POTASSIUM 4.1 4.8 4.6   CHLORIDE 101 102 101   CO2 20 23 21   GLUCOSE 225* 165* 186*   BUN 27* 40* 59*   CREATININE 1.29 1.26 1.33   CALCIUM 8.8 8.9 9.7                   Imaging  EC-ECHOCARDIOGRAM COMPLETE W/ CONT   Final Result      DX-CHEST-PORTABLE (1 VIEW)   Final Result      Cardiac silhouette enlargement. No definite acute abnormality.      US-EXTREMITY VENOUS LOWER BILAT   Final Result      DX-CHEST-PORTABLE (1 VIEW)   Final Result      No acute process.      CT-CTA CHEST PULMONARY ARTERY W/ RECONS    (Results Pending)        Assessment/Plan  * Acute asthma exacerbation- (present on admission)  Assessment & Plan  Continue IV Solu-Medrol, titrate  I ordered bronchodilators per RT protocol  Reviewed chest x-ray with no consolidation given her productive cough, yellow sputum,  started antibiotics, sputum culture   patient was unable to tolerate CTA in the emergency department    Elevated troponin  Assessment & Plan  Likely demand ischemia in the setting of hypoxia  Repeat troponin plateau/flattend  ASA      Acute respiratory failure with hypoxia (HCC)- (present on admission)  Assessment & Plan  RT per protocol  Titrate oxygen as tolerates. 11/4 on HFNC  Inhalers, nebs  Asthma exacerbation, steroids  Steroids  Forced diuresis    Type 2 diabetes mellitus, without long-term current use of insulin ([facility])- (present on admission)  Assessment & Plan  Hemoglobin A1c 6.2 in past three months  Monitor accuchecks and cover with SSI  Hypoglycemic protocol  On steroids and likely to induce hyperglycemia  Encourage weight loss    Primary hypertension- (present on admission)  Assessment & Plan  Amlodipine 10mg, losartan 100mg qd, metoprolol SR 25mg qd  Monitor vitals   PRN antihypertensives    Lymphedema associated with obesity- (present on admission)  Assessment & Plan  Lower extremity duplex reviewed negative for DVT      YLOA (obstructive sleep apnea)- (present on admission)  Assessment & Plan  Rechallenge to start BiPAP at home settings, 24/18  Monitor.    Hypothyroidism- (present on admission)  Assessment & Plan  Continue levothyroxine 88mcg qd  TSH:4.5 in past three months    Severe obesity (BMI >= 40) ([facility])- (present on admission)  Assessment & Plan  Body mass index is 64.45 kg/m².     Plan  11/6  Currently improved respiratory status, continue empiric antibiotics, pending sputum culture,  BiPAP when resting and nocturnally, family to bring in device  Do steroids, close monitoring  Monitor closely for sedation needs  Close laboratory follow-up  Lower suspicion for VTE or PE at this time  See orders  Patient is has a high medical complexity, complex decision making and is at high risk for complication, morbidity, and mortality.  I spent 56 minutes, reviewing the chart, obtaining and/or reviewing  separately obtained history. Performing a medically appropriate examination and evaluation.  Counseling and educating the patient. Ordering and reviewing medications, tests, or procedures.   Documenting clinical information in EPIC. Independently interpreting results and communicating results to patient. Discussing future disposition of care with patient, RN and case management.    VTE prophylaxis:    enoxaparin ppx      I have performed a physical exam and reviewed and updated ROS and Plan today (11/6/2024). In review of yesterday's note (11/5/2024), there are no changes except as documented above.      Please note that this dictation was created using voice recognition software. I have made every reasonable attempt to correct obvious errors, but I expect that there are errors of grammar and possibly context that I did not discover before finalizing the note.

## 2024-11-07 NOTE — CARE PLAN
The patient is Watcher - Medium risk of patient condition declining or worsening    Shift Goals  Clinical Goals: respiratory status  Patient Goals: rest in recliner  Family Goals: MIKE    Progress made toward(s) clinical / shift goals:  Pt transferred to tele this afternoon around 1300. Pt became very SOB and SpO2 dropped to 86%. Briefly required 6L O2. RT at bedside to assess. Pt received neb treatment and was able to wean back to 3 L O2. Pt resting comfortably in chair asking about d/c tomorrow. Pt was a lina lift up to the chair, unable to stand with the assistance of 3 people. Pt states she has a w/c at home with grab bars.    Patient is not progressing towards the following goals:

## 2024-11-07 NOTE — PROGRESS NOTES
Patient has just arrived to [facility] as HLOC from [facility]. Patient placed on Precedex and increased to 0.3 mcg/kg/hr for a RASS of +2. 2 RN skin assessment complete (see note below.) Patient placed on continuous monitoring. No unmet needs at this time.     4 Eyes Skin Assessment Completed by MEHRAN Herman and MEHRAN Corbin.    Head WDL  Ears WDL  Nose Redness and Blanching  Mouth WDL  Neck WDL  Breast/Chest Redness, Excoriation, and Shiny, Serosanguinous drainage  Shoulder Blades WDL  Spine WDL  (R) Arm/Elbow/Hand Redness and Bruising  (L) Arm/Elbow/Hand Redness and Bruising  Abdomen Redness and Scar  Groin Redness and Excoriation  Scrotum/Coccyx/Buttocks WDL  (R) Leg Redness and Edema  (L) Leg Redness and Edema  (R) Heel/Foot/Toe Redness and Blanching  (L) Heel/Foot/Toe Redness and Blanching          Devices In Places ECG, Blood Pressure Cuff, Pulse Ox, and Oxy Mask      Interventions In Place Heel Mepilex, TAP System, Pillows, Q2 Turns, Low Air Loss Mattress, and Heels Loaded W/Pillows    Possible Skin Injury Yes    Pictures Uploaded Into Epic Yes  Wound Consult Placed N/A  RN Wound Prevention Protocol Ordered Yes

## 2024-11-07 NOTE — CARE PLAN
The patient is Stable - Low risk of patient condition declining or worsening    Shift Goals  Clinical Goals: Maintain respiratory status  Patient Goals: Rest  Family Goals: MIKE    Progress made toward(s) clinical / shift goals:    Problem: Pain - Standard  Goal: Alleviation of pain or a reduction in pain to the patient’s comfort goal  Description: Target End Date:  Prior to discharge or change in level of care    Document on Vitals flowsheet    1.  Document pain using the appropriate pain scale per order or unit policy  2.  Educate and implement non-pharmacologic comfort measures (i.e. relaxation, distraction, massage, cold/heat therapy, etc.)  3.  Pain management medications as ordered  4.  Reassess pain after pain med administration per policy  5.  If opiods administered assess patient's response to pain medication is appropriate per POSS sedation scale  6.  Follow pain management plan developed in collaboration with patient and interdisciplinary team (including palliative care or pain specialists if applicable)  Outcome: Progressing     Problem: Fall Risk  Goal: Patient will remain free from falls  Description: Target End Date:  Prior to discharge or change in level of care    Document interventions on the Brown Dash Fall Risk Assessment    1.  Assess for fall risk factors  2.  Implement fall precautions  Outcome: Progressing       Patient is using call light for any and all assistance, not ambulatory. Chris lift is used for moving patient, she is not attempting to get up without assistance, preventing fall risk.

## 2024-11-07 NOTE — PROGRESS NOTES
Hospital Medicine Daily Progress Note    Date of Service  11/7/2024    Chief Complaint  Short of breath    Hospital Course  Cecile Teixeira is a 79 y.o. female wheelchair bound since her 30's with a hx of asthma, hypothyroid, YOLA on CPAP, CVA, morbid obesity, admitted 11/3/2024 with acute respiatory failiure requiring high flow oxygen and frequent nebulizer treatments.    Interval Problem Update  Patient seen and examined today.  Data, Medication data reviewed.  Case discussed with nursing as available.  Plan of Care reviewed with patient and notified of changes.  11/4: wbc:14.5, on HFNC 40L and 50% FiO2. Sleeping but moves all extremities.  11/5 the patient still complaining of significant shortness of breath, especially worsening when mobilizing, currently afebrile, heart rate in the 70s to 80s, respiration unlabored, blood pressure in the 120s over 60s, positive productive cough, white second 19.4, hemoglobin 11.7, hematocrit 38.2, platelet count 249, sodium 134, potassium 4.8, chloride 102, glucose 165 echocardiogram with preserved ejection fraction, right side unfortunately not well-visualized, negative lower extremity Doppler  11/6 the patient with multiple complaints this morning, did apparently not do well overnight, is complaining of a headache and complaints of bleeding from her brain, she shows me some nasal encrustation with minor amount of blood on it, patient is somewhat perseverating around the subject,  Afebrile, heart rate in the 80s, respiration unlabored, the patient is currently saturating in the mid 90s on 3 L which is her home oxygen dose, blood pressure at times elevated with later improvement to the 120s over 60s, white cell count still elevated 18.2, hemoglobin 13.6, platelet count 387, sodium 136, potassium 4.6, chloride 101, bicarb 21, glucose 186, BUN 59, creatinine 1.33, BNP at 996  Patient with overall improvement  11/7 the patient with overnight decompensation, aggressive behavior,  required to be placed in 4 point restraints, transferring back to [facility] with Precedex drip, the patient not redirectable,  Afebrile, heart rate in the 100s, respiration in the 20s, appears to be unlabored, the patient is saturating in the high 90s on 3 to 6 L nasal cannula oxygen, blood pressure in the 130s over 70s, increased with agitation  Laboratory data white count 11.4, hemoglobin 13.3, platelet count 360, sodium 138, potassium 5.0, chloride 102, bicarb 24, glucose 202, BUN 65, creatinine 1.22, , ABG with a pH of 7.36, pCO2 47, PaO2 77    I have discussed this patient's plan of care and discharge plan at IDT rounds today with Case Management, Nursing, Nursing leadership, and other members of the IDT team.    Consultants/Specialty  critical care    Code Status  Full Code    Disposition  The patient is not medically cleared for discharge to home or a post-acute facility.  Anticipate discharge to: home with close outpatient follow-up    I have placed the appropriate orders for post-discharge needs.    Review of Systems  Review of Systems   Unable to perform ROS: Psychiatric disorder        Physical Exam  Temp:  [35.8 °C (96.5 °F)-36.9 °C (98.4 °F)] 35.8 °C (96.5 °F)  Pulse:  [] 79  Resp:  [16-22] 16  BP: (126-189)/(67-88) 189/81  SpO2:  [94 %-98 %] 97 %    Physical Exam  Vitals reviewed.   Constitutional:       General: She is sleeping. She is in acute distress.      Appearance: She is obese. She is diaphoretic.      Comments: Aggressive, hostile, agitated   HENT:      Head: Normocephalic.      Nose: Nose normal.   Eyes:      General:         Right eye: No discharge.         Left eye: No discharge.      Conjunctiva/sclera: Conjunctivae normal.   Neck:      Comments: Large neck circumference  Cardiovascular:      Rate and Rhythm: Normal rate and regular rhythm.      Pulses: Normal pulses.      Heart sounds: No murmur heard.  Pulmonary:      Effort: Pulmonary effort is normal. No respiratory distress.       Breath sounds: Rhonchi and rales present.   Abdominal:      General: Bowel sounds are normal. There is no distension.      Palpations: Abdomen is soft.   Musculoskeletal:         General: Swelling present.      Right lower leg: Edema (lymphedema) present.      Left lower leg: Edema present.   Lymphadenopathy:      Cervical: No cervical adenopathy.   Neurological:      Mental Status: She is disoriented.      Comments: Agitated, moving all 4 extremities   Psychiatric:         Behavior: Behavior is cooperative.      Comments: Paranoid, agitated         Fluids    Intake/Output Summary (Last 24 hours) at 11/7/2024 1422  Last data filed at 11/7/2024 0420  Gross per 24 hour   Intake 240 ml   Output 700 ml   Net -460 ml        Laboratory  Recent Labs     11/05/24  0230 11/06/24 0357 11/07/24  0758   WBC 19.4* 18.2* 11.4*   RBC 3.86* 4.52 4.41   HEMOGLOBIN 11.7* 13.6 13.3   HEMATOCRIT 38.2 44.1 41.6   MCV 99.0* 97.6 94.3   MCH 30.3 30.1 30.2   MCHC 30.6* 30.8* 32.0*   RDW 54.3* 54.3* 51.7*   PLATELETCT 249 387 360   MPV 9.1 9.3 9.2     Recent Labs     11/05/24 0230 11/06/24 0357 11/07/24  0758   SODIUM 134* 136 138   POTASSIUM 4.8 4.6 5.0   CHLORIDE 102 101 102   CO2 23 21 24   GLUCOSE 165* 186* 202*   BUN 40* 59* 65*   CREATININE 1.26 1.33 1.22   CALCIUM 8.9 9.7 9.4                   Imaging  IR-US GUIDED PIV   Final Result    Ultrasound-guided PERIPHERAL IV INSERTION performed by    qualified nursing staff as above.      EC-ECHOCARDIOGRAM COMPLETE W/ CONT   Final Result      DX-CHEST-PORTABLE (1 VIEW)   Final Result      Cardiac silhouette enlargement. No definite acute abnormality.      US-EXTREMITY VENOUS LOWER BILAT   Final Result      DX-CHEST-PORTABLE (1 VIEW)   Final Result      No acute process.           Assessment/Plan  * Acute asthma exacerbation- (present on admission)  Assessment & Plan  Continue IV Solu-Medrol, titrate  I ordered bronchodilators per RT protocol  Reviewed chest x-ray with no  consolidation given her productive cough, yellow sputum, started antibiotics, sputum culture   patient was unable to tolerate CTA in the emergency department    Delirium of mixed origin  Assessment & Plan  Agitated,  Question psychiatric underlying condition versus other  Reportedly the patient with multiple prior difficulties with medical compliance and infection    Elevated troponin  Assessment & Plan  Likely demand ischemia in the setting of hypoxia  Repeat troponin plateau/flattend  ASA      Acute respiratory failure with hypoxia (HCC)- (present on admission)  Assessment & Plan  RT per protocol  Titrate oxygen as tolerates. 11/4 on HFNC  Inhalers, nebs  Asthma exacerbation, steroids  Steroids  Forced diuresis    Type 2 diabetes mellitus, without long-term current use of insulin ([facility])- (present on admission)  Assessment & Plan  Hemoglobin A1c 6.2 in past three months  Monitor accuchecks and cover with SSI  Hypoglycemic protocol  On steroids and likely to induce hyperglycemia  Encourage weight loss    Primary hypertension- (present on admission)  Assessment & Plan  Amlodipine 10mg, losartan 100mg qd, metoprolol SR 25mg qd  Monitor vitals   PRN antihypertensives    Lymphedema associated with obesity- (present on admission)  Assessment & Plan  Lower extremity duplex reviewed negative for DVT      YOLA (obstructive sleep apnea)- (present on admission)  Assessment & Plan  Rechallenge to start BiPAP at home settings, 24/18  Monitor.    Hypothyroidism- (present on admission)  Assessment & Plan  Continue levothyroxine 88mcg qd  TSH:4.5 in past three months    Severe obesity (BMI >= 40) ([facility])- (present on admission)  Assessment & Plan  Body mass index is 64.45 kg/m².     Plan  11/7  The patient with severe agitation, belligerence,  Unable to maintain on telemetry status, at 1 point in 4 point restraints  Returning back to [facility] for Precedex drip, ongoing medical optimization  Currently improved respiratory status, continue  empiric antibiotics, pending sputum culture,  BiPAP when resting and nocturnally, family to bring in device  Monitor closely for sedation needs  Close laboratory follow-up  Lower suspicion for VTE or PE at this time  See orders  Patient is has a high medical complexity, complex decision making and is at high risk for complication, morbidity, and mortality.  Patient is critically ill.   The patient continues to have: Severe agitation, delirium  The vital organ system that is affected is the: CNS, respiratory  If untreated there is a high chance of deterioration into: Progressive agitated delirium  And eventually death.   The critical care that I am providing today is: Titration of sedatives, Precedex intravenously   The critical that has been undertaken is medically complex.   There has been no overlap in critical care time.   Critical Care Time not including procedures: 45 minutes      VTE prophylaxis:    enoxaparin ppx      I have performed a physical exam and reviewed and updated ROS and Plan today (11/7/2024). In review of yesterday's note (11/6/2024), there are no changes except as documented above.      Please note that this dictation was created using voice recognition software. I have made every reasonable attempt to correct obvious errors, but I expect that there are errors of grammar and possibly context that I did not discover before finalizing the note.
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
## 2024-11-07 NOTE — ASSESSMENT & PLAN NOTE
Agitated,  Question psychiatric underlying condition versus other  Reportedly the patient with multiple prior difficulties with medical compliance and infection
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
## 2024-11-07 NOTE — PROGRESS NOTES
Bedside report received from off going RN/tech: Pamella LAURENT, assumed care of patient.     Fall Risk Score: HIGH RISK  Fall risk interventions in place: Place yellow fall risk ID band on patient, Provide patient/family education based on risk assessment, Educate patient/family to call staff for assistance when getting out of bed, Place fall precaution signage outside patient door, Utilize bed/chair fall alarm, and Bed alarm connected correctly  Bed type: Regular (Stevenson Score less than 17 interventions in place)  Patient on cardiac monitor: Yes  IVF/IV medications: Not Applicable   Oxygen: How many liters 3L  Bedside sitter: Not Applicable   Isolation: Not applicable

## 2024-11-07 NOTE — THERAPY
11/07/24 9389   Interdisciplinary Plan of Care Collaboration   Collaboration Comments Pt transitioned to a higher level of care. Additionally, per chart, pt agitated. Will hold and follow for appropriate timing of OT treatment.

## 2024-11-08 ENCOUNTER — PHARMACY VISIT (OUTPATIENT)
Dept: PHARMACY | Facility: MEDICAL CENTER | Age: 79
End: 2024-11-08
Payer: COMMERCIAL

## 2024-11-08 ENCOUNTER — APPOINTMENT (OUTPATIENT)
Dept: RADIOLOGY | Facility: MEDICAL CENTER | Age: 79
DRG: 202 | End: 2024-11-08
Attending: STUDENT IN AN ORGANIZED HEALTH CARE EDUCATION/TRAINING PROGRAM
Payer: MEDICARE

## 2024-11-08 ENCOUNTER — HOME HEALTH ADMISSION (OUTPATIENT)
Dept: HOME HEALTH SERVICES | Facility: HOME HEALTHCARE | Age: 79
End: 2024-11-08
Payer: MEDICARE

## 2024-11-08 ENCOUNTER — HOSPITAL ENCOUNTER (INPATIENT)
Facility: MEDICAL CENTER | Age: 79
LOS: 1 days | DRG: 202 | End: 2024-11-09
Attending: STUDENT IN AN ORGANIZED HEALTH CARE EDUCATION/TRAINING PROGRAM | Admitting: HOSPITALIST
Payer: MEDICARE

## 2024-11-08 VITALS
WEIGHT: 293 LBS | HEART RATE: 87 BPM | RESPIRATION RATE: 27 BRPM | BODY MASS INDEX: 57.52 KG/M2 | TEMPERATURE: 97.4 F | OXYGEN SATURATION: 92 % | HEIGHT: 60 IN | SYSTOLIC BLOOD PRESSURE: 126 MMHG | DIASTOLIC BLOOD PRESSURE: 60 MMHG

## 2024-11-08 DIAGNOSIS — J96.01 ACUTE RESPIRATORY FAILURE WITH HYPOXIA (HCC): Chronic | ICD-10-CM

## 2024-11-08 DIAGNOSIS — J18.9 PNEUMONIA OF BOTH LOWER LOBES DUE TO INFECTIOUS ORGANISM: ICD-10-CM

## 2024-11-08 DIAGNOSIS — K21.00 GASTROESOPHAGEAL REFLUX DISEASE WITH ESOPHAGITIS WITHOUT HEMORRHAGE: ICD-10-CM

## 2024-11-08 DIAGNOSIS — R11.0 NAUSEA: ICD-10-CM

## 2024-11-08 DIAGNOSIS — J45.51 SEVERE PERSISTENT ASTHMA WITH ACUTE EXACERBATION: ICD-10-CM

## 2024-11-08 DIAGNOSIS — J45.41 MODERATE PERSISTENT ASTHMA WITH ACUTE EXACERBATION: ICD-10-CM

## 2024-11-08 DIAGNOSIS — G47.33 OSA (OBSTRUCTIVE SLEEP APNEA): ICD-10-CM

## 2024-11-08 DIAGNOSIS — I50.32 CHRONIC HEART FAILURE WITH PRESERVED EJECTION FRACTION (HCC): ICD-10-CM

## 2024-11-08 DIAGNOSIS — D72.829 LEUKOCYTOSIS, UNSPECIFIED TYPE: ICD-10-CM

## 2024-11-08 LAB
ALBUMIN SERPL BCP-MCNC: 3.7 G/DL (ref 3.2–4.9)
ALBUMIN SERPL BCP-MCNC: 4.1 G/DL (ref 3.2–4.9)
ALBUMIN/GLOB SERPL: 1.2 G/DL
ALBUMIN/GLOB SERPL: 1.2 G/DL
ALP SERPL-CCNC: 54 U/L (ref 30–99)
ALP SERPL-CCNC: 64 U/L (ref 30–99)
ALT SERPL-CCNC: 15 U/L (ref 2–50)
ALT SERPL-CCNC: 18 U/L (ref 2–50)
ANION GAP SERPL CALC-SCNC: 10 MMOL/L (ref 7–16)
ANION GAP SERPL CALC-SCNC: 13 MMOL/L (ref 7–16)
AST SERPL-CCNC: 18 U/L (ref 12–45)
AST SERPL-CCNC: 23 U/L (ref 12–45)
BACTERIA BLD CULT: NORMAL
BACTERIA BLD CULT: NORMAL
BASOPHILS # BLD AUTO: 0.1 % (ref 0–1.8)
BASOPHILS # BLD AUTO: 0.1 % (ref 0–1.8)
BASOPHILS # BLD: 0.01 K/UL (ref 0–0.12)
BASOPHILS # BLD: 0.01 K/UL (ref 0–0.12)
BILIRUB SERPL-MCNC: 0.2 MG/DL (ref 0.1–1.5)
BILIRUB SERPL-MCNC: 0.3 MG/DL (ref 0.1–1.5)
BUN SERPL-MCNC: 56 MG/DL (ref 8–22)
BUN SERPL-MCNC: 69 MG/DL (ref 8–22)
CALCIUM ALBUM COR SERPL-MCNC: 9.1 MG/DL (ref 8.5–10.5)
CALCIUM ALBUM COR SERPL-MCNC: 9.6 MG/DL (ref 8.5–10.5)
CALCIUM SERPL-MCNC: 8.9 MG/DL (ref 8.5–10.5)
CALCIUM SERPL-MCNC: 9.7 MG/DL (ref 8.5–10.5)
CHLORIDE SERPL-SCNC: 103 MMOL/L (ref 96–112)
CHLORIDE SERPL-SCNC: 106 MMOL/L (ref 96–112)
CO2 SERPL-SCNC: 25 MMOL/L (ref 20–33)
CO2 SERPL-SCNC: 26 MMOL/L (ref 20–33)
CREAT SERPL-MCNC: 0.84 MG/DL (ref 0.5–1.4)
CREAT SERPL-MCNC: 1.13 MG/DL (ref 0.5–1.4)
EKG IMPRESSION: NORMAL
EKG IMPRESSION: NORMAL
EOSINOPHIL # BLD AUTO: 0 K/UL (ref 0–0.51)
EOSINOPHIL # BLD AUTO: 0 K/UL (ref 0–0.51)
EOSINOPHIL NFR BLD: 0 % (ref 0–6.9)
EOSINOPHIL NFR BLD: 0 % (ref 0–6.9)
ERYTHROCYTE [DISTWIDTH] IN BLOOD BY AUTOMATED COUNT: 49.6 FL (ref 35.9–50)
ERYTHROCYTE [DISTWIDTH] IN BLOOD BY AUTOMATED COUNT: 52.3 FL (ref 35.9–50)
GFR SERPLBLD CREATININE-BSD FMLA CKD-EPI: 49 ML/MIN/1.73 M 2
GFR SERPLBLD CREATININE-BSD FMLA CKD-EPI: 71 ML/MIN/1.73 M 2
GLOBULIN SER CALC-MCNC: 3 G/DL (ref 1.9–3.5)
GLOBULIN SER CALC-MCNC: 3.3 G/DL (ref 1.9–3.5)
GLUCOSE BLD STRIP.AUTO-MCNC: 144 MG/DL (ref 65–99)
GLUCOSE SERPL-MCNC: 155 MG/DL (ref 65–99)
GLUCOSE SERPL-MCNC: 221 MG/DL (ref 65–99)
HCT VFR BLD AUTO: 36.2 % (ref 37–47)
HCT VFR BLD AUTO: 43.4 % (ref 37–47)
HGB BLD-MCNC: 11.5 G/DL (ref 12–16)
HGB BLD-MCNC: 13.9 G/DL (ref 12–16)
IMM GRANULOCYTES # BLD AUTO: 0.09 K/UL (ref 0–0.11)
IMM GRANULOCYTES # BLD AUTO: 0.31 K/UL (ref 0–0.11)
IMM GRANULOCYTES NFR BLD AUTO: 1.3 % (ref 0–0.9)
IMM GRANULOCYTES NFR BLD AUTO: 2 % (ref 0–0.9)
LYMPHOCYTES # BLD AUTO: 0.52 K/UL (ref 1–4.8)
LYMPHOCYTES # BLD AUTO: 1.43 K/UL (ref 1–4.8)
LYMPHOCYTES NFR BLD: 7.6 % (ref 22–41)
LYMPHOCYTES NFR BLD: 9.3 % (ref 22–41)
MAGNESIUM SERPL-MCNC: 2.8 MG/DL (ref 1.5–2.5)
MCH RBC QN AUTO: 30.5 PG (ref 27–33)
MCH RBC QN AUTO: 31 PG (ref 27–33)
MCHC RBC AUTO-ENTMCNC: 31.8 G/DL (ref 32.2–35.5)
MCHC RBC AUTO-ENTMCNC: 32 G/DL (ref 32.2–35.5)
MCV RBC AUTO: 95.2 FL (ref 81.4–97.8)
MCV RBC AUTO: 97.6 FL (ref 81.4–97.8)
MONOCYTES # BLD AUTO: 0.26 K/UL (ref 0–0.85)
MONOCYTES # BLD AUTO: 1.39 K/UL (ref 0–0.85)
MONOCYTES NFR BLD AUTO: 3.8 % (ref 0–13.4)
MONOCYTES NFR BLD AUTO: 9 % (ref 0–13.4)
NEUTROPHILS # BLD AUTO: 12.31 K/UL (ref 1.82–7.42)
NEUTROPHILS # BLD AUTO: 5.97 K/UL (ref 1.82–7.42)
NEUTROPHILS NFR BLD: 79.6 % (ref 44–72)
NEUTROPHILS NFR BLD: 87.2 % (ref 44–72)
NRBC # BLD AUTO: 0 K/UL
NRBC # BLD AUTO: 0 K/UL
NRBC BLD-RTO: 0 /100 WBC (ref 0–0.2)
NRBC BLD-RTO: 0 /100 WBC (ref 0–0.2)
NT-PROBNP SERPL IA-MCNC: 245 PG/ML (ref 0–125)
NT-PROBNP SERPL IA-MCNC: 384 PG/ML (ref 0–125)
PHOSPHATE SERPL-MCNC: 3.6 MG/DL (ref 2.5–4.5)
PLATELET # BLD AUTO: 254 K/UL (ref 164–446)
PLATELET # BLD AUTO: 392 K/UL (ref 164–446)
PMV BLD AUTO: 9.4 FL (ref 9–12.9)
PMV BLD AUTO: 9.7 FL (ref 9–12.9)
POTASSIUM SERPL-SCNC: 4.3 MMOL/L (ref 3.6–5.5)
POTASSIUM SERPL-SCNC: 4.7 MMOL/L (ref 3.6–5.5)
PROT SERPL-MCNC: 6.7 G/DL (ref 6–8.2)
PROT SERPL-MCNC: 7.4 G/DL (ref 6–8.2)
RBC # BLD AUTO: 3.71 M/UL (ref 4.2–5.4)
RBC # BLD AUTO: 4.56 M/UL (ref 4.2–5.4)
SIGNIFICANT IND 70042: NORMAL
SIGNIFICANT IND 70042: NORMAL
SITE SITE: NORMAL
SITE SITE: NORMAL
SODIUM SERPL-SCNC: 141 MMOL/L (ref 135–145)
SODIUM SERPL-SCNC: 142 MMOL/L (ref 135–145)
SOURCE SOURCE: NORMAL
SOURCE SOURCE: NORMAL
WBC # BLD AUTO: 15.5 K/UL (ref 4.8–10.8)
WBC # BLD AUTO: 6.9 K/UL (ref 4.8–10.8)

## 2024-11-08 PROCEDURE — 94640 AIRWAY INHALATION TREATMENT: CPT

## 2024-11-08 PROCEDURE — RXMED WILLOW AMBULATORY MEDICATION CHARGE: Performed by: HOSPITALIST

## 2024-11-08 PROCEDURE — 83880 ASSAY OF NATRIURETIC PEPTIDE: CPT | Mod: 91

## 2024-11-08 PROCEDURE — 700101 HCHG RX REV CODE 250: Performed by: STUDENT IN AN ORGANIZED HEALTH CARE EDUCATION/TRAINING PROGRAM

## 2024-11-08 PROCEDURE — 83880 ASSAY OF NATRIURETIC PEPTIDE: CPT

## 2024-11-08 PROCEDURE — 36415 COLL VENOUS BLD VENIPUNCTURE: CPT

## 2024-11-08 PROCEDURE — 85025 COMPLETE CBC W/AUTO DIFF WBC: CPT

## 2024-11-08 PROCEDURE — 99285 EMERGENCY DEPT VISIT HI MDM: CPT

## 2024-11-08 PROCEDURE — 770006 HCHG ROOM/CARE - MED/SURG/GYN SEMI*

## 2024-11-08 PROCEDURE — 85025 COMPLETE CBC W/AUTO DIFF WBC: CPT | Mod: 91

## 2024-11-08 PROCEDURE — 700102 HCHG RX REV CODE 250 W/ 637 OVERRIDE(OP): Performed by: HOSPITALIST

## 2024-11-08 PROCEDURE — 80053 COMPREHEN METABOLIC PANEL: CPT | Mod: 91

## 2024-11-08 PROCEDURE — A9270 NON-COVERED ITEM OR SERVICE: HCPCS | Performed by: HOSPITALIST

## 2024-11-08 PROCEDURE — A9270 NON-COVERED ITEM OR SERVICE: HCPCS | Performed by: STUDENT IN AN ORGANIZED HEALTH CARE EDUCATION/TRAINING PROGRAM

## 2024-11-08 PROCEDURE — 80053 COMPREHEN METABOLIC PANEL: CPT

## 2024-11-08 PROCEDURE — 93005 ELECTROCARDIOGRAM TRACING: CPT | Performed by: HOSPITALIST

## 2024-11-08 PROCEDURE — 700105 HCHG RX REV CODE 258: Performed by: HOSPITALIST

## 2024-11-08 PROCEDURE — 700111 HCHG RX REV CODE 636 W/ 250 OVERRIDE (IP): Mod: JZ | Performed by: HOSPITALIST

## 2024-11-08 PROCEDURE — 82962 GLUCOSE BLOOD TEST: CPT

## 2024-11-08 PROCEDURE — 84100 ASSAY OF PHOSPHORUS: CPT

## 2024-11-08 PROCEDURE — 93005 ELECTROCARDIOGRAM TRACING: CPT | Performed by: STUDENT IN AN ORGANIZED HEALTH CARE EDUCATION/TRAINING PROGRAM

## 2024-11-08 PROCEDURE — 71045 X-RAY EXAM CHEST 1 VIEW: CPT

## 2024-11-08 PROCEDURE — 99497 ADVNCD CARE PLAN 30 MIN: CPT | Performed by: NAPRAPATH

## 2024-11-08 PROCEDURE — 700111 HCHG RX REV CODE 636 W/ 250 OVERRIDE (IP): Performed by: HOSPITALIST

## 2024-11-08 PROCEDURE — 99239 HOSP IP/OBS DSCHRG MGMT >30: CPT | Performed by: HOSPITALIST

## 2024-11-08 PROCEDURE — 93010 ELECTROCARDIOGRAM REPORT: CPT | Performed by: INTERNAL MEDICINE

## 2024-11-08 PROCEDURE — 83735 ASSAY OF MAGNESIUM: CPT

## 2024-11-08 PROCEDURE — 99255 IP/OBS CONSLTJ NEW/EST HI 80: CPT | Performed by: NAPRAPATH

## 2024-11-08 PROCEDURE — 700102 HCHG RX REV CODE 250 W/ 637 OVERRIDE(OP): Performed by: STUDENT IN AN ORGANIZED HEALTH CARE EDUCATION/TRAINING PROGRAM

## 2024-11-08 RX ORDER — ALBUTEROL SULFATE 5 MG/ML
5 SOLUTION RESPIRATORY (INHALATION) ONCE
Status: COMPLETED | OUTPATIENT
Start: 2024-11-08 | End: 2024-11-08

## 2024-11-08 RX ORDER — CEFDINIR 300 MG/1
300 CAPSULE ORAL 2 TIMES DAILY
Qty: 4 CAPSULE | Refills: 0 | Status: ON HOLD | OUTPATIENT
Start: 2024-11-08 | End: 2024-11-09

## 2024-11-08 RX ORDER — IPRATROPIUM BROMIDE AND ALBUTEROL SULFATE 2.5; .5 MG/3ML; MG/3ML
3 SOLUTION RESPIRATORY (INHALATION) ONCE
Status: COMPLETED | OUTPATIENT
Start: 2024-11-08 | End: 2024-11-08

## 2024-11-08 RX ORDER — CARVEDILOL 12.5 MG/1
12.5 TABLET ORAL 2 TIMES DAILY WITH MEALS
Qty: 60 TABLET | Refills: 2 | Status: SHIPPED | OUTPATIENT
Start: 2024-11-08

## 2024-11-08 RX ORDER — CEFDINIR 300 MG/1
300 CAPSULE ORAL ONCE
Status: COMPLETED | OUTPATIENT
Start: 2024-11-08 | End: 2024-11-08

## 2024-11-08 RX ORDER — METOPROLOL SUCCINATE 25 MG/1
25 TABLET, EXTENDED RELEASE ORAL DAILY
Status: ON HOLD | COMMUNITY
End: 2024-11-13

## 2024-11-08 RX ORDER — ZIPRASIDONE HYDROCHLORIDE 20 MG/1
20 CAPSULE ORAL 2 TIMES DAILY
Qty: 60 CAPSULE | Refills: 2 | Status: ON HOLD | OUTPATIENT
Start: 2024-11-08 | End: 2024-11-09

## 2024-11-08 RX ORDER — CARVEDILOL 12.5 MG/1
12.5 TABLET ORAL 2 TIMES DAILY WITH MEALS
Status: DISCONTINUED | OUTPATIENT
Start: 2024-11-08 | End: 2024-11-08 | Stop reason: HOSPADM

## 2024-11-08 RX ADMIN — METHYLPREDNISOLONE SODIUM SUCCINATE 40 MG: 40 INJECTION, POWDER, FOR SOLUTION INTRAMUSCULAR; INTRAVENOUS at 00:19

## 2024-11-08 RX ADMIN — CEFDINIR 300 MG: 300 CAPSULE ORAL at 22:36

## 2024-11-08 RX ADMIN — NYSTATIN: 100000 POWDER TOPICAL at 04:07

## 2024-11-08 RX ADMIN — METHYLPREDNISOLONE SODIUM SUCCINATE 40 MG: 40 INJECTION, POWDER, FOR SOLUTION INTRAMUSCULAR; INTRAVENOUS at 05:15

## 2024-11-08 RX ADMIN — IPRATROPIUM BROMIDE AND ALBUTEROL SULFATE 3 ML: 2.5; .5 SOLUTION RESPIRATORY (INHALATION) at 20:18

## 2024-11-08 RX ADMIN — LOSARTAN POTASSIUM 100 MG: 50 TABLET, FILM COATED ORAL at 05:14

## 2024-11-08 RX ADMIN — ZIPRASIDONE HYDROCHLORIDE 20 MG: 20 CAPSULE ORAL at 05:14

## 2024-11-08 RX ADMIN — ENOXAPARIN SODIUM 60 MG: 100 INJECTION SUBCUTANEOUS at 05:15

## 2024-11-08 RX ADMIN — ACETAMINOPHEN 650 MG: 325 TABLET ORAL at 06:06

## 2024-11-08 RX ADMIN — ASPIRIN 81 MG: 81 TABLET, COATED ORAL at 05:13

## 2024-11-08 RX ADMIN — ALBUTEROL SULFATE 5 MG: 2.5 SOLUTION RESPIRATORY (INHALATION) at 21:19

## 2024-11-08 RX ADMIN — CEFTRIAXONE SODIUM 2000 MG: 10 INJECTION, POWDER, FOR SOLUTION INTRAVENOUS at 05:15

## 2024-11-08 RX ADMIN — AMLODIPINE BESYLATE 10 MG: 10 TABLET ORAL at 05:14

## 2024-11-08 RX ADMIN — LEVOTHYROXINE SODIUM 88 MCG: 0.09 TABLET ORAL at 05:14

## 2024-11-08 RX ADMIN — METOPROLOL SUCCINATE 25 MG: 25 TABLET, EXTENDED RELEASE ORAL at 05:14

## 2024-11-08 RX ADMIN — FLUTICASONE PROPIONATE 220 MCG: 110 AEROSOL, METERED RESPIRATORY (INHALATION) at 05:13

## 2024-11-08 ASSESSMENT — PAIN DESCRIPTION - PAIN TYPE
TYPE: ACUTE PAIN

## 2024-11-08 ASSESSMENT — ENCOUNTER SYMPTOMS: COUGH: 1

## 2024-11-08 ASSESSMENT — FIBROSIS 4 INDEX
FIB4 SCORE: 1.45
FIB4 SCORE: 1.45

## 2024-11-08 NOTE — DISCHARGE PLANNING
1121  NOMI spoke with Andrea Ruiz to see if patient is on service/Pt is on service with [facility] for her C-Pap but not for O2/Notified CM

## 2024-11-08 NOTE — DISCHARGE PLANNING
Case Management Discharge Planning    Admission Date: 11/3/2024  GMLOS: 2.9  ALOS: 5    6-Clicks ADL Score: 14  6-Clicks Mobility Score: 8  PT and/or OT Eval ordered: Yes  Post-acute Referrals Ordered: Yes  Post-acute Choice Obtained: No  Has referral(s) been sent to post-acute provider:  No      Anticipated Discharge Dispo: Discharge Disposition: D/T to SNF with Medicare cert in anticipation of skilled care (03)    DME Needed: Yes    DME Ordered: Yes    Action(s) Taken: Choice obtained and Referral(s) sent    RN CM received order for HH and home oxygen.   Patient already on service with [facility] for CPAP. Referral sent to them.   Patient made choice for [facility].     1321-Called and followed up on oxygen referral. It should be here within the hour.    1511- Transport scheduled for 1600. Asked Jonathan with [facility] for and ETA from the supervisor review. He does not have one.     Escalations Completed: None    Medically Clear: Yes    Next Steps: Continue to follow for CM needs.     Barriers to Discharge: DME    Is the patient up for discharge tomorrow: No

## 2024-11-08 NOTE — DISCHARGE PLANNING
ATTN: Case Management  RE: Referral for Home Health    Reason for referral denial: Per RN Supervisor patient inappropriate with staff.              Unfortunately, we are not able to accept this referral for the reason listed above. If further clarity is needed, our Transitional Care Specialists are available to discuss any barriers to service at [phone].      We look forward to collaborating with you in the future,  [facility] Team

## 2024-11-08 NOTE — FACE TO FACE
"Face to Face Note  -  Durable Medical Equipment    Jagdeep Hope M.D. - NPI: [id number]  I certify that this patient is under my care and that they had a durable medical equipment(DME)face to face encounter by myself that meets the physician DME face-to-face encounter requirements with this patient on:    Date of encounter:   Patient:                    MRN:                       YOB: 2024  Cecile Teixeira  [id number]  1945     The encounter with the patient was in whole, or in part, for the following medical condition, which is the primary reason for durable medical equipment:  Other - PNA, YOLA, OHS    I certify that, based on my findings, the following durable medical equipment is medically necessary:    Oxygen   HOME O2 Saturation Measurements:(Values must be present for Home Oxygen orders)  Room air sat at rest: 94  Room air sat with amb: 84  With liters of O2: 3, O2 sat at rest with O2: 94  With Liters of O2: 3, O2 sat with amb with O2 : 89  Is the patient mobile?: Yes  If patient feels more short of breath, they can go up to 6 liters per minute and contact healthcare provider.    Supporting Symptoms: The patient requires supplemental oxygen, as the following interventions have been tried with limited or no improvement: \"Positive expiratory pressure therapies, \"Oral and/or IV steroids, \"Ambulation with oximetry, and \"Incentive spirometry.    My Clinical findings support the need for the above equipment due to:  Hypoxia  "

## 2024-11-08 NOTE — PROGRESS NOTES
Oxygen evaluation for 3 Liters oxygen-    Rest with 3 Liters oxygen: 94%    Rest without 3 Liters oxygen: 92%    Ambulation with 3 Liters oxygen: 89%    Ambulation without 3 Liters oxygen: 84%

## 2024-11-08 NOTE — DISCHARGE PLANNING
DC Transport Scheduled    Transport Company Scheduled:  [facility]  Spoke with Dora at [facility] to schedule transport.    Scheduled Date: 11/8/2024  Scheduled Time: 1600    Transport Type: Wheelchair- Large  Destination:   Home   Destination address: [address] [place] [id number]    Notified care team of scheduled transport via Voalte.     If there are any changes needed to the DC transportation scheduled, please contact [facility] at ext. [phone] between the hours of 2573-7055 Mon-Fri. If outside those hours, contact the ED Case Manager at ext. [phone].

## 2024-11-08 NOTE — CARE PLAN
The patient is Stable - Low risk of patient condition declining or worsening    Shift Goals  Clinical Goals: Safety, decrease aggitation, airway  Patient Goals: MIKE  Family Goals: MIKE    Progress made toward(s) clinical / shift goals:    Problem: Fall Risk  Goal: Patient will remain free from falls  Outcome: Progressing     Problem: Skin Integrity  Goal: Skin integrity is maintained or improved  Outcome: Progressing     Problem: Safety - Restraint  Goal: Remains free of injury from restraints   Outcome: Progressing       Patient is not progressing towards the following goals:      Problem: Safety - Restraint  Goal: Free from restraints   Outcome: Not Met   Pt still combative agitated and yelling, int. Precedex gtt when pt's can tolerate. Low stimulus environment encouraged. Pt refusing oral psych medication throughout shift despite education.

## 2024-11-08 NOTE — CONSULTS
Palliative care consult received, EMR reviewed.  Palliative care will see this patient tomorrow (11/8/2024).    Plan: Full consult to follow.   Thank you for allowing Palliative Care to support this patient and family. Contact [phone] for additional assistance, change in patient status, or with any questions/concerns.      Carin Hutson, RUT  Palliative Care  [phone]

## 2024-11-08 NOTE — CONSULTS
MRN: [id number]  Date of palliative consult: 2024  Reason for consult: Advance care planning  Referring provider: Dr. Giuliano Ferrari  Location of consult: [facility]  Additional consulting services: Critical care    HPI:   Cecile Teixeira is a 79 y.o. female with medical history significant for asthma, YOLA with home BiPAP, severe obesity, chronic wheelchair-bound status, type 2 diabetes, hypertension, DVT, CVA, mood disorder, left lower extremity cellulitis, bilateral hearing loss and mild cognitive impairment was admitted on 11/3/2024 for shortness of breath, cough and frequent urination.  Patient was placed on high flow oxygen for acute respiratory failure, then transferred to [facility] on 2024 for aggression and agitation requiring Precedex drip.  Patient has now been off Precedex since late last evening and is now A&O x 4, on her baseline 2 L nasal cannula oxygen and wanting to discharge to home with home health.    Additional Pertinent Medical History:    ROS:    Review of Systems   Constitutional:  Positive for malaise/fatigue.   Respiratory:  Positive for cough.         PE:   Recent vital signs  BMI: Body mass index is 61.18 kg/m².    Temp (24hrs), Av.4 °C (97.5 °F), Min:35.8 °C (96.5 °F), Max:36.9 °C (98.4 °F)  Temperature: 35.9 °C (96.7 °F)  Pulse  Av.5  Min: 52  Max: 123   Blood Pressure : (!) 151/66, NIBP: 133/75       Physical Exam  Constitutional:       Appearance: She is obese.   Cardiovascular:      Rate and Rhythm: Normal rate.      Pulses: Normal pulses.      Heart sounds: Normal heart sounds.   Pulmonary:      Breath sounds: Wheezing present.   Abdominal:      Palpations: Abdomen is soft.   Musculoskeletal:      Right lower leg: Edema present.      Left lower leg: Edema present.   Neurological:      Mental Status: She is alert and oriented to person, place, and time.   Psychiatric:         Mood and Affect: Mood normal.         Behavior: Behavior normal.         Thought Content: Thought  content normal.         Judgment: Judgment normal.          ASSESSMENT/PLAN WITH SHARED DECISION MAKING:   PHYSICAL ASPECTS OF CARE  Palliative Performance Scale: 50%    # Acute asthma exacerbation  -Patient treated with IV steroids, bronchodilators, empiric antibiotics  # Acute respiratory failure with hypoxia  -High flow oxygen, steroids and diuresis  # Delirium  -Patient has history of hospital delirium, mild cognitive impairment  -Patient transferred to [facility] for Precedex drip  # Severe obesity  # Obstructive sleep apnea  -On BiPAP at home  -Family to bring in BiPAP machine for use at hospital  # Type 2 diabetes  # Advance care planning    SOCIAL ASPECTS OF CARE  Patient lives alone but is visited at least 3 times a week by her daughter, her granddaughter, and her great-grandchildren.  She has been wheelchair-bound since age 60 when she became disabled after a CVA.  She has 2 electric wheelchairs and bars throughout the house to help her with independent transfers.  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  She has 2 adult children, her daughter Clarence Curran who lives nearby in [place] and her son Latasha Teixeira who lives in [place].     SPIRITUAL ASPECTS OF CARE   She is not spiritual nor Worship.    GOALS OF CARE/SERIOUS ILLNESS CONVERSATION  Introduced myself to Cecile. Discussed role of palliative care and reason for consult.  Cecile agreeable to discuss goals of care.  Cecile understands that she has multiple chronic conditions along with her wheelchair bound status and this puts her at significant risk of precipitous decline in her condition at any time.  Discussed that in her situation, if her health was to decline enough to where her heart stopped or she stopped breathing, the burden of resuscitation attempts may outweigh the benefit and that it could be difficult to get her back off of a breathing machine and get her back to her current quality of life.  Patient felt strongly  that if her heart was to stop or if she stopped breathing she would want everything done to keep her alive, including resuscitation and intubation.  Reaffirmed that we currently have her as a full CODE STATUS and would respect her wishes in regards to this status.  Asked patient about advanced directive where she may have named a specific surrogate decision maker if she was unable to make decisions herself.  Patient feels that she does have this paperwork on file somewhere.  Encourage patient to try to submit her advance directive to [facility] so we can include in her chart.  Discussed that without any paperwork, her healthcare team would defer to her next of kin for medical decision making and in this case it would be her son and her daughter.  Cecile states she is okay with having her son and daughter be her surrogate decision-maker.  Provided palliative care contact information and encouraged Cecile to reach out with any questions/needs.     Code Status: Full code    ACP Documents: None on file at [facility], patient states she may have advanced directive on file elsewhere.    Communicated regarding this patient with Dr. Hope and bedside RN Virgil.    20 minutes spent discussing advance care planning, this time excludes any other billed services.    Interval diagnostic studies and medical documentation entries pertinent to this case were reviewed independently by me. This patient has at least one acute or chronic illness or injury that poses a threat to life or bodily function. This patient suffers from a high risk of morbidity from additional invasive diagnostic testing or intensive treatment. Discussion of recommendations and coordination of care undertaken with primary provider/treatment team.      Carin Hutson, APRN  Palliative Care  [phone]

## 2024-11-08 NOTE — DISCHARGE PLANNING
GMT informed me they had to move transport to 1700 due to an availability error on their end. Care team notified.   [facility]

## 2024-11-09 ENCOUNTER — PHARMACY VISIT (OUTPATIENT)
Dept: PHARMACY | Facility: MEDICAL CENTER | Age: 79
End: 2024-11-09
Payer: COMMERCIAL

## 2024-11-09 ENCOUNTER — APPOINTMENT (OUTPATIENT)
Dept: RADIOLOGY | Facility: MEDICAL CENTER | Age: 79
DRG: 202 | End: 2024-11-09
Attending: STUDENT IN AN ORGANIZED HEALTH CARE EDUCATION/TRAINING PROGRAM
Payer: MEDICARE

## 2024-11-09 VITALS
DIASTOLIC BLOOD PRESSURE: 69 MMHG | RESPIRATION RATE: 18 BRPM | SYSTOLIC BLOOD PRESSURE: 144 MMHG | HEART RATE: 89 BPM | HEIGHT: 60 IN | OXYGEN SATURATION: 93 % | WEIGHT: 293 LBS | TEMPERATURE: 96.8 F | BODY MASS INDEX: 57.52 KG/M2

## 2024-11-09 PROBLEM — R41.0 DELIRIUM: Status: RESOLVED | Noted: 2024-11-07 | Resolved: 2024-11-09

## 2024-11-09 PROBLEM — I50.32 CHRONIC HEART FAILURE WITH PRESERVED EJECTION FRACTION (HCC): Status: ACTIVE | Noted: 2024-11-09

## 2024-11-09 PROBLEM — R41.0 DELIRIUM: Status: ACTIVE | Noted: 2024-11-07

## 2024-11-09 LAB
FLUAV RNA SPEC QL NAA+PROBE: NEGATIVE
FLUBV RNA SPEC QL NAA+PROBE: NEGATIVE
GLUCOSE BLD STRIP.AUTO-MCNC: 100 MG/DL (ref 65–99)
GLUCOSE BLD STRIP.AUTO-MCNC: 135 MG/DL (ref 65–99)
GLUCOSE BLD STRIP.AUTO-MCNC: 172 MG/DL (ref 65–99)
NT-PROBNP SERPL IA-MCNC: 265 PG/ML (ref 0–125)
PROCALCITONIN SERPL-MCNC: 0.07 NG/ML
RSV RNA SPEC QL NAA+PROBE: NEGATIVE
SARS-COV-2 RNA RESP QL NAA+PROBE: NOTDETECTED
SPECIMEN SOURCE: NORMAL

## 2024-11-09 PROCEDURE — 700111 HCHG RX REV CODE 636 W/ 250 OVERRIDE (IP): Mod: JZ | Performed by: HOSPITALIST

## 2024-11-09 PROCEDURE — 700105 HCHG RX REV CODE 258: Performed by: HOSPITALIST

## 2024-11-09 PROCEDURE — 700102 HCHG RX REV CODE 250 W/ 637 OVERRIDE(OP): Performed by: HOSPITALIST

## 2024-11-09 PROCEDURE — 3E02340 INTRODUCTION OF INFLUENZA VACCINE INTO MUSCLE, PERCUTANEOUS APPROACH: ICD-10-PCS | Performed by: HOSPITALIST

## 2024-11-09 PROCEDURE — 94760 N-INVAS EAR/PLS OXIMETRY 1: CPT

## 2024-11-09 PROCEDURE — 99223 1ST HOSP IP/OBS HIGH 75: CPT | Mod: AI | Performed by: HOSPITALIST

## 2024-11-09 PROCEDURE — 84145 PROCALCITONIN (PCT): CPT

## 2024-11-09 PROCEDURE — A9270 NON-COVERED ITEM OR SERVICE: HCPCS | Performed by: STUDENT IN AN ORGANIZED HEALTH CARE EDUCATION/TRAINING PROGRAM

## 2024-11-09 PROCEDURE — RXMED WILLOW AMBULATORY MEDICATION CHARGE: Performed by: STUDENT IN AN ORGANIZED HEALTH CARE EDUCATION/TRAINING PROGRAM

## 2024-11-09 PROCEDURE — A9270 NON-COVERED ITEM OR SERVICE: HCPCS | Performed by: HOSPITALIST

## 2024-11-09 PROCEDURE — 90662 IIV NO PRSV INCREASED AG IM: CPT | Performed by: HOSPITALIST

## 2024-11-09 PROCEDURE — 0241U HCHG SARS-COV-2 COVID-19 NFCT DS RESP RNA 4 TRGT MIC: CPT

## 2024-11-09 PROCEDURE — 94640 AIRWAY INHALATION TREATMENT: CPT

## 2024-11-09 PROCEDURE — 94660 CPAP INITIATION&MGMT: CPT

## 2024-11-09 PROCEDURE — 700111 HCHG RX REV CODE 636 W/ 250 OVERRIDE (IP): Performed by: STUDENT IN AN ORGANIZED HEALTH CARE EDUCATION/TRAINING PROGRAM

## 2024-11-09 PROCEDURE — 82962 GLUCOSE BLOOD TEST: CPT

## 2024-11-09 PROCEDURE — 700102 HCHG RX REV CODE 250 W/ 637 OVERRIDE(OP): Performed by: STUDENT IN AN ORGANIZED HEALTH CARE EDUCATION/TRAINING PROGRAM

## 2024-11-09 PROCEDURE — 36415 COLL VENOUS BLD VENIPUNCTURE: CPT

## 2024-11-09 PROCEDURE — 90471 IMMUNIZATION ADMIN: CPT

## 2024-11-09 PROCEDURE — 82962 GLUCOSE BLOOD TEST: CPT | Mod: 91

## 2024-11-09 PROCEDURE — 83880 ASSAY OF NATRIURETIC PEPTIDE: CPT

## 2024-11-09 PROCEDURE — 700101 HCHG RX REV CODE 250: Performed by: HOSPITALIST

## 2024-11-09 RX ORDER — ONDANSETRON 2 MG/ML
4 INJECTION INTRAMUSCULAR; INTRAVENOUS EVERY 4 HOURS PRN
Status: DISCONTINUED | OUTPATIENT
Start: 2024-11-09 | End: 2024-11-10 | Stop reason: HOSPADM

## 2024-11-09 RX ORDER — PROMETHAZINE HYDROCHLORIDE 25 MG/1
50 SUPPOSITORY RECTAL EVERY 6 HOURS PRN
Status: DISCONTINUED | OUTPATIENT
Start: 2024-11-09 | End: 2024-11-10 | Stop reason: HOSPADM

## 2024-11-09 RX ORDER — DEXTROSE MONOHYDRATE 25 G/50ML
25 INJECTION, SOLUTION INTRAVENOUS
Status: DISCONTINUED | OUTPATIENT
Start: 2024-11-09 | End: 2024-11-10 | Stop reason: HOSPADM

## 2024-11-09 RX ORDER — AMLODIPINE BESYLATE 10 MG/1
10 TABLET ORAL DAILY
Status: DISCONTINUED | OUTPATIENT
Start: 2024-11-09 | End: 2024-11-10 | Stop reason: HOSPADM

## 2024-11-09 RX ORDER — GUAIFENESIN 600 MG/1
600 TABLET, EXTENDED RELEASE ORAL EVERY 12 HOURS
Status: DISCONTINUED | OUTPATIENT
Start: 2024-11-09 | End: 2024-11-10 | Stop reason: HOSPADM

## 2024-11-09 RX ORDER — ENOXAPARIN SODIUM 100 MG/ML
40 INJECTION SUBCUTANEOUS EVERY 12 HOURS
Status: DISCONTINUED | OUTPATIENT
Start: 2024-11-09 | End: 2024-11-09

## 2024-11-09 RX ORDER — ENOXAPARIN SODIUM 100 MG/ML
60 INJECTION SUBCUTANEOUS EVERY 12 HOURS
Status: DISCONTINUED | OUTPATIENT
Start: 2024-11-09 | End: 2024-11-10 | Stop reason: HOSPADM

## 2024-11-09 RX ORDER — PROCHLORPERAZINE MALEATE 10 MG
10 TABLET ORAL EVERY 6 HOURS PRN
Qty: 30 TABLET | Refills: 0 | Status: ON HOLD | OUTPATIENT
Start: 2024-11-09 | End: 2024-11-13

## 2024-11-09 RX ORDER — IPRATROPIUM BROMIDE AND ALBUTEROL SULFATE 2.5; .5 MG/3ML; MG/3ML
3 SOLUTION RESPIRATORY (INHALATION) ONCE
Status: DISCONTINUED | OUTPATIENT
Start: 2024-11-09 | End: 2024-11-09

## 2024-11-09 RX ORDER — ACETAMINOPHEN 325 MG/1
650 TABLET ORAL EVERY 6 HOURS PRN
Status: DISCONTINUED | OUTPATIENT
Start: 2024-11-09 | End: 2024-11-10 | Stop reason: HOSPADM

## 2024-11-09 RX ORDER — HYDRALAZINE HYDROCHLORIDE 20 MG/ML
10 INJECTION INTRAMUSCULAR; INTRAVENOUS EVERY 4 HOURS PRN
Status: DISCONTINUED | OUTPATIENT
Start: 2024-11-09 | End: 2024-11-09

## 2024-11-09 RX ORDER — CARVEDILOL 12.5 MG/1
12.5 TABLET ORAL 2 TIMES DAILY WITH MEALS
Status: DISCONTINUED | OUTPATIENT
Start: 2024-11-09 | End: 2024-11-10 | Stop reason: HOSPADM

## 2024-11-09 RX ORDER — PROCHLORPERAZINE MALEATE 10 MG
10 TABLET ORAL EVERY 6 HOURS PRN
Status: DISCONTINUED | OUTPATIENT
Start: 2024-11-09 | End: 2024-11-10 | Stop reason: HOSPADM

## 2024-11-09 RX ORDER — INSULIN LISPRO 100 [IU]/ML
2-9 INJECTION, SOLUTION INTRAVENOUS; SUBCUTANEOUS
Status: DISCONTINUED | OUTPATIENT
Start: 2024-11-09 | End: 2024-11-10 | Stop reason: HOSPADM

## 2024-11-09 RX ORDER — ASPIRIN 81 MG/1
81 TABLET ORAL DAILY
Status: DISCONTINUED | OUTPATIENT
Start: 2024-11-09 | End: 2024-11-10 | Stop reason: HOSPADM

## 2024-11-09 RX ORDER — GUAIFENESIN/DEXTROMETHORPHAN 100-10MG/5
10 SYRUP ORAL EVERY 6 HOURS PRN
Status: DISCONTINUED | OUTPATIENT
Start: 2024-11-09 | End: 2024-11-10 | Stop reason: HOSPADM

## 2024-11-09 RX ORDER — IPRATROPIUM BROMIDE AND ALBUTEROL SULFATE 2.5; .5 MG/3ML; MG/3ML
3 SOLUTION RESPIRATORY (INHALATION)
Status: DISCONTINUED | OUTPATIENT
Start: 2024-11-09 | End: 2024-11-10 | Stop reason: HOSPADM

## 2024-11-09 RX ORDER — ONDANSETRON 2 MG/ML
4 INJECTION INTRAMUSCULAR; INTRAVENOUS EVERY 4 HOURS PRN
Status: DISCONTINUED | OUTPATIENT
Start: 2024-11-09 | End: 2024-11-09

## 2024-11-09 RX ORDER — METHYLPREDNISOLONE SODIUM SUCCINATE 40 MG/ML
40 INJECTION, POWDER, LYOPHILIZED, FOR SOLUTION INTRAMUSCULAR; INTRAVENOUS EVERY 12 HOURS
Status: DISCONTINUED | OUTPATIENT
Start: 2024-11-09 | End: 2024-11-09

## 2024-11-09 RX ORDER — PROCHLORPERAZINE EDISYLATE 5 MG/ML
10 INJECTION INTRAMUSCULAR; INTRAVENOUS EVERY 6 HOURS PRN
Status: DISCONTINUED | OUTPATIENT
Start: 2024-11-09 | End: 2024-11-10 | Stop reason: HOSPADM

## 2024-11-09 RX ORDER — LEVOTHYROXINE SODIUM 88 UG/1
88 TABLET ORAL
Status: DISCONTINUED | OUTPATIENT
Start: 2024-11-09 | End: 2024-11-10 | Stop reason: HOSPADM

## 2024-11-09 RX ORDER — ONDANSETRON 4 MG/1
4 TABLET, ORALLY DISINTEGRATING ORAL EVERY 4 HOURS PRN
Qty: 30 TABLET | Refills: 0 | Status: SHIPPED | OUTPATIENT
Start: 2024-11-09 | End: 2024-11-15

## 2024-11-09 RX ORDER — ACETAMINOPHEN 500 MG
500 TABLET ORAL EVERY 6 HOURS PRN
Status: ON HOLD | COMMUNITY
Start: 2024-11-09 | End: 2024-11-18

## 2024-11-09 RX ORDER — LOSARTAN POTASSIUM 50 MG/1
100 TABLET ORAL DAILY
Status: DISCONTINUED | OUTPATIENT
Start: 2024-11-09 | End: 2024-11-10 | Stop reason: HOSPADM

## 2024-11-09 RX ORDER — ONDANSETRON 4 MG/1
4 TABLET, ORALLY DISINTEGRATING ORAL EVERY 4 HOURS PRN
Status: DISCONTINUED | OUTPATIENT
Start: 2024-11-09 | End: 2024-11-10 | Stop reason: HOSPADM

## 2024-11-09 RX ORDER — GUAIFENESIN/DEXTROMETHORPHAN 100-10MG/5
10 SYRUP ORAL EVERY 6 HOURS PRN
Status: ON HOLD | COMMUNITY
Start: 2024-11-09 | End: 2024-11-13

## 2024-11-09 RX ORDER — ZIPRASIDONE HYDROCHLORIDE 20 MG/1
20 CAPSULE ORAL 2 TIMES DAILY
Status: DISCONTINUED | OUTPATIENT
Start: 2024-11-09 | End: 2024-11-09

## 2024-11-09 RX ADMIN — OMEPRAZOLE 20 MG: 20 CAPSULE, DELAYED RELEASE ORAL at 13:57

## 2024-11-09 RX ADMIN — IPRATROPIUM BROMIDE AND ALBUTEROL SULFATE 3 ML: .5; 2.5 SOLUTION RESPIRATORY (INHALATION) at 07:43

## 2024-11-09 RX ADMIN — AMPICILLIN AND SULBACTAM 3 G: 1; 2 INJECTION, POWDER, FOR SOLUTION INTRAMUSCULAR; INTRAVENOUS at 01:46

## 2024-11-09 RX ADMIN — CARVEDILOL 12.5 MG: 12.5 TABLET, FILM COATED ORAL at 09:13

## 2024-11-09 RX ADMIN — ENOXAPARIN SODIUM 60 MG: 100 INJECTION SUBCUTANEOUS at 10:11

## 2024-11-09 RX ADMIN — CARVEDILOL 12.5 MG: 12.5 TABLET, FILM COATED ORAL at 18:45

## 2024-11-09 RX ADMIN — AMPICILLIN AND SULBACTAM 3 G: 1; 2 INJECTION, POWDER, FOR SOLUTION INTRAMUSCULAR; INTRAVENOUS at 09:25

## 2024-11-09 RX ADMIN — ASPIRIN 81 MG: 81 TABLET, COATED ORAL at 10:09

## 2024-11-09 RX ADMIN — AMLODIPINE BESYLATE 10 MG: 10 TABLET ORAL at 09:27

## 2024-11-09 RX ADMIN — LEVOTHYROXINE SODIUM 88 MCG: 0.09 TABLET ORAL at 12:21

## 2024-11-09 RX ADMIN — GUAIFENESIN 600 MG: 600 TABLET, EXTENDED RELEASE ORAL at 18:45

## 2024-11-09 RX ADMIN — METHYLPREDNISOLONE SODIUM SUCCINATE 40 MG: 40 INJECTION, POWDER, FOR SOLUTION INTRAMUSCULAR; INTRAVENOUS at 09:16

## 2024-11-09 RX ADMIN — ONDANSETRON 4 MG: 4 TABLET, ORALLY DISINTEGRATING ORAL at 09:13

## 2024-11-09 RX ADMIN — GUAIFENESIN 600 MG: 600 TABLET, EXTENDED RELEASE ORAL at 10:08

## 2024-11-09 RX ADMIN — LOSARTAN POTASSIUM 100 MG: 50 TABLET, FILM COATED ORAL at 09:13

## 2024-11-09 RX ADMIN — IPRATROPIUM BROMIDE AND ALBUTEROL SULFATE 3 ML: .5; 2.5 SOLUTION RESPIRATORY (INHALATION) at 02:45

## 2024-11-09 RX ADMIN — INFLUENZA A VIRUS A/VICTORIA/4897/2022 IVR-238 (H1N1) ANTIGEN (FORMALDEHYDE INACTIVATED), INFLUENZA A VIRUS A/CALIFORNIA/122/2022 SAN-022 (H3N2) ANTIGEN (FORMALDEHYDE INACTIVATED), AND INFLUENZA B VIRUS B/MICHIGAN/01/2021 ANTIGEN (FORMALDEHYDE INACTIVATED) 0.5 ML: 60; 60; 60 INJECTION, SUSPENSION INTRAMUSCULAR at 01:34

## 2024-11-09 ASSESSMENT — ENCOUNTER SYMPTOMS
DEPRESSION: 0
SPUTUM PRODUCTION: 0
TREMORS: 0
CONSTIPATION: 0
HEMOPTYSIS: 0
POLYDIPSIA: 0
DOUBLE VISION: 0
PALPITATIONS: 0
ORTHOPNEA: 0
HALLUCINATIONS: 0
ABDOMINAL PAIN: 0
BLOOD IN STOOL: 0
CLAUDICATION: 0
DIARRHEA: 0
COUGH: 1
MYALGIAS: 0
WEAKNESS: 0
HEARTBURN: 0
FLANK PAIN: 0
NAUSEA: 0
FALLS: 0
BRUISES/BLEEDS EASILY: 0
HEADACHES: 0
FEVER: 0
DIZZINESS: 0
PHOTOPHOBIA: 0
VOMITING: 0
EYE PAIN: 0
TINGLING: 0
BLURRED VISION: 0
SINUS PAIN: 0
SORE THROAT: 0
DIAPHORESIS: 0
NECK PAIN: 0
WHEEZING: 1
SHORTNESS OF BREATH: 1
BACK PAIN: 0
CHILLS: 0
PND: 0
STRIDOR: 0

## 2024-11-09 ASSESSMENT — COGNITIVE AND FUNCTIONAL STATUS - GENERAL
DAILY ACTIVITIY SCORE: 15
DRESSING REGULAR UPPER BODY CLOTHING: A LITTLE
WALKING IN HOSPITAL ROOM: TOTAL
MOVING TO AND FROM BED TO CHAIR: A LOT
PERSONAL GROOMING: A LOT
MOVING FROM LYING ON BACK TO SITTING ON SIDE OF FLAT BED: A LOT
STANDING UP FROM CHAIR USING ARMS: A LOT
SUGGESTED CMS G CODE MODIFIER DAILY ACTIVITY: CK
HELP NEEDED FOR BATHING: A LOT
TURNING FROM BACK TO SIDE WHILE IN FLAT BAD: A LOT
SUGGESTED CMS G CODE MODIFIER MOBILITY: CL
DRESSING REGULAR LOWER BODY CLOTHING: A LITTLE
CLIMB 3 TO 5 STEPS WITH RAILING: TOTAL
EATING MEALS: A LITTLE
TOILETING: A LOT
MOBILITY SCORE: 10

## 2024-11-09 ASSESSMENT — COPD QUESTIONNAIRES
DO YOU EVER COUGH UP ANY MUCUS OR PHLEGM?: YES, A FEW DAYS A WEEK OR MONTH
HAVE YOU SMOKED AT LEAST 100 CIGARETTES IN YOUR ENTIRE LIFE: YES
COPD SCREENING SCORE: 7
DURING THE PAST 4 WEEKS HOW MUCH DID YOU FEEL SHORT OF BREATH: SOME OF THE TIME

## 2024-11-09 ASSESSMENT — LIFESTYLE VARIABLES
HAVE PEOPLE ANNOYED YOU BY CRITICIZING YOUR DRINKING: NO
DOES PATIENT WANT TO STOP DRINKING: NO
TOTAL SCORE: 0
TOTAL SCORE: 0
CONSUMPTION TOTAL: NEGATIVE
ALCOHOL_USE: YES
ON A TYPICAL DAY WHEN YOU DRINK ALCOHOL HOW MANY DRINKS DO YOU HAVE: 0
AVERAGE NUMBER OF DAYS PER WEEK YOU HAVE A DRINK CONTAINING ALCOHOL: 0
EVER HAD A DRINK FIRST THING IN THE MORNING TO STEADY YOUR NERVES TO GET RID OF A HANGOVER: NO
HOW MANY TIMES IN THE PAST YEAR HAVE YOU HAD 5 OR MORE DRINKS IN A DAY: 0
EVER FELT BAD OR GUILTY ABOUT YOUR DRINKING: NO
SUBSTANCE_ABUSE: 0
TOTAL SCORE: 0
HAVE YOU EVER FELT YOU SHOULD CUT DOWN ON YOUR DRINKING: NO

## 2024-11-09 ASSESSMENT — PAIN DESCRIPTION - PAIN TYPE
TYPE: ACUTE PAIN
TYPE: ACUTE PAIN

## 2024-11-09 ASSESSMENT — PATIENT HEALTH QUESTIONNAIRE - PHQ9
1. LITTLE INTEREST OR PLEASURE IN DOING THINGS: NOT AT ALL
SUM OF ALL RESPONSES TO PHQ9 QUESTIONS 1 AND 2: 0
2. FEELING DOWN, DEPRESSED, IRRITABLE, OR HOPELESS: NOT AT ALL

## 2024-11-09 ASSESSMENT — SOCIAL DETERMINANTS OF HEALTH (SDOH)
WITHIN THE LAST YEAR, HAVE YOU BEEN AFRAID OF YOUR PARTNER OR EX-PARTNER?: NO
WITHIN THE LAST YEAR, HAVE YOU BEEN HUMILIATED OR EMOTIONALLY ABUSED IN OTHER WAYS BY YOUR PARTNER OR EX-PARTNER?: NO
IN THE PAST 12 MONTHS, HAS THE ELECTRIC, GAS, OIL, OR WATER COMPANY THREATENED TO SHUT OFF SERVICE IN YOUR HOME?: NO
WITHIN THE PAST 12 MONTHS, YOU WORRIED THAT YOUR FOOD WOULD RUN OUT BEFORE YOU GOT THE MONEY TO BUY MORE: NEVER TRUE
WITHIN THE PAST 12 MONTHS, THE FOOD YOU BOUGHT JUST DIDN'T LAST AND YOU DIDN'T HAVE MONEY TO GET MORE: NEVER TRUE
WITHIN THE LAST YEAR, HAVE YOU BEEN KICKED, HIT, SLAPPED, OR OTHERWISE PHYSICALLY HURT BY YOUR PARTNER OR EX-PARTNER?: NO
WITHIN THE LAST YEAR, HAVE TO BEEN RAPED OR FORCED TO HAVE ANY KIND OF SEXUAL ACTIVITY BY YOUR PARTNER OR EX-PARTNER?: NO

## 2024-11-09 ASSESSMENT — FIBROSIS 4 INDEX: FIB4 SCORE: 1.09

## 2024-11-09 NOTE — FACE TO FACE
"Face to Face Note  -  Durable Medical Equipment    Wood Simpson M.D. - NPI: [id number]  I certify that this patient is under my care and that they had a durable medical equipment(DME)face to face encounter by myself that meets the physician DME face-to-face encounter requirements with this patient on:    Date of encounter:   Patient:                    MRN:                       YOB: 2024  Cecile Teixeira  [id number]  1945     The encounter with the patient was in whole, or in part, for the following medical condition, which is the primary reason for durable medical equipment:  CHF, COPD, and Asthma    I certify that, based on my findings, the following durable medical equipment is medically necessary:    Oxygen   HOME O2 Saturation Measurements:(Values must be present for Home Oxygen orders)  Room air sat at rest: 83     With liters of O2: 3, O2 sat at rest with O2: 92   ,    Is the patient mobile?: No  If patient feels more short of breath, they can go up to 6 liters per minute and contact healthcare provider.    Supporting Symptoms: The patient requires supplemental oxygen, as the following interventions have been tried with limited or no improvement: \"Bronchodilators and/or steroid inhalers, \"Positive expiratory pressure therapies, \"Oral and/or IV steroids, and \"Incentive spirometry.    My Clinical findings support the need for the above equipment due to:  Hypoxia  "

## 2024-11-09 NOTE — ASSESSMENT & PLAN NOTE
Bilateral infiltrates  Check sputum cultures and blood cultures  Follow-up on procalcitonin  Start IV Unasyn

## 2024-11-09 NOTE — ED NOTES
Pt resting on stretcher SVN being administered. Pt connected to BS monitor, Call light in reach. Awaiting orders

## 2024-11-09 NOTE — H&P
Hospital Medicine History & Physical Note    Date of Service  11/9/2024    Primary Care Physician  GERBER Sood    Consultants      Specialist Names:     Code Status  Full Code    Chief Complaint  Chief Complaint   Patient presents with    Shortness of Breath       History of Presenting Illness  Cecile Teixeira is a 79 y.o. female who presented 11/8/2024 with past medical history of asthma, hypothyroidism, CVA, obesity, lymphedema, DVT who presents to the hospital for shortness of breath.  Patient was discharged from the hospital yesterday.  She was admitted in the ICU for asthma exacerbation.  The patient was discharged with oxygen, cefnidir, Coreg.  When the patient got home the oxygen tank cord was not long enough to use.  She became progressively short of breath and had productive cough.  She denies any fever, chills, nausea, vomiting, diarrhea, dysuria.  The patient was unable to fill her cefdinir.     CXR interpreted by me found bilateral infiltrate   EKG interpreted by me found normal sinus rhythm without ST segment changes    I discussed the plan of care with patient.    Review of Systems  Review of Systems   Constitutional:  Negative for chills, diaphoresis, fever and malaise/fatigue.   HENT:  Negative for congestion, ear discharge, ear pain, hearing loss, nosebleeds, sinus pain, sore throat and tinnitus.    Eyes:  Negative for blurred vision, double vision, photophobia and pain.   Respiratory:  Positive for cough, shortness of breath and wheezing. Negative for hemoptysis, sputum production and stridor.    Cardiovascular:  Negative for chest pain, palpitations, orthopnea, claudication, leg swelling and PND.   Gastrointestinal:  Negative for abdominal pain, blood in stool, constipation, diarrhea, heartburn, melena, nausea and vomiting.   Genitourinary:  Negative for dysuria, flank pain, frequency, hematuria and urgency.   Musculoskeletal:  Negative for back pain, falls, joint pain, myalgias and neck  pain.   Skin:  Negative for itching and rash.   Neurological:  Negative for dizziness, tingling, tremors, weakness and headaches.   Endo/Heme/Allergies:  Negative for environmental allergies and polydipsia. Does not bruise/bleed easily.   Psychiatric/Behavioral:  Negative for depression, hallucinations, substance abuse and suicidal ideas.        Past Medical History   has a past medical history of Asthma, Blood clots in brain (2000), Chest pain, Cough, GERD (gastroesophageal reflux disease), Hypothyroidism, Osteoarthritis, Painful breathing, Pleurodynia (07/20/2024), Shortness of breath, Sleep apnea, Sputum production, and Wheezing.    Surgical History   has a past surgical history that includes other (Left, [id number]).     Family History  Family history reviewed with patient. There is no family history that is pertinent to the chief complaint.     Social History   reports that she has quit smoking. Her smoking use included cigarettes. She has never used smokeless tobacco. She reports that she does not currently use alcohol. She reports that she does not use drugs.    Allergies  Allergies   Allergen Reactions    Doxycycline Hives    Prednisone Unspecified     Diarrhea and very irritable     Cephalexin Diarrhea     Diarrhea and mild rash     Montelukast Rash     Leg pain, back pain and rash     Spinach Unspecified     Skin allergy test    Cow's Milk [Lac Bovis]     Other Environmental      Trees except oak    Codeine Unspecified     Headaches and confusion     Lactose Unspecified     Lactose intolerance since a child     Levothyroxine Anxiety     Panic attack     Cannot take generic    Sulfa Drugs Unspecified     Mental status change        Medications  Prior to Admission Medications   Prescriptions Last Dose Informant Patient Reported? Taking?   Multiple Vitamins-Minerals (CENTRUM ADULT 50+ MULTIGUMMIES PO) 10/31/2024 Patient, Historical Yes No   Sig: Take 1 Tablet by mouth every day. Indications: supplement    Nystatin Powder 11/2/2024 Patient, Historical No No   Sig: Apply 1 Application topically 2 times a day as needed (skin breakdown).   albuterol 108 (90 Base) MCG/ACT Aero Soln inhalation aerosol 11/3/2024 Morning Patient, Historical No No   Sig: Inhale 2 Puffs every 6 hours as needed for Shortness of Breath. Indications: Asthma   amLODIPine (NORVASC) 10 MG Tab 10/31/2024 Patient, Historical No No   Sig: Take 1 Tablet by mouth every day. Indications: High Blood Pressure   aspirin 81 MG EC tablet 10/31/2024 Patient, Historical No No   Sig: Take 1 Tablet by mouth every day.   benzonatate (TESSALON) 100 MG Cap 10/31/2024 Patient, Historical No No   Sig: Take 1 Capsule by mouth 3 times a day as needed for Cough.   carvedilol (COREG) 12.5 MG Tab New Rx Patient No No   Sig: Take 1 Tablet by mouth 2 times a day with meals.   cefdinir (OMNICEF) 300 MG Cap New Rx Patient No No   Sig: Take 1 Capsule by mouth 2 times a day for 2 days.   cetirizine (ZYRTEC) 10 MG Tab 10/31/2024 Patient, Historical No No   Sig: Take 1 Tablet by mouth at bedtime. FOR ALLERGIES   fluticasone (FLONASE) 50 MCG/ACT nasal spray 10/31/2024 Patient, Historical No No   Sig: ADMINISTER 1 SPRAY INTO AFFECTED NOSTRIL(S) EVERY DAY.   fluticasone (FLOVENT HFA) 110 MCG/ACT Aerosol 11/2/2024 Evening Patient, Historical No No   Sig: Inhale 2 Puffs 2 times a day. Indications: Asthma   levothyroxine (SYNTHROID) 88 MCG Tab 10/31/2024 Morning Patient, Historical No No   Sig: Take 1 Tablet by mouth every morning on an empty stomach.   losartan (COZAAR) 100 MG Tab 10/31/2024 Patient, Historical No No   Sig: Take 1 Tablet by mouth every day.   metoprolol SR (TOPROL XL) 25 MG TABLET SR 24 HR 10/31/2024 Patient, Historical Yes Yes   Sig: Take 25 mg by mouth every day.   ziprasidone (GEODON) 20 MG Cap New Rx Patient No No   Sig: Take 1 Capsule by mouth 2 times a day.      Facility-Administered Medications: None       Physical Exam  Temp:  [36.3 °C (97.4 °F)-36.6 °C (97.9  °F)] 36.4 °C (97.5 °F)  Pulse:  [] 119  Resp:  [15-34] 24  BP: (126-177)/(58-78) 137/65  SpO2:  [92 %-100 %] 92 %  Blood Pressure : 137/65   Temperature: 36.4 °C (97.5 °F)   Pulse: (!) 119   Respiration: (!) 24   Pulse Oximetry: 92 %       Physical Exam  Vitals and nursing note reviewed.   Constitutional:       General: She is not in acute distress.     Appearance: Normal appearance. She is not ill-appearing, toxic-appearing or diaphoretic.   HENT:      Head: Normocephalic and atraumatic.      Nose: No congestion or rhinorrhea.      Mouth/Throat:      Pharynx: No oropharyngeal exudate or posterior oropharyngeal erythema.   Eyes:      General: No scleral icterus.  Neck:      Vascular: No carotid bruit or JVD.   Cardiovascular:      Rate and Rhythm: Normal rate and regular rhythm.      Pulses: Normal pulses.      Heart sounds: Normal heart sounds. No murmur heard.     No friction rub. No gallop.   Pulmonary:      Effort: Pulmonary effort is normal. No respiratory distress.      Breath sounds: No stridor. Wheezing and rhonchi present. No rales.   Abdominal:      General: Abdomen is flat. There is no distension.      Palpations: There is no mass.      Tenderness: There is no abdominal tenderness. There is no left CVA tenderness, guarding or rebound.      Hernia: No hernia is present.   Musculoskeletal:         General: No swelling. Normal range of motion.      Cervical back: No rigidity. No muscular tenderness.      Right lower leg: Edema present.      Left lower leg: Edema present.   Lymphadenopathy:      Cervical: No cervical adenopathy.   Skin:     General: Skin is warm and dry.      Capillary Refill: Capillary refill takes more than 3 seconds.      Coloration: Skin is not jaundiced or pale.      Findings: No bruising or erythema.   Neurological:      Mental Status: She is alert.         Laboratory:  Recent Labs     11/07/24  0758 11/08/24  0055 11/08/24 2049   WBC 11.4* 6.9 15.5*   RBC 4.41 3.71* 4.56  "  HEMOGLOBIN 13.3 11.5* 13.9   HEMATOCRIT 41.6 36.2* 43.4   MCV 94.3 97.6 95.2   MCH 30.2 31.0 30.5   MCHC 32.0* 31.8* 32.0*   RDW 51.7* 52.3* 49.6   PLATELETCT 360 254 392   MPV 9.2 9.4 9.7     Recent Labs     11/07/24  0758 11/08/24  0055 11/08/24 2049   SODIUM 138 142 141   POTASSIUM 5.0 4.7 4.3   CHLORIDE 102 106 103   CO2 24 26 25   GLUCOSE 202* 155* 221*   BUN 65* 56* 69*   CREATININE 1.22 0.84 1.13   CALCIUM 9.4 8.9 9.7     Recent Labs     11/07/24  0758 11/08/24  0055 11/08/24 2049   ALTSGPT 18 15 18   ASTSGOT 22 18 23   ALKPHOSPHAT 66 54 64   TBILIRUBIN 0.3 0.2 0.3   GLUCOSE 202* 155* 221*         Recent Labs     11/07/24  0758 11/08/24 0055 11/08/24 2049   NTPROBNP 503* 245* 384*         No results for input(s): \"TROPONINT\" in the last 72 hours.    Imaging:  DX-CHEST-PORTABLE (1 VIEW)   Final Result         Hazy left basilar opacity, atelectasis or infection.          X-Ray:  I have personally reviewed the images and compared with prior images.  EKG:  I have personally reviewed the images and compared with prior images.    Assessment/Plan:  Justification for Admission Status  I anticipate this patient will require at least two midnights for appropriate medical management, necessitating inpatient admission because pneumonia    Patient will need a Med/Surg bed on MEDICAL service .  The need is secondary to pneumonia.    * Pneumonia due to infectious organism- (present on admission)  Assessment & Plan  Bilateral infiltrates  Check sputum cultures and blood cultures  Follow-up on procalcitonin  Start IV Unasyn      Chronic heart failure with preserved ejection fraction (HCC)  Assessment & Plan  Chronic  Monitor volume status    Acute respiratory failure with hypoxia (HCC)- (present on admission)  Assessment & Plan  On 4 L of O2 above baseline  We will need to contact the patient's oxygen company to deliver the equipment    Type 2 diabetes mellitus, without long-term current use of insulin (HCC)- (present " on admission)  Assessment & Plan  Start on insulin sliding scale with serial Accu-Checks  Check hemoglobin A1c  Hypoglycemic protocol in place      Mild persistent asthma with exacerbation- (present on admission)  Assessment & Plan  In acute exacerbation  IV Solu-Medrol  DuoNebs every 4 hours  Continue home inhalers    Primary hypertension- (present on admission)  Assessment & Plan  controlled  Continue current home medications  IV as needed medications have been ordered      Hypothyroidism- (present on admission)  Assessment & Plan  Continue home levothyroxine        VTE prophylaxis: SCDs/TEDs

## 2024-11-09 NOTE — DIETARY
NUTRITION SERVICES:   BMI -   Pt with BMI >40 (=Body mass index is 60.71 kg/m².), Class III obesity. Weight loss counseling not appropriate in acute care setting.     RECOMMEND - If appropriate at DC please refer to outpatient nutrition services for weight management.     Wound -   Alert received for newly identified wound. Wound team consult pending, will await wound staging to make recommendations if appropriate.       RD will monitor per dept policy.

## 2024-11-09 NOTE — ED NOTES
Report to MEHRAN Floyd   Writer left voicemail for patient to return call, per Dr. Rosaline Cabrera PAP smear did show some inflammation and patient needs to repeat in 6 months.   Lab results will be discussed in February at visit on 2/11/2020

## 2024-11-09 NOTE — DISCHARGE PLANNING
Received Choice Form at: 9672  Agency/Facility Name:  [facility]  Sent Referral per Choice Form at: [phone]

## 2024-11-09 NOTE — PROGRESS NOTES
Assumed care of pt at 0700. Report received and bedside rounding completed with night RN. Pt is calm no SOB, no acute distress noted. 4LPM O2.     POC discussed with pt, questions answered. White board updated.Call light and pt belongings within reach - hourly rounding in place. See flowsheets for assessment.

## 2024-11-09 NOTE — ED PROVIDER NOTES
ER Provider Note    Scribed for Dr. Loy Jason MD. by Jose A Middleton. 11/8/2024  7:44 PM    Primary Care Provider: GERBER Sood    CHIEF COMPLAINT  Chief Complaint   Patient presents with    Shortness of Breath       EXTERNAL RECORDS REVIEWED  Inpatient Notes Patient was admitted on 11/3/24 for acute asthma exacerbation and subsequently discharged 5 days later.    HPI/ROS  LIMITATION TO HISTORY   Select: : None    OUTSIDE HISTORIAN(S):  None    Cecile Teixeira is a 79 y.o. female who presents to the ED for shortness of breath onset today. She comes to the ED today to get the medications that she was supposed to get last time. Patient report that she is slightly nauseous.    PAST MEDICAL HISTORY  Past Medical History:   Diagnosis Date    Asthma     Blood clots in brain 2000    Chest pain     Cough     GERD (gastroesophageal reflux disease)     Hypothyroidism     Osteoarthritis     Painful breathing     Pleurodynia 07/20/2024    Shortness of breath     Sleep apnea     Sputum production     Wheezing        SURGICAL HISTORY  Past Surgical History:   Procedure Laterality Date    OTHER Left [id number]    LT LEG SURGERY        FAMILY HISTORY  No family history noted.  SOCIAL HISTORY   reports that she has quit smoking. Her smoking use included cigarettes. She has never used smokeless tobacco. She reports that she does not currently use alcohol. She reports that she does not use drugs.    CURRENT MEDICATIONS  Previous Medications    ACETAMINOPHEN (TYLENOL) 325 MG TAB    Take 2 Tablets by mouth every 6 hours as needed for Moderate Pain.    ALBUTEROL 108 (90 BASE) MCG/ACT AERO SOLN INHALATION AEROSOL    Inhale 2 Puffs every 6 hours as needed for Shortness of Breath. Indications: Asthma    AMLODIPINE (NORVASC) 10 MG TAB    Take 1 Tablet by mouth every day. Indications: High Blood Pressure    ASPIRIN 81 MG EC TABLET    Take 1 Tablet by mouth every day.    BENZONATATE (TESSALON) 100 MG CAP    Take 1 Capsule by mouth  3 times a day as needed for Cough.    CARVEDILOL (COREG) 12.5 MG TAB    Take 1 Tablet by mouth 2 times a day with meals.    CEFDINIR (OMNICEF) 300 MG CAP    Take 1 Capsule by mouth 2 times a day for 2 days.    CETIRIZINE (ZYRTEC) 10 MG TAB    Take 1 Tablet by mouth at bedtime. FOR ALLERGIES    FLUTICASONE (FLONASE) 50 MCG/ACT NASAL SPRAY    ADMINISTER 1 SPRAY INTO AFFECTED NOSTRIL(S) EVERY DAY.    FLUTICASONE (FLOVENT HFA) 110 MCG/ACT AEROSOL    Inhale 2 Puffs 2 times a day. Indications: Asthma    LEVOTHYROXINE (SYNTHROID) 88 MCG TAB    Take 1 Tablet by mouth every morning on an empty stomach.    LOSARTAN (COZAAR) 100 MG TAB    Take 1 Tablet by mouth every day.    MULTIPLE VITAMINS-MINERALS (CENTRUM ADULT 50+ MULTIGUMMIES PO)    Take 1 Tablet by mouth every day. Indications: supplement    NYSTATIN POWDER    Apply 1 Application topically 2 times a day as needed (skin breakdown).    ZIPRASIDONE (GEODON) 20 MG CAP    Take 1 Capsule by mouth 2 times a day.       ALLERGIES  Doxycycline, Prednisone, Cephalexin, Montelukast, Spinach, Cow's milk [lac bovis], Other environmental, Codeine, Lactose, Levothyroxine, and Sulfa drugs    PHYSICAL EXAM  BP (!) 148/72   Pulse 93   Temp 36.6 °C (97.9 °F) (Temporal)   Resp (!) 31   Ht 1.524 m (5')   Wt (!) 141 kg (310 lb 13.6 oz)   SpO2 97%   BMI 60.71 kg/m²   Physical Exam  Vitals and nursing note reviewed.   Constitutional:       Appearance: She is well-developed.   HENT:      Head: Normocephalic.   Eyes:      Extraocular Movements: Extraocular movements intact.      Pupils: Pupils are equal, round, and reactive to light.   Cardiovascular:      Rate and Rhythm: Normal rate and regular rhythm.   Pulmonary:      Effort: Pulmonary effort is normal.      Breath sounds: Wheezing (coarse inspiratory and expiratory bilaterally) present.   Abdominal:      Palpations: Abdomen is soft.      Tenderness: There is no abdominal tenderness.   Musculoskeletal:      Cervical back: Normal range  of motion.   Neurological:      Mental Status: She is alert and oriented to person, place, and time.       DIAGNOSTIC STUDIES & PROCEDURES    Labs:   Results for orders placed or performed during the hospital encounter of 24   EKG    Collection Time: 24  7:49 PM   Result Value Ref Range    Report       [facility] Emergency Dept[facility]    Test Date:  2024  Pt Name:    LUCIANA MUELLER                   Department: ER  MRN:        [id number]                      Room:        21  Gender:     Female                       Technician: [id number]  :        1945                   Requested By:ER TRIAGE PROTOCOL  Order #:    [id number]                    Reading MD: Loy Jason    Measurements  Intervals                                Axis  Rate:       97                           P:          63  OH:         149                          QRS:        50  QRSD:       97                           T:          20  QT:         349  QTc:        444    Interpretive Statements  Sinus rhythm  Low voltage, precordial leads  Borderline ST depression, lateral leads  Compared to ECG 2024 09:49:11  ST (T wave) deviation now present  T-wave abnormality no longer present  Electronically Signed On 2024 23:23:16 PST by Loy Jason     CBC with Differential    Collection Time: 24  8:49 PM   Result Value Ref Range    WBC 15.5 (H) 4.8 - 10.8 K/uL    RBC 4.56 4.20 - 5.40 M/uL    Hemoglobin 13.9 12.0 - 16.0 g/dL    Hematocrit 43.4 37.0 - 47.0 %    MCV 95.2 81.4 - 97.8 fL    MCH 30.5 27.0 - 33.0 pg    MCHC 32.0 (L) 32.2 - 35.5 g/dL    RDW 49.6 35.9 - 50.0 fL    Platelet Count 392 164 - 446 K/uL    MPV 9.7 9.0 - 12.9 fL    Neutrophils-Polys 79.60 (H) 44.00 - 72.00 %    Lymphocytes 9.30 (L) 22.00 - 41.00 %    Monocytes 9.00 0.00 - 13.40 %    Eosinophils 0.00 0.00 - 6.90 %    Basophils 0.10 0.00 - 1.80 %    Immature Granulocytes 2.00 (H) 0.00 - 0.90 %    Nucleated RBC 0.00 0.00 - 0.20 /100 WBC    Neutrophils  (Absolute) 12.31 (H) 1.82 - 7.42 K/uL    Lymphs (Absolute) 1.43 1.00 - 4.80 K/uL    Monos (Absolute) 1.39 (H) 0.00 - 0.85 K/uL    Eos (Absolute) 0.00 0.00 - 0.51 K/uL    Baso (Absolute) 0.01 0.00 - 0.12 K/uL    Immature Granulocytes (abs) 0.31 (H) 0.00 - 0.11 K/uL    NRBC (Absolute) 0.00 K/uL   Comp Metabolic Panel    Collection Time: 11/08/24  8:49 PM   Result Value Ref Range    Sodium 141 135 - 145 mmol/L    Potassium 4.3 3.6 - 5.5 mmol/L    Chloride 103 96 - 112 mmol/L    Co2 25 20 - 33 mmol/L    Anion Gap 13.0 7.0 - 16.0    Glucose 221 (H) 65 - 99 mg/dL    Bun 69 (H) 8 - 22 mg/dL    Creatinine 1.13 0.50 - 1.40 mg/dL    Calcium 9.7 8.5 - 10.5 mg/dL    Correct Calcium 9.6 8.5 - 10.5 mg/dL    AST(SGOT) 23 12 - 45 U/L    ALT(SGPT) 18 2 - 50 U/L    Alkaline Phosphatase 64 30 - 99 U/L    Total Bilirubin 0.3 0.1 - 1.5 mg/dL    Albumin 4.1 3.2 - 4.9 g/dL    Total Protein 7.4 6.0 - 8.2 g/dL    Globulin 3.3 1.9 - 3.5 g/dL    A-G Ratio 1.2 g/dL   proBrain Natriuretic Peptide, NT    Collection Time: 11/08/24  8:49 PM   Result Value Ref Range    NT-proBNP 384 (H) 0 - 125 pg/mL   ESTIMATED GFR    Collection Time: 11/08/24  8:49 PM   Result Value Ref Range    GFR (CKD-EPI) 49 (A) >60 mL/min/1.73 m 2      All labs reviewed by me.    EKG:   I have independently interpreted this EKG     {CARDIAC MONITORING    The cardiac monitor revealed sinus tachycardia as interpreted by me. The cardiac monitor was ordered secondary to the patient’s complaint of shortness of breath and to monitor the patient for dysrhythmia    Radiology:   The attending Emergency Physician has independently interpreted the diagnostic imaging associated with this visit and is awaiting the final reading from the radiologist, which will be displayed below.    Preliminary interpretation is a follows: Hazy bibasilar opacifications  Radiologist interpretation:    DX-CHEST-PORTABLE (1 VIEW)   Final Result         Hazy left basilar opacity, atelectasis or infection.          CRITICAL CARE  The very real possibilty of a deterioration of this patient's condition required the highest level of my preparedness for sudden, emergent intervention.  I provided critical care services, which included medication orders, frequent reevaluations of the patient's condition and response to treatment, ordering and reviewing test results, and discussing the case with various consultants.  The critical care time associated with the care of the patient was 33 minutes. Review chart for interventions. This time is exclusive of any other billable procedures.       COURSE & MEDICAL DECISION MAKING    INITIAL ASSESSMENT AND PLAN  Care Narrative:         7:44 PM - Patient seen and evaluated at bedside.     9:15 PM - Patient was reevaluated at bedside. Patient still has coarse wheezes bilaterally. Will re dose neubilzers.    11:22 PM - Patient was reevaluated at bedside.     11:30 PM I discussed the patient's case and the above findings with Dr. Zavala (Internal) who agrees to evaluate the patient for admission.     ADDITIONAL PROBLEM LIST AND DISPOSITION  Past Medical History:   Diagnosis Date    Asthma     Blood clots in brain 2000    Chest pain     Cough     GERD (gastroesophageal reflux disease)     Hypothyroidism     Osteoarthritis     Painful breathing     Pleurodynia 07/20/2024    Shortness of breath     Sleep apnea     Sputum production     Wheezing                   DISPOSITION AND DISCUSSIONS  79-year-old female chronically ill obese with a history of asthma who presents to the ED for worsening respiratory status after being discharged from the hospital earlier today.  She also reports she was not discharged with her oxygen supplies that she needs at home.  She arrives with labored breathing, tachypnea and coarse wheezes bilaterally.  She was given multiple nebulizer therapies here and upon multiple reassessments she failed to significantly improve requiring admission for further management the case  was discussed with the hospitalist who kindly agreed to admit for further management.      I have discussed management of the patient with the following physicians and SAGAR's: Dr. Zavala (Internal)    Discussion of management with other QHP or appropriate source(s): RT nebulizer       Escalation of care considered, and ultimately not performed: .    Barriers to care at this time, including but not limited to:  None .     Decision tools and prescription drugs considered including, but not limited to: .    DISPOSITION:  Patient will be hospitalized by Dr. Zavala (Internal) in guarded condition.     FINAL IMPRESSION   1. Moderate persistent asthma with acute exacerbation    2. Leukocytosis, unspecified type         I, Jose A Middleton (Heatheriblanny), am scribing for, and in the presence of, Loy Jason M.D..    Electronically signed by: Jose A Middleton (Balaji), 11/8/2024    ILoy M.D. personally performed the services described in this documentation, as scribed by Jose A Middleton in my presence, and it is both accurate and complete.    The note accurately reflects work and decisions made by me.  Loy Jason M.D.  11/9/2024  1:51 AM

## 2024-11-09 NOTE — ED TRIAGE NOTES
Pt BIB [facility] for [facility]. Pt has audible expiratory wheezes and wet nonproductive cough. Pt just discharged from inpatient a few hours ago. Pt sent home and sts the O2 tubing wasn't long enough and she wasn't able to use her concentrator. Pt does not normally wear O2 during the day. Pt does use a CPAP at night. Connected to bedside monitor, protocol orders placed, and Dr. Conway updated on pt status. New orders received.

## 2024-11-09 NOTE — DISCHARGE INSTRUCTIONS
Discharge Instructions per Wood Simpson M.D.    You were hospitalized for low oxygen (hypoxia) due to a viral pneumonia. Antibiotics will not help this. It was NOT due to COVID, influenza, nor RSV for which there is no specific treatment and will improve on its own. This may have exacerbated your asthma and can cause nausea.    Wear your oxygen at all times. Once you have received your CPAP connection tube, you can resume wearing that at night as your did prior.     DIET: Regular. Ensure adequate hydration.    ACTIVITY: Regular    DIAGNOSIS: Acute Hypoxia due to Viral Pneumonia    Return to ER if you faint for any reason, develop sudden chest pain or shortness of breath, develop bleeding in your bowels or vomit.

## 2024-11-09 NOTE — DISCHARGE SUMMARY
Discharge Summary    CHIEF COMPLAINT ON ADMISSION  Chief Complaint   Patient presents with    Shortness of Breath     Pt BIBA SOB, asthma. EMS administered albuterol and duo-neb treatment x 2. GCS 15.       Reason for Admission  Acute respiratory failure    Admission Date  11/3/2024    CODE STATUS  Full Code    HPI & HOSPITAL COURSE  Cecile Teixeira is a 79 y.o. female wheelchair bound since her 30's with a hx of asthma, hypothyroid, YOLA on CPAP, CVA, morbid obesity, admitted 11/3/2024 with acute respiatory failiure requiring high flow oxygen and frequent nebulizer treatments.  11/4: wbc:14.5, on HFNC 40L and 50% FiO2. Sleeping but moves all extremities.  11/5 the patient still complaining of significant shortness of breath, especially worsening when mobilizing, currently afebrile, heart rate in the 70s to 80s, respiration unlabored, blood pressure in the 120s over 60s, positive productive cough, white second 19.4, hemoglobin 11.7, hematocrit 38.2, platelet count 249, sodium 134, potassium 4.8, chloride 102, glucose 165 echocardiogram with preserved ejection fraction, right side unfortunately not well-visualized, negative lower extremity Doppler  11/6 the patient with multiple complaints this morning, did apparently not do well overnight, is complaining of a headache and complaints of bleeding from her brain, she shows me some nasal encrustation with minor amount of blood on it, patient is somewhat perseverating around the subject,  Afebrile, heart rate in the 80s, respiration unlabored, the patient is currently saturating in the mid 90s on 3 L which is her home oxygen dose, blood pressure at times elevated with later improvement to the 120s over 60s, white cell count still elevated 18.2, hemoglobin 13.6, platelet count 387, sodium 136, potassium 4.6, chloride 101, bicarb 21, glucose 186, BUN 59, creatinine 1.33, BNP at 996  Patient with overall improvement  11/7 the patient with overnight decompensation,  aggressive behavior, required to be placed in 4 point restraints, transferring back to [facility] with Precedex drip, the patient not redirectable,  Afebrile, heart rate in the 100s, respiration in the 20s, appears to be unlabored, the patient is saturating in the high 90s on 3 to 6 L nasal cannula oxygen, blood pressure in the 130s over 70s, increased with agitation  Laboratory data white count 11.4, hemoglobin 13.3, platelet count 360, sodium 138, potassium 5.0, chloride 102, bicarb 24, glucose 202, BUN 65, creatinine 1.22, , ABG with a pH of 7.36, pCO2 47, PaO2 77  11/8 the patient improved after initiation of Geodon, she was able to calm down and cooperated with the respiratory interventions and had less respiratory difficulty on low-flow nasal cannula oxygen, she qualified for home oxygen which she did not have prior to this admission appears, the patient therefore settled and prepared for discharge on 11/8, home oxygen was arranged for the patient was prescribed additional medications, the patient had some intermittent agitation still but overall improved, at this time she is able to be discharged back to home with close outpatient follow-up  Therefore, she is discharged in fair and stable condition to home with close outpatient follow-up.    The patient met 2-midnight criteria for an inpatient stay at the time of discharge.    Discharge Date  11/8/2024    FOLLOW UP ITEMS POST DISCHARGE  Closely with her primary care physician and outpatient pulmonary medicine as previously set up    DISCHARGE DIAGNOSES  Principal Problem:    Acute asthma exacerbation (POA: Yes)  Active Problems:    Delirium of mixed origin (POA: Unknown)    Severe obesity (BMI >= 40) (HCC) (Chronic) (POA: Yes)    Hypothyroidism (Chronic) (POA: Yes)    YOLA (obstructive sleep apnea) (POA: Yes)    Lymphedema associated with obesity (POA: Yes)    Primary hypertension (Chronic) (POA: Yes)    Type 2 diabetes mellitus, without long-term current use  of insulin (HCC) (Chronic) (POA: Yes)    Acute respiratory failure with hypoxia (HCC) (Chronic) (POA: Yes)    Elevated troponin (POA: Unknown)  Resolved Problems:    * No resolved hospital problems. *      FOLLOW UP  Future Appointments   Date Time Provider Department Center   11/11/2024  1:30 PM GERBER Sood [facility]     GERBER Sood  [address] [facility] [address]  [phone]    Schedule an appointment as soon as possible for a visit in 1 week(s)  As needed, care and treatment after hospitial discharge      MEDICATIONS ON DISCHARGE     Medication List        START taking these medications        Instructions   carvedilol 12.5 MG Tabs  Commonly known as: Coreg   Take 1 Tablet by mouth 2 times a day with meals.  Dose: 12.5 mg     cefdinir 300 MG Caps  Commonly known as: Omnicef   Take 1 Capsule by mouth 2 times a day for 2 days.  Dose: 300 mg     ziprasidone 20 MG Caps  Commonly known as: Geodon   Take 1 Capsule by mouth 2 times a day.  Dose: 20 mg            CONTINUE taking these medications        Instructions   acetaminophen 325 MG Tabs  Commonly known as: Tylenol   Take 2 Tablets by mouth every 6 hours as needed for Moderate Pain.  Dose: 650 mg     albuterol 108 (90 Base) MCG/ACT Aers inhalation aerosol   Inhale 2 Puffs every 6 hours as needed for Shortness of Breath. Indications: Asthma  Dose: 2 Puff     amLODIPine 10 MG Tabs  Commonly known as: Norvasc   Take 1 Tablet by mouth every day. Indications: High Blood Pressure  Dose: 10 mg     aspirin 81 MG EC tablet   Take 1 Tablet by mouth every day.  Dose: 81 mg     benzonatate 100 MG Caps  Commonly known as: Tessalon   Take 1 Capsule by mouth 3 times a day as needed for Cough.  Dose: 100 mg     CENTRUM ADULT 50+ MULTIGUMMIES PO   Take 1 Tablet by mouth every day. Indications: supplement  Dose: 1 Tablet     cetirizine 10 MG Tabs  Commonly known as: ZyrTEC   Take 1 Tablet by mouth at bedtime. FOR ALLERGIES  Dose: 10 mg     fluticasone 110 MCG/ACT  Aero  Commonly known as: Flovent HFA   Inhale 2 Puffs 2 times a day. Indications: Asthma  Dose: 2 Puff     fluticasone 50 MCG/ACT nasal spray  Commonly known as: Flonase   ADMINISTER 1 SPRAY INTO AFFECTED NOSTRIL(S) EVERY DAY.  Dose: 1 Spray     levothyroxine 88 MCG Tabs  Commonly known as: Synthroid   Take 1 Tablet by mouth every morning on an empty stomach.  Dose: 88 mcg     losartan 100 MG Tabs  Commonly known as: Cozaar   Take 1 Tablet by mouth every day.  Dose: 100 mg     Nystatin Powd   Apply 1 Application topically 2 times a day as needed (skin breakdown).  Dose: 1 Application            STOP taking these medications      metoprolol SR 25 MG Tb24  Commonly known as: Toprol XL              Allergies  Allergies   Allergen Reactions    Doxycycline Hives    Prednisone Unspecified     Diarrhea and very irritable     Cephalexin Diarrhea     Diarrhea and mild rash     Montelukast Rash     Leg pain, back pain and rash     Spinach Unspecified     Skin allergy test    Cow's Milk [Lac Bovis]     Other Environmental      Trees except oak    Codeine Unspecified     Headaches and confusion     Lactose Unspecified     Lactose intolerance since a child     Levothyroxine Anxiety     Panic attack     Cannot take generic    Sulfa Drugs Unspecified     Mental status change        DIET  Orders Placed This Encounter   Procedures    Diet Order Diet: Regular (sit pt upright for PO); Miscellaneous modifications: (optional): Lactose Free     Standing Status:   Standing     Number of Occurrences:   1     Order Specific Question:   Diet:     Answer:   Regular [1]     Comments:   sit pt upright for PO     Order Specific Question:   Miscellaneous modifications: (optional)     Answer:   Lactose Free [5]       ACTIVITY  As tolerated.  Weight bearing as tolerated    CONSULTATIONS  Critical care, palliative care    PROCEDURES  No invasive procedures    LABORATORY  Lab Results   Component Value Date    SODIUM 142 11/08/2024    POTASSIUM 4.7  11/08/2024    CHLORIDE 106 11/08/2024    CO2 26 11/08/2024    GLUCOSE 155 (H) 11/08/2024    BUN 56 (H) 11/08/2024    CREATININE 0.84 11/08/2024        Lab Results   Component Value Date    WBC 6.9 11/08/2024    HEMOGLOBIN 11.5 (L) 11/08/2024    HEMATOCRIT 36.2 (L) 11/08/2024    PLATELETCT 254 11/08/2024        Total time of the discharge process to the extent of 45 minutes.

## 2024-11-09 NOTE — ASSESSMENT & PLAN NOTE
On 4 L of O2 above baseline  We will need to contact the patient's oxygen company to deliver the equipment

## 2024-11-09 NOTE — ED NOTES
Medication history reviewed with patient at bedside.   Med rec is complete  Allergies reviewed.     Patient has not had any outpatient antibiotics in the last 30 days.   Anticoagulants: No    Patient was D/C from [facility] earlier today 11/8/24. Patient states she did not take any rx medications since being discharged including new abx rx.    Puneet Kaba PhT

## 2024-11-09 NOTE — PROGRESS NOTES
4 Eyes Skin Assessment Completed by MEHRAN Chong and MEHRAN Yanes.    Head WDL  Ears Redness and Blanching  Nose WDL  Mouth WDL  Neck WDL  Breast/Chest Redness and Excoriation moist  Shoulder Blades WDL  Spine WDL  (R) Arm/Elbow/Hand Bruising  (L) Arm/Elbow/Hand Bruising  Abdomen Redness, Scar, and Bruising red, moist pannus  Groin WDL  Scrotum/Coccyx/Buttocks WDL  (R) Leg Redness and Edema behind knee  (L) Leg Redness and Edemabehind knee  (R) Heel/Foot/Toe Redness and Blanching  (L) Heel/Foot/Toe Redness and Blanching          Devices In Places Blood Pressure Cuff, Pulse Ox, and Nasal Cannula      Interventions In Place Gray Ear Foams, InterDry, TAP System, Pillows, Barrier Cream, and Heels Loaded W/Pillows    Possible Skin Injury Yes    Pictures Uploaded Into Epic Yes  Wound Consult Placed Yes  RN Wound Prevention Protocol Ordered Yes

## 2024-11-09 NOTE — PROGRESS NOTES
Received report at 8974 from Lydia ED RN. Pt arrived on floor with transport via bed at 0010. Pt is A & O 4. Pt is on 4 L O2 . Bed locked in the lowest position with 2 rails up, call light within reach, and belongings at bedside. Pt reports no pain at this time. Pt educated on plan of care and safety precautions are in place. Pt has no further questions at this time. Hourly rounding is in place.

## 2024-11-09 NOTE — DISCHARGE PLANNING
Received Choice Form at: 1223  Agency/Facility Name:  [facility]   Sent Referral per Choice Form at: 3183

## 2024-11-09 NOTE — DISCHARGE PLANNING
HTH/SCP TCN chart review completed. Collaborated with bedside RN as well as MD. Also discussed with CM via VOALTE. The most current review of medical record, knowledge of pt's PLOF and social support, LACE+ score of 69, 6 clicks scores of 10 mobility were considered.      Pt seen at bedside. Introduced TCN program. Provided education regarding post acute levels of care. Education provided regarding case management policy for blanket SNF referrals. Discussed HTH/SCP plan benefits. Pt verbalizes understanding.     Note pt is a readmission. Appears was dc'd to home on 11/8 with plan for [facility] services and supplemental 02 via [facility] (pt had a CPAP prior to recent admit though appears was to receive portables and concentrator at time of dc as well as missing item from her CPAP as well; appears now has portables and concentrator and [facility] is aware of missing item as well). This TCN discussed aforementioned with MD and bedside RN. Noted CM obtained DME choice for [facility] for 02 supplies at time of dc and this TCN obtained HH choice for [facility] for resumption of HH services. Relayed information re: [facility] to CM who is going to send referral and MD is to place order per discussion as well. Noted plan for dc to home later today with supplemental 02 and resumption of [facility] and pt in agreement.    No additional provider consults as well. Given aforementioned, choice proactively obtained for HH for resumption of services with [facility] and faxed to D[facility] with CM aware. Noted CM has already obtained DME (02 via [facility]) choice.    In collaboration with JAROD, MD and bedside RN, current discharge considerations are anticipated for dc to home with resumption of [facility] and supplemental 02/supplies via [facility] as indicated. TCN will continue to follow and collaborate with discharge planning team as additional post acute needs arise. Thank you.     Completed today:  Choice obtained: HH ([facility] for resumption of services); DME (02 via [facility]  obtained by CM)  Pt aware of [facility]'s blanket referral policy  SCP with non [facility] PCP

## 2024-11-10 NOTE — DISCHARGE PLANNING
ER SW asked to assist with transportation of Pt home as she is ready for discharge.  SW informed that she will need w/c and O2 for transportation.  SW called and scheduled [facility] transportation for a [phone] (res #[id number]). RN updated via voalte message.

## 2024-11-10 NOTE — PROGRESS NOTES
Patient discharged from the floor to home with [facility] transport. Patient left the floor via wheelchair on 3L O2 nasal cannula. All discharge paperwork reviewed and with patient in her purse. Home medications sent with patient as well. Patient discharged in stable condition.

## 2024-11-10 NOTE — DISCHARGE SUMMARY
Discharge Summary    CHIEF COMPLAINT ON ADMISSION  Chief Complaint   Patient presents with    Shortness of Breath       Reason for Admission  Hypoxia     Admission Date  11/8/2024    CODE STATUS  Full Code    HPI & HOSPITAL COURSE  This is a 79 y.o. female with YOLA on CPAP, T2DM, HTN, HFpEF, asthma/COPD, hypothyroidism here with ongoing hypoxia.     She was discharged 11/8/24 by Dr. Hope for acute hypoxic respiratory failure due to asthma exacerbation and community-acquired pneumonia. See discharge summary for details of that encounter. She was discharged on 3 L/min oxygen.    After discharge she developed ongoing dyspnea and cough. She did not obtain her prescriptions after discharge. CXR demonstrated Left basilar opacity. Procalcitonin was negative for which antibiotics were discontinued in absence of expected benefit. Respiratory PCR was negative for COVID/RSV/FLU.    During hospitalization she indicated she did not want to continue [facility] for behavioral disturbance. This was honored in the absence of a court order for mandated pharmacotherapy. She was advised that she would be treated with respect and the best medical care [facility] can provide. In return, disrespect to staff would not be tolerated and result in staff disengaging until she could be respectful. She was advised that threatening or injuring a healthcare worker is a felony for which charges would be pressed and she would be discharged to the custody of [facility]. She was thereafter calm and cooperative without incident.    Transitional Care Navigator assisted with referral of  services to [facility] after prior decline from [facility]. Her granddaughter was advised that Mrs. Teixeira did not wish for post-acute care and that HH confirmation or candidacy is not a requirement of discharge due to limiting insurance, geographic, and/or behavioral factors.    She indicated that an adaptor was missing from her CPAP machine, which must be replaced per [employer] company.  Nocturnal CPAP capability is not a requirement of discharge from acute care due to the chronic nature of YOLA/OHS. Oxygen remained at 3 L/min for which updated oxygen order was provided to Sara and will not require change in equipment. Additional tubing was provided by the hospital supply if not confirmed. Discharge prescriptions were provided through [id number] prior to discharge. Transportation was provided through [facility].    Therefore, she is discharged in fair and stable condition to home with organized home healthcare and close outpatient follow-up.    The patient recovered much more quickly than anticipated on admission.    Discharge Date  11/9/2024    FOLLOW UP ITEMS POST DISCHARGE    Acute hypoxia - wean oxygen as tolerated    CAP/Asthma Exacerbation - likely viral, no further antibiotics nor steroids indicated    DISCHARGE DIAGNOSES  Principal Problem:    Acute respiratory failure with hypoxia (HCC) (Chronic) (POA: Yes)  Active Problems:    Acquired hypothyroidism (POA: Yes)    Primary hypertension (Chronic) (POA: Yes)    Mild persistent asthma with exacerbation (POA: Yes)    Type 2 diabetes mellitus, without long-term current use of insulin (HCC) (Chronic) (POA: Yes)    Pneumonia due to infectious organism (POA: Yes)    Chronic heart failure with preserved ejection fraction (HCC) (POA: Yes)  Resolved Problems:    Delirium (POA: Yes)      FOLLOW UP  Future Appointments   Date Time Provider Department Center   11/11/2024  1:30 PM GERBER Sood [facility]     GERBER Sood  [address] [facility] [id number]  [phone]    Schedule an appointment as soon as possible for a visit in 2 week(s)  after-hospital follow up      MEDICATIONS ON DISCHARGE     Medication List        START taking these medications        Instructions   acetaminophen 500 MG Tabs  Commonly known as: Tylenol   Take 1 Tablet by mouth every 6 hours as needed for Mild Pain or Fever.  Dose: 500 mg     guaiFENesin dextromethorphan 100-10  MG/5ML Syrp syrup  Commonly known as: Robitussin DM   Take 10 mL by mouth every 6 hours as needed for Cough.  Dose: 10 mL     omeprazole 20 MG delayed-release capsule  Commonly known as: PriLOSEC   Take 1 Capsule by mouth every day.  Dose: 20 mg     ondansetron 4 MG Tbdp  Commonly known as: Zofran ODT   Take 1 Tablet by mouth every four hours as needed for Nausea/Vomiting.  Dose: 4 mg     prochlorperazine 10 MG Tabs  Commonly known as: Compazine   Take 1 Tablet by mouth every 6 hours as needed for Nausea/Vomiting (use SECOND if zofran ineffective).  Dose: 10 mg            CONTINUE taking these medications        Instructions   albuterol 108 (90 Base) MCG/ACT Aers inhalation aerosol   Inhale 2 Puffs every 6 hours as needed for Shortness of Breath. Indications: Asthma  Dose: 2 Puff     amLODIPine 10 MG Tabs  Commonly known as: Norvasc   Take 1 Tablet by mouth every day. Indications: High Blood Pressure  Dose: 10 mg     aspirin 81 MG EC tablet   Take 1 Tablet by mouth every day.  Dose: 81 mg     benzonatate 100 MG Caps  Commonly known as: Tessalon   Take 1 Capsule by mouth 3 times a day as needed for Cough.  Dose: 100 mg     carvedilol 12.5 MG Tabs  Commonly known as: Coreg   Take 1 Tablet by mouth 2 times a day with meals.  Dose: 12.5 mg     CENTRUM ADULT 50+ MULTIGUMMIES PO   Take 1 Tablet by mouth every day. Indications: supplement  Dose: 1 Tablet     cetirizine 10 MG Tabs  Commonly known as: ZyrTEC   Take 1 Tablet by mouth at bedtime. FOR ALLERGIES  Dose: 10 mg     fluticasone 110 MCG/ACT Aero  Commonly known as: Flovent HFA   Inhale 2 Puffs 2 times a day. Indications: Asthma  Dose: 2 Puff     fluticasone 50 MCG/ACT nasal spray  Commonly known as: Flonase   ADMINISTER 1 SPRAY INTO AFFECTED NOSTRIL(S) EVERY DAY.  Dose: 1 Spray     levothyroxine 88 MCG Tabs  Commonly known as: Synthroid   Take 1 Tablet by mouth every morning on an empty stomach.  Dose: 88 mcg     losartan 100 MG Tabs  Commonly known as: Cozaar   Take  1 Tablet by mouth every day.  Dose: 100 mg     metoprolol SR 25 MG Tb24  Commonly known as: Toprol XL   Take 25 mg by mouth every day.  Dose: 25 mg     Nystatin Powd   Apply 1 Application topically 2 times a day as needed (skin breakdown).  Dose: 1 Application            STOP taking these medications      ziprasidone 20 MG Caps  Commonly known as: Geodon              Allergies  Allergies   Allergen Reactions    Doxycycline Hives    Prednisone Unspecified     Diarrhea and very irritable     Cephalexin Diarrhea     Diarrhea and mild rash     Montelukast Rash     Leg pain, back pain and rash     Spinach Unspecified     Skin allergy test    Cow's Milk [Lac Bovis]     Other Environmental      Trees except oak    Codeine Unspecified     Headaches and confusion     Lactose Unspecified     Lactose intolerance since a child     Levothyroxine Anxiety     Panic attack     Cannot take generic    Sulfa Drugs Unspecified     Mental status change        DIET  Orders Placed This Encounter   Procedures    Diet Order Diet: 2 Gram Sodium     Standing Status:   Standing     Number of Occurrences:   1     Order Specific Question:   Diet:     Answer:   2 Gram Sodium [7]       ACTIVITY  As tolerated.  Weight bearing as tolerated    CONSULTATIONS  None    PROCEDURES  None    LABORATORY  Lab Results   Component Value Date    SODIUM 141 11/08/2024    POTASSIUM 4.3 11/08/2024    CHLORIDE 103 11/08/2024    CO2 25 11/08/2024    GLUCOSE 221 (H) 11/08/2024    BUN 69 (H) 11/08/2024    CREATININE 1.13 11/08/2024        Lab Results   Component Value Date    WBC 15.5 (H) 11/08/2024    HEMOGLOBIN 13.9 11/08/2024    HEMATOCRIT 43.4 11/08/2024    PLATELETCT 392 11/08/2024        Total time of the discharge process exceeds 68 minutes.

## 2024-11-10 NOTE — PROGRESS NOTES
Spoke with [employer] (oxygen company). They are to come to patients house on Monday with humidifier for concentrator as well as updates about the connection piece for CPAP that patient is missing. Piece will either be delivered Monday if there are extra parts found in the meantime or the piece will be ordered pending delivery. Patient prefers that [employer] orders the part and declines ordering herself for $60 online. Educated she would likely receive it sooner that way. MD ok with pt using oxygen 24/7 until NOC CPAP available for use. Several extension tubings, connectors, and NC provided. M2B and home medications delivered.     ED Social work assisting to coordinate ride for patient.     *Earlier in the day, grand daughter came to bedside with concerns about patient going home with HH and would prefer SNF for patient. Patient became highly irritable and was demanding to leave. Patient stated she is able to care for herself - able to do all ADLs by self. States fully functional with the two wheelchairs she has at home. Patient refuses placement discussion. Per MD no need for PT OT.

## 2024-11-11 ENCOUNTER — APPOINTMENT (OUTPATIENT)
Dept: RADIOLOGY | Facility: MEDICAL CENTER | Age: 79
DRG: 202 | End: 2024-11-11
Attending: EMERGENCY MEDICINE
Payer: MEDICARE

## 2024-11-11 ENCOUNTER — HOSPITAL ENCOUNTER (INPATIENT)
Facility: MEDICAL CENTER | Age: 79
LOS: 2 days | DRG: 202 | End: 2024-11-13
Attending: EMERGENCY MEDICINE | Admitting: HOSPITALIST
Payer: MEDICARE

## 2024-11-11 DIAGNOSIS — E03.9 ACQUIRED HYPOTHYROIDISM: ICD-10-CM

## 2024-11-11 DIAGNOSIS — J18.9 PNEUMONIA OF RIGHT LUNG DUE TO INFECTIOUS ORGANISM, UNSPECIFIED PART OF LUNG: ICD-10-CM

## 2024-11-11 DIAGNOSIS — J96.01 ACUTE RESPIRATORY FAILURE WITH HYPOXIA (HCC): Chronic | ICD-10-CM

## 2024-11-11 DIAGNOSIS — R79.89 ELEVATED TROPONIN: ICD-10-CM

## 2024-11-11 DIAGNOSIS — Z91.148 NON COMPLIANCE W MEDICATION REGIMEN: ICD-10-CM

## 2024-11-11 DIAGNOSIS — K59.00 CONSTIPATION, UNSPECIFIED CONSTIPATION TYPE: ICD-10-CM

## 2024-11-11 DIAGNOSIS — Z74.09 IMPAIRED FUNCTIONAL MOBILITY, BALANCE, GAIT, AND ENDURANCE: ICD-10-CM

## 2024-11-11 DIAGNOSIS — J45.31 MILD PERSISTENT ASTHMA WITH EXACERBATION: ICD-10-CM

## 2024-11-11 DIAGNOSIS — K21.00 GASTROESOPHAGEAL REFLUX DISEASE WITH ESOPHAGITIS WITHOUT HEMORRHAGE: ICD-10-CM

## 2024-11-11 DIAGNOSIS — R06.09 DYSPNEA ON EXERTION: ICD-10-CM

## 2024-11-11 DIAGNOSIS — G47.33 OSA (OBSTRUCTIVE SLEEP APNEA): ICD-10-CM

## 2024-11-11 DIAGNOSIS — J45.41 MODERATE PERSISTENT ASTHMA WITH EXACERBATION: ICD-10-CM

## 2024-11-11 DIAGNOSIS — I27.20 PULMONARY HYPERTENSION (HCC): ICD-10-CM

## 2024-11-11 DIAGNOSIS — G89.29 CHRONIC LOW BACK PAIN WITH LEFT-SIDED SCIATICA, UNSPECIFIED BACK PAIN LATERALITY: ICD-10-CM

## 2024-11-11 DIAGNOSIS — E66.01 MORBID OBESITY (HCC): Chronic | ICD-10-CM

## 2024-11-11 DIAGNOSIS — J96.21 ACUTE ON CHRONIC RESPIRATORY FAILURE WITH HYPOXIA (HCC): ICD-10-CM

## 2024-11-11 DIAGNOSIS — I10 PRIMARY HYPERTENSION: Chronic | ICD-10-CM

## 2024-11-11 DIAGNOSIS — K92.1 MELENA: ICD-10-CM

## 2024-11-11 DIAGNOSIS — K59.01 SLOW TRANSIT CONSTIPATION: ICD-10-CM

## 2024-11-11 DIAGNOSIS — J44.1 ACUTE EXACERBATION OF CHRONIC OBSTRUCTIVE PULMONARY DISEASE (COPD) (HCC): ICD-10-CM

## 2024-11-11 DIAGNOSIS — E11.00 TYPE 2 DIABETES MELLITUS WITH HYPEROSMOLARITY WITHOUT COMA, WITHOUT LONG-TERM CURRENT USE OF INSULIN (HCC): Chronic | ICD-10-CM

## 2024-11-11 DIAGNOSIS — F39 MOOD DISORDER (HCC): Chronic | ICD-10-CM

## 2024-11-11 DIAGNOSIS — I50.32 CHRONIC HEART FAILURE WITH PRESERVED EJECTION FRACTION (HCC): ICD-10-CM

## 2024-11-11 DIAGNOSIS — M54.42 CHRONIC BILATERAL LOW BACK PAIN WITH LEFT-SIDED SCIATICA: ICD-10-CM

## 2024-11-11 DIAGNOSIS — M54.42 CHRONIC LOW BACK PAIN WITH LEFT-SIDED SCIATICA, UNSPECIFIED BACK PAIN LATERALITY: ICD-10-CM

## 2024-11-11 DIAGNOSIS — K80.20 CALCULUS OF GALLBLADDER WITHOUT CHOLECYSTITIS WITHOUT OBSTRUCTION: ICD-10-CM

## 2024-11-11 DIAGNOSIS — G89.29 CHRONIC BILATERAL LOW BACK PAIN WITH LEFT-SIDED SCIATICA: ICD-10-CM

## 2024-11-11 DIAGNOSIS — M15.0 PRIMARY OSTEOARTHRITIS INVOLVING MULTIPLE JOINTS: ICD-10-CM

## 2024-11-11 DIAGNOSIS — R46.89 BEHAVIORAL CHANGE: ICD-10-CM

## 2024-11-11 LAB
ALBUMIN SERPL BCP-MCNC: 3.9 G/DL (ref 3.2–4.9)
ALBUMIN/GLOB SERPL: 1.3 G/DL
ALP SERPL-CCNC: 60 U/L (ref 30–99)
ALT SERPL-CCNC: 19 U/L (ref 2–50)
ANION GAP SERPL CALC-SCNC: 13 MMOL/L (ref 7–16)
AST SERPL-CCNC: 20 U/L (ref 12–45)
BASOPHILS # BLD AUTO: 0.1 % (ref 0–1.8)
BASOPHILS # BLD: 0.01 K/UL (ref 0–0.12)
BILIRUB SERPL-MCNC: 0.8 MG/DL (ref 0.1–1.5)
BUN SERPL-MCNC: 41 MG/DL (ref 8–22)
CALCIUM ALBUM COR SERPL-MCNC: 9.2 MG/DL (ref 8.5–10.5)
CALCIUM SERPL-MCNC: 9.1 MG/DL (ref 8.5–10.5)
CHLORIDE SERPL-SCNC: 105 MMOL/L (ref 96–112)
CO2 SERPL-SCNC: 24 MMOL/L (ref 20–33)
CREAT SERPL-MCNC: 0.8 MG/DL (ref 0.5–1.4)
EOSINOPHIL # BLD AUTO: 0.11 K/UL (ref 0–0.51)
EOSINOPHIL NFR BLD: 0.6 % (ref 0–6.9)
ERYTHROCYTE [DISTWIDTH] IN BLOOD BY AUTOMATED COUNT: 49.1 FL (ref 35.9–50)
FLUAV RNA SPEC QL NAA+PROBE: NEGATIVE
FLUBV RNA SPEC QL NAA+PROBE: NEGATIVE
GFR SERPLBLD CREATININE-BSD FMLA CKD-EPI: 75 ML/MIN/1.73 M 2
GLOBULIN SER CALC-MCNC: 3.1 G/DL (ref 1.9–3.5)
GLUCOSE SERPL-MCNC: 190 MG/DL (ref 65–99)
HCT VFR BLD AUTO: 42.1 % (ref 37–47)
HGB BLD-MCNC: 13.8 G/DL (ref 12–16)
IMM GRANULOCYTES # BLD AUTO: 0.17 K/UL (ref 0–0.11)
IMM GRANULOCYTES NFR BLD AUTO: 0.9 % (ref 0–0.9)
LACTATE SERPL-SCNC: 1.5 MMOL/L (ref 0.5–2)
LYMPHOCYTES # BLD AUTO: 0.42 K/UL (ref 1–4.8)
LYMPHOCYTES NFR BLD: 2.3 % (ref 22–41)
MCH RBC QN AUTO: 30.7 PG (ref 27–33)
MCHC RBC AUTO-ENTMCNC: 32.8 G/DL (ref 32.2–35.5)
MCV RBC AUTO: 93.8 FL (ref 81.4–97.8)
MONOCYTES # BLD AUTO: 0.75 K/UL (ref 0–0.85)
MONOCYTES NFR BLD AUTO: 4.1 % (ref 0–13.4)
NEUTROPHILS # BLD AUTO: 16.73 K/UL (ref 1.82–7.42)
NEUTROPHILS NFR BLD: 92 % (ref 44–72)
NRBC # BLD AUTO: 0 K/UL
NRBC BLD-RTO: 0 /100 WBC (ref 0–0.2)
NT-PROBNP SERPL IA-MCNC: 151 PG/ML (ref 0–125)
PLATELET # BLD AUTO: 267 K/UL (ref 164–446)
PMV BLD AUTO: 9.8 FL (ref 9–12.9)
POTASSIUM SERPL-SCNC: 4.4 MMOL/L (ref 3.6–5.5)
PROCALCITONIN SERPL-MCNC: 0.09 NG/ML
PROT SERPL-MCNC: 7 G/DL (ref 6–8.2)
RBC # BLD AUTO: 4.49 M/UL (ref 4.2–5.4)
RSV RNA SPEC QL NAA+PROBE: NEGATIVE
SARS-COV-2 RNA RESP QL NAA+PROBE: NOTDETECTED
SODIUM SERPL-SCNC: 142 MMOL/L (ref 135–145)
TROPONIN T SERPL-MCNC: 17 NG/L (ref 6–19)
WBC # BLD AUTO: 18.2 K/UL (ref 4.8–10.8)

## 2024-11-11 PROCEDURE — 770006 HCHG ROOM/CARE - MED/SURG/GYN SEMI*

## 2024-11-11 PROCEDURE — 87040 BLOOD CULTURE FOR BACTERIA: CPT | Mod: 91

## 2024-11-11 PROCEDURE — 84484 ASSAY OF TROPONIN QUANT: CPT

## 2024-11-11 PROCEDURE — 700111 HCHG RX REV CODE 636 W/ 250 OVERRIDE (IP): Mod: JZ | Performed by: EMERGENCY MEDICINE

## 2024-11-11 PROCEDURE — 94644 CONT INHLJ TX 1ST HOUR: CPT

## 2024-11-11 PROCEDURE — 71045 X-RAY EXAM CHEST 1 VIEW: CPT

## 2024-11-11 PROCEDURE — 96374 THER/PROPH/DIAG INJ IV PUSH: CPT

## 2024-11-11 PROCEDURE — 74176 CT ABD & PELVIS W/O CONTRAST: CPT

## 2024-11-11 PROCEDURE — 80053 COMPREHEN METABOLIC PANEL: CPT

## 2024-11-11 PROCEDURE — 99285 EMERGENCY DEPT VISIT HI MDM: CPT

## 2024-11-11 PROCEDURE — 0241U HCHG SARS-COV-2 COVID-19 NFCT DS RESP RNA 4 TRGT ED POC: CPT

## 2024-11-11 PROCEDURE — 700101 HCHG RX REV CODE 250: Performed by: EMERGENCY MEDICINE

## 2024-11-11 PROCEDURE — 85025 COMPLETE CBC W/AUTO DIFF WBC: CPT

## 2024-11-11 PROCEDURE — 83605 ASSAY OF LACTIC ACID: CPT

## 2024-11-11 PROCEDURE — 36415 COLL VENOUS BLD VENIPUNCTURE: CPT

## 2024-11-11 PROCEDURE — 84145 PROCALCITONIN (PCT): CPT

## 2024-11-11 PROCEDURE — 83880 ASSAY OF NATRIURETIC PEPTIDE: CPT

## 2024-11-11 RX ORDER — SODIUM PHOSPHATE,MONO-DIBASIC 19G-7G/118
1 ENEMA (ML) RECTAL ONCE
Status: DISPENSED | OUTPATIENT
Start: 2024-11-11 | End: 2024-11-12

## 2024-11-11 RX ORDER — ONDANSETRON 2 MG/ML
4 INJECTION INTRAMUSCULAR; INTRAVENOUS ONCE
Status: COMPLETED | OUTPATIENT
Start: 2024-11-11 | End: 2024-11-11

## 2024-11-11 RX ORDER — IPRATROPIUM BROMIDE AND ALBUTEROL SULFATE 2.5; .5 MG/3ML; MG/3ML
3 SOLUTION RESPIRATORY (INHALATION) ONCE
Status: COMPLETED | OUTPATIENT
Start: 2024-11-11 | End: 2024-11-11

## 2024-11-11 RX ORDER — LINEZOLID 600 MG/1
600 TABLET, FILM COATED ORAL ONCE
Status: COMPLETED | OUTPATIENT
Start: 2024-11-11 | End: 2024-11-12

## 2024-11-11 RX ADMIN — IPRATROPIUM BROMIDE AND ALBUTEROL SULFATE 3 ML: 2.5; .5 SOLUTION RESPIRATORY (INHALATION) at 20:53

## 2024-11-11 RX ADMIN — ONDANSETRON 4 MG: 2 INJECTION INTRAMUSCULAR; INTRAVENOUS at 20:56

## 2024-11-11 RX ADMIN — Medication 10 MG/HR: at 20:53

## 2024-11-11 ASSESSMENT — FIBROSIS 4 INDEX: FIB4 SCORE: 1.09

## 2024-11-12 PROBLEM — J45.41 MODERATE PERSISTENT ASTHMA WITH EXACERBATION: Status: ACTIVE | Noted: 2024-11-03

## 2024-11-12 LAB
EST. AVERAGE GLUCOSE BLD GHB EST-MCNC: 146 MG/DL
GLUCOSE BLD STRIP.AUTO-MCNC: 119 MG/DL (ref 65–99)
GLUCOSE BLD STRIP.AUTO-MCNC: 129 MG/DL (ref 65–99)
GLUCOSE BLD STRIP.AUTO-MCNC: 145 MG/DL (ref 65–99)
GLUCOSE BLD STRIP.AUTO-MCNC: 175 MG/DL (ref 65–99)
HBA1C MFR BLD: 6.7 % (ref 4–5.6)
SCCMEC + MECA PNL NOSE NAA+PROBE: NEGATIVE

## 2024-11-12 PROCEDURE — 97167 OT EVAL HIGH COMPLEX 60 MIN: CPT

## 2024-11-12 PROCEDURE — 700111 HCHG RX REV CODE 636 W/ 250 OVERRIDE (IP): Performed by: HOSPITALIST

## 2024-11-12 PROCEDURE — 99223 1ST HOSP IP/OBS HIGH 75: CPT | Performed by: HOSPITALIST

## 2024-11-12 PROCEDURE — 97602 WOUND(S) CARE NON-SELECTIVE: CPT

## 2024-11-12 PROCEDURE — 94640 AIRWAY INHALATION TREATMENT: CPT

## 2024-11-12 PROCEDURE — 83036 HEMOGLOBIN GLYCOSYLATED A1C: CPT

## 2024-11-12 PROCEDURE — 97162 PT EVAL MOD COMPLEX 30 MIN: CPT

## 2024-11-12 PROCEDURE — A9270 NON-COVERED ITEM OR SERVICE: HCPCS | Performed by: HOSPITALIST

## 2024-11-12 PROCEDURE — 87641 MR-STAPH DNA AMP PROBE: CPT

## 2024-11-12 PROCEDURE — 94760 N-INVAS EAR/PLS OXIMETRY 1: CPT

## 2024-11-12 PROCEDURE — 700102 HCHG RX REV CODE 250 W/ 637 OVERRIDE(OP): Performed by: EMERGENCY MEDICINE

## 2024-11-12 PROCEDURE — 97530 THERAPEUTIC ACTIVITIES: CPT

## 2024-11-12 PROCEDURE — 700102 HCHG RX REV CODE 250 W/ 637 OVERRIDE(OP): Performed by: HOSPITALIST

## 2024-11-12 PROCEDURE — 82962 GLUCOSE BLOOD TEST: CPT | Mod: 91

## 2024-11-12 PROCEDURE — 36415 COLL VENOUS BLD VENIPUNCTURE: CPT

## 2024-11-12 PROCEDURE — 770006 HCHG ROOM/CARE - MED/SURG/GYN SEMI*

## 2024-11-12 PROCEDURE — 700101 HCHG RX REV CODE 250: Performed by: HOSPITALIST

## 2024-11-12 PROCEDURE — 700105 HCHG RX REV CODE 258: Performed by: EMERGENCY MEDICINE

## 2024-11-12 PROCEDURE — 700111 HCHG RX REV CODE 636 W/ 250 OVERRIDE (IP): Mod: JZ | Performed by: EMERGENCY MEDICINE

## 2024-11-12 PROCEDURE — A9270 NON-COVERED ITEM OR SERVICE: HCPCS | Performed by: EMERGENCY MEDICINE

## 2024-11-12 RX ORDER — POLYETHYLENE GLYCOL 3350 17 G/17G
1 POWDER, FOR SOLUTION ORAL
Status: DISCONTINUED | OUTPATIENT
Start: 2024-11-12 | End: 2024-11-13 | Stop reason: HOSPADM

## 2024-11-12 RX ORDER — LEVOTHYROXINE SODIUM 88 UG/1
88 TABLET ORAL
Status: DISCONTINUED | OUTPATIENT
Start: 2024-11-12 | End: 2024-11-13 | Stop reason: HOSPADM

## 2024-11-12 RX ORDER — CARVEDILOL 12.5 MG/1
12.5 TABLET ORAL 2 TIMES DAILY WITH MEALS
Status: DISCONTINUED | OUTPATIENT
Start: 2024-11-12 | End: 2024-11-13 | Stop reason: HOSPADM

## 2024-11-12 RX ORDER — INSULIN LISPRO 100 [IU]/ML
2-9 INJECTION, SOLUTION INTRAVENOUS; SUBCUTANEOUS
Status: DISCONTINUED | OUTPATIENT
Start: 2024-11-12 | End: 2024-11-13 | Stop reason: HOSPADM

## 2024-11-12 RX ORDER — IPRATROPIUM BROMIDE AND ALBUTEROL SULFATE 2.5; .5 MG/3ML; MG/3ML
3 SOLUTION RESPIRATORY (INHALATION)
Status: DISCONTINUED | OUTPATIENT
Start: 2024-11-12 | End: 2024-11-13 | Stop reason: HOSPADM

## 2024-11-12 RX ORDER — ASPIRIN 81 MG/1
81 TABLET ORAL DAILY
Status: DISCONTINUED | OUTPATIENT
Start: 2024-11-12 | End: 2024-11-13

## 2024-11-12 RX ORDER — DEXTROSE MONOHYDRATE 25 G/50ML
25 INJECTION, SOLUTION INTRAVENOUS
Status: DISCONTINUED | OUTPATIENT
Start: 2024-11-12 | End: 2024-11-13 | Stop reason: HOSPADM

## 2024-11-12 RX ORDER — BENZONATATE 100 MG/1
100 CAPSULE ORAL 3 TIMES DAILY PRN
Status: DISCONTINUED | OUTPATIENT
Start: 2024-11-12 | End: 2024-11-13 | Stop reason: HOSPADM

## 2024-11-12 RX ORDER — AMOXICILLIN 250 MG
2 CAPSULE ORAL EVERY EVENING
Status: DISCONTINUED | OUTPATIENT
Start: 2024-11-12 | End: 2024-11-13 | Stop reason: HOSPADM

## 2024-11-12 RX ORDER — FUROSEMIDE 10 MG/ML
40 INJECTION INTRAMUSCULAR; INTRAVENOUS
Status: DISCONTINUED | OUTPATIENT
Start: 2024-11-12 | End: 2024-11-13 | Stop reason: HOSPADM

## 2024-11-12 RX ORDER — AMLODIPINE BESYLATE 10 MG/1
10 TABLET ORAL DAILY
Status: DISCONTINUED | OUTPATIENT
Start: 2024-11-12 | End: 2024-11-13 | Stop reason: HOSPADM

## 2024-11-12 RX ORDER — METOPROLOL SUCCINATE 25 MG/1
25 TABLET, EXTENDED RELEASE ORAL DAILY
Status: DISCONTINUED | OUTPATIENT
Start: 2024-11-12 | End: 2024-11-12

## 2024-11-12 RX ORDER — LOSARTAN POTASSIUM 50 MG/1
100 TABLET ORAL DAILY
Status: DISCONTINUED | OUTPATIENT
Start: 2024-11-12 | End: 2024-11-13 | Stop reason: HOSPADM

## 2024-11-12 RX ORDER — IPRATROPIUM BROMIDE AND ALBUTEROL SULFATE 2.5; .5 MG/3ML; MG/3ML
3 SOLUTION RESPIRATORY (INHALATION)
Status: DISCONTINUED | OUTPATIENT
Start: 2024-11-12 | End: 2024-11-12

## 2024-11-12 RX ORDER — ACETAMINOPHEN 325 MG/1
650 TABLET ORAL EVERY 6 HOURS PRN
Status: DISCONTINUED | OUTPATIENT
Start: 2024-11-12 | End: 2024-11-13 | Stop reason: HOSPADM

## 2024-11-12 RX ORDER — ONDANSETRON 4 MG/1
4 TABLET, ORALLY DISINTEGRATING ORAL EVERY 4 HOURS PRN
Status: DISCONTINUED | OUTPATIENT
Start: 2024-11-12 | End: 2024-11-13 | Stop reason: HOSPADM

## 2024-11-12 RX ORDER — MAGNESIUM SULFATE HEPTAHYDRATE 40 MG/ML
2 INJECTION, SOLUTION INTRAVENOUS ONCE
Status: COMPLETED | OUTPATIENT
Start: 2024-11-12 | End: 2024-11-12

## 2024-11-12 RX ORDER — FLUTICASONE PROPIONATE 110 UG/1
2 AEROSOL, METERED RESPIRATORY (INHALATION) 2 TIMES DAILY
Status: DISCONTINUED | OUTPATIENT
Start: 2024-11-12 | End: 2024-11-13 | Stop reason: HOSPADM

## 2024-11-12 RX ORDER — ONDANSETRON 2 MG/ML
4 INJECTION INTRAMUSCULAR; INTRAVENOUS EVERY 4 HOURS PRN
Status: DISCONTINUED | OUTPATIENT
Start: 2024-11-12 | End: 2024-11-13 | Stop reason: HOSPADM

## 2024-11-12 RX ORDER — HYDRALAZINE HYDROCHLORIDE 20 MG/ML
10 INJECTION INTRAMUSCULAR; INTRAVENOUS EVERY 4 HOURS PRN
Status: DISCONTINUED | OUTPATIENT
Start: 2024-11-12 | End: 2024-11-13 | Stop reason: HOSPADM

## 2024-11-12 RX ORDER — GUAIFENESIN/DEXTROMETHORPHAN 100-10MG/5
10 SYRUP ORAL EVERY 6 HOURS PRN
Status: DISCONTINUED | OUTPATIENT
Start: 2024-11-12 | End: 2024-11-13 | Stop reason: HOSPADM

## 2024-11-12 RX ORDER — METHYLPREDNISOLONE SODIUM SUCCINATE 40 MG/ML
40 INJECTION, POWDER, LYOPHILIZED, FOR SOLUTION INTRAMUSCULAR; INTRAVENOUS EVERY 12 HOURS
Status: DISCONTINUED | OUTPATIENT
Start: 2024-11-12 | End: 2024-11-13

## 2024-11-12 RX ADMIN — CARVEDILOL 12.5 MG: 12.5 TABLET, FILM COATED ORAL at 17:14

## 2024-11-12 RX ADMIN — METHYLPREDNISOLONE SODIUM SUCCINATE 40 MG: 40 INJECTION, POWDER, FOR SOLUTION INTRAMUSCULAR; INTRAVENOUS at 17:13

## 2024-11-12 RX ADMIN — METHYLPREDNISOLONE SODIUM SUCCINATE 40 MG: 40 INJECTION, POWDER, FOR SOLUTION INTRAMUSCULAR; INTRAVENOUS at 02:16

## 2024-11-12 RX ADMIN — MAGNESIUM SULFATE HEPTAHYDRATE 2 G: 2 INJECTION, SOLUTION INTRAVENOUS at 03:49

## 2024-11-12 RX ADMIN — LINEZOLID 600 MG: 600 TABLET, FILM COATED ORAL at 02:16

## 2024-11-12 RX ADMIN — CARVEDILOL 12.5 MG: 12.5 TABLET, FILM COATED ORAL at 08:59

## 2024-11-12 RX ADMIN — PIPERACILLIN AND TAZOBACTAM 4.5 G: 4; .5 INJECTION, POWDER, FOR SOLUTION INTRAVENOUS at 02:21

## 2024-11-12 RX ADMIN — ACETAMINOPHEN 650 MG: 325 TABLET ORAL at 06:20

## 2024-11-12 RX ADMIN — ACETAMINOPHEN 650 MG: 325 TABLET ORAL at 16:16

## 2024-11-12 RX ADMIN — FUROSEMIDE 40 MG: 10 INJECTION INTRAMUSCULAR; INTRAVENOUS at 17:07

## 2024-11-12 RX ADMIN — IPRATROPIUM BROMIDE AND ALBUTEROL SULFATE 3 ML: .5; 2.5 SOLUTION RESPIRATORY (INHALATION) at 17:12

## 2024-11-12 RX ADMIN — INSULIN LISPRO 2 UNITS: 100 INJECTION, SOLUTION INTRAVENOUS; SUBCUTANEOUS at 08:59

## 2024-11-12 RX ADMIN — AMLODIPINE BESYLATE 10 MG: 10 TABLET ORAL at 06:20

## 2024-11-12 RX ADMIN — FLUTICASONE PROPIONATE 220 MCG: 110 AEROSOL, METERED RESPIRATORY (INHALATION) at 06:20

## 2024-11-12 RX ADMIN — LEVOTHYROXINE SODIUM 88 MCG: 0.09 TABLET ORAL at 06:20

## 2024-11-12 RX ADMIN — LOSARTAN POTASSIUM 100 MG: 50 TABLET, FILM COATED ORAL at 06:20

## 2024-11-12 RX ADMIN — ASPIRIN 81 MG: 81 TABLET, COATED ORAL at 06:20

## 2024-11-12 ASSESSMENT — PAIN DESCRIPTION - PAIN TYPE
TYPE: ACUTE PAIN
TYPE: ACUTE PAIN
TYPE: CHRONIC PAIN
TYPE: ACUTE PAIN

## 2024-11-12 ASSESSMENT — ENCOUNTER SYMPTOMS
TINGLING: 0
SINUS PAIN: 0
BRUISES/BLEEDS EASILY: 0
WEAKNESS: 0
CHILLS: 0
VOMITING: 0
PALPITATIONS: 0
DIARRHEA: 0
STRIDOR: 0
HEADACHES: 0
BLURRED VISION: 0
HEMOPTYSIS: 0
PHOTOPHOBIA: 0
DIAPHORESIS: 0
COUGH: 1
CLAUDICATION: 0
DIZZINESS: 0
NECK PAIN: 0
BLOOD IN STOOL: 0
HALLUCINATIONS: 0
TREMORS: 0
FEVER: 0
WHEEZING: 1
EYE PAIN: 0
ORTHOPNEA: 0
SPUTUM PRODUCTION: 0
PND: 0
POLYDIPSIA: 0
SORE THROAT: 0
DOUBLE VISION: 0
CONSTIPATION: 1
FLANK PAIN: 0
MYALGIAS: 0
BACK PAIN: 0
DEPRESSION: 0
SHORTNESS OF BREATH: 0
FALLS: 0
ABDOMINAL PAIN: 0
NAUSEA: 0
HEARTBURN: 0

## 2024-11-12 ASSESSMENT — COGNITIVE AND FUNCTIONAL STATUS - GENERAL
DRESSING REGULAR LOWER BODY CLOTHING: A LOT
MOBILITY SCORE: 10
MOVING TO AND FROM BED TO CHAIR: A LOT
TURNING FROM BACK TO SIDE WHILE IN FLAT BAD: A LOT
SUGGESTED CMS G CODE MODIFIER MOBILITY: CL
DAILY ACTIVITIY SCORE: 18
TOILETING: TOTAL
SUGGESTED CMS G CODE MODIFIER DAILY ACTIVITY: CK
DRESSING REGULAR UPPER BODY CLOTHING: A LITTLE
HELP NEEDED FOR BATHING: A LOT
HELP NEEDED FOR BATHING: A LOT
DAILY ACTIVITIY SCORE: 16
SUGGESTED CMS G CODE MODIFIER DAILY ACTIVITY: CK
DRESSING REGULAR LOWER BODY CLOTHING: A LITTLE
MOBILITY SCORE: 12
CLIMB 3 TO 5 STEPS WITH RAILING: TOTAL
MOVING TO AND FROM BED TO CHAIR: A LOT
TOILETING: A LOT
STANDING UP FROM CHAIR USING ARMS: A LOT
WALKING IN HOSPITAL ROOM: A LOT
CLIMB 3 TO 5 STEPS WITH RAILING: A LOT
WALKING IN HOSPITAL ROOM: TOTAL
TURNING FROM BACK TO SIDE WHILE IN FLAT BAD: A LOT
DRESSING REGULAR UPPER BODY CLOTHING: A LITTLE
SUGGESTED CMS G CODE MODIFIER MOBILITY: CL
MOVING FROM LYING ON BACK TO SITTING ON SIDE OF FLAT BED: A LOT
MOVING FROM LYING ON BACK TO SITTING ON SIDE OF FLAT BED: A LOT
STANDING UP FROM CHAIR USING ARMS: A LOT

## 2024-11-12 ASSESSMENT — FIBROSIS 4 INDEX: FIB4 SCORE: 1.36

## 2024-11-12 ASSESSMENT — SOCIAL DETERMINANTS OF HEALTH (SDOH)
WITHIN THE PAST 12 MONTHS, YOU WORRIED THAT YOUR FOOD WOULD RUN OUT BEFORE YOU GOT THE MONEY TO BUY MORE: NEVER TRUE
WITHIN THE LAST YEAR, HAVE YOU BEEN HUMILIATED OR EMOTIONALLY ABUSED IN OTHER WAYS BY YOUR PARTNER OR EX-PARTNER?: NO
WITHIN THE LAST YEAR, HAVE TO BEEN RAPED OR FORCED TO HAVE ANY KIND OF SEXUAL ACTIVITY BY YOUR PARTNER OR EX-PARTNER?: NO
WITHIN THE LAST YEAR, HAVE YOU BEEN KICKED, HIT, SLAPPED, OR OTHERWISE PHYSICALLY HURT BY YOUR PARTNER OR EX-PARTNER?: NO
WITHIN THE LAST YEAR, HAVE YOU BEEN AFRAID OF YOUR PARTNER OR EX-PARTNER?: NO
WITHIN THE PAST 12 MONTHS, THE FOOD YOU BOUGHT JUST DIDN'T LAST AND YOU DIDN'T HAVE MONEY TO GET MORE: NEVER TRUE
IN THE PAST 12 MONTHS, HAS THE ELECTRIC, GAS, OIL, OR WATER COMPANY THREATENED TO SHUT OFF SERVICE IN YOUR HOME?: NO

## 2024-11-12 ASSESSMENT — LIFESTYLE VARIABLES
EVER FELT BAD OR GUILTY ABOUT YOUR DRINKING: NO
SUBSTANCE_ABUSE: 0
HAVE YOU EVER FELT YOU SHOULD CUT DOWN ON YOUR DRINKING: NO
TOTAL SCORE: 0
ON A TYPICAL DAY WHEN YOU DRINK ALCOHOL HOW MANY DRINKS DO YOU HAVE: 0
CONSUMPTION TOTAL: NEGATIVE
DOES PATIENT WANT TO STOP DRINKING: NO
HAVE PEOPLE ANNOYED YOU BY CRITICIZING YOUR DRINKING: NO
AVERAGE NUMBER OF DAYS PER WEEK YOU HAVE A DRINK CONTAINING ALCOHOL: 0
TOTAL SCORE: 0
TOTAL SCORE: 0
HOW MANY TIMES IN THE PAST YEAR HAVE YOU HAD 5 OR MORE DRINKS IN A DAY: 0
EVER HAD A DRINK FIRST THING IN THE MORNING TO STEADY YOUR NERVES TO GET RID OF A HANGOVER: NO

## 2024-11-12 ASSESSMENT — GAIT ASSESSMENTS: GAIT LEVEL OF ASSIST: UNABLE TO PARTICIPATE

## 2024-11-12 ASSESSMENT — ACTIVITIES OF DAILY LIVING (ADL): TOILETING: INDEPENDENT

## 2024-11-12 NOTE — RESPIRATORY CARE
" COPD EDUCATION by COPD CLINICAL EDUCATOR  11/12/2024 at 6:39 AM by Jonathan Rosario, RRT     Patient reviewed by COPD education team. Patient does not have a history or diagnosis of COPD and is a non-smoker.  Therefore, patient does not qualify for the COPD program.  Patient has Asthma and YOLA, [facility]    COPD Screen  COPD Risk Screening  Do you have a history of COPD?: No    COPD Assessment  COPD Clinical Specialists ONLY  COPD Education Initiated: No--Quick Screen (no hx/dx of copd and patient is non smoker. she has ashtma and YOLA , followed by [facility])  Interdisciplinary Rounds: Attendance at Rounds (30 Min)    PFT Results        Meds to Beds  [facility] for all eligible patients at discharge and you have been automatically enrolled in the Meds to Beds Program. Would you like to opt out of this program for any reason?: No - Stay Opted In     MY COPD ACTION PLAN     It is recommended that patients and physicians /healthcare providers complete this action plan together. This plan should be discussed at each physician visit and updated as needed.    The green, yellow and red zones show groups of symptoms of COPD. This list of symptoms is not comprehensive, and you may experience other symptoms. In the \"Actions\" column, your healthcare provider has recommended actions for you to take based on your symptoms.    Patient Name: Cecile Teixeira   YOB: 1945   Last Updated on:     Green Zone:  I am doing well today Actions     Usual activitiy and exercise level   Take daily medications     Usual amounts of cough and phlegm/mucus   Use oxygen as prescribed     Sleep well at night   Continue regular exercise/diet plan     Appetite is good   At all times avoid cigarette smoke, inhaled irritants     Daily Medications (these medications are taken every day):                Yellow Zone:  I am having a bad day or a COPD flare Actions     More " "breathless than usual   Continue daily medications     I have less energy for my daily activities   Use quick relief inhaler as ordered     Increased or thicker phlegm/mucus   Use oxygen as prescribed     Using quick relief inhaler/nebulizer more often   Get plenty of rest     Swelling of ankles more than usual   Use pursed lip breathing     More coughing than usual   At all times avoid cigarette smoke, inhaled irritants     I feel like I have a \"chest cold\"     Poor sleep and my symptoms woke me up     My appetite is not good     My medicine is not helping      Call provider immediately if symptoms don’t improve     Continue daily medications, add rescue medications:               Medications to be used during a flare up, (as Discussed with Provider):              Red Zone:  I need urgent medical care Actions     Severe shortness of breath even at rest   Call 911 or seek medical care immediately     Not able to do any activity because of breathing      Fever or shaking chills      Feeling confused or very drowsy       Chest pains      Coughing up blood                  "

## 2024-11-12 NOTE — ED NOTES
Pt transferred to commode because she states she needs to have a bowel movement. Pt had one large very loose BM. Pt stating she feels like there's still more in there, but that she feels better. Pt transferred back to bed.

## 2024-11-12 NOTE — DISCHARGE PLANNING
HTH/SCP TCN chart review completed. Collaborated with CM post to meeting with the pt. The most current review of medical record, knowledge of pt's PLOF and social support, LACE+ score of 69, 6 clicks scores of 12 mobility were considered.      Pt seen at bedside. Introduced TCN program. Provided education regarding post acute levels of care. Education provided regarding case management policy for blanket SNF referrals. Discussed HTH/SCP plan benefits. Pt verbalizes understanding; reports she will refuse all post acute placement.    Patient reports she lives alone, and only transfers to [employer] at baseline.  Patient reports granddaughter checks on her frequently and daughter checks on her every other day.  Patient also owns FWW and cane, however reports does not use for mobility purposes.  Patient states she utilizes [facility] for transport and has a  that comes in 1x/week.  Patient states she cooks independently.  Patient provided choice for [facility] (resumption) and DME ([facility]).  TCN/patient discussed patient readmission to hospital, and patient reports was not her function, it was not having functional DME equipment.   No further TCN needs at this time.      Choice proactively obtained for HH ([facility]) and DME ([facility]) and faxed to [facility].  Current discharge considerations are home with home health and DME ([facility]) if needed at time of discharge. TCN will continue to follow and collaborate with discharge planning team as additional post acute needs arise. Thank you.     Completed today:  PT/OT orders in chart   Choice obtained: HH ([facility]), DME ([facility])  Pt aware of [facility]'s blanket referral policy; patient reports she will refuse.  SCP with [facility] PCP.

## 2024-11-12 NOTE — ED NOTES
Called patient back and informed him MD has placed a referral to see Dr. Roy.   I did inform him that he is hard to get into and it could be a month until seen.    Pt to CT scan.

## 2024-11-12 NOTE — ED NOTES
"CT tech brought pt back from imaging, states they were unable to get CT scan because pt had an emesis and they \"did not feel comfortable scanning her\". ERP notified, new orders received for zofran.   "

## 2024-11-12 NOTE — H&P
Hospital Medicine History & Physical Note    Date of Service  11/12/2024    Primary Care Physician  GERBER Sood    Consultants      Specialist Names:     Code Status  Full Code    Chief Complaint  Chief Complaint   Patient presents with    Shortness of Breath     PT discharged recently from hospital after being treated for pneumonia. PT was supposed to receive home O2 but never received appropriate home device. PT found to be saturating 76% on RA per EMS. PT reports cough and SOB no worse then when initially discharged from hospital.      Constipation     PT reports no BM x 6 days.       History of Presenting Illness  Cecile Teixeira is a 79 y.o. female who presented 11/11/2024 with past medical history of sleep apnea, hypertension, heart failure preserved EF, asthma, hypothyroidism who presents to the hospital for constipation for 6 days.  It is associated with diffuse abdominal pain, nausea and vomiting.  Patient was discharged from the hospital on 11/9 where she was admitted for asthma exacerbation and pneumonia.  The patient was discharged on 3 L of oxygen.  She had the tank delivered but not the device.  It was recommended for her to go to postacute care but the patient refused.  After patient was discharged she continued to feel short of breath and had a nonproductive cough.  She denies any fevers, chest pain, headaches, sore throat or nasal congestion.    Chest x-ray interpreted by me found bilateral infiltrates and increased intravascular congestion    I discussed the plan of care with patient.    Review of Systems  Review of Systems   Constitutional:  Negative for chills, diaphoresis, fever and malaise/fatigue.   HENT:  Negative for congestion, ear discharge, ear pain, hearing loss, nosebleeds, sinus pain, sore throat and tinnitus.    Eyes:  Negative for blurred vision, double vision, photophobia and pain.   Respiratory:  Positive for cough and wheezing. Negative for hemoptysis, sputum production,  shortness of breath and stridor.    Cardiovascular:  Negative for chest pain, palpitations, orthopnea, claudication, leg swelling and PND.   Gastrointestinal:  Positive for constipation. Negative for abdominal pain, blood in stool, diarrhea, heartburn, melena, nausea and vomiting.   Genitourinary:  Negative for dysuria, flank pain, frequency, hematuria and urgency.   Musculoskeletal:  Negative for back pain, falls, joint pain, myalgias and neck pain.   Skin:  Negative for itching and rash.   Neurological:  Negative for dizziness, tingling, tremors, weakness and headaches.   Endo/Heme/Allergies:  Negative for environmental allergies and polydipsia. Does not bruise/bleed easily.   Psychiatric/Behavioral:  Negative for depression, hallucinations, substance abuse and suicidal ideas.        Past Medical History   has a past medical history of Asthma, Blood clots in brain (2000), Chest pain, Cough, GERD (gastroesophageal reflux disease), Hypothyroidism, Osteoarthritis, Painful breathing, Pleurodynia (07/20/2024), Shortness of breath, Sleep apnea, Sputum production, and Wheezing.    Surgical History   has a past surgical history that includes other (Left, [id number]).     Family History  Family history reviewed with patient. There is no family history that is pertinent to the chief complaint.     Social History   reports that she has quit smoking. Her smoking use included cigarettes. She has never used smokeless tobacco. She reports that she does not currently use alcohol. She reports that she does not use drugs.    Allergies  Allergies   Allergen Reactions    Doxycycline Hives    Prednisone Unspecified     Diarrhea and very irritable     Cephalexin Diarrhea     Diarrhea and mild rash     Montelukast Rash     Leg pain, back pain and rash     Spinach Unspecified     Skin allergy test    Cow's Milk [Lac Bovis]     Other Environmental      Trees except oak    Codeine Unspecified     Headaches and confusion     Lactose  Unspecified     Lactose intolerance since a child     Levothyroxine Anxiety     Panic attack     Cannot take generic    Sulfa Drugs Unspecified     Mental status change        Medications  Prior to Admission Medications   Prescriptions Last Dose Informant Patient Reported? Taking?   Multiple Vitamins-Minerals (CENTRUM ADULT 50+ MULTIGUMMIES PO) Unknown Patient, Historical Yes No   Sig: Take 1 Tablet by mouth every day. Indications: supplement   Nystatin Powder Unknown Patient, Historical No No   Sig: Apply 1 Application topically 2 times a day as needed (skin breakdown).   acetaminophen (TYLENOL) 500 MG Tab Unknown  No No   Sig: Take 1 Tablet by mouth every 6 hours as needed for Mild Pain or Fever.   albuterol 108 (90 Base) MCG/ACT Aero Soln inhalation aerosol Unknown Patient, Historical No No   Sig: Inhale 2 Puffs every 6 hours as needed for Shortness of Breath. Indications: Asthma   amLODIPine (NORVASC) 10 MG Tab Unknown Patient, Historical No No   Sig: Take 1 Tablet by mouth every day. Indications: High Blood Pressure   aspirin 81 MG EC tablet Unknown Patient, Historical No No   Sig: Take 1 Tablet by mouth every day.   benzonatate (TESSALON) 100 MG Cap Unknown Patient, Historical No No   Sig: Take 1 Capsule by mouth 3 times a day as needed for Cough.   carvedilol (COREG) 12.5 MG Tab Unknown Patient No No   Sig: Take 1 Tablet by mouth 2 times a day with meals.   cetirizine (ZYRTEC) 10 MG Tab Unknown Patient, Historical No No   Sig: Take 1 Tablet by mouth at bedtime. FOR ALLERGIES   fluticasone (FLONASE) 50 MCG/ACT nasal spray Unknown Patient, Historical No No   Sig: ADMINISTER 1 SPRAY INTO AFFECTED NOSTRIL(S) EVERY DAY.   fluticasone (FLOVENT HFA) 110 MCG/ACT Aerosol Unknown Patient, Historical No No   Sig: Inhale 2 Puffs 2 times a day. Indications: Asthma   guaiFENesin dextromethorphan (ROBITUSSIN DM) 100-10 MG/5ML Syrup syrup Unknown  No No   Sig: Take 10 mL by mouth every 6 hours as needed for Cough.    levothyroxine (SYNTHROID) 88 MCG Tab Unknown Patient, Historical No No   Sig: Take 1 Tablet by mouth every morning on an empty stomach.   losartan (COZAAR) 100 MG Tab Unknown Patient, Historical No No   Sig: Take 1 Tablet by mouth every day.   metoprolol SR (TOPROL XL) 25 MG TABLET SR 24 HR Unknown Patient, Historical Yes No   Sig: Take 25 mg by mouth every day.   omeprazole (PRILOSEC) 20 MG delayed-release capsule Unknown  No No   Sig: Take 1 Capsule by mouth every day.   ondansetron (ZOFRAN ODT) 4 MG TABLET DISPERSIBLE Unknown  No No   Sig: Take 1 Tablet by mouth every four hours as needed for Nausea/Vomiting.   prochlorperazine (COMPAZINE) 10 MG Tab Unknown  No No   Sig: Take 1 Tablet by mouth every 6 hours as needed for Nausea/Vomiting (use SECOND if zofran ineffective).      Facility-Administered Medications: None       Physical Exam  Temp:  [36.3 °C (97.3 °F)] 36.3 °C (97.3 °F)  Pulse:  [50-89] 89  Resp:  [22-24] 22  BP: (144-152)/(67-74) 152/74  SpO2:  [89 %-91 %] 89 %  Blood Pressure : (!) 152/74   Temperature: 36.3 °C (97.3 °F)   Pulse: 89   Respiration: (!) 22   Pulse Oximetry: 89 %       Physical Exam  Vitals and nursing note reviewed.   Constitutional:       General: She is not in acute distress.     Appearance: Normal appearance. She is not ill-appearing, toxic-appearing or diaphoretic.   HENT:      Head: Normocephalic and atraumatic.      Nose: No congestion or rhinorrhea.      Mouth/Throat:      Pharynx: No oropharyngeal exudate or posterior oropharyngeal erythema.   Eyes:      General: No scleral icterus.  Neck:      Vascular: No carotid bruit or JVD.   Cardiovascular:      Rate and Rhythm: Normal rate and regular rhythm.      Pulses: Normal pulses.      Heart sounds: Normal heart sounds. No murmur heard.     No friction rub. No gallop.   Pulmonary:      Effort: Pulmonary effort is normal. No respiratory distress.      Breath sounds: No stridor. Wheezing present. No rhonchi or rales.   Abdominal:       General: Abdomen is flat. There is no distension.      Palpations: There is no mass.      Tenderness: There is no abdominal tenderness. There is no left CVA tenderness, guarding or rebound.      Hernia: No hernia is present.   Musculoskeletal:         General: No swelling. Normal range of motion.      Cervical back: No rigidity. No muscular tenderness.      Right lower leg: No edema.      Left lower leg: No edema.   Lymphadenopathy:      Cervical: No cervical adenopathy.   Skin:     General: Skin is warm and dry.      Capillary Refill: Capillary refill takes more than 3 seconds.      Coloration: Skin is not jaundiced or pale.      Findings: No bruising or erythema.   Neurological:      Mental Status: She is alert.         Laboratory:  Recent Labs     11/11/24 2019   WBC 18.2*   RBC 4.49   HEMOGLOBIN 13.8   HEMATOCRIT 42.1   MCV 93.8   MCH 30.7   MCHC 32.8   RDW 49.1   PLATELETCT 267   MPV 9.8     Recent Labs     11/11/24 2019   SODIUM 142   POTASSIUM 4.4   CHLORIDE 105   CO2 24   GLUCOSE 190*   BUN 41*   CREATININE 0.80   CALCIUM 9.1     Recent Labs     11/11/24 2019   ALTSGPT 19   ASTSGOT 20   ALKPHOSPHAT 60   TBILIRUBIN 0.8   GLUCOSE 190*         Recent Labs     11/09/24  0914 11/11/24 2019   NTPROBNP 265* 151*         Recent Labs     11/11/24  2019   TROPONINT 17       Imaging:  CT-ABDOMEN-PELVIS W/O   Final Result         1.  Linear consolidations in the bilateral lung bases, appearance concerning for component of infiltrate.   2.  Hepatomegaly with slight irregular hepatic contour favoring changes of cirrhosis   3.  Large anterior abdominal hernia containing a portion of transverse colon without visualized obstructive changes.   4.  Left adrenal nodule, density compatible with adenoma.   5.  Diverticulosis   6.  Cholelithiasis   7.  Atherosclerosis and atherosclerotic coronary artery disease      DX-CHEST-PORTABLE (1 VIEW)   Final Result         1.  Hazy bilateral lower lobe opacity suggesting subtle  infiltrates   2.  Cardiomegaly   3.  Atherosclerosis          X-Ray:  I have personally reviewed the images and compared with prior images.  EKG:  I have personally reviewed the images and compared with prior images.    Assessment/Plan:  Justification for Admission Status  I anticipate this patient will require at least two midnights for appropriate medical management, necessitating inpatient admission because asthma exacerbation    Patient will need a Med/Surg bed on MEDICAL service .  The need is secondary to asthma exacerbation.    * Moderate persistent asthma with exacerbation- (present on admission)  Assessment & Plan  Patient continues to have wheezing and shortness of breath  Solu-Medrol 40 twice daily  DuoNebs every 4 hours  Continue Flovent    Pneumonia- (present on admission)  Assessment & Plan  Patient does have bilateral infiltrates on her chest x-ray but this is likely from a treated pneumonia  Procalcitonin is negative  I will hold off on further antibiotics for now    Chronic heart failure with preserved ejection fraction (HCC)- (present on admission)  Assessment & Plan  Compensated  Monitor volume status    Acute respiratory failure with hypoxia (HCC)- (present on admission)  Assessment & Plan  On 8 L of O2 above baseline  Secondary to asthma exacerbation    Type 2 diabetes mellitus, without long-term current use of insulin (HCC)- (present on admission)  Assessment & Plan  Start on insulin sliding scale with serial Accu-Checks  Hypoglycemic protocol in place      Primary hypertension- (present on admission)  Assessment & Plan  controlled  Continue current home medications  IV as needed medications have been ordered      YOLA (obstructive sleep apnea)- (present on admission)  Assessment & Plan  CPAP ordered    Acquired hypothyroidism- (present on admission)  Assessment & Plan  Continue home medications        VTE prophylaxis: SCDs/TEDs

## 2024-11-12 NOTE — CARE PLAN
The patient is Watcher - Medium risk of patient condition declining or worsening    Shift Goals  Clinical Goals: Maintain adequate oxygenation and gas exchange.  Patient Goals: eat and rest  Family Goals: none present    Progress made toward(s) clinical / shift goals:  Non-productive cough noted, SOB on exertion. On , O2 decreased to 3lpm, O2 sat. 93%, and kept hob elevated.    Patient is not progressing towards the following goals: n/a

## 2024-11-12 NOTE — ED NOTES
"Pt yelling \"help\" in room. Upon entering, pt states she that her \"butt hurts\" because she is constipated. Pt informed that the ERP did not order pain medication currently and wants to wait for the results of the pt's CT scan. Pt yells \"Well then I'm just going to keep yelling until I get my pain meds!\". RN informed pt that this is not appropriate behavior and that we cannot give her pain meds. Pt proceeds to yell \"Help! Help!\". ERP at bedside.   "

## 2024-11-12 NOTE — ED NOTES
Med rec updated and complete. Allergies reviewed.     Pt confirmed name and date of birth.    Pt reports that she was recently discharged from [facility]. Since discharge pt has not taken any of her medications.      [facility] pharmacy  [facility] [phone]    Discharge prescription filled at  [facility] = [phone]

## 2024-11-12 NOTE — ASSESSMENT & PLAN NOTE
Patient does have bilateral infiltrates on her chest x-ray but this is likely from a treated pneumonia  Procalcitonin is negative  I will hold off on further antibiotics for now

## 2024-11-12 NOTE — ASSESSMENT & PLAN NOTE
Patient continues to have wheezing and shortness of breath  Solu-Medrol 40 twice daily  DuoNebs every 4 hours  Continue Flovent

## 2024-11-12 NOTE — CARE PLAN
The patient is Stable - Low risk of patient condition declining or worsening    Shift Goals  Clinical Goals: Keep adequate spO2 and ween O2 back to baseline of 3L  Patient Goals: Bowel movement  Family Goals: Family not present    Progress made toward(s) clinical / shift goals:        Problem: Knowledge Deficit - Standard  Goal: Patient and family/care givers will demonstrate understanding of plan of care, disease process/condition, diagnostic tests and medications  11/12/2024 1320 by Latisha Santos R.N.  Outcome: Progressing  Patient updated on plan of care.     Problem: Respiratory:  Goal: Respiratory status will improve  Outcome: Progressing  Patient's oxygen baseline is 3L. Patient was on 5L NC during breakfast. Weaning patient's oxygen down, 4L NC at 1200.       Patient is not progressing towards the following goals:

## 2024-11-12 NOTE — ED PROVIDER NOTES
ED Provider Note    CHIEF COMPLAINT  Chief Complaint   Patient presents with    Shortness of Breath     PT discharged recently from hospital after being treated for pneumonia. PT was supposed to receive home O2 but never received appropriate home device. PT found to be saturating 76% on RA per EMS. PT reports cough and SOB no worse then when initially discharged from hospital.      Constipation     PT reports no BM x 6 days.       EXTERNAL RECORDS REVIEWED  Inpatient Notes discharge summary 11/9/2024 after admission for shortness of breath, hypoxia.  History YOLA on CPAP, type 2 diabetes, hypertension, HFpEF, asthma/COPD, hypothyroidism and ongoing hypoxia..Discharged a day prior for acute hypoxic respiratory failure due to asthma exacerbation with community-acquired pneumonia.  Discharged on 3 L/min oxygen.  After discharge she developed ongoing dyspnea and cough.  Did not obtain her prescriptions after discharge.  Chest x-ray with left basilar opacity.  Procalcitonin was negative for which antibiotics were discontinued absence of expected benefit.  Respiratory PCR was negative as well.  During hospitalization patient discontinued Geodon for behavioral disturbance.  Advised threatening or injuring healthcare workers a felony for charges were pressed and she would be discharged to the custody of [facility].  Thereafter she was calm and cooperative.  Transitional care navigator assist with referral to home health services to [facility]  Her granddaughter was advised that she did not wish for postacute care and that home health confirmation her candidacy is not a requirement of discharge due to limiting insurance, geographic and behavioral factors.  Patient indicated that an adapter was missing from her CPAP which must be replaced per [employer].  Nocturnal CPAP capability is not a requirement of discharge from acute care due to the chronic nature of YOLA/OHS.  Oxygen remained at 3 L  for which updated oxygen order was provided to [facility] and will not require change in equipment.  Additional tubing was provided by the hospital supply if not confirmed.  Discharge prescriptions were provided for meds to bed.  Transportation provided through [facility].    HPI/ROS  LIMITATION TO HISTORY   Select: : None  OUTSIDE HISTORIAN(S):  None    Cecile Teixeira is a 79 y.o. female who presents to the to the emergency department by ambulance for constipation.  Patient states she has not had a bowel movement in 6 days.  Generalized abdominal discomfort.  Nausea without vomiting.  Appetite is normal.    Patient also complaining of shortness of breath, chronic congestion and dry nose.  Found saturating 76% on room air per EMS.  Patient states cough and shortness of breath have worsened from discharge last week.  Denies fever.  Denies chest pain.  Denies syncope.    PAST MEDICAL HISTORY   has a past medical history of Asthma, Blood clots in brain (2000), Chest pain, Cough, GERD (gastroesophageal reflux disease), Hypothyroidism, Osteoarthritis, Painful breathing, Pleurodynia (07/20/2024), Shortness of breath, Sleep apnea, Sputum production, and Wheezing.    SURGICAL HISTORY   has a past surgical history that includes other (Left, [id number]).    FAMILY HISTORY  No family history on file.    SOCIAL HISTORY  Social History     Tobacco Use    Smoking status: Former     Types: Cigarettes    Smokeless tobacco: Never    Tobacco comments:     only 1.5 yr. only social   Vaping Use    Vaping status: Never Used   Substance and Sexual Activity    Alcohol use: Not Currently     Comment: rare     Drug use: Never    Sexual activity: Not on file       CURRENT MEDICATIONS  Home Medications       Reviewed by Aaron Pruett (Pharmacy Tech) on 11/11/24 at 2312  Med List Status: Complete     Medication Last Dose Status   acetaminophen (TYLENOL) 500 MG Tab Unknown Active   albuterol 108 (90 Base) MCG/ACT Aero Soln inhalation aerosol Unknown  Active   amLODIPine (NORVASC) 10 MG Tab Unknown Active   aspirin 81 MG EC tablet Unknown Active   benzonatate (TESSALON) 100 MG Cap Unknown Active   carvedilol (COREG) 12.5 MG Tab Unknown Active   cetirizine (ZYRTEC) 10 MG Tab Unknown Active   fluticasone (FLONASE) 50 MCG/ACT nasal spray Unknown Active   fluticasone (FLOVENT HFA) 110 MCG/ACT Aerosol Unknown Active   guaiFENesin dextromethorphan (ROBITUSSIN DM) 100-10 MG/5ML Syrup syrup Unknown Active   levothyroxine (SYNTHROID) 88 MCG Tab Unknown Active   losartan (COZAAR) 100 MG Tab Unknown Active   metoprolol SR (TOPROL XL) 25 MG TABLET SR 24 HR Unknown Active   Multiple Vitamins-Minerals (CENTRUM ADULT 50+ MULTIGUMMIES PO) Unknown Active   Nystatin Powder Unknown Active   omeprazole (PRILOSEC) 20 MG delayed-release capsule Unknown Active   ondansetron (ZOFRAN ODT) 4 MG TABLET DISPERSIBLE Unknown Active   prochlorperazine (COMPAZINE) 10 MG Tab Unknown Active                  Audit from Redirected Encounters    **Home medications have not yet been reviewed for this encounter**         ALLERGIES  Allergies   Allergen Reactions    Doxycycline Hives    Prednisone Unspecified     Diarrhea and very irritable     Cephalexin Diarrhea     Diarrhea and mild rash     Montelukast Rash     Leg pain, back pain and rash     Spinach Unspecified     Skin allergy test    Cow's Milk [Lac Bovis]     Other Environmental      Trees except oak    Codeine Unspecified     Headaches and confusion     Lactose Unspecified     Lactose intolerance since a child     Levothyroxine Anxiety     Panic attack     Cannot take generic    Sulfa Drugs Unspecified     Mental status change        PHYSICAL EXAM  VITAL SIGNS: BP (!) 152/74   Pulse 89   Temp 36.3 °C (97.3 °F) (Temporal)   Resp (!) 22   Ht 1.524 m (5')   Wt (!) 141 kg (310 lb)   SpO2 89%   BMI 60.54 kg/m²    Pulse ox interpretation: I interpret this pulse ox as low  Constitutional: Alert in no apparent distress.  Morbidly obese.   Disheveled.  HENT: Normocephalic, atraumatic. Bilateral external ears normal, Nose normal.   Eyes: Pupils are equal and reactive, Conjunctiva normal.   Neck: Normal range of motion, Supple..   Lymphatic: No lymphadenopathy noted.   Cardiovascular: Regular rate and rhythm, no murmurs. Distal pulses intact.    Thorax & Lungs:  diminished bilaterally, diffuse wheeze, some rhonchi as well.  No increased work of breathing, clip speech or retractions.  Abdomen: Obese, soft, nondistended.  Generally tender to palpation without focal guarding, peritonitis.  No palpable or pulsatile mass.  Skin: Warm, Dry, No erythema, No rash.   Musculoskeletal: Good range of motion in all major joints.   Neurologic: Alert and orient x 3.  Moves 4 extremity spontaneously  Psychiatric: Odd affect.    EKG/LABS  Results for orders placed or performed during the hospital encounter of 11/11/24   Blood Culture - Draw one from central line and one from peripheral site    Collection Time: 11/11/24  8:14 PM    Specimen: Line; Blood   Result Value Ref Range    Significant Indicator NEG     Source BLD     Site Peripheral     Culture Result       No Growth  Note: Blood cultures are incubated for 5 days and  are monitored continuously.Positive blood cultures  are called to the RN and reported as soon as  they are identified.     Lactic Acid    Collection Time: 11/11/24  8:19 PM   Result Value Ref Range    Lactic Acid 1.5 0.5 - 2.0 mmol/L   CBC with Differential    Collection Time: 11/11/24  8:19 PM   Result Value Ref Range    WBC 18.2 (H) 4.8 - 10.8 K/uL    RBC 4.49 4.20 - 5.40 M/uL    Hemoglobin 13.8 12.0 - 16.0 g/dL    Hematocrit 42.1 37.0 - 47.0 %    MCV 93.8 81.4 - 97.8 fL    MCH 30.7 27.0 - 33.0 pg    MCHC 32.8 32.2 - 35.5 g/dL    RDW 49.1 35.9 - 50.0 fL    Platelet Count 267 164 - 446 K/uL    MPV 9.8 9.0 - 12.9 fL    Neutrophils-Polys 92.00 (H) 44.00 - 72.00 %    Lymphocytes 2.30 (L) 22.00 - 41.00 %    Monocytes 4.10 0.00 - 13.40 %    Eosinophils  0.60 0.00 - 6.90 %    Basophils 0.10 0.00 - 1.80 %    Immature Granulocytes 0.90 0.00 - 0.90 %    Nucleated RBC 0.00 0.00 - 0.20 /100 WBC    Neutrophils (Absolute) 16.73 (H) 1.82 - 7.42 K/uL    Lymphs (Absolute) 0.42 (L) 1.00 - 4.80 K/uL    Monos (Absolute) 0.75 0.00 - 0.85 K/uL    Eos (Absolute) 0.11 0.00 - 0.51 K/uL    Baso (Absolute) 0.01 0.00 - 0.12 K/uL    Immature Granulocytes (abs) 0.17 (H) 0.00 - 0.11 K/uL    NRBC (Absolute) 0.00 K/uL   Complete Metabolic Panel    Collection Time: 11/11/24  8:19 PM   Result Value Ref Range    Sodium 142 135 - 145 mmol/L    Potassium 4.4 3.6 - 5.5 mmol/L    Chloride 105 96 - 112 mmol/L    Co2 24 20 - 33 mmol/L    Anion Gap 13.0 7.0 - 16.0    Glucose 190 (H) 65 - 99 mg/dL    Bun 41 (H) 8 - 22 mg/dL    Creatinine 0.80 0.50 - 1.40 mg/dL    Calcium 9.1 8.5 - 10.5 mg/dL    Correct Calcium 9.2 8.5 - 10.5 mg/dL    AST(SGOT) 20 12 - 45 U/L    ALT(SGPT) 19 2 - 50 U/L    Alkaline Phosphatase 60 30 - 99 U/L    Total Bilirubin 0.8 0.1 - 1.5 mg/dL    Albumin 3.9 3.2 - 4.9 g/dL    Total Protein 7.0 6.0 - 8.2 g/dL    Globulin 3.1 1.9 - 3.5 g/dL    A-G Ratio 1.3 g/dL   Procalcitonin    Collection Time: 11/11/24  8:19 PM   Result Value Ref Range    Procalcitonin 0.09 <0.25 ng/mL   Troponin    Collection Time: 11/11/24  8:19 PM   Result Value Ref Range    Troponin T 17 6 - 19 ng/L   proBrain Natriuretic Peptide, NT    Collection Time: 11/11/24  8:19 PM   Result Value Ref Range    NT-proBNP 151 (H) 0 - 125 pg/mL   ESTIMATED GFR    Collection Time: 11/11/24  8:19 PM   Result Value Ref Range    GFR (CKD-EPI) 75 >60 mL/min/1.73 m 2   POC CoV-2, FLU A/B, RSV by PCR    Collection Time: 11/11/24  9:13 PM   Result Value Ref Range    POC Influenza A RNA, PCR Negative Negative    POC Influenza B RNA, PCR Negative Negative    POC RSV, by PCR Negative Negative    POC SARS-CoV-2, PCR NotDetected NotDetected       I have independently interpreted this EKG    RADIOLOGY/PROCEDURES   I have independently  interpreted the diagnostic imaging associated with this visit and am waiting the final reading from the radiologist.   My preliminary interpretation is as follows:   Chest x-ray: Bibasilar opacities.  Improved from prior.    Radiologist interpretation:  CT-ABDOMEN-PELVIS W/O   Final Result         1.  Linear consolidations in the bilateral lung bases, appearance concerning for component of infiltrate.   2.  Hepatomegaly with slight irregular hepatic contour favoring changes of cirrhosis   3.  Large anterior abdominal hernia containing a portion of transverse colon without visualized obstructive changes.   4.  Left adrenal nodule, density compatible with adenoma.   5.  Diverticulosis   6.  Cholelithiasis   7.  Atherosclerosis and atherosclerotic coronary artery disease      DX-CHEST-PORTABLE (1 VIEW)   Final Result         1.  Hazy bilateral lower lobe opacity suggesting subtle infiltrates   2.  Cardiomegaly   3.  Atherosclerosis          COURSE & MEDICAL DECISION MAKING    ASSESSMENT, COURSE AND PLAN  Care Narrative:   Seen evaluated at bedside.  Proceed with septic workup per protocol for history of shortness of breath, hypoxia pneumonia and COPD.  There is clinical evidence for COPD exacerbation, allergic to prednisone, add DuoNeb and continuous albuterol neb.  Add chest x-ray.  Add noncontrast abdominal CT for history constipation, prior abdominal surgeries, abdominal pain.    10:56 PM leukocytosis with WBC 18.2 (15.511/8, however 19.4 and 18.2 11/5 and 11/6 respectively), leftward shift and mild bandemia.  No electrolyte derangement.  Renal function preserved.  Lactic acid is normal.  Troponin normal BNP nonspecific with minimal elevation 151.  No clinical evidence for fluid overload.  Procalcitonin normal.  Viral studies negative.  However, CT with linear consolidations in the bilateral lung bases appearance concerning for component of infiltrate.  Large anterior abdominal wall hernias without obstructive  changes.  Diverticulosis, cholelithiasis.  Body of the report with no bowel obstruction or acute inflammation.  Scattered colonic diverticuli.  11.8 cm umbilical hernia containing portions of transverse colon without visualized obstructive changes.  Appendix normal.  Personal review of imaging does show some increased colonic stool, rectal bolus.  Add enema due to patient's ongoing discomfort.  She will need ongoing bowel regimen.  She is reevaluated at bedside.  Continues to need 6 L of supplemental oxygen, continues dry cough and expiratory wheeze despite DuoNeb, continuous albuterol neb.  However, no acute respiratory distress.  Hemodynamically stable otherwise.  Given leukocytosis and CT evidence of bibasilar infiltrate, recent hospitalization antibiotics per protocol for hospital-acquired pneumonia to include Zosyn and Vanco.  She was hospitalized for further evaluation and treatment.    ADDITIONAL PROBLEMS MANAGED  Asthma/COPD -oxygen dependent  YOLA on CPAP  Type 2 diabetes  Hypertension  HFpEF  Hypothyroidism    DISPOSITION AND DISCUSSIONS  I have discussed management of the patient with the following physicians and SAGAR's:    Dr. Zavala is aware the patient agreeable to consultation.    Discussion of management with other \[facility]\"" or appropriate source(s):   Ph[facility] regarding antibiotic choice will switch Vanco to Zyvox given IV fluid shortage      CRITICAL CARE  The very real possibilty of a deterioration of this patient's condition required the highest level of my preparedness for sudden, emergent intervention.  I provided critical care services, which included medication orders, frequent reevaluations of the patient's condition and response to treatment, ordering and reviewing test results, and discussing the case with various consultants.  The critical care time associated with the care of the patient was 35 minutes. Review chart for interventions. This time is exclusive of any other billable procedures.        FINAL DIAGNOSIS  1. Acute on chronic respiratory failure with hypoxia (HCC)    2. Acute exacerbation of chronic obstructive pulmonary disease (COPD) (HCC)    3. Constipation, unspecified constipation type    4. Calculus of gallbladder without cholecystitis without obstruction         Electronically signed by: Padmini Vidales D.O., 11/11/2024 8:43 PM

## 2024-11-12 NOTE — WOUND TEAM
[facility]  Inpatient Services  Wound and Skin Care Brief Evaluation    Admission Date: 11/11/2024     Last order of IP CONSULT TO WOUND CARE was found on 11/12/2024 from Hospital Encounter on 11/11/2024     HPI, PMH, SH: Reviewed    Chief Complaint   Patient presents with    Shortness of Breath     PT discharged recently from hospital after being treated for pneumonia. PT was supposed to receive home O2 but never received appropriate home device. PT found to be saturating 76% on RA per EMS. PT reports cough and SOB no worse then when initially discharged from hospital.      Constipation     PT reports no BM x 6 days.     Diagnosis: Acute respiratory failure with hypoxia (HCC) [J96.01]    Unit where seen by Wound Team: S615/01     Wound consult placed regarding Bilateral feet. Chart and images reviewed. This discussed with bedside RN Irlanda. This clinician in to assess patient. Patient pleasant and agreeable. Bilateral feet and heels . Non-selectively debrided with Moist warm washcloth.                     Patient does have bruise on hallux but per patient it is from running into walls at home with her electric wheel chair.      Patient refused assessment of her sacrum.  Education provided and patient stated not now.      No pressure injuries or advanced wound care needs identified. Wound consult completed. No further follow up unless indicated and consulted.          PREVENTATIVE INTERVENTIONS:    Q shift Stevenson - performed per nursing policy  Q shift pressure point assessments - performed per nursing policy    Surface/Positioning  Standard/trauma mattress - Currently in Place  Reposition q 2 hours - Currently in Place  TAPs Turning system - Currently in Place    Offloading/Redistribution  Heel offloading dressing (Silicone dressing) - Ordered      Respiratory  Silicone O2 tubing - Currently in Place  Gray Foam Ear protectors - Currently in Place    Mobilization      Up to chair

## 2024-11-12 NOTE — THERAPY
"Physical Therapy   Initial Evaluation     Patient Name: Cecile Teixeira  Age:  79 y.o., Sex:  female  Medical Record #: [id number]  Today's Date: 11/12/2024     Precautions  Precautions: Fall Risk    Assessment  Patient is a 79 y.o. female who was admitted with hypoxia after home O2 reportedly not set up correctly. Pt recently admitted with pneumonia. PMH significant for YOLA, diabetes, HTN, HFpEF, COPD, hypothyroidism, lymphedema.    Pt received in a chair at bedside and agreeable to PT evaluation. Pt presents with acute on chronic generalized weakness with impaired ability to transfer independently and also limited by significant incontinent episode. Pt required 2P skilled assist to safely transfer out of the chair and back to bed. Pt then assisted in completing multiple STS for zina care. Continue to recommend post-acute placement given that pt lives alone and does not have full time support for mobility, however, pt declined post-acute placement last admission. Will follow for acute PT to progress as able.    Plan    Physical Therapy Initial Treatment Plan   Treatment Plan : Bed Mobility, Equipment, Family / Caregiver Training, Neuro Re-Education / Balance, Self Care / Home Evaluation, Therapeutic Activities, Therapeutic Exercise  Treatment Frequency: 3 Times per Week  Duration: Until Therapy Goals Met    DC Equipment Recommendations: Unable to determine at this time  Discharge Recommendations: Recommend post-acute placement for additional physical therapy services prior to discharge home     Subjective    \"I've got some unfortunate news for you. I'm (pooping)\" with pt using profanity.      Objective       11/12/24 1110   Precautions   Precautions Fall Risk   Vitals   O2 (LPM) 4   O2 Delivery Device Oxymask   Pain 0 - 10 Group   Therapist Pain Assessment Post Activity Pain Same as Prior to Activity;Nurse Notified  (low back pain not rated)   Prior Living Situation   Housing / Facility 1 Story Apartment / Condo "   Steps Into Home 0   Steps In Home 0   Equipment Owned Power Wheel Chair   Lives with - Patient's Self Care Capacity Alone and Unable to Care For Self   Comments Pt reports her daughter and granddaughter check on her 3x/wk. Pt uses chucks at home instead of getting to the toilet.   Prior Level of Functional Mobility   Bed Mobility Independent   Transfer Status Independent   Wheelchair Independent   Comments Pt utilizes a power wheelchair at home   History of Falls   History of Falls No   Cognition    Level of Consciousness Alert   Comments Cooperative with session   Active ROM Lower Body    Active ROM Lower Body  WDL   Strength Lower Body   Comments BLE generalized weakness L>R, pt reports that this is normal for her. However, pt reports feeling more globally weak than normal   Sensation Lower Body   Comments Impaired LLE sensation per pt, related to low back problems   Lower Body Muscle Tone   Lower Body Muscle Tone  WDL   Coordination Lower Body    Coordination Lower Body  WDL   Balance Assessment   Sitting Balance (Static) Fair -   Sitting Balance (Dynamic) Fair -   Standing Balance (Static) Poor -   Standing Balance (Dynamic) Trace +   Weight Shift Sitting Poor   Weight Shift Standing Poor   Comments BUE support from therapists in standing   Bed Mobility    Supine to Sit Moderate Assist   Scooting Contact Guard Assist   Comments mod A to bring LE into bed   Gait Analysis   Gait Level Of Assist Unable to Participate   Functional Mobility   Sit to Stand Maximal Assist   Bed, Chair, Wheelchair Transfer Maximal Assist   Comments Pt assisted with chair>bed transfer followed by multiple STS with standing bouts to manage incontinent episode. 2P assist required.   6 Clicks Assessment - How much HELP from from another person do you currently need... (If the patient hasn't done an activity recently, how much help from another person do you think he/she would need if he/she tried?)   Turning from your back to your side  while in a flat bed without using bedrails? 2   Moving from lying on your back to sitting on the side of a flat bed without using bedrails? 2   Moving to and from a bed to a chair (including a wheelchair)? 2   Standing up from a chair using your arms (e.g., wheelchair, or bedside chair)? 2   Walking in hospital room? 1   Climbing 3-5 steps with a railing? 1   6 clicks Mobility Score 10   Short Term Goals    Short Term Goal # 1 Pt will complete bed mobility with supervision to progress to prior level in 6 visits.   Short Term Goal # 2 Pt will transfer with LRAD and supervision to progress to prior level in 6 visits.   Education Group   Education Provided Role of Physical Therapist   Role of Physical Therapist Patient Response Patient;Acceptance;Explanation;Verbal Demonstration   Physical Therapy Initial Treatment Plan    Treatment Plan  Bed Mobility;Equipment;Family / Caregiver Training;Neuro Re-Education / Balance;Self Care / Home Evaluation;Therapeutic Activities;Therapeutic Exercise   Treatment Frequency 3 Times per Week   Duration Until Therapy Goals Met   Problem List    Problems Impaired Bed Mobility;Impaired Transfers;Decreased Activity Tolerance;Functional Strength Deficit   Anticipated Discharge Equipment and Recommendations   DC Equipment Recommendations Unable to determine at this time   Discharge Recommendations Recommend post-acute placement for additional physical therapy services prior to discharge home   Interdisciplinary Plan of Care Collaboration   IDT Collaboration with  Nursing;Occupational Therapist;Certified Nursing Assistant   Patient Position at End of Therapy In Bed;Call Light within Reach;Tray Table within Reach;Phone within Reach   Collaboration Comments RN updated   Session Information   Date / Session Number  11/12-1 (1/3, 11/18)       Patient seen for team evaluation with Occupational Therapist for the following reason(s):  Patient required 2 person assistance for safety and to provide  effective interventions. Each discipline assisted patient with appropriate and separate goals.

## 2024-11-12 NOTE — PROGRESS NOTES
4 Eyes Skin Assessment Completed by Shanna RN and MEHRAN Gandhi.    Head WDL  Ears Redness and Blanching (right ear)  Nose WDL  Mouth WDL  Neck WDL  Breast/Chest Redness (MASD)  Shoulder Blades WDL  Spine WDL  (R) Arm/Elbow/Hand Bruising and Swelling  (L) Arm/Elbow/Hand Bruising and Swelling  Abdomen Bruising, hernia, redness (panus)  Groin WDL  Scrotum/Coccyx/Buttocks Redness and Blanching  (R) Leg Redness, Bruising, and Swelling  (L) Leg Redness, Blanching, and Swelling  (R) Heel/Foot/Toe Redness and Blanching, bruising (pressure injury)  (L) Heel/Foot/Toe Redness and Blanching,           Devices In Places Blood Pressure Cuff, Pulse Ox, and Oxy Mask      Interventions In Place Heel Mepilex, TAP System, Pillows, and Q2 Turns    Possible Skin Injury Yes    Pictures Uploaded Into Epic Yes  Wound Consult Placed Yes  RN Wound Prevention Protocol Ordered Yes

## 2024-11-12 NOTE — THERAPY
Occupational Therapy   Initial Evaluation     Patient Name: Cecile Teixeira  Age:  79 y.o., Sex:  female  Medical Record #: [id number]  Today's Date: 11/12/2024     Precautions  Precautions: (P) Fall Risk    Assessment    Patient is 79 y.o. female admitted with hypoxia after home O2 reportedly not set up correctly, had been recently admitted with PNA; PMH of YOLA, diabetes, HTN, COPD, hypothyroidism, lymphedema. Per EMR, pt transfers only has assistance for bathing from daughter, grand daughter assists daily, pt had refused post-acute placement during prior admission. Pt required maxA for STS transfer, lower body ADLs, and transfer to bed total assist provided by CNA for toileting to significant bowel incontinence. Will continue to see for skilled therapy while admitted as well as recommend post-acute placement.      Plan    Occupational Therapy Initial Treatment Plan   Treatment Interventions: (P) Self Care / Activities of Daily Living, Adaptive Equipment, Community Re-Integration, Manual Therapy Techniques, Neuro Re-Education / Balance, Therapeutic Exercises, Therapeutic Activity  Treatment Frequency: (P) 3 Times per Week  Duration: (P) Until Therapy Goals Met    DC Equipment Recommendations: (P) Unable to determine at this time  Discharge Recommendations: (P) Recommend post-acute placement for additional occupational therapy services prior to discharge home     Objective       11/12/24 1112   Prior Living Situation   Prior Services Intermittent Physical Support for ADL Per Family   Housing / Facility 1 Story Apartment / Condo   Steps Into Home 0   Steps In Home 0   Bathroom Set up Walk In Shower;Grab Bars;Shower Chair   Equipment Owned Power Wheel Chair;Tub / Shower Seat;Grab Bar(s) In Tub / Shower   Lives with - Patient's Self Care Capacity Alone and Unable to Care For Self   Comments Pt reports her daughter and granddaughter check on her 3x/wk. Pt uses chucks at home instead of getting to the toilet.   Prior  Level of ADL Function   Self Feeding Independent   Grooming / Hygiene Independent   Bathing Requires Assist   Dressing Requires Assist  (does not wear lower body clothing, night gowns/dresses)   Toileting Independent   Prior Level of IADL Function   Medication Management Independent   Laundry Requires Assist   Kitchen Mobility Independent   Finances Independent   Home Management Requires Assist   Shopping Independent   Prior Level Of Mobility Uses Wheel Chair for Community Mobility;Uses Wheel Chair for in Home Mobility   Driving / Transportation Other (Comments)  (uses power [employer]am[employer])   Occupation (Pre-Hospital Vocational) Retired Due To Age   History of Falls   History of Falls No   Precautions   Precautions Fall Risk   Pain 0 - 10 Group   Therapist Pain Assessment Post Activity Pain Same as Prior to Activity;Nurse Notified   Cognition    Cognition / Consciousness X   Level of Consciousness Alert   Safety Awareness Impaired   New Learning Impaired   Attention Impaired   Initiation Impaired   Active ROM Upper Body   Active ROM Upper Body  WDL   Strength Upper Body   Upper Body Strength  WDL   Sensation Upper Body   Upper Extremity Sensation  WDL   Balance Assessment   Sitting Balance (Static) Fair -   Sitting Balance (Dynamic) Fair -   Standing Balance (Static) Poor -   Standing Balance (Dynamic) Trace +   Weight Shift Sitting Poor   Weight Shift Standing Poor   Comments w/ BUE x2   Bed Mobility    Sit to Supine Maximal Assist   Scooting Contact Guard Assist   ADL Assessment   Eating Supervision   Upper Body Dressing Supervision   Lower Body Dressing Maximal Assist   Toileting Total Assist   How much help from another person does the patient currently need...   Putting on and taking off regular lower body clothing? 2   Bathing (including washing, rinsing, and drying)? 2   Toileting, which includes using a toilet, bedpan, or urinal? 1   Putting on and taking off regular upper body clothing? 3   Taking care of personal  grooming such as brushing teeth? 4   Eating meals? 4   6 Clicks Daily Activity Score 16   Functional Mobility   Sit to Stand Maximal Assist   Bed, Chair, Wheelchair Transfer Maximal Assist   Transfer Method Stand Pivot   Mobility STS from chair, pivot back to bed, STS at EOB for bowel care, returned to supine   Comments w/ HHA x2   Activity Tolerance   Sitting in Chair found seated in chair   Sitting Edge of Bed 8 min   Standing 2 min   Short Term Goals   Short Term Goal # 1 Pt will complete ADL transfers with supervision   Short Term Goal # 2 Pt will complete UB dressing with supervision   Short Term Goal # 3 Pt will complete toileting with supervision   Education Group   Education Provided Role of Occupational Therapist   Role of Occupational Therapist Patient Response Patient;Acceptance;Explanation;Reinforcement Needed   Occupational Therapy Initial Treatment Plan    Treatment Interventions Self Care / Activities of Daily Living;Adaptive Equipment;Community Re-Integration;Manual Therapy Techniques;Neuro Re-Education / Balance;Therapeutic Exercises;Therapeutic Activity   Treatment Frequency 3 Times per Week   Duration Until Therapy Goals Met   Problem List   Problem List Decreased Active Daily Living Skills;Decreased Homemaking Skills;Decreased Functional Mobility;Decreased Activity Tolerance;Safety Awareness Deficits / Cognition;Impaired Postural Control / Balance;Impaired Cognitive Function   Anticipated Discharge Equipment and Recommendations   DC Equipment Recommendations Unable to determine at this time   Discharge Recommendations Recommend post-acute placement for additional occupational therapy services prior to discharge home   Interdisciplinary Plan of Care Collaboration   IDT Collaboration with  Nursing;Physical Therapist;Certified Nursing Assistant   Patient Position at End of Therapy In Bed;Bed Alarm On;Call Light within Reach;Tray Table within Reach;Phone within Reach   Collaboration Comments RN  updated     Patient seen for team evaluation with Physical Therapist for the following reason(s):  Patient required 2 person assistance for safety and to provide effective interventions. Each discipline assisted patient with appropriate and separate goals. Due to the medical complexity, the skill of both practitioners is needed to monitor vitals, patient status, and adjust the intervention to fit the patient's needs and goals.

## 2024-11-12 NOTE — DIETARY
NUTRITION SERVICES: BMI - Pt with BMI >40 (=Body mass index is 57.82 kg/m².), Class III obesity. Weight taken via bed scale and pt is +0.7L fluids per I/O. Weight loss counseling not appropriate in acute care setting. RECOMMEND - If appropriate at DC please refer to outpatient nutrition services for weight management.

## 2024-11-12 NOTE — DISCHARGE PLANNING
Case Management Discharge Planning    Admission Date: 11/11/2024  GMLOS: 2.9  ALOS: 1    6-Clicks ADL Score: 16  6-Clicks Mobility Score: 10  PT and/or OT Eval ordered: Yes  Post-acute Referrals Ordered: No  Post-acute Choice Obtained: No  Has referral(s) been sent to post-acute provider:  No      Anticipated Discharge Dispo: Discharge Disposition: D/T to SNF with Medicare cert in anticipation of skilled care (03)    DME Needed: Yes    DME Ordered: No Pt will have continuation of services with [facility].     Action(s) Taken: Updated Provider/Nurse on Discharge Plan  RNCM discussed pt during IDT rounds. CM discussed discharge plan with Dr. Simmons. Met with pt at bedside to discuss discharge assessment. She states that she needs a portable oxygen concentrator, if possible. Pt reports that upon previous discharge, all of the parts were not delivered and/or equipment did not work. CM messaged DPA to send [facility]  referral and will clarify with [facility] regarding services and equipment already provided at home.  @1500 CM received update from Dr Simmons that pt is agreeable to placement at a SNF. [place] referrals were sent by [facility].       Escalations Completed: None    Medically Clear: No    Next Steps: Follow-up with medical team to discuss DC needs and barriers.    Barriers to Discharge: Medical clearance, Pending PT Evaluation, and Outpatient referrals pending

## 2024-11-12 NOTE — ED NOTES
Pt calling multiple times asking for ice chips. RN educated pt that we need to wait for CT scan results prior to giving ice chips. Supplied with a mouth swab.

## 2024-11-12 NOTE — ED TRIAGE NOTES
Chief Complaint   Patient presents with    Shortness of Breath     PT discharged recently from hospital after being treated for pneumonia. PT was supposed to receive home O2 but never received appropriate home device. PT found to be saturating 76% on RA per EMS. PT reports cough and SOB no worse then when initially discharged from hospital.      Constipation     PT reports no BM x 6 days.       BIB EMS to [place] [address], pt on monitor, provided call bell and in gown, labs drawn and sent.    Medications given en route: O2    BP (!) 144/67   Pulse (!) 50   Temp 36.3 °C (97.3 °F) (Temporal)   Resp (!) 24   Ht 1.524 m (5')   Wt (!) 141 kg (310 lb)   SpO2 91%   BMI 60.54 kg/m²

## 2024-11-13 VITALS
HEART RATE: 68 BPM | WEIGHT: 293 LBS | HEIGHT: 60 IN | SYSTOLIC BLOOD PRESSURE: 150 MMHG | BODY MASS INDEX: 57.52 KG/M2 | OXYGEN SATURATION: 94 % | DIASTOLIC BLOOD PRESSURE: 71 MMHG | TEMPERATURE: 98.1 F | RESPIRATION RATE: 18 BRPM

## 2024-11-13 PROBLEM — K92.1 MELENA: Status: ACTIVE | Noted: 2024-11-13

## 2024-11-13 LAB
ALBUMIN SERPL BCP-MCNC: 3.8 G/DL (ref 3.2–4.9)
ALBUMIN/GLOB SERPL: 1.3 G/DL
ALP SERPL-CCNC: 61 U/L (ref 30–99)
ALT SERPL-CCNC: 22 U/L (ref 2–50)
ANION GAP SERPL CALC-SCNC: 10 MMOL/L (ref 7–16)
APPEARANCE UR: CLEAR
AST SERPL-CCNC: 22 U/L (ref 12–45)
BACTERIA #/AREA URNS HPF: ABNORMAL /HPF
BASOPHILS # BLD AUTO: 0.4 % (ref 0–1.8)
BASOPHILS # BLD: 0.04 K/UL (ref 0–0.12)
BILIRUB SERPL-MCNC: 0.4 MG/DL (ref 0.1–1.5)
BILIRUB UR QL STRIP.AUTO: NEGATIVE
BUN SERPL-MCNC: 33 MG/DL (ref 8–22)
CALCIUM ALBUM COR SERPL-MCNC: 8.5 MG/DL (ref 8.5–10.5)
CALCIUM SERPL-MCNC: 8.3 MG/DL (ref 8.5–10.5)
CASTS URNS QL MICRO: ABNORMAL /LPF (ref 0–2)
CHLORIDE SERPL-SCNC: 100 MMOL/L (ref 96–112)
CO2 SERPL-SCNC: 29 MMOL/L (ref 20–33)
COLOR UR: YELLOW
CREAT SERPL-MCNC: 0.85 MG/DL (ref 0.5–1.4)
EOSINOPHIL # BLD AUTO: 0 K/UL (ref 0–0.51)
EOSINOPHIL NFR BLD: 0 % (ref 0–6.9)
EPITHELIAL CELLS 1715: ABNORMAL /HPF (ref 0–5)
ERYTHROCYTE [DISTWIDTH] IN BLOOD BY AUTOMATED COUNT: 50.9 FL (ref 35.9–50)
GFR SERPLBLD CREATININE-BSD FMLA CKD-EPI: 70 ML/MIN/1.73 M 2
GLOBULIN SER CALC-MCNC: 2.9 G/DL (ref 1.9–3.5)
GLUCOSE BLD STRIP.AUTO-MCNC: 119 MG/DL (ref 65–99)
GLUCOSE BLD STRIP.AUTO-MCNC: 152 MG/DL (ref 65–99)
GLUCOSE SERPL-MCNC: 151 MG/DL (ref 65–99)
GLUCOSE UR STRIP.AUTO-MCNC: NEGATIVE MG/DL
HCT VFR BLD AUTO: 41.4 % (ref 37–47)
HEMOCCULT STL QL: POSITIVE
HGB BLD-MCNC: 13.2 G/DL (ref 12–16)
IMM GRANULOCYTES # BLD AUTO: 0.2 K/UL (ref 0–0.11)
IMM GRANULOCYTES NFR BLD AUTO: 1.8 % (ref 0–0.9)
KETONES UR STRIP.AUTO-MCNC: NEGATIVE MG/DL
LEUKOCYTE ESTERASE UR QL STRIP.AUTO: NEGATIVE
LYMPHOCYTES # BLD AUTO: 0.79 K/UL (ref 1–4.8)
LYMPHOCYTES NFR BLD: 7 % (ref 22–41)
MCH RBC QN AUTO: 30.4 PG (ref 27–33)
MCHC RBC AUTO-ENTMCNC: 31.9 G/DL (ref 32.2–35.5)
MCV RBC AUTO: 95.4 FL (ref 81.4–97.8)
MICRO URNS: ABNORMAL
MONOCYTES # BLD AUTO: 0.74 K/UL (ref 0–0.85)
MONOCYTES NFR BLD AUTO: 6.5 % (ref 0–13.4)
NEUTROPHILS # BLD AUTO: 9.53 K/UL (ref 1.82–7.42)
NEUTROPHILS NFR BLD: 84.3 % (ref 44–72)
NITRITE UR QL STRIP.AUTO: NEGATIVE
NRBC # BLD AUTO: 0 K/UL
NRBC BLD-RTO: 0 /100 WBC (ref 0–0.2)
PH UR STRIP.AUTO: 5 [PH] (ref 5–8)
PLATELET # BLD AUTO: 269 K/UL (ref 164–446)
PMV BLD AUTO: 10.1 FL (ref 9–12.9)
POTASSIUM SERPL-SCNC: 4.1 MMOL/L (ref 3.6–5.5)
PROT SERPL-MCNC: 6.7 G/DL (ref 6–8.2)
PROT UR QL STRIP: NEGATIVE MG/DL
RBC # BLD AUTO: 4.34 M/UL (ref 4.2–5.4)
RBC # URNS HPF: ABNORMAL /HPF (ref 0–2)
RBC UR QL AUTO: ABNORMAL
SODIUM SERPL-SCNC: 139 MMOL/L (ref 135–145)
SP GR UR STRIP.AUTO: 1.01
UROBILINOGEN UR STRIP.AUTO-MCNC: 0.2 EU/DL
WBC # BLD AUTO: 11.3 K/UL (ref 4.8–10.8)
WBC #/AREA URNS HPF: ABNORMAL /HPF

## 2024-11-13 PROCEDURE — 82272 OCCULT BLD FECES 1-3 TESTS: CPT

## 2024-11-13 PROCEDURE — 700111 HCHG RX REV CODE 636 W/ 250 OVERRIDE (IP): Mod: JZ | Performed by: HOSPITALIST

## 2024-11-13 PROCEDURE — 81001 URINALYSIS AUTO W/SCOPE: CPT

## 2024-11-13 PROCEDURE — 36415 COLL VENOUS BLD VENIPUNCTURE: CPT

## 2024-11-13 PROCEDURE — 94640 AIRWAY INHALATION TREATMENT: CPT

## 2024-11-13 PROCEDURE — 99239 HOSP IP/OBS DSCHRG MGMT >30: CPT | Performed by: HOSPITALIST

## 2024-11-13 PROCEDURE — 87086 URINE CULTURE/COLONY COUNT: CPT

## 2024-11-13 PROCEDURE — 85025 COMPLETE CBC W/AUTO DIFF WBC: CPT

## 2024-11-13 PROCEDURE — 700102 HCHG RX REV CODE 250 W/ 637 OVERRIDE(OP): Performed by: HOSPITALIST

## 2024-11-13 PROCEDURE — 700101 HCHG RX REV CODE 250: Performed by: HOSPITALIST

## 2024-11-13 PROCEDURE — 700111 HCHG RX REV CODE 636 W/ 250 OVERRIDE (IP): Performed by: HOSPITALIST

## 2024-11-13 PROCEDURE — A9270 NON-COVERED ITEM OR SERVICE: HCPCS | Performed by: HOSPITALIST

## 2024-11-13 PROCEDURE — 82962 GLUCOSE BLOOD TEST: CPT | Mod: 91

## 2024-11-13 PROCEDURE — 80053 COMPREHEN METABOLIC PANEL: CPT

## 2024-11-13 RX ORDER — POLYETHYLENE GLYCOL 3350 17 G/17G
17 POWDER, FOR SOLUTION ORAL
Status: ON HOLD
Start: 2024-11-13 | End: 2024-11-18

## 2024-11-13 RX ORDER — PIOGLITAZONE 30 MG/1
30 TABLET ORAL DAILY
Qty: 30 TABLET | Refills: 11 | Status: SHIPPED | OUTPATIENT
Start: 2024-11-13

## 2024-11-13 RX ORDER — POTASSIUM CHLORIDE 1500 MG/1
20 TABLET, EXTENDED RELEASE ORAL DAILY
Status: SHIPPED
Start: 2024-11-13

## 2024-11-13 RX ORDER — FUROSEMIDE 20 MG/1
20 TABLET ORAL DAILY
Status: SHIPPED
Start: 2024-11-13

## 2024-11-13 RX ORDER — AMOXICILLIN 250 MG
2 CAPSULE ORAL EVERY EVENING
Status: ON HOLD
Start: 2024-11-13 | End: 2024-11-18

## 2024-11-13 RX ADMIN — FUROSEMIDE 40 MG: 10 INJECTION INTRAMUSCULAR; INTRAVENOUS at 05:25

## 2024-11-13 RX ADMIN — LEVOTHYROXINE SODIUM 88 MCG: 0.09 TABLET ORAL at 05:26

## 2024-11-13 RX ADMIN — FLUTICASONE PROPIONATE 220 MCG: 110 AEROSOL, METERED RESPIRATORY (INHALATION) at 05:26

## 2024-11-13 RX ADMIN — AMLODIPINE BESYLATE 10 MG: 10 TABLET ORAL at 05:26

## 2024-11-13 RX ADMIN — LOSARTAN POTASSIUM 100 MG: 50 TABLET, FILM COATED ORAL at 05:26

## 2024-11-13 RX ADMIN — OMEPRAZOLE 20 MG: 20 CAPSULE, DELAYED RELEASE ORAL at 13:28

## 2024-11-13 RX ADMIN — ASPIRIN 81 MG: 81 TABLET, COATED ORAL at 05:26

## 2024-11-13 RX ADMIN — CARVEDILOL 12.5 MG: 12.5 TABLET, FILM COATED ORAL at 08:43

## 2024-11-13 RX ADMIN — IPRATROPIUM BROMIDE AND ALBUTEROL SULFATE 3 ML: .5; 2.5 SOLUTION RESPIRATORY (INHALATION) at 07:41

## 2024-11-13 RX ADMIN — METHYLPREDNISOLONE SODIUM SUCCINATE 40 MG: 40 INJECTION, POWDER, FOR SOLUTION INTRAMUSCULAR; INTRAVENOUS at 05:25

## 2024-11-13 ASSESSMENT — PAIN DESCRIPTION - PAIN TYPE: TYPE: ACUTE PAIN

## 2024-11-13 NOTE — CARE PLAN
The patient is Watcher - Medium risk of patient condition declining or worsening    Shift Goals  Clinical Goals: maintain O2 sat >90% throughout shift  Patient Goals: comfort, ease of breathing  Family Goals: MIKE    Progress made toward(s) clinical / shift goals:  Pt alert and oriented, makes needs known. Denies pain. Per RT patient refused breathing treatments. Education provided. Refused repositioning, heels floated with pillows. Remains on 4L oxygen and connected to . Refused CPAP, is wearing oxy mask due to mouth-breathing while asleep. Bed locked and in lowest position, call light in reach. Fall precautions in place.    Patient is not progressing towards the following goals:      Problem: Respiratory:  Goal: Respiratory status will improve  Outcome: Not Progressing

## 2024-11-13 NOTE — PROGRESS NOTES
Pt discharged to [facility]. Transported with [facility] on [facility]. IV removed from right arm. Report called to REJI Kay at [place]. All questions answered. PT left with all belongings.

## 2024-11-13 NOTE — PROGRESS NOTES
Pt seen on 4l O2 NC tolerating at this time. Patient refused Duoneb tx at this time. Pt continues with exp wheezes.   Will continue to encourage Duoneb txper MD orders. Will continue to monitor Pt.

## 2024-11-13 NOTE — DISCHARGE PLANNING
Case Management Discharge Planning    Admission Date: 11/11/2024  GMLOS: 2.9  ALOS: 2    6-Clicks ADL Score: 16  6-Clicks Mobility Score: 10  PT and/or OT Eval ordered: Yes  Post-acute Referrals Ordered: Yes  Post-acute Choice Obtained: Yes  Has referral(s) been sent to post-acute provider:  Yes      Anticipated Discharge Dispo: Discharge Disposition: D/T to SNF with Medicare cert in anticipation of skilled care (03)    DME Needed: No    Action(s) Taken: Updated Provider/Nurse on Discharge Plan, Choice obtained, Referral(s) sent, and Acceptance Received  RNCM discussed pt during IDT rounds. CM discussed discharge plan with Dr. Simmons. Met with pt at bedside to discuss discharge plan. Pt is medically cleared for discharge to Post Acute Care. Choice was obtained for [facility]   PASRR: [id number]. CM spoke to grand-daughter Colette. Family is agreeable with this plan and spoke to Pt on the phone. Pt states that she is agreeable to transfer today.   CM messaged DPA to contact [facility] to inquire regarding bed availability.   @1001 [facility] confirmed bed available @1330. MD and Primary RN were notified. COBRA packet started and order sent for transport.     Escalations Completed: None    Medically Clear: Yes    Next Steps: Follow-up with medical team to discuss DC needs and barriers.    Barriers to Discharge: Transportation    Care Transition Team Assessment    Information Source  Orientation Level: Oriented X4  Information Given By: Patient  Who is responsible for making decisions for patient? : Patient    Elopement Risk  Legal Hold: No  Ambulatory or Self Mobile in Wheelchair: No-Not an Elopement Risk  Disoriented: No  Psychiatric Symptoms: None  History of Wandering: No  Elopement this Admit: No  Vocalizing Wanting to Leave: No  Displays Behaviors, Body Language Wanting to Leave: No-Not at Risk for Elopement  Elopement Risk: Not at Risk for Elopement    Interdisciplinary Discharge Planning  Lives with -  Patient's Self Care Capacity: Alone and Unable to Care For Self  Patient or legal guardian wants to designate a caregiver: No  Support Systems: Children  Housing / Facility: 1 Story Apartment / Condo  Prior Services: Intermittent Physical Support for ADL Per Family    Discharge Preparedness  What is your plan after discharge?: Skilled nursing facility  What are your discharge supports?: Child (Adult child and Adult grand-child)  Prior Functional Level: Needs Assist with Activities of Daily Living, Independent with Medication Management, Ambulatory  Difficulity with ADLs: Bathing, Walking, Toileting, Dressing  Difficulty with ADLs Comment: Weakness and unsteady with strength and mobility  Difficulity with IADLs: Cooking, Driving, Shopping, Using the telephone or computer    Functional Assesment  Prior Functional Level: Needs Assist with Activities of Daily Living, Independent with Medication Management, Ambulatory    Finances  Financial Barriers to Discharge: No  Prescription Coverage: Yes    Vision / Hearing Impairment  Vision Impairment : No  Right Eye Vision: Wears Glasses  Left Eye Vision: Wears Glasses  Hearing Impairment : No    Advance Directive  Advance Directive?: None  Advance Directive offered?: AD Booklet given    Domestic Abuse  Have you ever been the victim of abuse or violence?: No  Possible Abuse/Neglect Reported to:: Not Applicable    Psychological Assessment  History of Substance Abuse: None  History of Psychiatric Problems: No  Non-compliant with Treatment: No  Newly Diagnosed Illness: No    Discharge Risks or Barriers  Discharge risks or barriers?: Complex medical needs  Patient risk factors: Readmission    Anticipated Discharge Information  Discharge Disposition: D/T to SNF with Medicare cert in anticipation of skilled care (03)  Discharge Address: [address] [place] [id number]  Discharge Contact Phone Number: [phone]

## 2024-11-13 NOTE — CARE PLAN
The patient is Stable - Low risk of patient condition declining or worsening    Shift Goals  Clinical Goals: Maintain spO2 levels at 3L O2  Patient Goals: Discharge to skilled facility  Family Goals: Family not present    Progress made toward(s) clinical / shift goals:      Problem: Knowledge Deficit - Standard  Goal: Patient and family/care givers will demonstrate understanding of plan of care, disease process/condition, diagnostic tests and medications  Outcome: Progressing  Updated patient on plan of care and plans to discharge to skilled facility.     Problem: Respiratory:  Goal: Respiratory status will improve  Outcome: Progressing  Continuing to work on oxygenation and getting patient back to baseline of 3L nasal cannula. Patient current saturation above 90% on 4L at rest.

## 2024-11-13 NOTE — PROGRESS NOTES
Came in same day 11/12/2024.  Agreeable to SNF.  SNF order placed.  Work up for weakness.  Guaic stool  Follow cbc.

## 2024-11-13 NOTE — DISCHARGE PLANNING
HTH/SCP TCN chart review completed. Current discharge consideration is [facility] today. Noted per chart review patient refused TCN / CM yesterday, then was agreeable Dr. Simmons.  TCN appreciates CM obtaining SNF choice for [facility], who has accepted.  Request for SNF authorization sent to [facility] team.  Per chart patient scheduled for transport at 1330 today.      TCN will continue to follow and collaborate with discharge planning team as additional post acute needs arise. Thank you.    Completed:  PT/OT recommending post acute placement - 11/12  Choice obtained: [facility] ([facility]), [facility] ([facility])  CM obtained choice for [facility] (SNF)  SCP with Non[facility] PCP.     *Update*  SCP authorization for SNF in chart

## 2024-11-13 NOTE — DISCHARGE INSTRUCTIONS
Chronic Obstructive Pulmonary Disease (COPD) is a long-term, progressive lung disease that makes it harder to breathe. It includes chronic bronchitis, emphysema, and refractory (non-reversible) asthma. With COPD, the airways in your lungs become inflamed and thicken, and the tissue where oxygen is exchanged is destroyed. The flow of air in and out of your lungs decreases. When that happens, less oxygen gets into your body tissues, and it becomes harder to get rid of the waste gas carbon dioxide. As the disease gets worse, shortness of breath makes it harder to remain active. There is no cure for COPD, but it is preventable and treatable.    COPD Patient Discharge Instructions    Diet  Follow a low fat, low cholesterol, low salt diet unless instructed otherwise by your Doctor. Read the labels on the back of food products and track your intake of fat, cholesterol and salt.  No smoking  Discontinuing smoking will have the biggest impact on preventing progression of disease.  To participate in [employer]’s Quit Tobacco Program, call [phone] or visit [email or web address]  Oxygen  If your doctor has order that you wear oxygen at home, it is important to wear it as ordered.  Do not smoke, vape, or use e-cigarettes with oxygen on.  Store in an appropriate location: upright in its arroyo or laid flat down, away from open flames and stoves.   Do not use oil-based creams and moisturizers (ie: petroleum products, oil-based lip moisturizers) or aerosol sprays (ie: hair sprays or deodorants) when using your oxygen equipment.  Be careful with tubing placement to prevent yourself and others from tripping.  Medications  Refer to your personalized Action Plan to manage your symptoms.  Warning signs of an exacerbation  Breathing fast and shallow, worsening shortness of breath (like you just finished exercising)  Chest tightness  Increases in sputum production  Changes in sputum color  Lower oxygen levels than baseline  When  to call your doctor  If the warning signs of an exacerbation do not improve  Refer to your personalized Action Plan   Pulmonary Rehab  Your doctor has ordered you a referral to Pulmonary Rehab. Call [phone] to schedule an appointment  Attend your follow-up appointment with your PCP and/or Pulmonologist  Remote Monitoring: At the direction of the remote monitoring on-call provider, you may increase your oxygen by 2 liters above your baseline.     See the educational handout provided by your COPD Navigator for more information. This also explains more about COPD, symptoms of an exacerbation, and some of the tests that you have undergone.

## 2024-11-13 NOTE — CARE PLAN
Problem: Bronchoconstriction  Goal: Improve in air movement and diminished wheezing  Description: Target End Date:  2 to 3 days    1.  Implement inhaled treatments  2.  Evaluate and manage medication effects  Outcome: Not Met   SVN

## 2024-11-13 NOTE — DISCHARGE PLANNING
@[id number]  Agency/Facility Name: [facility]  Spoke To: Sanjeev  Outcome: [facility] has a bed available today and the requested transport time is 1330.

## 2024-11-13 NOTE — DISCHARGE SUMMARY
Discharge Summary    CHIEF COMPLAINT ON ADMISSION  Chief Complaint   Patient presents with    Shortness of Breath     PT discharged recently from hospital after being treated for pneumonia. PT was supposed to receive home O2 but never received appropriate home device. PT found to be saturating 76% on RA per EMS. PT reports cough and SOB no worse then when initially discharged from hospital.      Constipation     PT reports no BM x 6 days.       Reason for Admission  Acute respiratory failure with hyp*    Admission Date  11/11/2024     CODE STATUS  Full Code    HPI & HOSPITAL COURSE  Cecile Teixeira is a 79 y.o. female who presented 11/11/2024 with past medical history of wheel chair bound since her 30s, YOLA on CPAP qhs, morbid obesity, DM type 2 not on current medications, diet controlled, HFpEF, hypertension, asthma, hypothyroidism who presents to the hospital for constipation for 6 days and lack of oxygen concentrator.     Patient was discharged from the hospital on 11/9 where she was admitted for asthma exacerbation and pneumonia.  The patient was discharged on 3 L of oxygen.  She had the tank delivered but not the concentrator.  It was recommended for her to go to postacute care but the patient refused.  After patient was discharged she continued to feel short of breath and had a nonproductive cough.  She denies any fevers, chest pain, headaches, sore throat or nasal congestion.     Patient recently treated for pneumonia with cefdinir during hospital admission 11/3-11/8.  Negative procalcitonin.  She had a CT abdomen pelvis with constipation seen, mild bilateral infiltrates at bases.  She was agreeable to SNF since she states she is weaker than her baseline.  She normally is able to use her power scooter to go to the store, able to transfer herself from bed to chair, to scooter.   She complained of melena, occult blood stool tested positive.  No drop in Hg, no diarrhea.  This appears to be stable for  outpatient GI follow up .  Stopped her ASA 81mg daily, dc'd solumedrol IV, no wheeze on exam for 2 days and increased her prilosec to 20mg bid.  She does have a history of GERD.    She was diuresed with lasix 40mg IV daily x 2 days.  She likely has some pulmonary hypertension, although technically difficult echo, unable to assess RV.         Therefore, she is discharged in good and stable condition to skilled nursing facility.    The patient met 2-midnight criteria for an inpatient stay at the time of discharge.      FOLLOW UP ITEMS POST DISCHARGE  Follow up with GI in 2 weeks for occult blood positive stool, but no drop in Hg 13.8 to 13.2.  constipation noted on CT abdomen/pelvix on admission.    DISCHARGE DIAGNOSES  Principal Problem:    Moderate persistent asthma with exacerbation (POA: Yes)  Active Problems:    Acquired hypothyroidism (POA: Yes)    Morbid obesity (HCC) (Chronic) (POA: Yes)    YOLA (obstructive sleep apnea) (POA: Yes)    Primary hypertension (Chronic) (POA: Yes)    Type 2 diabetes mellitus, without long-term current use of insulin (HCC) (Chronic) (POA: Yes)    Acute respiratory failure with hypoxia (HCC) (Chronic) (POA: Yes)    Pneumonia (POA: Yes)    Chronic heart failure with preserved ejection fraction (HCC) (POA: Yes)    Melena (POA: Yes)  Resolved Problems:    * No resolved hospital problems. *      FOLLOW UP  No future appointments.  [facility] [place]  [address] [address]  [phone]          MEDICATIONS ON DISCHARGE     Medication List        START taking these medications        Instructions   furosemide 20 MG Tabs  Commonly known as: Lasix   Take 1 Tablet by mouth every day.  Dose: 20 mg     pioglitazone 30 MG Tabs  Commonly known as: Actos   Take 1 Tablet by mouth every day.  Dose: 30 mg     polyethylene glycol/lytes Pack  Commonly known as: Miralax   Take 1 Packet by mouth 1 time a day as needed (if no bowel movement in last 2  days).  Dose: 17 g     potassium chloride SA 20 MEQ Tbcr  Commonly known as: Kdur   Take 1 Tablet by mouth every day.  Dose: 20 mEq     senna-docusate 8.6-50 MG Tabs  Commonly known as: Pericolace Or Senokot S   Take 2 Tablets by mouth every evening.  Dose: 2 Tablet            CHANGE how you take these medications        Instructions   omeprazole 20 MG delayed-release capsule  What changed: when to take this  Commonly known as: PriLOSEC   Take 1 Capsule by mouth 2 times a day for 30 days.  Dose: 20 mg            CONTINUE taking these medications        Instructions   acetaminophen 500 MG Tabs  Commonly known as: Tylenol   Take 1 Tablet by mouth every 6 hours as needed for Mild Pain or Fever.  Dose: 500 mg     albuterol 108 (90 Base) MCG/ACT Aers inhalation aerosol   Inhale 2 Puffs every 6 hours as needed for Shortness of Breath. Indications: Asthma  Dose: 2 Puff     amLODIPine 10 MG Tabs  Commonly known as: Norvasc   Take 1 Tablet by mouth every day. Indications: High Blood Pressure  Dose: 10 mg     benzonatate 100 MG Caps  Commonly known as: Tessalon   Take 1 Capsule by mouth 3 times a day as needed for Cough.  Dose: 100 mg     carvedilol 12.5 MG Tabs  Commonly known as: Coreg   Take 1 Tablet by mouth 2 times a day with meals.  Dose: 12.5 mg     CENTRUM ADULT 50+ MULTIGUMMIES PO   Take 1 Tablet by mouth every day. Indications: supplement  Dose: 1 Tablet     cetirizine 10 MG Tabs  Commonly known as: ZyrTEC   Take 1 Tablet by mouth at bedtime. FOR ALLERGIES  Dose: 10 mg     fluticasone 110 MCG/ACT Aero  Commonly known as: Flovent HFA   Inhale 2 Puffs 2 times a day. Indications: Asthma  Dose: 2 Puff     fluticasone 50 MCG/ACT nasal spray  Commonly known as: Flonase   ADMINISTER 1 SPRAY INTO AFFECTED NOSTRIL(S) EVERY DAY.  Dose: 1 Spray     levothyroxine 88 MCG Tabs  Commonly known as: Synthroid   Take 1 Tablet by mouth every morning on an empty stomach.  Dose: 88 mcg     losartan 100 MG Tabs  Commonly known as:  Cozaar   Take 1 Tablet by mouth every day.  Dose: 100 mg     Nystatin Powd   Apply 1 Application topically 2 times a day as needed (skin breakdown).  Dose: 1 Application     ondansetron 4 MG Tbdp  Commonly known as: Zofran ODT   Take 1 Tablet by mouth every four hours as needed for Nausea/Vomiting.  Dose: 4 mg            STOP taking these medications      aspirin 81 MG EC tablet     guaiFENesin dextromethorphan 100-10 MG/5ML Syrp syrup  Commonly known as: Robitussin DM     metoprolol SR 25 MG Tb24  Commonly known as: Toprol XL     prochlorperazine 10 MG Tabs  Commonly known as: Compazine              Allergies  Allergies   Allergen Reactions    Doxycycline Hives    Prednisone Unspecified     Diarrhea and very irritable     Cephalexin Diarrhea     Diarrhea and mild rash     Montelukast Rash     Leg pain, back pain and rash     Spinach Unspecified     Skin allergy test    Cow's Milk [Lac Bovis]     Other Environmental      Trees except oak    Codeine Unspecified     Headaches and confusion     Lactose Unspecified     Lactose intolerance since a child     Levothyroxine Anxiety     Panic attack     Cannot take generic    Sulfa Drugs Unspecified     Mental status change        DIET  Orders Placed This Encounter   Procedures    Diet Order Diet: Regular     Standing Status:   Standing     Number of Occurrences:   1     Order Specific Question:   Diet:     Answer:   Regular [1]       ACTIVITY  As tolerated and directed by skilled nursing.  Weight bearing as tolerated    LINES, DRAINS, AND WOUNDS  This is an automated list. Peripheral IVs will be removed prior to discharge.  Peripheral IV 11/11/24 18 G Distal;Right;Posterior Forearm (Active)   Site Assessment Clean;Dry 11/12/24 1200   Dressing Type Transparent 11/12/24 1200   Line Status No blood return;Flushed 11/12/24 1200   Dressing Status Clean;Dry;Intact 11/12/24 1200   Dressing Intervention N/A 11/12/24 1200   Dressing Change Due 11/16/24 11/12/24 1200   Date Primary  Tubing Changed 11/12/24 11/12/24 0200   NEXT Primary Tubing Change  11/16/24 11/12/24 0200   Infiltration Grading ([facility]) 1 11/12/24 1200   Phlebitis Scale ([facility] Only) 0 11/12/24 1200       Peripheral IV 11/12/24 20 G Anterior;Proximal;Right Forearm (Active)   Site Assessment Clean;Dry;Intact 11/12/24 2215   Dressing Type Transparent 11/12/24 2215   Line Status Scrubbed the hub prior to access;Flushed;Saline locked 11/12/24 2215   Dressing Status Clean;Dry;Intact 11/12/24 2215   Dressing Intervention N/A 11/12/24 2215   Dressing Change Due 11/16/24 11/12/24 2215   Infiltration Grading ([facility]) 0 11/12/24 2215   Phlebitis Scale ([facility] Only) 0 11/12/24 2215          Peripheral IV 11/11/24 18 G Distal;Right;Posterior Forearm (Active)   Site Assessment Clean;Dry 11/12/24 1200   Dressing Type Transparent 11/12/24 1200   Line Status No blood return;Flushed 11/12/24 1200   Dressing Status Clean;Dry;Intact 11/12/24 1200   Dressing Intervention N/A 11/12/24 1200   Dressing Change Due 11/16/24 11/12/24 1200   Date Primary Tubing Changed 11/12/24 11/12/24 0200   NEXT Primary Tubing Change  11/16/24 11/12/24 0200   Infiltration Grading ([facility]) 1 11/12/24 1200   Phlebitis Scale ([facility] Only) 0 11/12/24 1200       Peripheral IV 11/12/24 20 G Anterior;Proximal;Right Forearm (Active)   Site Assessment Clean;Dry;Intact 11/12/24 2215   Dressing Type Transparent 11/12/24 2215   Line Status Scrubbed the hub prior to access;Flushed;Saline locked 11/12/24 2215   Dressing Status Clean;Dry;Intact 11/12/24 2215   Dressing Intervention N/A 11/12/24 2215   Dressing Change Due 11/16/24 11/12/24 2215   Infiltration Grading ([facility]) 0 11/12/24 2215   Phlebitis Scale ([facility] Only) 0 11/12/24 2215               MENTAL STATUS ON TRANSFER             CONSULTATIONS      PROCEDURES  11/11/2024 10:02 PM     HISTORY/REASON FOR EXAM:  Pain, constipation.     TECHNIQUE/EXAM DESCRIPTION:     CT scan of the abdomen and pelvis  without contrast.     Noncontrast helical scanning was obtained from the diaphragmatic domes through the pubic symphysis.     Low dose optimization technique was utilized for this CT exam including automated exposure control and adjustment of the mA and/or kV according to patient size.     COMPARISON: None.     FINDINGS:     Lower Chest: Linear consolidations of the bilateral lung bases are seen.     Liver: Hepatomegaly is noted with slight irregular hepatic contour.     Spleen: Unremarkable.     Pancreas: Unremarkable.     Gallbladder: Cholelithiasis.     Biliary: Nondilated.     Adrenal glands: 1.4 cm left adrenal nodule is seen measuring -9 Hounsfield units.     Kidneys: Punctate bilateral renal calculi are seen. There is no visualized hydronephrosis..     Bowel: No obstruction or acute inflammation. Scattered colonic diverticula are seen. 11.8 cm umbilical hernia is seen containing a portion of transverse colon without visualized obstructive changes. The appendix appears within normal limits.     Lymph nodes: No adenopathy.     Vasculature: Atherosclerotic changes are seen including atherosclerotic coronary artery calcifications.     Peritoneum: Unremarkable without ascites.     Musculoskeletal: No acute or destructive process.     Pelvis: No adenopathy or free fluid. Uterus and adnexal structures appear within physiologic limits.     IMPRESSION:        1.  Linear consolidations in the bilateral lung bases, appearance concerning for component of infiltrate.  2.  Hepatomegaly with slight irregular hepatic contour favoring changes of cirrhosis  3.  Large anterior abdominal hernia containing a portion of transverse colon without visualized obstructive changes.  4.  Left adrenal nodule, density compatible with adenoma.  5.  Diverticulosis  6.  Cholelithiasis  7.  Atherosclerosis and atherosclerotic coronary artery disease        Exam Ended: 11/11/24 10:11 PM Last Resulted: 11/11/24 10:39 PM       LABORATORY  Lab  Results   Component Value Date    SODIUM 139 11/13/2024    POTASSIUM 4.1 11/13/2024    CHLORIDE 100 11/13/2024    CO2 29 11/13/2024    GLUCOSE 151 (H) 11/13/2024    BUN 33 (H) 11/13/2024    CREATININE 0.85 11/13/2024        Lab Results   Component Value Date    WBC 11.3 (H) 11/13/2024    HEMOGLOBIN 13.2 11/13/2024    HEMATOCRIT 41.4 11/13/2024    PLATELETCT 269 11/13/2024        Total time of the discharge process exceeds 50 minutes.

## 2024-11-13 NOTE — DISCHARGE PLANNING
DC Transport Scheduled    Transport Company Scheduled:  [facility]  Spoke with Dora at [facility] to schedule transport.    Scheduled Date: 11/13/2024  Scheduled Time: 1330    Transport Type: [facility]  Destination: [facility]   Destination address: [address] [place] [id number]    Notified care team of scheduled transport via [employer].     If there are any changes needed to the DC transportation scheduled, please contact [facility] at ext. [phone] between the hours of [phone] Mon-Fri. If outside those hours, contact the ED Case Manager at ext. [phone].

## 2024-11-14 ENCOUNTER — HOSPITAL ENCOUNTER (INPATIENT)
Facility: MEDICAL CENTER | Age: 79
LOS: 3 days | DRG: 189 | End: 2024-11-18
Attending: EMERGENCY MEDICINE | Admitting: STUDENT IN AN ORGANIZED HEALTH CARE EDUCATION/TRAINING PROGRAM
Payer: MEDICARE

## 2024-11-14 DIAGNOSIS — M15.0 PRIMARY OSTEOARTHRITIS INVOLVING MULTIPLE JOINTS: ICD-10-CM

## 2024-11-14 DIAGNOSIS — G63 POLYNEUROPATHY IN OTHER DISEASES CLASSIFIED ELSEWHERE (HCC): Chronic | ICD-10-CM

## 2024-11-14 DIAGNOSIS — J18.9 PNEUMONIA DUE TO INFECTIOUS ORGANISM, UNSPECIFIED LATERALITY, UNSPECIFIED PART OF LUNG: ICD-10-CM

## 2024-11-14 DIAGNOSIS — G47.33 OSA (OBSTRUCTIVE SLEEP APNEA): ICD-10-CM

## 2024-11-14 DIAGNOSIS — Z74.09 IMPAIRED FUNCTIONAL MOBILITY, BALANCE, GAIT, AND ENDURANCE: ICD-10-CM

## 2024-11-14 DIAGNOSIS — K59.01 SLOW TRANSIT CONSTIPATION: ICD-10-CM

## 2024-11-14 DIAGNOSIS — G89.29 CHRONIC LOW BACK PAIN WITH LEFT-SIDED SCIATICA, UNSPECIFIED BACK PAIN LATERALITY: ICD-10-CM

## 2024-11-14 DIAGNOSIS — I50.32 CHRONIC HEART FAILURE WITH PRESERVED EJECTION FRACTION (HCC): ICD-10-CM

## 2024-11-14 DIAGNOSIS — I10 PRIMARY HYPERTENSION: Chronic | ICD-10-CM

## 2024-11-14 DIAGNOSIS — I50.9 ACUTE ON CHRONIC CONGESTIVE HEART FAILURE, UNSPECIFIED HEART FAILURE TYPE (HCC): ICD-10-CM

## 2024-11-14 DIAGNOSIS — E87.6 HYPOKALEMIA: ICD-10-CM

## 2024-11-14 DIAGNOSIS — M54.42 CHRONIC LOW BACK PAIN WITH LEFT-SIDED SCIATICA, UNSPECIFIED BACK PAIN LATERALITY: ICD-10-CM

## 2024-11-14 DIAGNOSIS — J96.21 ACUTE ON CHRONIC HYPOXIC RESPIRATORY FAILURE (HCC): ICD-10-CM

## 2024-11-14 DIAGNOSIS — J81.0 ACUTE PULMONARY EDEMA (HCC): ICD-10-CM

## 2024-11-14 DIAGNOSIS — E11.00 TYPE 2 DIABETES MELLITUS WITH HYPEROSMOLARITY WITHOUT COMA, WITHOUT LONG-TERM CURRENT USE OF INSULIN (HCC): Chronic | ICD-10-CM

## 2024-11-14 DIAGNOSIS — J45.41 MODERATE PERSISTENT ASTHMA WITH EXACERBATION: ICD-10-CM

## 2024-11-14 DIAGNOSIS — E66.01 SEVERE OBESITY (BMI >= 40) (HCC): Chronic | ICD-10-CM

## 2024-11-14 DIAGNOSIS — E03.9 ACQUIRED HYPOTHYROIDISM: ICD-10-CM

## 2024-11-14 DIAGNOSIS — J96.01 ACUTE HYPOXEMIC RESPIRATORY FAILURE (HCC): ICD-10-CM

## 2024-11-14 PROCEDURE — 94002 VENT MGMT INPAT INIT DAY: CPT

## 2024-11-14 PROCEDURE — 99285 EMERGENCY DEPT VISIT HI MDM: CPT

## 2024-11-14 ASSESSMENT — FIBROSIS 4 INDEX: FIB4 SCORE: 1.38

## 2024-11-15 ENCOUNTER — APPOINTMENT (OUTPATIENT)
Dept: RADIOLOGY | Facility: MEDICAL CENTER | Age: 79
DRG: 189 | End: 2024-11-15
Attending: EMERGENCY MEDICINE
Payer: MEDICARE

## 2024-11-15 PROBLEM — E87.6 HYPOKALEMIA: Status: ACTIVE | Noted: 2024-11-15

## 2024-11-15 PROBLEM — J96.21 ACUTE ON CHRONIC HYPOXIC RESPIRATORY FAILURE (HCC): Status: ACTIVE | Noted: 2024-11-15

## 2024-11-15 PROBLEM — J81.1 PULMONARY EDEMA: Status: ACTIVE | Noted: 2024-11-15

## 2024-11-15 LAB
ALBUMIN SERPL BCP-MCNC: 3.1 G/DL (ref 3.2–4.9)
ALBUMIN/GLOB SERPL: 1.1 G/DL
ALP SERPL-CCNC: 51 U/L (ref 30–99)
ALT SERPL-CCNC: 18 U/L (ref 2–50)
ANION GAP SERPL CALC-SCNC: 9 MMOL/L (ref 7–16)
AST SERPL-CCNC: 18 U/L (ref 12–45)
BACTERIA UR CULT: NORMAL
BASOPHILS # BLD AUTO: 0.1 % (ref 0–1.8)
BASOPHILS # BLD: 0.01 K/UL (ref 0–0.12)
BILIRUB SERPL-MCNC: 0.4 MG/DL (ref 0.1–1.5)
BUN SERPL-MCNC: 31 MG/DL (ref 8–22)
CALCIUM ALBUM COR SERPL-MCNC: 8.7 MG/DL (ref 8.5–10.5)
CALCIUM SERPL-MCNC: 8 MG/DL (ref 8.5–10.5)
CHLORIDE SERPL-SCNC: 100 MMOL/L (ref 96–112)
CO2 SERPL-SCNC: 29 MMOL/L (ref 20–33)
CREAT SERPL-MCNC: 1.2 MG/DL (ref 0.5–1.4)
EKG IMPRESSION: NORMAL
EOSINOPHIL # BLD AUTO: 0.03 K/UL (ref 0–0.51)
EOSINOPHIL NFR BLD: 0.2 % (ref 0–6.9)
ERYTHROCYTE [DISTWIDTH] IN BLOOD BY AUTOMATED COUNT: 49.3 FL (ref 35.9–50)
GFR SERPLBLD CREATININE-BSD FMLA CKD-EPI: 46 ML/MIN/1.73 M 2
GLOBULIN SER CALC-MCNC: 2.8 G/DL (ref 1.9–3.5)
GLUCOSE BLD STRIP.AUTO-MCNC: 101 MG/DL (ref 65–99)
GLUCOSE BLD STRIP.AUTO-MCNC: 108 MG/DL (ref 65–99)
GLUCOSE BLD STRIP.AUTO-MCNC: 115 MG/DL (ref 65–99)
GLUCOSE SERPL-MCNC: 96 MG/DL (ref 65–99)
HCT VFR BLD AUTO: 39.5 % (ref 37–47)
HGB BLD-MCNC: 12.6 G/DL (ref 12–16)
IMM GRANULOCYTES # BLD AUTO: 0.12 K/UL (ref 0–0.11)
IMM GRANULOCYTES NFR BLD AUTO: 0.8 % (ref 0–0.9)
LYMPHOCYTES # BLD AUTO: 1.78 K/UL (ref 1–4.8)
LYMPHOCYTES NFR BLD: 11.8 % (ref 22–41)
MCH RBC QN AUTO: 30.1 PG (ref 27–33)
MCHC RBC AUTO-ENTMCNC: 31.9 G/DL (ref 32.2–35.5)
MCV RBC AUTO: 94.5 FL (ref 81.4–97.8)
MONOCYTES # BLD AUTO: 1.24 K/UL (ref 0–0.85)
MONOCYTES NFR BLD AUTO: 8.2 % (ref 0–13.4)
NEUTROPHILS # BLD AUTO: 11.89 K/UL (ref 1.82–7.42)
NEUTROPHILS NFR BLD: 78.9 % (ref 44–72)
NRBC # BLD AUTO: 0 K/UL
NRBC BLD-RTO: 0 /100 WBC (ref 0–0.2)
NT-PROBNP SERPL IA-MCNC: 550 PG/ML (ref 0–125)
PLATELET # BLD AUTO: 260 K/UL (ref 164–446)
PMV BLD AUTO: 9.6 FL (ref 9–12.9)
POTASSIUM SERPL-SCNC: 3.3 MMOL/L (ref 3.6–5.5)
PROCALCITONIN SERPL-MCNC: 0.06 NG/ML
PROT SERPL-MCNC: 5.9 G/DL (ref 6–8.2)
RBC # BLD AUTO: 4.18 M/UL (ref 4.2–5.4)
SIGNIFICANT IND 70042: NORMAL
SITE SITE: NORMAL
SODIUM SERPL-SCNC: 138 MMOL/L (ref 135–145)
SOURCE SOURCE: NORMAL
TROPONIN T SERPL-MCNC: 31 NG/L (ref 6–19)
WBC # BLD AUTO: 15.1 K/UL (ref 4.8–10.8)

## 2024-11-15 PROCEDURE — 84145 PROCALCITONIN (PCT): CPT

## 2024-11-15 PROCEDURE — 94640 AIRWAY INHALATION TREATMENT: CPT

## 2024-11-15 PROCEDURE — 83880 ASSAY OF NATRIURETIC PEPTIDE: CPT

## 2024-11-15 PROCEDURE — 96366 THER/PROPH/DIAG IV INF ADDON: CPT

## 2024-11-15 PROCEDURE — 96375 TX/PRO/DX INJ NEW DRUG ADDON: CPT

## 2024-11-15 PROCEDURE — A9270 NON-COVERED ITEM OR SERVICE: HCPCS

## 2024-11-15 PROCEDURE — 700111 HCHG RX REV CODE 636 W/ 250 OVERRIDE (IP): Mod: JZ | Performed by: EMERGENCY MEDICINE

## 2024-11-15 PROCEDURE — 99222 1ST HOSP IP/OBS MODERATE 55: CPT | Mod: AI | Performed by: STUDENT IN AN ORGANIZED HEALTH CARE EDUCATION/TRAINING PROGRAM

## 2024-11-15 PROCEDURE — 700101 HCHG RX REV CODE 250: Performed by: STUDENT IN AN ORGANIZED HEALTH CARE EDUCATION/TRAINING PROGRAM

## 2024-11-15 PROCEDURE — 700101 HCHG RX REV CODE 250: Performed by: EMERGENCY MEDICINE

## 2024-11-15 PROCEDURE — 84484 ASSAY OF TROPONIN QUANT: CPT

## 2024-11-15 PROCEDURE — 93005 ELECTROCARDIOGRAM TRACING: CPT | Performed by: EMERGENCY MEDICINE

## 2024-11-15 PROCEDURE — A9270 NON-COVERED ITEM OR SERVICE: HCPCS | Performed by: STUDENT IN AN ORGANIZED HEALTH CARE EDUCATION/TRAINING PROGRAM

## 2024-11-15 PROCEDURE — 85025 COMPLETE CBC W/AUTO DIFF WBC: CPT

## 2024-11-15 PROCEDURE — 96365 THER/PROPH/DIAG IV INF INIT: CPT

## 2024-11-15 PROCEDURE — 82962 GLUCOSE BLOOD TEST: CPT | Mod: 91

## 2024-11-15 PROCEDURE — 96376 TX/PRO/DX INJ SAME DRUG ADON: CPT

## 2024-11-15 PROCEDURE — 700102 HCHG RX REV CODE 250 W/ 637 OVERRIDE(OP)

## 2024-11-15 PROCEDURE — 36415 COLL VENOUS BLD VENIPUNCTURE: CPT

## 2024-11-15 PROCEDURE — 700111 HCHG RX REV CODE 636 W/ 250 OVERRIDE (IP): Performed by: STUDENT IN AN ORGANIZED HEALTH CARE EDUCATION/TRAINING PROGRAM

## 2024-11-15 PROCEDURE — 770006 HCHG ROOM/CARE - MED/SURG/GYN SEMI*

## 2024-11-15 PROCEDURE — 700102 HCHG RX REV CODE 250 W/ 637 OVERRIDE(OP): Performed by: STUDENT IN AN ORGANIZED HEALTH CARE EDUCATION/TRAINING PROGRAM

## 2024-11-15 PROCEDURE — 71045 X-RAY EXAM CHEST 1 VIEW: CPT

## 2024-11-15 PROCEDURE — 80053 COMPREHEN METABOLIC PANEL: CPT

## 2024-11-15 RX ORDER — INSULIN LISPRO 100 [IU]/ML
1-6 INJECTION, SOLUTION INTRAVENOUS; SUBCUTANEOUS
Status: DISCONTINUED | OUTPATIENT
Start: 2024-11-15 | End: 2024-11-16

## 2024-11-15 RX ORDER — LEVOTHYROXINE SODIUM 88 UG/1
88 TABLET ORAL
Status: DISCONTINUED | OUTPATIENT
Start: 2024-11-15 | End: 2024-11-19 | Stop reason: HOSPADM

## 2024-11-15 RX ORDER — HYDRALAZINE HYDROCHLORIDE 20 MG/ML
10 INJECTION INTRAMUSCULAR; INTRAVENOUS EVERY 4 HOURS PRN
Status: DISCONTINUED | OUTPATIENT
Start: 2024-11-15 | End: 2024-11-19 | Stop reason: HOSPADM

## 2024-11-15 RX ORDER — CETIRIZINE HYDROCHLORIDE 10 MG/1
10 TABLET ORAL
Status: DISCONTINUED | OUTPATIENT
Start: 2024-11-15 | End: 2024-11-15

## 2024-11-15 RX ORDER — ACETAMINOPHEN 325 MG/1
650 TABLET ORAL EVERY 6 HOURS PRN
Status: DISCONTINUED | OUTPATIENT
Start: 2024-11-15 | End: 2024-11-19 | Stop reason: HOSPADM

## 2024-11-15 RX ORDER — FUROSEMIDE 10 MG/ML
20 INJECTION INTRAMUSCULAR; INTRAVENOUS ONCE
Status: COMPLETED | OUTPATIENT
Start: 2024-11-15 | End: 2024-11-15

## 2024-11-15 RX ORDER — FLUTICASONE PROPIONATE 110 UG/1
2 AEROSOL, METERED RESPIRATORY (INHALATION) 2 TIMES DAILY
Status: DISCONTINUED | OUTPATIENT
Start: 2024-11-15 | End: 2024-11-19 | Stop reason: HOSPADM

## 2024-11-15 RX ORDER — LOSARTAN POTASSIUM 50 MG/1
100 TABLET ORAL DAILY
Status: DISCONTINUED | OUTPATIENT
Start: 2024-11-15 | End: 2024-11-19 | Stop reason: HOSPADM

## 2024-11-15 RX ORDER — ALBUTEROL SULFATE 90 UG/1
2 INHALANT RESPIRATORY (INHALATION) EVERY 6 HOURS PRN
Status: DISCONTINUED | OUTPATIENT
Start: 2024-11-15 | End: 2024-11-19 | Stop reason: HOSPADM

## 2024-11-15 RX ORDER — ONDANSETRON 2 MG/ML
4 INJECTION INTRAMUSCULAR; INTRAVENOUS EVERY 4 HOURS PRN
Status: DISCONTINUED | OUTPATIENT
Start: 2024-11-15 | End: 2024-11-19 | Stop reason: HOSPADM

## 2024-11-15 RX ORDER — OXYCODONE HYDROCHLORIDE 5 MG/1
2.5 TABLET ORAL EVERY 4 HOURS PRN
Status: DISCONTINUED | OUTPATIENT
Start: 2024-11-15 | End: 2024-11-19 | Stop reason: HOSPADM

## 2024-11-15 RX ORDER — IPRATROPIUM BROMIDE AND ALBUTEROL SULFATE 2.5; .5 MG/3ML; MG/3ML
3 SOLUTION RESPIRATORY (INHALATION)
Status: DISCONTINUED | OUTPATIENT
Start: 2024-11-15 | End: 2024-11-19 | Stop reason: HOSPADM

## 2024-11-15 RX ORDER — AMLODIPINE BESYLATE 10 MG/1
10 TABLET ORAL DAILY
Status: DISCONTINUED | OUTPATIENT
Start: 2024-11-15 | End: 2024-11-19 | Stop reason: HOSPADM

## 2024-11-15 RX ORDER — POTASSIUM CHLORIDE 1500 MG/1
40 TABLET, EXTENDED RELEASE ORAL ONCE
Status: COMPLETED | OUTPATIENT
Start: 2024-11-15 | End: 2024-11-15

## 2024-11-15 RX ORDER — ONDANSETRON 4 MG/1
4 TABLET, ORALLY DISINTEGRATING ORAL EVERY 4 HOURS PRN
Status: DISCONTINUED | OUTPATIENT
Start: 2024-11-15 | End: 2024-11-19 | Stop reason: HOSPADM

## 2024-11-15 RX ORDER — POTASSIUM CHLORIDE 7.45 MG/ML
10 INJECTION INTRAVENOUS
Status: COMPLETED | OUTPATIENT
Start: 2024-11-15 | End: 2024-11-15

## 2024-11-15 RX ORDER — DEXTROSE MONOHYDRATE 25 G/50ML
25 INJECTION, SOLUTION INTRAVENOUS
Status: DISCONTINUED | OUTPATIENT
Start: 2024-11-15 | End: 2024-11-19 | Stop reason: HOSPADM

## 2024-11-15 RX ORDER — ENOXAPARIN SODIUM 100 MG/ML
40 INJECTION SUBCUTANEOUS DAILY
Status: DISCONTINUED | OUTPATIENT
Start: 2024-11-15 | End: 2024-11-19 | Stop reason: HOSPADM

## 2024-11-15 RX ORDER — IPRATROPIUM BROMIDE AND ALBUTEROL SULFATE 2.5; .5 MG/3ML; MG/3ML
3 SOLUTION RESPIRATORY (INHALATION)
Status: COMPLETED | OUTPATIENT
Start: 2024-11-15 | End: 2024-11-15

## 2024-11-15 RX ORDER — FUROSEMIDE 20 MG/1
20 TABLET ORAL DAILY
Status: DISCONTINUED | OUTPATIENT
Start: 2024-11-15 | End: 2024-11-16

## 2024-11-15 RX ORDER — GABAPENTIN 300 MG/1
300 CAPSULE ORAL 3 TIMES DAILY
Status: ON HOLD | COMMUNITY
End: 2024-11-18

## 2024-11-15 RX ORDER — CARVEDILOL 12.5 MG/1
12.5 TABLET ORAL 2 TIMES DAILY WITH MEALS
Status: DISCONTINUED | OUTPATIENT
Start: 2024-11-15 | End: 2024-11-19 | Stop reason: HOSPADM

## 2024-11-15 RX ORDER — OXYCODONE HYDROCHLORIDE 5 MG/1
5 TABLET ORAL EVERY 4 HOURS PRN
Status: DISCONTINUED | OUTPATIENT
Start: 2024-11-15 | End: 2024-11-19 | Stop reason: HOSPADM

## 2024-11-15 RX ADMIN — FUROSEMIDE 20 MG: 10 INJECTION, SOLUTION INTRAVENOUS at 01:03

## 2024-11-15 RX ADMIN — POTASSIUM CHLORIDE 40 MEQ: 1500 TABLET, EXTENDED RELEASE ORAL at 03:10

## 2024-11-15 RX ADMIN — OMEPRAZOLE 20 MG: 20 CAPSULE, DELAYED RELEASE ORAL at 05:37

## 2024-11-15 RX ADMIN — FUROSEMIDE 20 MG: 40 TABLET ORAL at 05:37

## 2024-11-15 RX ADMIN — ENOXAPARIN SODIUM 40 MG: 100 INJECTION SUBCUTANEOUS at 17:24

## 2024-11-15 RX ADMIN — POTASSIUM CHLORIDE 10 MEQ: 7.46 INJECTION, SOLUTION INTRAVENOUS at 05:34

## 2024-11-15 RX ADMIN — IPRATROPIUM BROMIDE AND ALBUTEROL SULFATE 3 ML: 2.5; .5 SOLUTION RESPIRATORY (INHALATION) at 09:29

## 2024-11-15 RX ADMIN — POTASSIUM CHLORIDE 10 MEQ: 7.46 INJECTION, SOLUTION INTRAVENOUS at 04:00

## 2024-11-15 RX ADMIN — LOSARTAN POTASSIUM 100 MG: 50 TABLET, FILM COATED ORAL at 05:37

## 2024-11-15 RX ADMIN — CARVEDILOL 12.5 MG: 12.5 TABLET, FILM COATED ORAL at 17:24

## 2024-11-15 RX ADMIN — LEVOTHYROXINE SODIUM 88 MCG: 0.09 TABLET ORAL at 05:37

## 2024-11-15 RX ADMIN — OMEPRAZOLE 20 MG: 20 CAPSULE, DELAYED RELEASE ORAL at 17:24

## 2024-11-15 RX ADMIN — FLUTICASONE PROPIONATE 220 MCG: 110 AEROSOL, METERED RESPIRATORY (INHALATION) at 17:24

## 2024-11-15 RX ADMIN — FUROSEMIDE 20 MG: 10 INJECTION, SOLUTION INTRAVENOUS at 03:10

## 2024-11-15 RX ADMIN — FLUTICASONE PROPIONATE 220 MCG: 110 AEROSOL, METERED RESPIRATORY (INHALATION) at 10:21

## 2024-11-15 RX ADMIN — IPRATROPIUM BROMIDE AND ALBUTEROL SULFATE 3 ML: .5; 2.5 SOLUTION RESPIRATORY (INHALATION) at 01:15

## 2024-11-15 RX ADMIN — POTASSIUM CHLORIDE 10 MEQ: 7.46 INJECTION, SOLUTION INTRAVENOUS at 06:49

## 2024-11-15 RX ADMIN — ALBUTEROL SULFATE 2 PUFF: 90 AEROSOL, METERED RESPIRATORY (INHALATION) at 09:26

## 2024-11-15 RX ADMIN — AMLODIPINE BESYLATE 10 MG: 10 TABLET ORAL at 05:37

## 2024-11-15 RX ADMIN — OXYCODONE 5 MG: 5 TABLET ORAL at 23:00

## 2024-11-15 RX ADMIN — POTASSIUM CHLORIDE 10 MEQ: 7.46 INJECTION, SOLUTION INTRAVENOUS at 03:09

## 2024-11-15 ASSESSMENT — COGNITIVE AND FUNCTIONAL STATUS - GENERAL
CLIMB 3 TO 5 STEPS WITH RAILING: TOTAL
SUGGESTED CMS G CODE MODIFIER DAILY ACTIVITY: CK
MOVING FROM LYING ON BACK TO SITTING ON SIDE OF FLAT BED: A LOT
MOVING TO AND FROM BED TO CHAIR: A LOT
TURNING FROM BACK TO SIDE WHILE IN FLAT BAD: A LOT
TOILETING: A LOT
DRESSING REGULAR UPPER BODY CLOTHING: A LOT
DRESSING REGULAR LOWER BODY CLOTHING: A LOT
DAILY ACTIVITIY SCORE: 16
STANDING UP FROM CHAIR USING ARMS: A LOT
SUGGESTED CMS G CODE MODIFIER MOBILITY: CL
WALKING IN HOSPITAL ROOM: TOTAL
HELP NEEDED FOR BATHING: A LOT
MOBILITY SCORE: 10

## 2024-11-15 ASSESSMENT — SOCIAL DETERMINANTS OF HEALTH (SDOH)
WITHIN THE PAST 12 MONTHS, YOU WORRIED THAT YOUR FOOD WOULD RUN OUT BEFORE YOU GOT THE MONEY TO BUY MORE: NEVER TRUE
WITHIN THE LAST YEAR, HAVE YOU BEEN HUMILIATED OR EMOTIONALLY ABUSED IN OTHER WAYS BY YOUR PARTNER OR EX-PARTNER?: NO
WITHIN THE LAST YEAR, HAVE TO BEEN RAPED OR FORCED TO HAVE ANY KIND OF SEXUAL ACTIVITY BY YOUR PARTNER OR EX-PARTNER?: NO
WITHIN THE LAST YEAR, HAVE YOU BEEN AFRAID OF YOUR PARTNER OR EX-PARTNER?: NO
WITHIN THE PAST 12 MONTHS, THE FOOD YOU BOUGHT JUST DIDN'T LAST AND YOU DIDN'T HAVE MONEY TO GET MORE: NEVER TRUE
IN THE PAST 12 MONTHS, HAS THE ELECTRIC, GAS, OIL, OR WATER COMPANY THREATENED TO SHUT OFF SERVICE IN YOUR HOME?: NO
WITHIN THE LAST YEAR, HAVE YOU BEEN KICKED, HIT, SLAPPED, OR OTHERWISE PHYSICALLY HURT BY YOUR PARTNER OR EX-PARTNER?: NO

## 2024-11-15 ASSESSMENT — ENCOUNTER SYMPTOMS
SHORTNESS OF BREATH: 1
HEADACHES: 0
CHILLS: 0
NAUSEA: 0
PALPITATIONS: 0
DIARRHEA: 0
COUGH: 0
MYALGIAS: 1
ABDOMINAL PAIN: 0
DIZZINESS: 0
HEARTBURN: 0
FEVER: 0
VOMITING: 0

## 2024-11-15 ASSESSMENT — LIFESTYLE VARIABLES
EVER HAD A DRINK FIRST THING IN THE MORNING TO STEADY YOUR NERVES TO GET RID OF A HANGOVER: NO
ALCOHOL_USE: NO
HAVE PEOPLE ANNOYED YOU BY CRITICIZING YOUR DRINKING: NO
AVERAGE NUMBER OF DAYS PER WEEK YOU HAVE A DRINK CONTAINING ALCOHOL: 0
ON A TYPICAL DAY WHEN YOU DRINK ALCOHOL HOW MANY DRINKS DO YOU HAVE: 0
TOTAL SCORE: 0
TOTAL SCORE: 0
EVER FELT BAD OR GUILTY ABOUT YOUR DRINKING: NO
HOW MANY TIMES IN THE PAST YEAR HAVE YOU HAD 5 OR MORE DRINKS IN A DAY: 0
TOTAL SCORE: 0
DOES PATIENT WANT TO STOP DRINKING: NO
HAVE YOU EVER FELT YOU SHOULD CUT DOWN ON YOUR DRINKING: NO
CONSUMPTION TOTAL: NEGATIVE

## 2024-11-15 ASSESSMENT — PAIN DESCRIPTION - PAIN TYPE
TYPE: ACUTE PAIN
TYPE: ACUTE PAIN

## 2024-11-15 ASSESSMENT — PATIENT HEALTH QUESTIONNAIRE - PHQ9
2. FEELING DOWN, DEPRESSED, IRRITABLE, OR HOPELESS: NOT AT ALL
SUM OF ALL RESPONSES TO PHQ9 QUESTIONS 1 AND 2: 0
1. LITTLE INTEREST OR PLEASURE IN DOING THINGS: NOT AT ALL

## 2024-11-15 NOTE — ED NOTES
Medication history reviewed per patient's MAR from [facility].  Med rec is complete  Allergies reviewed per MAR   Anticoagulants taken in the last 14 days? None per MAR    Per MAR patient has been refusing most of her medications    Aaron Angeles

## 2024-11-15 NOTE — ED NOTES
Pt woke up screaming that she was SOB. Pt breath sounds more wet and crackly. MD notified. New orders placed. RT notified of breathing treatment.

## 2024-11-15 NOTE — ED NOTES
Marcelina at [facility] called, updated on pt plan for admission.

## 2024-11-15 NOTE — ED PROVIDER NOTES
"ED Provider Note    Scribed for Dr. Pink by Dany Orantes. 11/14/2024,  11:57 PM.    CHIEF COMPLAINT  Chief Complaint   Patient presents with    Shortness of Breath     Pt got admitted to [facility] yesterday for pneumonia. Per EMS, pt is refusing all care and berating staff. Pt states that she thinks that staff is \"pumping poisonous gas into her room\". EMS found the pt on 88% on her 4 l/min oxygen at baseline. EMS initiated CPAP and spo2 increased to high 90's     EXTERNAL RECORDS REVIEWED  Inpatient Notes patient hospitalized 11/11/2024 for 2 days for persistent asthma with exacerbation, acute respiratory failure with hypoxia    HPI  LIMITATION TO HISTORY   Select: : None  OUTSIDE HISTORIAN(S):  EMS Provides all history    Cecile Teixeira is a 79 y.o. female with a history of congestive heart failure who presents to the Emergency Department via EMS for evaluation of shortness of breath onset tonight. Per EMS the patient was admitted on 11/12/24 and discharged on 11/13/24 to [facility] for pneumonia. EMS reports the patient is refusing to take any of her lasix. EMS notes rales and productive cough, but no respiratory distress. EMS reports the patient was found on 4L SpO2 at baseline saturating at 87-88% at [place]. EMS states the patient was placed on CPAP brought up to 6L FIO2, saturating at 97%. EMS reports the patient is refusing IV access or any other interventions. The patient stated to EMS she \"wanted to come to the ER so she could get a ride home from the hospital\" The patient adamantly refuses to take her lasix.     REVIEW OF SYSTEMS  See HPI for further details. All other systems are negative.     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    SOCIAL HISTORY    reports that she has quit smoking. Her smoking use included cigarettes. She has never used smokeless tobacco. She reports that she does not currently use alcohol. She reports that she does not use drugs.    CURRENT MEDICATIONS  Home Medications       Reviewed by Aaron Angeles (Pharmacy Tech) on 11/15/24 at 0203  Med List Status: Complete     Medication Last Dose Status   acetaminophen (TYLENOL) 500 MG Tab 11/14/2024 Active   albuterol 108 (90 Base) MCG/ACT Aero Soln inhalation aerosol 11/14/2024 Active   amLODIPine (NORVASC) 10 MG Tab Unknown Active   benzonatate (TESSALON) 100 MG Cap Not Taking Active   carvedilol (COREG) 12.5 MG Tab 11/14/2024 Active   cetirizine (ZYRTEC) 10 MG Tab Not Taking Active   fluticasone (FLONASE) 50 MCG/ACT nasal spray 11/14/2024 Active   fluticasone (FLOVENT HFA) 110 MCG/ACT Aerosol 11/14/2024 Active   furosemide (LASIX) 20 MG Tab Unknown Active   gabapentin (NEURONTIN) 300 MG Cap 11/14/2024 Active   levothyroxine (SYNTHROID) 88 MCG Tab Unknown Active   losartan (COZAAR) 100 MG Tab Unknown Active   Multiple Vitamins-Minerals (CENTRUM ADULT 50+ MULTIGUMMIES PO) Unknown Active   Nystatin Powder Not Taking Active   omeprazole (PRILOSEC) 20 MG delayed-release capsule 11/14/2024 Active   pioglitazone (ACTOS) 30 MG Tab Unknown Active   polyethylene glycol/lytes (MIRALAX) Pack Not Taking Active   potassium chloride SA (KDUR) 20 MEQ Tab CR Unknown Active   senna-docusate (PERICOLACE OR SENOKOT S) 8.6-50 MG Tab 11/14/2024 Active                  Audit from Redirected Encounters    **Home medications have not yet been reviewed for this encounter**       ALLERGIES  Allergies   Allergen Reactions    Doxycycline Hives    Prednisone Unspecified     Diarrhea and very irritable     Cephalexin  Diarrhea     Diarrhea and mild rash     Montelukast Rash     Leg pain, back pain and rash     Spinach Unspecified     Skin allergy test    Cow's Milk [Lac Bovis]     Other Environmental      Trees except oak    Codeine Unspecified     Headaches and confusion     Lactose Unspecified     Lactose intolerance since a child     Levothyroxine Anxiety     Panic attack     Cannot take generic    Sulfa Drugs Unspecified     Mental status change      PHYSICAL EXAM  VITAL SIGNS: BP (!) 192/81   Pulse 65   Temp 36.3 °C (97.3 °F) (Temporal)   Resp (!) 24   Ht 1.524 m (5')   Wt (!) 134 kg (295 lb 6.7 oz)   SpO2 93%   BMI 57.69 kg/m²   Gen: Alert, no acute distress, extreme obesity  HEENT: ATNC  Eyes: PERRL, EOMI, normal conjunctiva  Neck: trachea midline  Resp: minimal respiratory distress, BiPAP mask in place. Diminished on left, wheeze on right.   CV: No JVD, regular rate and rhythm.  No m/r/g. Bilateral pedal edema present.  Abd: non-distended, soft, nontender  Ext: No deformities  Neuro: speech fluent, GCS 15, speaks with full sentences, able to yell rather loudly.    DIAGNOSTIC STUDIES / PROCEDURES  EKG  I independently interpreted this EKG.  Results for orders placed or performed during the hospital encounter of 24   EKG (NOW)   Result Value Ref Range    Report       [facility] Emergency Dept.    Test Date:  2024-11-15  Pt Name:    LUCIANA MUELLER                   Department: ER  MRN:        [id number]                      Room:       [facility]  Gender:     Female                       Technician: [id number]  :        1945                   Requested By:OMID PINK  Order #:    [id number]                    Reading MD: Omid Pink    Measurements  Intervals                                Axis  Rate:       62                           P:          13  MN:         181                          QRS:        23  QRSD:       104                          T:          49  QT:         390  QTc:         396    Interpretive Statements  Sinus rhythm  Paired ventricular premature complexes  Minimal ST depression, lateral leads  Compared to ECG 11/08/2024 19:49:12  Ventricular premature complex(es) now present  ST (T wave) deviation still present  Electronically Signed On 11- 01:11:00 PST by Omid Pink       *Note: Due to a large number of results and/or encounters for the requested time period, some results have not been displayed. A complete set of results can be found in Results Review.      LABS  Labs Reviewed   CBC WITH DIFFERENTIAL - Abnormal; Notable for the following components:       Result Value    WBC 15.1 (*)     RBC 4.18 (*)     MCHC 31.9 (*)     Neutrophils-Polys 78.90 (*)     Lymphocytes 11.80 (*)     Neutrophils (Absolute) 11.89 (*)     Monos (Absolute) 1.24 (*)     Immature Granulocytes (abs) 0.12 (*)     All other components within normal limits   COMP METABOLIC PANEL - Abnormal; Notable for the following components:    Potassium 3.3 (*)     Bun 31 (*)     Calcium 8.0 (*)     Albumin 3.1 (*)     Total Protein 5.9 (*)     All other components within normal limits   PROBRAIN NATRIURETIC PEPTIDE, NT - Abnormal; Notable for the following components:    NT-proBNP 550 (*)     All other components within normal limits   TROPONIN - Abnormal; Notable for the following components:    Troponin T 31 (*)     All other components within normal limits   ESTIMATED GFR - Abnormal; Notable for the following components:    GFR (CKD-EPI) 46 (*)     All other components within normal limits   PROCALCITONIN     RADIOLOGY  I have independently interpreted the diagnostic imaging associated with this visit.  My preliminary interpretation is as follows: Chest x-ray: Pulmonary edema  Radiologist interpretation:    DX-CHEST-PORTABLE (1 VIEW)   Final Result         1.  Pulmonary edema and/or infiltrates are identified, which are stable since the prior exam.   2.  Cardiomegaly        COURSE & MEDICAL DECISION  MAKING  Pertinent Labs & Imaging studies were reviewed. (See chart for details)    ED Observation Status? No, the patient does not meet the criteria for ED observation.   -----------    11:55 PM - Patient seen and examined at bedside after overhead call to Red 1 at 11:46 PM with RT called. The patient is leaving against medical advice.  I discussed with the patient the risks of leaving without receiving appropriate care to include permanent disability or death.  I have discussed possible alternatives.  The patient is not intoxicated.  The patient is a capable decision maker and understands the risks and benefits of their decision.  While I certainly disagree with the patient's decision, I respect the patient's autonomy and will not keep them here against their will. They understand that their decision to leave can be reversed at any time and they can return to us at any time for any reason at all.     12:20 AM - The patient was reevaluated at bedside. The patient is amenable for hospitalization because she is unable to get a ride home.     1:29 AM - I discussed the patient's case and the above findings with Dr. Harrell (Hospitalist) who agrees to evaluate the patient for hospitalization.     INITIAL ASSESSMENT AND PLAN  Medical Decision Making: Patient arrives from skilled nursing facility via EMS with new and worsening oxygen demand.  The patient refuses to take her Lasix and she does report that she will not take it.  She is requesting the CPAP mask be brought off, that she be placed back on nasal cannula.  The patient is declining any medical intervention.  She states that she does not want to be admitted to the hospital, she does not want any medical interventions or diagnostics performed.  She states that she only wants to go home.  She states that she has been through this many times before, and would like transport back to her house.  When asked about her oxygen requirements and everything else, she does  acknowledge that her oxygen counts are low but, states that she has an oxygen concentrator at home.  When expressed my concern that she is very high risk for worsening respiratory status, organ injury, and ultimately death from this, the patient acknowledges this she is able to demonstrate logical thought, demonstrates capacity to make decision making.  The patient is adamant that she be discharged home and spite of her risk of death from this, and therefore will leave AGAINST MEDICAL ADVICE.      Case discussed with respiratory therapy, who will discontinue the patient's CPAP.    The patient was ultimately unable to get a ride home, and is agreeable with medical management.  She will be given empiric Lasix, breathing treatment, will plan for hospitalization.  Patient is agreeable with this plan of care.    On further discussion, the patient does report sputum production but denies fevers.  She does report chest discomfort but does not describe it as pain.  No hemoptysis.  No suspicion for pulmonary embolism.    Patient does have a proBNP that is elevated compared to her baseline, slightly elevated troponin but low suspicion for ACS.  She does have leukocytosis but no overt signs of infection    ADDITIONAL PROBLEM LIST AND DISPOSITION    I have discussed management of the patient with the following medical professionals: See below, above      Barriers to care at this time, including but not limited to: Patient lacks transportation .     Decision tools and prescription drugs considered including, but not limited to: Antibiotics no infection identified .      DISPOSITION:      Case discussed with Dr. Harrell , who will evaluate the patient for hospitalization. Patient will be hospitalized in guarded condition.      FINAL IMPRESSION  1. Acute hypoxemic respiratory failure (HCC)    2. Acute on chronic congestive heart failure, unspecified heart failure type (HCC)    3. Hypokalemia         Dany ZAVALETA (Balaji), am  scribing for, and in the presence of, Omid Pink M.D..    Electronically signed by: Dany Orantes (Scribe), 11/14/2024    I, Omid Pink M.D. personally performed the services described in this documentation, as scribed by Dany Orantes in my presence, and it is both accurate and complete.    The note accurately reflects work and decisions made by me.  Omid Pink M.D.  11/15/2024  5:38 AM      This dictation was created using voice recognition software. The accuracy of the dictation is limited to the abilities of the software. I expect there may be some errors of grammar and possibly content. The nursing notes were reviewed and certain aspects of this information were incorporated into this note.

## 2024-11-15 NOTE — PROGRESS NOTES
Hospital Medicine Daily Progress Note    Date of Service  11/15/2024    Chief Complaint  Cecile Teixeira is a 79 y.o. female admitted 11/14/2024 with increasing shortness of breath.    Hospital Course  Cecile Teixeira is a 79 y.o. female who presented 11/14/2024 with worsening shortness of breath.  Of note, history has been obtained from chart review and from ER physician.  Patient was aggressive at bedside, and states that she does not want to talk to anybody at this time.  She is unwilling to participate in any meaningful conversation with me.    Patient has a past medical history of being wheelchair-bound since her 30s, YOLA on CPAP, morbid obesity, diabetes mellitus, HFpEF, hypertension, asthma, hypothyroidism, who had a recent hospitalization here at [facility], was discharged on November 13, 2024.  Patient now re-presents to the emerged department, complaining of worsening of persistent shortness of breath.  At her skilled nursing facility, patient was refusing all care and braiding all staff.  Per EMS, she was requiring 4 to 5 L supplemental oxygen, which slightly elevated compared to her baseline.  CPAP was also initiated en route.  In the emergency department, chest x-ray was obtained showing some mild worsening pulmonary edema.  She was given a dose of IV Lasix, and given a breathing treatment as well.  Patient will be admitted for management of acute on chronic hypoxic respiratory failure    Interval Problem Update  11/15 afebrile, slightly bradycardic, hypertensive and on 4 L nasal cannula.  White count slightly elevated to 15. Patient continues to be argumentative and demanding.  From SNF, but states she does have a home, however, hasn't been there in awhile.    I have discussed this patient's plan of care and discharge plan at IDT rounds today with Case Management, Nursing, Nursing leadership, and other members of the IDT team.    Consultants/Specialty  None    Code Status  Full  Code    Disposition  The patient is not medically cleared for discharge to home or a post-acute facility.  Anticipate discharge to: skilled nursing facility    I have placed the appropriate orders for post-discharge needs.    Review of Systems  Review of Systems   Constitutional:  Negative for chills, fever and malaise/fatigue.   Respiratory:  Positive for shortness of breath. Negative for cough.    Cardiovascular:  Negative for chest pain, palpitations and leg swelling.   Gastrointestinal:  Negative for abdominal pain, diarrhea, heartburn, nausea and vomiting.   Genitourinary:  Negative for dysuria, frequency and urgency.   Musculoskeletal:  Positive for myalgias.   Neurological:  Negative for dizziness and headaches.   All other systems reviewed and are negative.       Physical Exam  Temp:  [36.3 °C (97.3 °F)] 36.3 °C (97.3 °F)  Pulse:  [52-98] 54  Resp:  [16-24] 17  BP: (133-192)/(59-81) 156/70  SpO2:  [91 %-94 %] 93 %    Physical Exam  Vitals and nursing note reviewed.   Constitutional:       Appearance: Normal appearance. She is not ill-appearing.   HENT:      Head: Normocephalic and atraumatic.      Jaw: There is normal jaw occlusion.      Right Ear: Hearing normal.      Left Ear: Hearing normal.      Nose: Nose normal.      Mouth/Throat:      Lips: Pink.      Mouth: Mucous membranes are moist.   Eyes:      Extraocular Movements: Extraocular movements intact.      Conjunctiva/sclera: Conjunctivae normal.      Pupils: Pupils are equal, round, and reactive to light.   Neck:      Vascular: No carotid bruit.   Cardiovascular:      Rate and Rhythm: Normal rate and regular rhythm.      Pulses: Normal pulses.      Heart sounds: Normal heart sounds, S1 normal and S2 normal.   Pulmonary:      Effort: Pulmonary effort is normal.      Breath sounds: Normal air entry. No stridor. Examination of the right-lower field reveals decreased breath sounds. Examination of the left-lower field reveals decreased breath sounds.  Decreased breath sounds present.   Musculoskeletal:      Cervical back: Normal range of motion and neck supple.      Right lower leg: No edema.      Left lower leg: No edema.   Skin:     General: Skin is warm and dry.      Capillary Refill: Capillary refill takes less than 2 seconds.   Neurological:      General: No focal deficit present.      Mental Status: She is alert and oriented to person, place, and time. Mental status is at baseline.      Sensory: Sensation is intact.      Motor: Motor function is intact.   Psychiatric:         Attention and Perception: Attention and perception normal.         Mood and Affect: Mood normal. Affect is angry.         Speech: Speech normal.         Behavior: Behavior is agitated and aggressive.         Fluids  No intake or output data in the 24 hours ending 11/15/24 0546     Laboratory  Recent Labs     11/13/24  0801 11/15/24  0055   WBC 11.3* 15.1*   RBC 4.34 4.18*   HEMOGLOBIN 13.2 12.6   HEMATOCRIT 41.4 39.5   MCV 95.4 94.5   MCH 30.4 30.1   MCHC 31.9* 31.9*   RDW 50.9* 49.3   PLATELETCT 269 260   MPV 10.1 9.6     Recent Labs     11/13/24  0801 11/15/24  0055   SODIUM 139 138   POTASSIUM 4.1 3.3*   CHLORIDE 100 100   CO2 29 29   GLUCOSE 151* 96   BUN 33* 31*   CREATININE 0.85 1.20   CALCIUM 8.3* 8.0*                   Imaging  DX-CHEST-PORTABLE (1 VIEW)   Final Result         1.  Pulmonary edema and/or infiltrates are identified, which are stable since the prior exam.   2.  Cardiomegaly           Assessment/Plan  * Acute on chronic hypoxic respiratory failure (HCC)- (present on admission)  Assessment & Plan  Patient requires 3 L supplemental oxygen at baseline given her history of COPD admissions of heart failure  Unclear if patient has been compliant with her home dose Lasix.  Prior to arrival, she was requiring 4 to 5 L supplemental oxygen and was started on CPAP as well prior to arrival.  She was given 40 mg total of IV Lasix while in the emergency department.  She was also  given a breathing treatment.  She has been titrated down to her near baseline.  Continue to monitor    Pulmonary edema  Assessment & Plan  Pulmonary edema seen on chest x-ray, which appears to be slightly worse compared to prior imaging  Given a dose of IV Lasix  Monitor electrolytes with daily BMP      Chronic heart failure with preserved ejection fraction (HCC)- (present on admission)  Assessment & Plan  Continue with home dose carvedilol, losartan, and furosemide    Hypokalemia  Assessment & Plan  Replete with IV and PO potassium  Follow up with daily BMPs    Type 2 diabetes mellitus, without long-term current use of insulin (HCC)- (present on admission)  Assessment & Plan  Insulin sliding scale, hypoglycemia protocol, diabetic diet    Lymphedema associated with obesity- (present on admission)  Assessment & Plan  Recent lower extremity duplex US negative for DVT     YOLA (obstructive sleep apnea)- (present on admission)  Assessment & Plan  CPAP         VTE prophylaxis:   SCDs/TEDs   enoxaparin ppx      I have performed a physical exam and reviewed and updated ROS and Plan today (11/15/2024). In review of yesterday's note (11/14/2024), there are no changes except as documented above.

## 2024-11-15 NOTE — RESPIRATORY CARE
" EDUCATION by COPD CLINICAL EDUCATOR  11/15/2024 at 1:04 PM by Hiral Hollis, RRT     Patient reviewed by education team. Patient does not qualify for the COPD program because she has known diagnosis of Asthma /YOLA. She followed with [facility] in past. She is a non-smoker.    COPD Screen  COPD Risk Screening  Do you have a history of COPD?: No  COPD Population Screener  During the past 4 weeks, how much did you feel short of breath?:  (would not answer questions angry)    COPD Assessment  COPD Clinical Specialists ONLY  COPD Education Initiated: Yes--Short Intervention (known Asthma/YOLA  pt recenty here and seen for Asthma Exacerbation and went to SNF; here with)  DME Company: [employer]  DME Equipment Type: 3lpm CPAP +18  Physician Name: GERBER Sood PCP manages her Asthma YOLA  Pulmonologist Name: [facility]    PFT Results  No results found for: \"PFT\"    Meds to Beds  [facility] provides bedside medication delivery for all eligible patients at discharge and you have been automatically enrolled in the Meds to Beds Program. Would you like to opt out of this program for any reason?: No - Stay Opted In     MY ASTHMA ACTION PLAN     It is recommended that patients and physicians /healthcare providers complete this action plan together. This plan should be discussed at each physician visit and updated as needed.    The green, yellow and red zones show groups of symptoms of Asthma. This list of symptoms is not comprehensive, and you may experience other symptoms. In the \"Actions\" column, your healthcare provider has recommended actions for you to take based on your symptoms.    Patient Name: Cecile Teixeira   YOB: 1945   Last Updated on: 11/12/2024  6:40 AM   Green Zone:  I am doing well today Actions     Usual activitiy and exercise level   Take daily medications     Usual amounts of cough and phlegm/mucus   Use oxygen as prescribed     Sleep well at night   Continue regular exercise/diet plan    " " Appetite is good   At all times avoid cigarette smoke, inhaled irritants     Daily Medications (these medications are taken every day):    Flovent   2 puffs   Twice Daily        Yellow Zone:  I am having a bad day or a COPD flare Actions     More breathless than usual   Continue daily medications     I have less energy for my daily activities   Use quick relief inhaler as ordered     Increased or thicker phlegm/mucus   Use oxygen as prescribed     Using quick relief inhaler/nebulizer more often   Get plenty of rest     Swelling of ankles more than usual   Use pursed lip breathing     More coughing than usual   At all times avoid cigarette smoke, inhaled irritants     I feel like I have a \"chest cold\"     Poor sleep and my symptoms woke me up     My appetite is not good     My medicine is not helping      Call provider immediately if symptoms don’t improve     Continue daily medications, add rescue medications:    Albuterol  2 puffs  As directed       Medications to be used during a flare up, (as Discussed with Provider):              Red Zone:  I need urgent medical care Actions     Severe shortness of breath even at rest   Call 911 or seek medical care immediately     Not able to do any activity because of breathing      Fever or shaking chills      Feeling confused or very drowsy       Chest pains      Coughing up blood                  "

## 2024-11-15 NOTE — ASSESSMENT & PLAN NOTE
Pulmonary edema seen on chest x-ray, which appears to be slightly worse compared to prior imaging  11/16:  increased  IV Lasix to 40 bid.  Monitor electrolytes with daily BMP

## 2024-11-15 NOTE — DISCHARGE INSTRUCTIONS
You were seen in the emergency department for worsening oxygen counts, refusal to take Lasix, and worsening CHF.  You declined all interventions in the emergency department.    You are very high risk of going home and suffering permanent disability, brain injury, or death.  You have demonstrated that you have the ability to make decisions for yourself. You accepted that you acknowledged these risks and are being sent home AGAINST MEDICAL ADVICE.      You are welcome to return anytime to continue your treatment.  Return immediately if you develop:  Slurred speech, weakness in any part of your body, chest pain, difficulty breathing, any other new or concerning findings

## 2024-11-15 NOTE — PROGRESS NOTES
4 Eyes Skin Assessment Completed by Neva RN and Nicole RN.    Head WDL  Ears Redness and Blanching  Nose WDL  Mouth WDL  Neck WDL  Breast/Chest Redness, Blanching, and Shiny under breasts  Shoulder Blades WDL  Spine WDL  (R) Arm/Elbow/Hand Redness and Blanching, bruising  (L) Arm/Elbow/Hand Redness and Blanching, bruising  Abdomen Redness, Blanching, Shiny to pannus, bruising  Groin Redness and Blanching  Scrotum/Coccyx/Buttocks Redness, Blanching, and Excoriation  (R) Leg Swelling, flaky  (L) Leg Swelling, flaky  (R) Heel/Foot/Toe Redness, Blanching, and Swelling  (L) Heel/Foot/Toe Redness, Blanching, and Swelling          Devices In Places IV      Interventions In Place Gray Ear Foams. Pt refusing mepilex and q2hr turns despite education provided.    Possible Skin Injury No    Pictures Uploaded Into Epic Yes  Wound Consult Placed N/A  RN Wound Prevention Protocol Ordered No

## 2024-11-15 NOTE — ED NOTES
Resource RN:  RT called for breathing treatment.    Notified pharmacy of pt requiring flovent inhaler    Albuterol inhaler pulled for administration

## 2024-11-15 NOTE — H&P
"Hospital Medicine History & Physical Note    Date of Service  11/15/2024    Primary Care Physician  GERBER Sood        Code Status  Full Code    Chief Complaint  Chief Complaint   Patient presents with    Shortness of Breath     Pt got admitted to [facility] yesterday for pneumonia. Per EMS, pt is refusing all care and berating staff. Pt states that she thinks that staff is \"pumping poisonous gas into her room\". EMS found the pt on 88% on her 4 l/min oxygen at baseline. EMS initiated CPAP and spo2 increased to high 90's       History of Presenting Illness  Cecile Teixeira is a 79 y.o. female who presented 11/14/2024 with worsening shortness of breath.  Of note, history has been obtained from chart review and from ER physician.  Patient was aggressive at bedside, and states that she does not want to talk to anybody at this time.  She is unwilling to participate in any meaningful conversation with me.    Patient has a past medical history of being wheelchair-bound since her 30s, YOLA on CPAP, morbid obesity, diabetes mellitus, HFpEF, hypertension, asthma, hypothyroidism, who had a recent hospitalization here at [facility], was discharged on November 13, 2024.  Patient now Yoav presents to the emerged department, complaining of worsening of persistent shortness of breath.  At her skilled nursing facility, patient was refusing all care and braiding all staff.  Per EMS, she was requiring 4 to 5 L supplemental oxygen, which slightly elevated compared to her baseline.  CPAP was also initiated en route.  In the emergency department, chest x-ray was obtained showing some mild worsening pulmonary edema.  She was given a dose of IV Lasix, and given a breathing treatment as well.  Patient will be admitted for management of acute on chronic hypoxic respiratory failure    I discussed the plan of care with patient.    Review of Systems  Unable to obtain as patient is unwilling to participate in " meaningful conversation at bedside.  Patient aggressive, and states that she does not want to talk to me at this time    Past Medical History   has a past medical history of Asthma, Blood clots in brain (2000), Chest pain, Cough, GERD (gastroesophageal reflux disease), Hypothyroidism, Osteoarthritis, Painful breathing, Pleurodynia (07/20/2024), Shortness of breath, Sleep apnea, Sputum production, and Wheezing.    Surgical History   has a past surgical history that includes other (Left, [id number]).     Family History  family history is not on file.   Family history reviewed with patient. There is no family history that is pertinent to the chief complaint.     Social History   reports that she has quit smoking. Her smoking use included cigarettes. She has never used smokeless tobacco. She reports that she does not currently use alcohol. She reports that she does not use drugs.    Allergies  Allergies   Allergen Reactions    Doxycycline Hives    Prednisone Unspecified     Diarrhea and very irritable     Cephalexin Diarrhea     Diarrhea and mild rash     Montelukast Rash     Leg pain, back pain and rash     Spinach Unspecified     Skin allergy test    Cow's Milk [Lac Bovis]     Other Environmental      Trees except oak    Codeine Unspecified     Headaches and confusion     Lactose Unspecified     Lactose intolerance since a child     Levothyroxine Anxiety     Panic attack     Cannot take generic    Sulfa Drugs Unspecified     Mental status change        Medications  Prior to Admission Medications   Prescriptions Last Dose Informant Patient Reported? Taking?   Multiple Vitamins-Minerals (CENTRUM ADULT 50+ MULTIGUMMIES PO)  Patient, Historical Yes No   Sig: Take 1 Tablet by mouth every day. Indications: supplement   Nystatin Powder  Patient, Historical No No   Sig: Apply 1 Application topically 2 times a day as needed (skin breakdown).   acetaminophen (TYLENOL) 500 MG Tab   No No   Sig: Take 1 Tablet by mouth every 6  hours as needed for Mild Pain or Fever.   albuterol 108 (90 Base) MCG/ACT Aero Soln inhalation aerosol  Patient, Historical No No   Sig: Inhale 2 Puffs every 6 hours as needed for Shortness of Breath. Indications: Asthma   amLODIPine (NORVASC) 10 MG Tab  Patient, Historical No No   Sig: Take 1 Tablet by mouth every day. Indications: High Blood Pressure   benzonatate (TESSALON) 100 MG Cap  Patient, Historical No No   Sig: Take 1 Capsule by mouth 3 times a day as needed for Cough.   carvedilol (COREG) 12.5 MG Tab  Patient No No   Sig: Take 1 Tablet by mouth 2 times a day with meals.   cetirizine (ZYRTEC) 10 MG Tab  Patient, Historical No No   Sig: Take 1 Tablet by mouth at bedtime. FOR ALLERGIES   fluticasone (FLONASE) 50 MCG/ACT nasal spray  Patient, Historical No No   Sig: ADMINISTER 1 SPRAY INTO AFFECTED NOSTRIL(S) EVERY DAY.   fluticasone (FLOVENT HFA) 110 MCG/ACT Aerosol  Patient, Historical No No   Sig: Inhale 2 Puffs 2 times a day. Indications: Asthma   furosemide (LASIX) 20 MG Tab   No No   Sig: Take 1 Tablet by mouth every day.   levothyroxine (SYNTHROID) 88 MCG Tab  Patient, Historical No No   Sig: Take 1 Tablet by mouth every morning on an empty stomach.   losartan (COZAAR) 100 MG Tab  Patient, Historical No No   Sig: Take 1 Tablet by mouth every day.   omeprazole (PRILOSEC) 20 MG delayed-release capsule   No No   Sig: Take 1 Capsule by mouth 2 times a day for 30 days.   ondansetron (ZOFRAN ODT) 4 MG TABLET DISPERSIBLE   No No   Sig: Take 1 Tablet by mouth every four hours as needed for Nausea/Vomiting.   pioglitazone (ACTOS) 30 MG Tab   No No   Sig: Take 1 Tablet by mouth every day.   polyethylene glycol/lytes (MIRALAX) Pack   No No   Sig: Take 1 Packet by mouth 1 time a day as needed (if no bowel movement in last 2 days).   potassium chloride SA (KDUR) 20 MEQ Tab CR   No No   Sig: Take 1 Tablet by mouth every day.   senna-docusate (PERICOLACE OR SENOKOT S) 8.6-50 MG Tab   No No   Sig: Take 2 Tablets by  mouth every evening.      Facility-Administered Medications: None       Physical Exam  Temp:  [36.3 °C (97.3 °F)] 36.3 °C (97.3 °F)  Pulse:  [65-90] 90  Resp:  [16-24] 16  BP: (192)/(81) 192/81  SpO2:  [91 %-93 %] 91 %  Blood Pressure : (!) 192/81   Temperature: 36.3 °C (97.3 °F)   Pulse: 90   Respiration: 16   Pulse Oximetry: 91 %       Physical Exam  Constitutional:       General: She is not in acute distress.     Appearance: Normal appearance. She is obese. She is not ill-appearing, toxic-appearing or diaphoretic.   HENT:      Head: Normocephalic and atraumatic.      Nose: Nose normal.      Mouth/Throat:      Mouth: Mucous membranes are moist.   Eyes:      Extraocular Movements: Extraocular movements intact.      Pupils: Pupils are equal, round, and reactive to light.   Cardiovascular:      Rate and Rhythm: Normal rate and regular rhythm.      Pulses: Normal pulses.      Heart sounds: Normal heart sounds. No murmur heard.     No friction rub. No gallop.   Pulmonary:      Effort: Pulmonary effort is normal. No respiratory distress.      Breath sounds: No stridor. Rales present. No wheezing or rhonchi.   Chest:      Chest wall: No tenderness.   Abdominal:      General: Abdomen is flat. There is no distension.      Palpations: Abdomen is soft. There is no mass.      Tenderness: There is no abdominal tenderness. There is no guarding or rebound.      Hernia: No hernia is present.   Musculoskeletal:         General: No swelling, tenderness, deformity or signs of injury.      Right lower leg: Edema present.      Left lower leg: Edema present.      Comments:      Skin:     General: Skin is warm and dry.      Capillary Refill: Capillary refill takes less than 2 seconds.      Coloration: Skin is not jaundiced or pale.      Findings: Bruising present. No erythema.   Neurological:      General: No focal deficit present.      Mental Status: She is alert and oriented to person, place, and time. Mental status is at baseline.       Cranial Nerves: No cranial nerve deficit.      Sensory: No sensory deficit.      Motor: No weakness.      Coordination: Coordination normal.   Psychiatric:         Mood and Affect: Mood normal.         Behavior: Behavior normal.         Laboratory:  Recent Labs     11/13/24  0801 11/15/24  0055   WBC 11.3* 15.1*   RBC 4.34 4.18*   HEMOGLOBIN 13.2 12.6   HEMATOCRIT 41.4 39.5   MCV 95.4 94.5   MCH 30.4 30.1   MCHC 31.9* 31.9*   RDW 50.9* 49.3   PLATELETCT 269 260   MPV 10.1 9.6     Recent Labs     11/13/24  0801 11/15/24  0055   SODIUM 139 138   POTASSIUM 4.1 3.3*   CHLORIDE 100 100   CO2 29 29   GLUCOSE 151* 96   BUN 33* 31*   CREATININE 0.85 1.20   CALCIUM 8.3* 8.0*     Recent Labs     11/13/24  0801 11/15/24  0055   ALTSGPT 22 18   ASTSGOT 22 18   ALKPHOSPHAT 61 51   TBILIRUBIN 0.4 0.4   GLUCOSE 151* 96         Recent Labs     11/15/24  0055   NTPROBNP 550*         Recent Labs     11/15/24  0055   TROPONINT 31*       Imaging:  DX-CHEST-PORTABLE (1 VIEW)   Final Result         1.  Pulmonary edema and/or infiltrates are identified, which are stable since the prior exam.   2.  Cardiomegaly          X-Ray:  I have personally reviewed the images and compared with prior images.  EKG:  I have personally reviewed the images and compared with prior images.    Assessment/Plan:  Justification for Admission Status  I anticipate this patient will require at least two midnights for appropriate medical management, necessitating inpatient admission because acute on chronic hypoxic respiratory failure    Patient will need a Telemetry bed on MEDICAL service .  The need is secondary to acute on chronic hypoxic respiratory failure.    * Acute on chronic hypoxic respiratory failure (HCC)- (present on admission)  Assessment & Plan  Patient requires 3 L supplemental oxygen at baseline given her history of COPD admissions of heart failure  Unclear if patient has been compliant with her home dose Lasix.  Prior to arrival, she was  requiring 4 to 5 L supplemental oxygen and was started on CPAP as well prior to arrival.  She was given 40 mg total of IV Lasix while in the emergency department.  She was also given a breathing treatment.  She has been titrated down to her near baseline.  Continue to monitor    Pulmonary edema  Assessment & Plan  Pulmonary edema seen on chest x-ray, which appears to be slightly worse compared to prior imaging  Given a dose of IV Lasix  Monitor electrolytes with daily BMP      Chronic heart failure with preserved ejection fraction (HCC)- (present on admission)  Assessment & Plan  Continue with home dose carvedilol, losartan, and furosemide    Type 2 diabetes mellitus, without long-term current use of insulin (HCC)- (present on admission)  Assessment & Plan  Insulin sliding scale, hypoglycemia protocol, diabetic diet    Lymphedema associated with obesity- (present on admission)  Assessment & Plan  Recent lower extremity duplex US negative for DVT     YOLA (obstructive sleep apnea)- (present on admission)  Assessment & Plan  CPAP    Hypokalemia  Assessment & Plan  Replete with IV and PO potassium  Follow up with daily BMPs        VTE prophylaxis: enoxaparin ppx

## 2024-11-15 NOTE — HOSPITAL COURSE
Cecile Teixeira is a 79 y.o. female who presented 11/14/2024 with worsening shortness of breath.  Of note, history has been obtained from chart review and from ER physician.  Patient was aggressive at bedside, and states that she does not want to talk to anybody at this time.  She is unwilling to participate in any meaningful conversation with me.    Patient has a past medical history of being wheelchair-bound since her 30s, YOLA on CPAP, morbid obesity, diabetes mellitus, HFpEF, hypertension, asthma, hypothyroidism, who had a recent hospitalization here at [facility], was discharged on November 13, 2024.  Patient now re-presents to the emerged department, complaining of worsening of persistent shortness of breath.  At her skilled nursing facility, patient was refusing all care and braiding all staff.  Per EMS, she was requiring 4 to 5 L supplemental oxygen, which slightly elevated compared to her baseline.  CPAP was also initiated en route.  In the emergency department, chest x-ray was obtained showing some mild worsening pulmonary edema.  She was given a dose of IV Lasix, and given a breathing treatment as well.  Patient will be admitted for management of acute on chronic hypoxic respiratory failure

## 2024-11-15 NOTE — ASSESSMENT & PLAN NOTE
Patient requires 3 L supplemental oxygen at baseline given her history of COPD admissions of heart failure  Unclear if patient has been compliant with her home dose Lasix.  Prior to arrival, she was requiring 4 to 5 L supplemental oxygen and was started on CPAP as well prior to arrival.  She was given 40 mg total of IV Lasix while in the emergency department.  She was also given a breathing treatment.  She has been titrated down to her near baseline.  Continue to monitor  11/16:  increased oxygen demand to 10 LPM oxymask.  Added solumedrol 40 IV q 8, lasix 40 IV bid.

## 2024-11-15 NOTE — CARE PLAN
The patient is Watcher - Medium risk of patient condition declining or worsening    Shift Goals  Clinical Goals: monitor o2 sats  Patient Goals: sleep, blankets  Family Goals: erin    Progress made toward(s) clinical / shift goals:  pt is a&o x4. IV patent and saline locked. Bed alarm on. No reports of pain at this time.    Patient is not progressing towards the following goals: pt refused mepilex and q2hr turns. O2 sats maintained on 6L oxy mask.

## 2024-11-15 NOTE — ED TRIAGE NOTES
"Chief Complaint   Patient presents with    Shortness of Breath     Pt got admitted to [facility] yesterday for pneumonia. Per EMS, pt is refusing all care and berating staff. Pt states that she thinks that staff is \"pumping poisonous gas into her room\". EMS found the pt on 88% on her 4 l/min oxygen at baseline. EMS initiated CPAP and spo2 increased to high 90's     Pt bib EMS. Pt changed into gown and placed on monitor.   A&O x 4  EMS interventions: CPAP    "

## 2024-11-15 NOTE — ED NOTES
Pt resting in room. Pt stated her daughter is not home to help her into her house. Pt willing to stay and have work up done. ERP notified.

## 2024-11-15 NOTE — PROGRESS NOTES
I will be off duty and signed out of voalte at 3pm.  If you have any needs for this patient after 3pm, please reach out to the on call Padmini BETTENCOURT.  Thank you!   Patient doing well PPD1 s/p . Reports minimal labial swelling/pain using tylenol/ibuprofen q 4 hrs and ice packs prn. Minimal lochia. Ambulating without issues. VSS. labs reviewed and appropriate. Plan for dc tomorrow

## 2024-11-16 LAB
ANION GAP SERPL CALC-SCNC: 9 MMOL/L (ref 7–16)
BACTERIA BLD CULT: NORMAL
BACTERIA BLD CULT: NORMAL
BUN SERPL-MCNC: 27 MG/DL (ref 8–22)
CALCIUM SERPL-MCNC: 8.4 MG/DL (ref 8.5–10.5)
CHLORIDE SERPL-SCNC: 101 MMOL/L (ref 96–112)
CO2 SERPL-SCNC: 29 MMOL/L (ref 20–33)
CREAT SERPL-MCNC: 0.91 MG/DL (ref 0.5–1.4)
ERYTHROCYTE [DISTWIDTH] IN BLOOD BY AUTOMATED COUNT: 49.3 FL (ref 35.9–50)
GFR SERPLBLD CREATININE-BSD FMLA CKD-EPI: 64 ML/MIN/1.73 M 2
GLUCOSE BLD STRIP.AUTO-MCNC: 103 MG/DL (ref 65–99)
GLUCOSE SERPL-MCNC: 107 MG/DL (ref 65–99)
HCT VFR BLD AUTO: 37.2 % (ref 37–47)
HGB BLD-MCNC: 11.9 G/DL (ref 12–16)
MCH RBC QN AUTO: 30.1 PG (ref 27–33)
MCHC RBC AUTO-ENTMCNC: 32 G/DL (ref 32.2–35.5)
MCV RBC AUTO: 94.2 FL (ref 81.4–97.8)
PLATELET # BLD AUTO: 222 K/UL (ref 164–446)
PMV BLD AUTO: 9.8 FL (ref 9–12.9)
POTASSIUM SERPL-SCNC: 3.6 MMOL/L (ref 3.6–5.5)
RBC # BLD AUTO: 3.95 M/UL (ref 4.2–5.4)
SIGNIFICANT IND 70042: NORMAL
SIGNIFICANT IND 70042: NORMAL
SITE SITE: NORMAL
SITE SITE: NORMAL
SODIUM SERPL-SCNC: 139 MMOL/L (ref 135–145)
SOURCE SOURCE: NORMAL
SOURCE SOURCE: NORMAL
WBC # BLD AUTO: 12.3 K/UL (ref 4.8–10.8)

## 2024-11-16 PROCEDURE — A9270 NON-COVERED ITEM OR SERVICE: HCPCS | Performed by: STUDENT IN AN ORGANIZED HEALTH CARE EDUCATION/TRAINING PROGRAM

## 2024-11-16 PROCEDURE — 36415 COLL VENOUS BLD VENIPUNCTURE: CPT

## 2024-11-16 PROCEDURE — 700102 HCHG RX REV CODE 250 W/ 637 OVERRIDE(OP): Performed by: STUDENT IN AN ORGANIZED HEALTH CARE EDUCATION/TRAINING PROGRAM

## 2024-11-16 PROCEDURE — 700102 HCHG RX REV CODE 250 W/ 637 OVERRIDE(OP)

## 2024-11-16 PROCEDURE — 80048 BASIC METABOLIC PNL TOTAL CA: CPT

## 2024-11-16 PROCEDURE — 700102 HCHG RX REV CODE 250 W/ 637 OVERRIDE(OP): Performed by: HOSPITALIST

## 2024-11-16 PROCEDURE — 99233 SBSQ HOSP IP/OBS HIGH 50: CPT | Performed by: HOSPITALIST

## 2024-11-16 PROCEDURE — 94640 AIRWAY INHALATION TREATMENT: CPT

## 2024-11-16 PROCEDURE — 700111 HCHG RX REV CODE 636 W/ 250 OVERRIDE (IP): Mod: JZ | Performed by: STUDENT IN AN ORGANIZED HEALTH CARE EDUCATION/TRAINING PROGRAM

## 2024-11-16 PROCEDURE — 82962 GLUCOSE BLOOD TEST: CPT

## 2024-11-16 PROCEDURE — A9270 NON-COVERED ITEM OR SERVICE: HCPCS | Performed by: HOSPITALIST

## 2024-11-16 PROCEDURE — 700101 HCHG RX REV CODE 250: Performed by: STUDENT IN AN ORGANIZED HEALTH CARE EDUCATION/TRAINING PROGRAM

## 2024-11-16 PROCEDURE — 94760 N-INVAS EAR/PLS OXIMETRY 1: CPT

## 2024-11-16 PROCEDURE — 85027 COMPLETE CBC AUTOMATED: CPT

## 2024-11-16 PROCEDURE — A9270 NON-COVERED ITEM OR SERVICE: HCPCS

## 2024-11-16 PROCEDURE — 770006 HCHG ROOM/CARE - MED/SURG/GYN SEMI*

## 2024-11-16 PROCEDURE — 700111 HCHG RX REV CODE 636 W/ 250 OVERRIDE (IP): Mod: JZ | Performed by: HOSPITALIST

## 2024-11-16 RX ORDER — FUROSEMIDE 10 MG/ML
40 INJECTION INTRAMUSCULAR; INTRAVENOUS
Status: DISCONTINUED | OUTPATIENT
Start: 2024-11-16 | End: 2024-11-18

## 2024-11-16 RX ORDER — FAMOTIDINE 20 MG/1
20 TABLET, FILM COATED ORAL 2 TIMES DAILY
Status: DISCONTINUED | OUTPATIENT
Start: 2024-11-16 | End: 2024-11-19 | Stop reason: HOSPADM

## 2024-11-16 RX ORDER — METHYLPREDNISOLONE SODIUM SUCCINATE 40 MG/ML
40 INJECTION, POWDER, LYOPHILIZED, FOR SOLUTION INTRAMUSCULAR; INTRAVENOUS EVERY 8 HOURS
Status: DISCONTINUED | OUTPATIENT
Start: 2024-11-16 | End: 2024-11-17

## 2024-11-16 RX ADMIN — METHYLPREDNISOLONE SODIUM SUCCINATE 40 MG: 40 INJECTION, POWDER, FOR SOLUTION INTRAMUSCULAR; INTRAVENOUS at 14:56

## 2024-11-16 RX ADMIN — ENOXAPARIN SODIUM 40 MG: 100 INJECTION SUBCUTANEOUS at 16:29

## 2024-11-16 RX ADMIN — METHYLPREDNISOLONE SODIUM SUCCINATE 40 MG: 40 INJECTION, POWDER, FOR SOLUTION INTRAMUSCULAR; INTRAVENOUS at 21:38

## 2024-11-16 RX ADMIN — FUROSEMIDE 20 MG: 40 TABLET ORAL at 04:35

## 2024-11-16 RX ADMIN — FLUTICASONE PROPIONATE 220 MCG: 110 AEROSOL, METERED RESPIRATORY (INHALATION) at 05:05

## 2024-11-16 RX ADMIN — FUROSEMIDE 40 MG: 10 INJECTION, SOLUTION INTRAMUSCULAR; INTRAVENOUS at 12:29

## 2024-11-16 RX ADMIN — IPRATROPIUM BROMIDE AND ALBUTEROL SULFATE 3 ML: 2.5; .5 SOLUTION RESPIRATORY (INHALATION) at 09:35

## 2024-11-16 RX ADMIN — OMEPRAZOLE 20 MG: 20 CAPSULE, DELAYED RELEASE ORAL at 04:36

## 2024-11-16 RX ADMIN — CARVEDILOL 12.5 MG: 12.5 TABLET, FILM COATED ORAL at 16:30

## 2024-11-16 RX ADMIN — FAMOTIDINE 20 MG: 20 TABLET, FILM COATED ORAL at 08:55

## 2024-11-16 RX ADMIN — OMEPRAZOLE 20 MG: 20 CAPSULE, DELAYED RELEASE ORAL at 16:30

## 2024-11-16 RX ADMIN — FAMOTIDINE 20 MG: 20 TABLET, FILM COATED ORAL at 16:30

## 2024-11-16 RX ADMIN — AMLODIPINE BESYLATE 10 MG: 10 TABLET ORAL at 04:36

## 2024-11-16 RX ADMIN — LEVOTHYROXINE SODIUM 88 MCG: 0.09 TABLET ORAL at 04:36

## 2024-11-16 RX ADMIN — LOSARTAN POTASSIUM 100 MG: 50 TABLET, FILM COATED ORAL at 04:35

## 2024-11-16 RX ADMIN — CARVEDILOL 12.5 MG: 12.5 TABLET, FILM COATED ORAL at 09:08

## 2024-11-16 RX ADMIN — OXYCODONE 5 MG: 5 TABLET ORAL at 21:46

## 2024-11-16 RX ADMIN — FLUTICASONE PROPIONATE 220 MCG: 110 AEROSOL, METERED RESPIRATORY (INHALATION) at 16:31

## 2024-11-16 ASSESSMENT — ENCOUNTER SYMPTOMS
SHORTNESS OF BREATH: 1
DIARRHEA: 0
DIZZINESS: 0
VOMITING: 0
MYALGIAS: 1
NAUSEA: 0
COUGH: 0
HEADACHES: 0
FEVER: 0
ABDOMINAL PAIN: 0
CHILLS: 0
PALPITATIONS: 0
HEARTBURN: 0

## 2024-11-16 ASSESSMENT — PAIN DESCRIPTION - PAIN TYPE
TYPE: ACUTE PAIN

## 2024-11-16 NOTE — PROGRESS NOTES
Patients BP was 181/76 when checked by CNA. Per CNA patient was having coughing episode before initial check. Upon recheck by this RN it was 152/56. Prn BP med held. MD notified.

## 2024-11-16 NOTE — CARE PLAN
The patient is Watcher - Medium risk of patient condition declining or worsening    Shift Goals  Clinical Goals: maintain o2 sats above 90%  Patient Goals: rest, comfort, food  Family Goals: erin    Progress made toward(s) clinical / shift goals:  pt is a&o x4. IV patent and saline locked. No reports of pain at this time.    Patient is not progressing towards the following goals: pt required 10 L oxy mask when mobilizing to chair. Pt was titrated down to currently 6 L oxy mask. Solumedrol and lasix given.

## 2024-11-16 NOTE — DISCHARGE PLANNING
HTH/SCP TCN chart review completed. Collaborated with CM prior to meeting with the pt. The most current review of medical record, knowledge of pt's PLOF and social support, LACE+ score of 69, 6 clicks scores of 10 mobility were considered.      Pt seen at bedside. Introduced TCN program. Provided education regarding post acute levels of care. Education provided regarding case management policy for blanket SNF referrals. Discussed HTH/SCP plan benefits. Pt verbalizes understanding.     Patient is familiar to this TCN and readmit.  Please see TCN note on 11/12 per last admit.  Noted patient refusing all post acute placement, stating she wants to go home with [facility].  Choice obtained.  Noted patient has home O2 via [facility].  No further TCN needs.         Choice proactively obtained for [facility] and faxed to [facility]. Current discharge considerations are . TCN will continue to follow and collaborate with discharge planning team as additional post acute needs arise. Thank you.     Completed today:  Choice obtained: [facility] ([facility])  Pt aware of [facility]'s blanket referral policy  SCP with Non-[facility] PCP

## 2024-11-16 NOTE — CARE PLAN
The patient is Watcher - Medium risk of patient condition declining or worsening    Shift Goals  Clinical Goals: Patient will maintain O2 sat above 90% throughout shift  Patient Goals: rest  Family Goals: erin    Progress made toward(s) clinical / shift goals:  Patient on 5L via oxymask. IS as bedside and education provided on use. Patient did maintain O2 sat above 90%.    Patient is not progressing towards the following goals: progressing

## 2024-11-17 ENCOUNTER — APPOINTMENT (OUTPATIENT)
Dept: RADIOLOGY | Facility: MEDICAL CENTER | Age: 79
DRG: 189 | End: 2024-11-17
Attending: HOSPITALIST
Payer: MEDICARE

## 2024-11-17 LAB
ALBUMIN SERPL BCP-MCNC: 3.2 G/DL (ref 3.2–4.9)
ALBUMIN/GLOB SERPL: 0.9 G/DL
ALP SERPL-CCNC: 55 U/L (ref 30–99)
ALT SERPL-CCNC: 14 U/L (ref 2–50)
ANION GAP SERPL CALC-SCNC: 10 MMOL/L (ref 7–16)
AST SERPL-CCNC: 17 U/L (ref 12–45)
BASE EXCESS BLDA CALC-SCNC: 8 MMOL/L (ref -4–3)
BASOPHILS # BLD AUTO: 0.1 % (ref 0–1.8)
BASOPHILS # BLD: 0.02 K/UL (ref 0–0.12)
BILIRUB SERPL-MCNC: 0.5 MG/DL (ref 0.1–1.5)
BODY TEMPERATURE: 36.4 CENTIGRADE
BUN SERPL-MCNC: 23 MG/DL (ref 8–22)
CALCIUM ALBUM COR SERPL-MCNC: 9.6 MG/DL (ref 8.5–10.5)
CALCIUM SERPL-MCNC: 9 MG/DL (ref 8.5–10.5)
CHLORIDE SERPL-SCNC: 97 MMOL/L (ref 96–112)
CO2 SERPL-SCNC: 29 MMOL/L (ref 20–33)
CREAT SERPL-MCNC: 0.82 MG/DL (ref 0.5–1.4)
EOSINOPHIL # BLD AUTO: 0.01 K/UL (ref 0–0.51)
EOSINOPHIL NFR BLD: 0.1 % (ref 0–6.9)
ERYTHROCYTE [DISTWIDTH] IN BLOOD BY AUTOMATED COUNT: 45.4 FL (ref 35.9–50)
GFR SERPLBLD CREATININE-BSD FMLA CKD-EPI: 73 ML/MIN/1.73 M 2
GLOBULIN SER CALC-MCNC: 3.4 G/DL (ref 1.9–3.5)
GLUCOSE SERPL-MCNC: 192 MG/DL (ref 65–99)
HCO3 BLDA-SCNC: 33 MMOL/L (ref 17–25)
HCT VFR BLD AUTO: 38.2 % (ref 37–47)
HGB BLD-MCNC: 12.9 G/DL (ref 12–16)
IMM GRANULOCYTES # BLD AUTO: 0.17 K/UL (ref 0–0.11)
IMM GRANULOCYTES NFR BLD AUTO: 1.2 % (ref 0–0.9)
INHALED O2 FLOW RATE: 5 L/MIN (ref 2–10)
INHALED O2 FLOW RATE: ABNORMAL L/MIN
LYMPHOCYTES # BLD AUTO: 0.59 K/UL (ref 1–4.8)
LYMPHOCYTES NFR BLD: 4.2 % (ref 22–41)
MCH RBC QN AUTO: 30.7 PG (ref 27–33)
MCHC RBC AUTO-ENTMCNC: 33.8 G/DL (ref 32.2–35.5)
MCV RBC AUTO: 91 FL (ref 81.4–97.8)
MONOCYTES # BLD AUTO: 0.3 K/UL (ref 0–0.85)
MONOCYTES NFR BLD AUTO: 2.1 % (ref 0–13.4)
NEUTROPHILS # BLD AUTO: 12.95 K/UL (ref 1.82–7.42)
NEUTROPHILS NFR BLD: 92.3 % (ref 44–72)
NRBC # BLD AUTO: 0 K/UL
NRBC BLD-RTO: 0 /100 WBC (ref 0–0.2)
PCO2 BLDA: 43.7 MMHG (ref 26–37)
PCO2 TEMP ADJ BLDA: 42.6 MMHG (ref 26–37)
PH BLDA: 7.49 [PH] (ref 7.4–7.5)
PH TEMP ADJ BLDA: 7.5 [PH] (ref 7.4–7.5)
PLATELET # BLD AUTO: 243 K/UL (ref 164–446)
PMV BLD AUTO: 9.7 FL (ref 9–12.9)
PO2 BLDA: 71 MMHG (ref 64–87)
PO2 TEMP ADJ BLDA: 68.2 MMHG (ref 64–87)
POTASSIUM SERPL-SCNC: 3.6 MMOL/L (ref 3.6–5.5)
PROT SERPL-MCNC: 6.6 G/DL (ref 6–8.2)
RBC # BLD AUTO: 4.2 M/UL (ref 4.2–5.4)
SAO2 % BLDA: 93.5 % (ref 93–99)
SODIUM SERPL-SCNC: 136 MMOL/L (ref 135–145)
WBC # BLD AUTO: 14 K/UL (ref 4.8–10.8)

## 2024-11-17 PROCEDURE — 700111 HCHG RX REV CODE 636 W/ 250 OVERRIDE (IP): Mod: JZ | Performed by: HOSPITALIST

## 2024-11-17 PROCEDURE — 700101 HCHG RX REV CODE 250: Performed by: STUDENT IN AN ORGANIZED HEALTH CARE EDUCATION/TRAINING PROGRAM

## 2024-11-17 PROCEDURE — A9270 NON-COVERED ITEM OR SERVICE: HCPCS | Performed by: HOSPITALIST

## 2024-11-17 PROCEDURE — 770006 HCHG ROOM/CARE - MED/SURG/GYN SEMI*

## 2024-11-17 PROCEDURE — 71045 X-RAY EXAM CHEST 1 VIEW: CPT

## 2024-11-17 PROCEDURE — A9270 NON-COVERED ITEM OR SERVICE: HCPCS | Performed by: STUDENT IN AN ORGANIZED HEALTH CARE EDUCATION/TRAINING PROGRAM

## 2024-11-17 PROCEDURE — 700111 HCHG RX REV CODE 636 W/ 250 OVERRIDE (IP): Mod: JZ | Performed by: STUDENT IN AN ORGANIZED HEALTH CARE EDUCATION/TRAINING PROGRAM

## 2024-11-17 PROCEDURE — 700102 HCHG RX REV CODE 250 W/ 637 OVERRIDE(OP): Performed by: HOSPITALIST

## 2024-11-17 PROCEDURE — 700102 HCHG RX REV CODE 250 W/ 637 OVERRIDE(OP): Performed by: STUDENT IN AN ORGANIZED HEALTH CARE EDUCATION/TRAINING PROGRAM

## 2024-11-17 PROCEDURE — 82803 BLOOD GASES ANY COMBINATION: CPT

## 2024-11-17 PROCEDURE — 36415 COLL VENOUS BLD VENIPUNCTURE: CPT

## 2024-11-17 PROCEDURE — 94640 AIRWAY INHALATION TREATMENT: CPT

## 2024-11-17 PROCEDURE — 700105 HCHG RX REV CODE 258: Performed by: HOSPITALIST

## 2024-11-17 PROCEDURE — 80053 COMPREHEN METABOLIC PANEL: CPT

## 2024-11-17 PROCEDURE — 99233 SBSQ HOSP IP/OBS HIGH 50: CPT | Performed by: HOSPITALIST

## 2024-11-17 PROCEDURE — 85025 COMPLETE CBC W/AUTO DIFF WBC: CPT

## 2024-11-17 RX ORDER — METHYLPREDNISOLONE SODIUM SUCCINATE 40 MG/ML
40 INJECTION, POWDER, LYOPHILIZED, FOR SOLUTION INTRAMUSCULAR; INTRAVENOUS EVERY 12 HOURS
Status: DISCONTINUED | OUTPATIENT
Start: 2024-11-18 | End: 2024-11-18

## 2024-11-17 RX ADMIN — LOSARTAN POTASSIUM 100 MG: 50 TABLET, FILM COATED ORAL at 06:09

## 2024-11-17 RX ADMIN — CARVEDILOL 12.5 MG: 12.5 TABLET, FILM COATED ORAL at 08:26

## 2024-11-17 RX ADMIN — FAMOTIDINE 20 MG: 20 TABLET, FILM COATED ORAL at 06:09

## 2024-11-17 RX ADMIN — AMPICILLIN AND SULBACTAM 3 G: 1; 2 INJECTION, POWDER, FOR SOLUTION INTRAMUSCULAR; INTRAVENOUS at 13:29

## 2024-11-17 RX ADMIN — FUROSEMIDE 40 MG: 10 INJECTION, SOLUTION INTRAMUSCULAR; INTRAVENOUS at 06:10

## 2024-11-17 RX ADMIN — METHYLPREDNISOLONE SODIUM SUCCINATE 40 MG: 40 INJECTION, POWDER, FOR SOLUTION INTRAMUSCULAR; INTRAVENOUS at 06:10

## 2024-11-17 RX ADMIN — IPRATROPIUM BROMIDE AND ALBUTEROL SULFATE 3 ML: 2.5; .5 SOLUTION RESPIRATORY (INHALATION) at 11:01

## 2024-11-17 RX ADMIN — FAMOTIDINE 20 MG: 20 TABLET, FILM COATED ORAL at 17:41

## 2024-11-17 RX ADMIN — AMPICILLIN AND SULBACTAM 3 G: 1; 2 INJECTION, POWDER, FOR SOLUTION INTRAMUSCULAR; INTRAVENOUS at 17:41

## 2024-11-17 RX ADMIN — ENOXAPARIN SODIUM 40 MG: 100 INJECTION SUBCUTANEOUS at 17:41

## 2024-11-17 RX ADMIN — AMLODIPINE BESYLATE 10 MG: 10 TABLET ORAL at 06:09

## 2024-11-17 RX ADMIN — FLUTICASONE PROPIONATE 220 MCG: 110 AEROSOL, METERED RESPIRATORY (INHALATION) at 17:44

## 2024-11-17 RX ADMIN — OMEPRAZOLE 20 MG: 20 CAPSULE, DELAYED RELEASE ORAL at 06:09

## 2024-11-17 RX ADMIN — FLUTICASONE PROPIONATE 220 MCG: 110 AEROSOL, METERED RESPIRATORY (INHALATION) at 06:13

## 2024-11-17 RX ADMIN — CARVEDILOL 12.5 MG: 12.5 TABLET, FILM COATED ORAL at 17:41

## 2024-11-17 RX ADMIN — OMEPRAZOLE 20 MG: 20 CAPSULE, DELAYED RELEASE ORAL at 17:41

## 2024-11-17 RX ADMIN — METHYLPREDNISOLONE SODIUM SUCCINATE 40 MG: 40 INJECTION, POWDER, FOR SOLUTION INTRAMUSCULAR; INTRAVENOUS at 13:25

## 2024-11-17 RX ADMIN — FUROSEMIDE 40 MG: 10 INJECTION, SOLUTION INTRAMUSCULAR; INTRAVENOUS at 17:44

## 2024-11-17 RX ADMIN — LEVOTHYROXINE SODIUM 88 MCG: 0.09 TABLET ORAL at 06:09

## 2024-11-17 ASSESSMENT — ENCOUNTER SYMPTOMS
HEADACHES: 0
HEARTBURN: 0
COUGH: 0
FEVER: 0
DIARRHEA: 0
ABDOMINAL PAIN: 0
DIZZINESS: 0
VOMITING: 0
NAUSEA: 0
PALPITATIONS: 0
CHILLS: 0
SHORTNESS OF BREATH: 1
MYALGIAS: 1

## 2024-11-17 ASSESSMENT — GAIT ASSESSMENTS: GAIT LEVEL OF ASSIST: UNABLE TO PARTICIPATE

## 2024-11-17 ASSESSMENT — COGNITIVE AND FUNCTIONAL STATUS - GENERAL
MOVING FROM LYING ON BACK TO SITTING ON SIDE OF FLAT BED: A LOT
MOVING TO AND FROM BED TO CHAIR: A LOT
TURNING FROM BACK TO SIDE WHILE IN FLAT BAD: A LOT
MOBILITY SCORE: 10
STANDING UP FROM CHAIR USING ARMS: A LOT
SUGGESTED CMS G CODE MODIFIER MOBILITY: CL
WALKING IN HOSPITAL ROOM: TOTAL
CLIMB 3 TO 5 STEPS WITH RAILING: TOTAL

## 2024-11-17 NOTE — PROGRESS NOTES
"Specimen cup given to patient. Patient instructed to expectorate sputum into cup when possible. Pt stated she \"doesn't have much to cough up\" at this current time, but would call when able to obtain specimen.  "

## 2024-11-17 NOTE — PROGRESS NOTES
Hospital Medicine Daily Progress Note    Date of Service  11/16/2024    Chief Complaint  Cecile Teixeira is a 79 y.o. female admitted 11/14/2024 with increasing shortness of breath.    Hospital Course  Cecile Teixeira is a 79 y.o. female who presented 11/14/2024 with worsening shortness of breath.  Of note, history has been obtained from chart review and from ER physician.  Patient was aggressive at bedside, and states that she does not want to talk to anybody at this time.  She is unwilling to participate in any meaningful conversation with me.    Patient has a past medical history of being wheelchair-bound since her 30s, YOLA on CPAP, morbid obesity, diabetes mellitus, HFpEF, hypertension, asthma, hypothyroidism, who had a recent hospitalization here at [facility], was discharged on November 13, 2024.  Patient now re-presents to the emerged department, complaining of worsening of persistent shortness of breath.  At her skilled nursing facility, patient was refusing all care and braiding all staff.  Per EMS, she was requiring 4 to 5 L supplemental oxygen, which slightly elevated compared to her baseline.  CPAP was also initiated en route.  In the emergency department, chest x-ray was obtained showing some mild worsening pulmonary edema.  She was given a dose of IV Lasix, and given a breathing treatment as well.  Patient will be admitted for management of acute on chronic hypoxic respiratory failure    Interval Problem Update  11/15 afebrile, slightly bradycardic, hypertensive and on 4 L nasal cannula.  White count slightly elevated to 15. Patient continues to be argumentative and demanding.  From SNF, but states she does have a home, however, hasn't been there in awhile.  11/16:  increased oxygen demand to 10 LPM oxymask. Increased lasix 40mg IV bid for congestion seen on CXR yesterday. Was on lasix 20mg daily.  Echo reviewed EF 65% difficult study, unable to see RV. Suspect pulmonary  hypertension from COPD and YOLA. Added solumedrol 40mg IV q 8.  Patient appears nontoxic, but leg edema noted bilaterally.  Encouraged her to take deeper breaths.  Exhibiting shallow breathing.    I have discussed this patient's plan of care and discharge plan at IDT rounds today with Case Management, Nursing, Nursing leadership, and other members of the IDT team.    Consultants/Specialty  None    Code Status  Full Code    Disposition  The patient is not medically cleared for discharge to home or a post-acute facility.  Anticipate discharge to: skilled nursing facility    I have placed the appropriate orders for post-discharge needs.    Review of Systems  Review of Systems   Constitutional:  Negative for chills, fever and malaise/fatigue.   Respiratory:  Positive for shortness of breath. Negative for cough.    Cardiovascular:  Negative for chest pain, palpitations and leg swelling.   Gastrointestinal:  Negative for abdominal pain, diarrhea, heartburn, nausea and vomiting.   Genitourinary:  Negative for dysuria, frequency and urgency.   Musculoskeletal:  Positive for myalgias.   Neurological:  Negative for dizziness and headaches.   All other systems reviewed and are negative.       Physical Exam  Temp:  [36.5 °C (97.7 °F)-37.1 °C (98.7 °F)] 36.5 °C (97.7 °F)  Pulse:  [60-68] 60  Resp:  [16-20] 20  BP: ()/(49-76) 125/64  SpO2:  [88 %-98 %] 94 %    Physical Exam  Vitals and nursing note reviewed.   Constitutional:       Appearance: Normal appearance. She is not ill-appearing.   HENT:      Head: Normocephalic and atraumatic.      Jaw: There is normal jaw occlusion.      Right Ear: Hearing normal.      Left Ear: Hearing normal.      Nose: Nose normal.      Mouth/Throat:      Lips: Pink.      Mouth: Mucous membranes are moist.   Eyes:      Extraocular Movements: Extraocular movements intact.      Conjunctiva/sclera: Conjunctivae normal.      Pupils: Pupils are equal, round, and reactive to light.   Neck:       Vascular: No carotid bruit.   Cardiovascular:      Rate and Rhythm: Normal rate and regular rhythm.      Pulses: Normal pulses.      Heart sounds: Normal heart sounds, S1 normal and S2 normal.   Pulmonary:      Effort: Pulmonary effort is normal.      Breath sounds: Normal air entry. No stridor. Examination of the right-lower field reveals decreased breath sounds. Examination of the left-lower field reveals decreased breath sounds. Decreased breath sounds present.   Musculoskeletal:      Cervical back: Normal range of motion and neck supple.      Right lower leg: No edema.      Left lower leg: No edema.   Skin:     General: Skin is warm and dry.      Capillary Refill: Capillary refill takes less than 2 seconds.   Neurological:      General: No focal deficit present.      Mental Status: She is alert and oriented to person, place, and time. Mental status is at baseline.      Sensory: Sensation is intact.      Motor: Motor function is intact.   Psychiatric:         Attention and Perception: Attention and perception normal.         Mood and Affect: Mood normal. Affect is angry.         Speech: Speech normal.         Behavior: Behavior is agitated and aggressive.         Fluids    Intake/Output Summary (Last 24 hours) at 11/16/2024 1811  Last data filed at 11/16/2024 0936  Gross per 24 hour   Intake 120 ml   Output 1500 ml   Net -1380 ml        Laboratory  Recent Labs     11/15/24  0055 11/16/24  0407   WBC 15.1* 12.3*   RBC 4.18* 3.95*   HEMOGLOBIN 12.6 11.9*   HEMATOCRIT 39.5 37.2   MCV 94.5 94.2   MCH 30.1 30.1   MCHC 31.9* 32.0*   RDW 49.3 49.3   PLATELETCT 260 222   MPV 9.6 9.8     Recent Labs     11/15/24  0055 11/16/24  0407   SODIUM 138 139   POTASSIUM 3.3* 3.6   CHLORIDE 100 101   CO2 29 29   GLUCOSE 96 107*   BUN 31* 27*   CREATININE 1.20 0.91   CALCIUM 8.0* 8.4*                   Imaging  DX-CHEST-PORTABLE (1 VIEW)   Final Result         1.  Pulmonary edema and/or infiltrates are identified, which are stable  since the prior exam.   2.  Cardiomegaly           Assessment/Plan  * Acute on chronic hypoxic respiratory failure (HCC)- (present on admission)  Assessment & Plan  Patient requires 3 L supplemental oxygen at baseline given her history of COPD admissions of heart failure  Unclear if patient has been compliant with her home dose Lasix.  Prior to arrival, she was requiring 4 to 5 L supplemental oxygen and was started on CPAP as well prior to arrival.  She was given 40 mg total of IV Lasix while in the emergency department.  She was also given a breathing treatment.  She has been titrated down to her near baseline.  Continue to monitor  11/16:  increased oxygen demand to 10 LPM oxymask.  Added solumedrol 40 IV q 8, lasix 40 IV bid.    Hypokalemia  Assessment & Plan  Replete with IV and PO potassium  Follow up with daily BMPs    Pulmonary edema- (present on admission)  Assessment & Plan  Pulmonary edema seen on chest x-ray, which appears to be slightly worse compared to prior imaging  11/16:  increased  IV Lasix to 40 bid.  Monitor electrolytes with daily BMP      Chronic heart failure with preserved ejection fraction (HCC)- (present on admission)  Assessment & Plan  Continue with home dose carvedilol, losartan, and furosemide    Type 2 diabetes mellitus, without long-term current use of insulin (HCC)- (present on admission)  Assessment & Plan  Insulin sliding scale, hypoglycemia protocol, diabetic diet    Lymphedema associated with obesity- (present on admission)  Assessment & Plan  Recent lower extremity duplex US negative for DVT     YOLA (obstructive sleep apnea)- (present on admission)  Assessment & Plan  CPAP         VTE prophylaxis:    enoxaparin ppx      I have performed a physical exam and reviewed and updated ROS and Plan today (11/16/2024). In review of yesterday's note (11/15/2024), there are no changes except as documented above.

## 2024-11-17 NOTE — CARE PLAN
The patient is Watcher - Medium risk of patient condition declining or worsening    Shift Goals  Clinical Goals: maintain o2 sats greater than 90%  Patient Goals: brown sugar  Family Goals: erin    Progress made toward(s) clinical / shift goals:  pt is a&o x4. O2 sats maintained on 5L oxy mask. IV patent and saline locked. No reports of pain at this time. All needs currently addressed.    Patient is not progressing towards the following goals: pt required security intervention due to verbal aggression towards staff this shift. Pt frequently talking to self in room.

## 2024-11-17 NOTE — THERAPY
Physical Therapy   Initial Evaluation     Patient Name: Cecile Teixeira  Age:  79 y.o., Sex:  female  Medical Record #: [id number]  Today's Date: 11/17/2024     Precautions  Precautions: Fall Risk  Comments: pt states I fall all the time and EMT's come and pick me up    Assessment  Patient is 79 y.o. female admitted 11/14/2024 with increasing shortness . Pt reports being at baseline for mobility which is pretty weak. Pt reports falling several times at home and call EMT to assist with lift. Pt is requiring 2 P for transfers. Pt is W/C bound. Recommend post acute PT services, but pt will adamantly refuse and wants to return home.     Plan    Physical Therapy Initial Treatment Plan   Treatment Plan : Bed Mobility, Neuro Re-Education / Balance, Therapeutic Activities, Self Care / Home Evaluation, Therapeutic Exercise  Treatment Frequency: 3 Times per Week  Duration: Until Therapy Goals Met    DC Equipment Recommendations: Unable to determine at this time  Discharge Recommendations: Recommend post-acute placement for additional physical therapy services prior to discharge home (pt is adament about return home)          11/17/24 1524   Time In/Time Out   Therapy Start Time 1509   Therapy End Time 1524   Total Therapy Time 15   Total Time Spent   PT Self Care/Home Evaluation Time Spent (Mins) 15   PT Total Time Spent (Calculated) 15    Services   Is patient using  services for this encounter? No   Initial Contact Note    Initial Contact Note Order Received and Verified, Physical Therapy Evaluation in Progress with Full Report to Follow.   Precautions   Precautions Fall Risk   Comments pt states I fall all the time and EMT's come and pick me up   Vitals   O2 (LPM) 5   O2 Delivery Device Oxymask   Pain 0 - 10 Group   Location   (electric pain comes and goes down both legs)   Therapist Pain Assessment Nurse Notified;Post Activity Pain Same as Prior to Activity   Prior Living Situation   Prior Services  Intermittent Physical Support for ADL Per Family   Housing / Facility 1 Story Apartment / Condo   Lives with - Patient's Self Care Capacity Alone and Unable to Care For Self   Comments pt reports she lives alone and able to care for self, although she falls frequently.   Prior Level of Functional Mobility   Wheelchair Independent   Comments Pt has a pwer W/C and slef propell W/C. She is abl eot transfer herself with a grab bar, falls frequently   History of Falls   History of Falls Yes   Cognition    Comments pt is a poor historian, yelling at staff. not willing to participate due to fatique   Passive ROM Lower Body   Passive ROM Lower Body WDL   Active ROM Lower Body    Active ROM Lower Body  X   Comments LLE weakness > R. Pt states she is not able to mvt L LE   Strength Lower Body   Lower Body Strength  X   Comments Pt reports she is at baseline for mbility, but requires 2 person assist to transfer safely   Sensation Lower Body   Lower Extremity Sensation   X   Comments c/o of shooting pain B LE's   Lower Body Muscle Tone   Lower Body Muscle Tone  WDL   Coordination Lower Body    Coordination Lower Body  WDL   Balance Assessment   Comments refused to stand   Bed Mobility    Supine to Sit Refused   Comments up to chair with sng two person assist   Gait Analysis   Gait Level Of Assist Unable to Participate   Comments pt has not walked x 7 years   Functional Mobility   Comments 2 P assist bed to chair per nsg report   6 Clicks Assessment - How much HELP from from another person do you currently need... (If the patient hasn't done an activity recently, how much help from another person do you think he/she would need if he/she tried?)   Turning from your back to your side while in a flat bed without using bedrails? 2   Moving from lying on your back to sitting on the side of a flat bed without using bedrails? 2   Moving to and from a bed to a chair (including a wheelchair)? 2   Standing up from a chair using your arms  (e.g., wheelchair, or bedside chair)? 2   Walking in hospital room? 1   Climbing 3-5 steps with a railing? 1   6 clicks Mobility Score 10   Activity Tolerance   Sitting in Chair 30 mins   Short Term Goals    Short Term Goal # 1 Pt will complete bed mobility with supervision to progress to prior level in 6 visits.   Short Term Goal # 2 Pt will transfer with LRAD and supervision to progress to prior level in 6 visits.   Education Group   Role of Physical Therapist Patient Response Patient;Acceptance;Explanation;Verbal Demonstration   Physical Therapy Initial Treatment Plan    Treatment Plan  Bed Mobility;Neuro Re-Education / Balance;Therapeutic Activities;Self Care / Home Evaluation;Therapeutic Exercise   Treatment Frequency 3 Times per Week   Duration Until Therapy Goals Met   Problem List    Problems Impaired Bed Mobility;Impaired Transfers;Decreased Activity Tolerance;Functional Strength Deficit   Anticipated Discharge Equipment and Recommendations   DC Equipment Recommendations Unable to determine at this time   Discharge Recommendations Recommend post-acute placement for additional physical therapy services prior to discharge home  (pt is adament about return home)   Interdisciplinary Plan of Care Collaboration   IDT Collaboration with  Nursing   Patient Position at End of Therapy Seated;Chair Alarm On;Phone within Reach;Tray Table within Reach;Call Light within Reach   Collaboration Comments re; eval   Session Information   Date / Session Number  11/17-1 ( 1/3, 11/23)

## 2024-11-17 NOTE — CARE PLAN
The patient is Watcher - Medium risk of patient condition declining or worsening    Shift Goals  Clinical Goals: Patient will maintain o2 sat above 90% throughout shift  Patient Goals: rest  Family Goals: erin    Progress made toward(s) clinical / shift goals:  Patient on 5 L via oxymask. IS at the bedside, education reinforced. HOB elevated above 30 degrees. Patient did maintain O2 sat above 90%.    Patient is not progressing towards the following goals: progressing

## 2024-11-17 NOTE — PROGRESS NOTES
Hospital Medicine Daily Progress Note    Date of Service  11/17/2024    Chief Complaint  Cecile Teixeira is a 79 y.o. female admitted 11/14/2024 with increasing shortness of breath.    Hospital Course  Cecile Teixeira is a 79 y.o. female who presented 11/14/2024 with worsening shortness of breath.  Of note, history has been obtained from chart review and from ER physician.  Patient was aggressive at bedside, and states that she does not want to talk to anybody at this time.  She is unwilling to participate in any meaningful conversation with me.    Patient has a past medical history of being wheelchair-bound since her 30s, YOLA on CPAP, morbid obesity, diabetes mellitus, HFpEF, hypertension, asthma, hypothyroidism, who had a recent hospitalization here at [facility], was discharged on November 13, 2024.  Patient now re-presents to the emerged department, complaining of worsening of persistent shortness of breath.  At her skilled nursing facility, patient was refusing all care and braiding all staff.  Per EMS, she was requiring 4 to 5 L supplemental oxygen, which slightly elevated compared to her baseline.  CPAP was also initiated en route.  In the emergency department, chest x-ray was obtained showing some mild worsening pulmonary edema.  She was given a dose of IV Lasix, and given a breathing treatment as well.  Patient will be admitted for management of acute on chronic hypoxic respiratory failure    Interval Problem Update  11/15 afebrile, slightly bradycardic, hypertensive and on 4 L nasal cannula.  White count slightly elevated to 15. Patient continues to be argumentative and demanding.  From SNF, but states she does have a home, however, hasn't been there in awhile.  11/16:  increased oxygen demand to 10 LPM oxymask. Increased lasix 40mg IV bid for congestion seen on CXR yesterday. Was on lasix 20mg daily.  Echo reviewed EF 65% difficult study, unable to see RV. Suspect pulmonary  hypertension from COPD and YOLA. Added solumedrol 40mg IV q 8.  Patient appears nontoxic, but leg edema noted bilaterally.  Encouraged her to take deeper breaths.  Exhibiting shallow breathing.  11/17:  doing better, oxygen down to 5 LPM oxymask.  Coughing up productive sputum, ordered sputum culture, added unasyn IV x 5 days.  Tapering solumedrol 40 iV a 8 to q 12.    I have discussed this patient's plan of care and discharge plan at IDT rounds today with Case Management, Nursing, Nursing leadership, and other members of the IDT team.    Consultants/Specialty  None    Code Status  Full Code    Disposition  The patient is not medically cleared for discharge to home or a post-acute facility.  Anticipate discharge to: home with organized home healthcare and close outpatient follow-up    I have placed the appropriate orders for post-discharge needs.    Review of Systems  Review of Systems   Constitutional:  Negative for chills, fever and malaise/fatigue.   Respiratory:  Positive for shortness of breath. Negative for cough.    Cardiovascular:  Negative for chest pain, palpitations and leg swelling.   Gastrointestinal:  Negative for abdominal pain, diarrhea, heartburn, nausea and vomiting.   Genitourinary:  Negative for dysuria, frequency and urgency.   Musculoskeletal:  Positive for myalgias.   Neurological:  Negative for dizziness and headaches.   All other systems reviewed and are negative.       Physical Exam  Temp:  [36.4 °C (97.6 °F)-36.9 °C (98.4 °F)] 36.4 °C (97.6 °F)  Pulse:  [60-70] 70  Resp:  [18-20] 20  BP: (125-146)/(59-72) 135/72  SpO2:  [93 %-96 %] 94 %    Physical Exam  Vitals and nursing note reviewed.   Constitutional:       Appearance: Normal appearance. She is not ill-appearing.   HENT:      Head: Normocephalic and atraumatic.      Jaw: There is normal jaw occlusion.      Right Ear: Hearing normal.      Left Ear: Hearing normal.      Nose: Nose normal.      Mouth/Throat:      Lips: Pink.      Mouth:  Mucous membranes are moist.   Eyes:      Extraocular Movements: Extraocular movements intact.      Conjunctiva/sclera: Conjunctivae normal.      Pupils: Pupils are equal, round, and reactive to light.   Neck:      Vascular: No carotid bruit.   Cardiovascular:      Rate and Rhythm: Normal rate and regular rhythm.      Pulses: Normal pulses.      Heart sounds: Normal heart sounds, S1 normal and S2 normal.   Pulmonary:      Effort: Pulmonary effort is normal.      Breath sounds: Normal air entry. No stridor. Examination of the right-lower field reveals decreased breath sounds. Examination of the left-lower field reveals decreased breath sounds. Decreased breath sounds present.   Musculoskeletal:      Cervical back: Normal range of motion and neck supple.      Right lower leg: No edema.      Left lower leg: No edema.   Skin:     General: Skin is warm and dry.      Capillary Refill: Capillary refill takes less than 2 seconds.   Neurological:      General: No focal deficit present.      Mental Status: She is alert and oriented to person, place, and time. Mental status is at baseline.      Sensory: Sensation is intact.      Motor: Motor function is intact.   Psychiatric:         Attention and Perception: Attention and perception normal.         Mood and Affect: Mood normal. Affect is angry.         Speech: Speech normal.         Behavior: Behavior is agitated and aggressive.         Fluids    Intake/Output Summary (Last 24 hours) at 11/17/2024 1441  Last data filed at 11/17/2024 0517  Gross per 24 hour   Intake --   Output 1100 ml   Net -1100 ml        Laboratory  Recent Labs     11/15/24  0055 11/16/24  0407 11/17/24  0950   WBC 15.1* 12.3* 14.0*   RBC 4.18* 3.95* 4.20   HEMOGLOBIN 12.6 11.9* 12.9   HEMATOCRIT 39.5 37.2 38.2   MCV 94.5 94.2 91.0   MCH 30.1 30.1 30.7   MCHC 31.9* 32.0* 33.8   RDW 49.3 49.3 45.4   PLATELETCT 260 222 243   MPV 9.6 9.8 9.7     Recent Labs     11/15/24  0055 11/16/24  0407 11/17/24  0950    SODIUM 138 139 136   POTASSIUM 3.3* 3.6 3.6   CHLORIDE 100 101 97   CO2 29 29 29   GLUCOSE 96 107* 192*   BUN 31* 27* 23*   CREATININE 1.20 0.91 0.82   CALCIUM 8.0* 8.4* 9.0                   Imaging  DX-CHEST-PORTABLE (1 VIEW)   Final Result         1. Stable mild bibasilar atelectasis versus airspace disease.   2. Mild cardiomegaly.      DX-CHEST-PORTABLE (1 VIEW)   Final Result         1.  Pulmonary edema and/or infiltrates are identified, which are stable since the prior exam.   2.  Cardiomegaly           Assessment/Plan  * Acute on chronic hypoxic respiratory failure (HCC)- (present on admission)  Assessment & Plan  Patient requires 3 L supplemental oxygen at baseline given her history of COPD admissions of heart failure  Unclear if patient has been compliant with her home dose Lasix.  Prior to arrival, she was requiring 4 to 5 L supplemental oxygen and was started on CPAP as well prior to arrival.  She was given 40 mg total of IV Lasix while in the emergency department.  She was also given a breathing treatment.  She has been titrated down to her near baseline.  Continue to monitor  11/16:  increased oxygen demand to 10 LPM oxymask.  Added solumedrol 40 IV q 8, lasix 40 IV bid.    Hypokalemia  Assessment & Plan  Replete with IV and PO potassium  Follow up with daily BMPs    Pulmonary edema- (present on admission)  Assessment & Plan  Pulmonary edema seen on chest x-ray, which appears to be slightly worse compared to prior imaging  11/16:  increased  IV Lasix to 40 bid.  Monitor electrolytes with daily BMP      Chronic heart failure with preserved ejection fraction (HCC)- (present on admission)  Assessment & Plan  Continue with home dose carvedilol, losartan, and furosemide    Type 2 diabetes mellitus, without long-term current use of insulin (HCC)- (present on admission)  Assessment & Plan  Insulin sliding scale, hypoglycemia protocol, diabetic diet    Lymphedema associated with obesity- (present on  admission)  Assessment & Plan  Recent lower extremity duplex US negative for DVT     YOLA (obstructive sleep apnea)- (present on admission)  Assessment & Plan  CPAP         VTE prophylaxis: VTE Selection    I have performed a physical exam and reviewed and updated ROS and Plan today (11/17/2024). In review of yesterday's note (11/16/2024), there are no changes except as documented above.

## 2024-11-18 ENCOUNTER — PHARMACY VISIT (OUTPATIENT)
Dept: PHARMACY | Facility: MEDICAL CENTER | Age: 79
End: 2024-11-18
Payer: COMMERCIAL

## 2024-11-18 VITALS
RESPIRATION RATE: 20 BRPM | HEART RATE: 57 BPM | TEMPERATURE: 97.2 F | HEIGHT: 60 IN | OXYGEN SATURATION: 97 % | DIASTOLIC BLOOD PRESSURE: 58 MMHG | WEIGHT: 293 LBS | SYSTOLIC BLOOD PRESSURE: 168 MMHG | BODY MASS INDEX: 57.52 KG/M2

## 2024-11-18 PROBLEM — E87.6 HYPOKALEMIA: Status: RESOLVED | Noted: 2024-11-15 | Resolved: 2024-11-18

## 2024-11-18 LAB
ANION GAP SERPL CALC-SCNC: 16 MMOL/L (ref 7–16)
BASOPHILS # BLD AUTO: 0.1 % (ref 0–1.8)
BASOPHILS # BLD: 0.03 K/UL (ref 0–0.12)
BUN SERPL-MCNC: 38 MG/DL (ref 8–22)
CALCIUM SERPL-MCNC: 8.9 MG/DL (ref 8.5–10.5)
CHLORIDE SERPL-SCNC: 99 MMOL/L (ref 96–112)
CO2 SERPL-SCNC: 24 MMOL/L (ref 20–33)
CREAT SERPL-MCNC: 1.22 MG/DL (ref 0.5–1.4)
EOSINOPHIL # BLD AUTO: 0 K/UL (ref 0–0.51)
EOSINOPHIL NFR BLD: 0 % (ref 0–6.9)
ERYTHROCYTE [DISTWIDTH] IN BLOOD BY AUTOMATED COUNT: 47.1 FL (ref 35.9–50)
GFR SERPLBLD CREATININE-BSD FMLA CKD-EPI: 45 ML/MIN/1.73 M 2
GLUCOSE SERPL-MCNC: 160 MG/DL (ref 65–99)
GRAM STN SPEC: NORMAL
HCT VFR BLD AUTO: 43.5 % (ref 37–47)
HGB BLD-MCNC: 13.8 G/DL (ref 12–16)
IMM GRANULOCYTES # BLD AUTO: 0.23 K/UL (ref 0–0.11)
IMM GRANULOCYTES NFR BLD AUTO: 1.1 % (ref 0–0.9)
LYMPHOCYTES # BLD AUTO: 0.73 K/UL (ref 1–4.8)
LYMPHOCYTES NFR BLD: 3.6 % (ref 22–41)
MCH RBC QN AUTO: 29.9 PG (ref 27–33)
MCHC RBC AUTO-ENTMCNC: 31.7 G/DL (ref 32.2–35.5)
MCV RBC AUTO: 94.2 FL (ref 81.4–97.8)
MONOCYTES # BLD AUTO: 0.47 K/UL (ref 0–0.85)
MONOCYTES NFR BLD AUTO: 2.3 % (ref 0–13.4)
NEUTROPHILS # BLD AUTO: 18.86 K/UL (ref 1.82–7.42)
NEUTROPHILS NFR BLD: 92.9 % (ref 44–72)
NRBC # BLD AUTO: 0 K/UL
NRBC BLD-RTO: 0 /100 WBC (ref 0–0.2)
PLATELET # BLD AUTO: 285 K/UL (ref 164–446)
PMV BLD AUTO: 10 FL (ref 9–12.9)
POTASSIUM SERPL-SCNC: 3.9 MMOL/L (ref 3.6–5.5)
RBC # BLD AUTO: 4.62 M/UL (ref 4.2–5.4)
SIGNIFICANT IND 70042: NORMAL
SITE SITE: NORMAL
SODIUM SERPL-SCNC: 139 MMOL/L (ref 135–145)
SOURCE SOURCE: NORMAL
WBC # BLD AUTO: 20.3 K/UL (ref 4.8–10.8)

## 2024-11-18 PROCEDURE — 87205 SMEAR GRAM STAIN: CPT

## 2024-11-18 PROCEDURE — 80048 BASIC METABOLIC PNL TOTAL CA: CPT

## 2024-11-18 PROCEDURE — 99239 HOSP IP/OBS DSCHRG MGMT >30: CPT | Performed by: HOSPITALIST

## 2024-11-18 PROCEDURE — A9270 NON-COVERED ITEM OR SERVICE: HCPCS | Performed by: STUDENT IN AN ORGANIZED HEALTH CARE EDUCATION/TRAINING PROGRAM

## 2024-11-18 PROCEDURE — 700111 HCHG RX REV CODE 636 W/ 250 OVERRIDE (IP): Mod: JZ | Performed by: HOSPITALIST

## 2024-11-18 PROCEDURE — RXMED WILLOW AMBULATORY MEDICATION CHARGE: Performed by: HOSPITALIST

## 2024-11-18 PROCEDURE — 700105 HCHG RX REV CODE 258: Performed by: HOSPITALIST

## 2024-11-18 PROCEDURE — 85025 COMPLETE CBC W/AUTO DIFF WBC: CPT

## 2024-11-18 PROCEDURE — 36415 COLL VENOUS BLD VENIPUNCTURE: CPT

## 2024-11-18 PROCEDURE — 700102 HCHG RX REV CODE 250 W/ 637 OVERRIDE(OP): Performed by: HOSPITALIST

## 2024-11-18 PROCEDURE — 700102 HCHG RX REV CODE 250 W/ 637 OVERRIDE(OP): Performed by: STUDENT IN AN ORGANIZED HEALTH CARE EDUCATION/TRAINING PROGRAM

## 2024-11-18 PROCEDURE — A9270 NON-COVERED ITEM OR SERVICE: HCPCS | Performed by: HOSPITALIST

## 2024-11-18 RX ORDER — POLYETHYLENE GLYCOL 3350 17 G/17G
1 POWDER, FOR SOLUTION ORAL
Status: DISCONTINUED | OUTPATIENT
Start: 2024-11-18 | End: 2024-11-19 | Stop reason: HOSPADM

## 2024-11-18 RX ORDER — FUROSEMIDE 10 MG/ML
40 INJECTION INTRAMUSCULAR; INTRAVENOUS
Status: DISCONTINUED | OUTPATIENT
Start: 2024-11-19 | End: 2024-11-18

## 2024-11-18 RX ORDER — METHYLPREDNISOLONE SODIUM SUCCINATE 40 MG/ML
40 INJECTION, POWDER, LYOPHILIZED, FOR SOLUTION INTRAMUSCULAR; INTRAVENOUS DAILY
Status: DISCONTINUED | OUTPATIENT
Start: 2024-11-19 | End: 2024-11-19 | Stop reason: HOSPADM

## 2024-11-18 RX ORDER — FUROSEMIDE 20 MG/1
20 TABLET ORAL
Status: DISCONTINUED | OUTPATIENT
Start: 2024-11-19 | End: 2024-11-19 | Stop reason: HOSPADM

## 2024-11-18 RX ORDER — GABAPENTIN 300 MG/1
300 CAPSULE ORAL 3 TIMES DAILY
Qty: 90 CAPSULE | Refills: 0 | Status: SHIPPED | OUTPATIENT
Start: 2024-11-18

## 2024-11-18 RX ORDER — AMOXICILLIN 250 MG
2 CAPSULE ORAL EVERY EVENING
Status: DISCONTINUED | OUTPATIENT
Start: 2024-11-18 | End: 2024-11-19 | Stop reason: HOSPADM

## 2024-11-18 RX ORDER — PREDNISONE 20 MG/1
40 TABLET ORAL DAILY
Qty: 6 TABLET | Refills: 0 | Status: SHIPPED | OUTPATIENT
Start: 2024-11-19 | End: 2024-11-22

## 2024-11-18 RX ORDER — AMOXICILLIN 250 MG
2 CAPSULE ORAL 2 TIMES DAILY
Qty: 120 TABLET | Refills: 0 | Status: SHIPPED | OUTPATIENT
Start: 2024-11-18

## 2024-11-18 RX ADMIN — CARVEDILOL 12.5 MG: 12.5 TABLET, FILM COATED ORAL at 08:53

## 2024-11-18 RX ADMIN — FUROSEMIDE 40 MG: 10 INJECTION, SOLUTION INTRAMUSCULAR; INTRAVENOUS at 06:17

## 2024-11-18 RX ADMIN — AMPICILLIN AND SULBACTAM 3 G: 1; 2 INJECTION, POWDER, FOR SOLUTION INTRAMUSCULAR; INTRAVENOUS at 12:07

## 2024-11-18 RX ADMIN — LEVOTHYROXINE SODIUM 88 MCG: 0.09 TABLET ORAL at 06:16

## 2024-11-18 RX ADMIN — FAMOTIDINE 20 MG: 20 TABLET, FILM COATED ORAL at 06:17

## 2024-11-18 RX ADMIN — FLUTICASONE PROPIONATE 220 MCG: 110 AEROSOL, METERED RESPIRATORY (INHALATION) at 06:17

## 2024-11-18 RX ADMIN — METHYLPREDNISOLONE SODIUM SUCCINATE 40 MG: 40 INJECTION, POWDER, FOR SOLUTION INTRAMUSCULAR; INTRAVENOUS at 06:17

## 2024-11-18 RX ADMIN — AMPICILLIN AND SULBACTAM 3 G: 1; 2 INJECTION, POWDER, FOR SOLUTION INTRAMUSCULAR; INTRAVENOUS at 00:03

## 2024-11-18 RX ADMIN — AMPICILLIN AND SULBACTAM 3 G: 1; 2 INJECTION, POWDER, FOR SOLUTION INTRAMUSCULAR; INTRAVENOUS at 06:24

## 2024-11-18 RX ADMIN — OMEPRAZOLE 20 MG: 20 CAPSULE, DELAYED RELEASE ORAL at 06:17

## 2024-11-18 RX ADMIN — LOSARTAN POTASSIUM 100 MG: 50 TABLET, FILM COATED ORAL at 06:17

## 2024-11-18 RX ADMIN — AMLODIPINE BESYLATE 10 MG: 10 TABLET ORAL at 06:17

## 2024-11-18 NOTE — CARE PLAN
The patient is Stable - Low risk of patient condition declining or worsening    Shift Goals  Clinical Goals: O2 sat will be maintained at or above 90%  Patient Goals: Food, rest  Family Goals: Not at bedside    Pt noncompliant at beginning of shift with verbal and physical aggression towards multiple staff members. Pt initially wanted to leave AMA but decided to stay once informed that the hospital would be unable to organize transportation home. Pt mostly compliant for the rest of this shift. Pain was controlled. Bed in lowest, locked position.    Progress made toward(s) clinical / shift goals:    Problem: Skin Integrity  Goal: Skin integrity is maintained or improved  11/18/2024 0416 by Sabrina Irby, R.N.  Outcome: Progressing  11/18/2024 0416 by Sabrina Irby, R.N.  Outcome: Progressing     Problem: Fall Risk  Goal: Patient will remain free from falls  11/18/2024 0416 by Sabrina Irby, R.N.  Outcome: Progressing  11/18/2024 0416 by Sabrina Irby, R.N.  Outcome: Progressing     Problem: Pain - Standard  Goal: Alleviation of pain or a reduction in pain to the patient’s comfort goal  11/18/2024 0416 by Sabrina Irby, R.N.  Outcome: Progressing  11/18/2024 0416 by Sabrina Irby, R.N.  Outcome: Progressing       Patient is not progressing towards the following goals:      Problem: Knowledge Deficit - Standard  Goal: Patient and family/care givers will demonstrate understanding of plan of care, disease process/condition, diagnostic tests and medications  11/18/2024 0416 by Sabrina Irby, R.N.  Outcome: Not Progressing  11/18/2024 0416 by Sabrina Irby, R.N.  Outcome: Progressing

## 2024-11-18 NOTE — CARE PLAN
Problem: Knowledge Deficit - Standard  Goal: Patient and family/care givers will demonstrate understanding of plan of care, disease process/condition, diagnostic tests and medications  Outcome: Not Met  Note: Titrate O2 needs, antibiotics     Problem: Skin Integrity  Goal: Skin integrity is maintained or improved  Outcome: Not Met  Note: Refused turns, importance reinforced   The patient is Watcher - Medium risk of patient condition declining or worsening    Shift Goals  Clinical Goals: O2 sat will be maintained at or above 90%  Patient Goals: Food, rest  Family Goals: Not at bedside    Progress made toward(s) clinical / shift goals:  safety and comfort    Patient is not progressing towards the following goals:      Problem: Knowledge Deficit - Standard  Goal: Patient and family/care givers will demonstrate understanding of plan of care, disease process/condition, diagnostic tests and medications  Outcome: Not Met  Note: Titrate O2 needs, antibiotics     Problem: Skin Integrity  Goal: Skin integrity is maintained or improved  Outcome: Not Met  Note: Refused turns, importance reinforced

## 2024-11-18 NOTE — PROGRESS NOTES
Late entry 1930    Yelling and screaming and with demands of ice cream- verbally and physically abusive towards staff and to security that was called to bedside for assistance.  Demanding to leave AMA but has no assistance or means to leave the hospital or having transportation- will stay to the morning and speak with the MD

## 2024-11-18 NOTE — DISCHARGE SUMMARY
"Discharge Summary    CHIEF COMPLAINT ON ADMISSION  Chief Complaint   Patient presents with    Shortness of Breath     Pt got admitted to [facility] yesterday for pneumonia. Per EMS, pt is refusing all care and berating staff. Pt states that she thinks that staff is \"pumping poisonous gas into her room\". EMS found the pt on 88% on her 4 l/min oxygen at baseline. EMS initiated CPAP and spo2 increased to high 90's       Reason for Admission  ems     Admission Date  11/14/2024    CODE STATUS  Full Code    HPI & HOSPITAL COURSE  This is a 79 y.o. female here with SOB.  Cecile Teixeira is a 79 y.o. female who presented 11/14/2024 with worsening shortness of breath.  Of note, history has been obtained from chart review and from ER physician.  Patient was aggressive at bedside, and states that she does not want to talk to anybody at this time.  She is unwilling to participate in any meaningful conversation with me.    Patient has a past medical history of being wheelchair-bound since her 30s, YOLA on CPAP, morbid obesity, diabetes mellitus, HFpEF, hypertension, asthma, hypothyroidism, who had a recent hospitalization here at [facility], was discharged on November 13, 2024.  Patient now re-presents to the emerged department, complaining of worsening of persistent shortness of breath.  At her skilled nursing facility, patient was refusing all care and braiding all staff.  Per EMS, she was requiring 4 to 5 L supplemental oxygen, which slightly elevated compared to her baseline.  CPAP was also initiated en route.  In the emergency department, chest x-ray was obtained showing some mild worsening pulmonary edema.  She was given a dose of IV Lasix, and given a breathing treatment as well.  Patient will be admitted for management of acute on chronic hypoxic respiratory failure  11/15 afebrile, slightly bradycardic, hypertensive and on 4 L nasal cannula.  White count slightly elevated to 15. Patient continues to " be argumentative and demanding.  From SNF, but states she does have a home, however, hasn't been there in awhile.  11/16:  increased oxygen demand to 10 LPM oxymask. Increased lasix 40mg IV bid for congestion seen on CXR yesterday. Was on lasix 20mg daily.  Echo reviewed EF 65% difficult study, unable to see RV. Suspect pulmonary hypertension from COPD and YOLA. Added solumedrol 40mg IV q 8.  Patient appears nontoxic, but leg edema noted bilaterally.  Encouraged her to take deeper breaths.  Exhibiting shallow breathing.  11/17:  doing better, oxygen down to 5 LPM oxymask.  Coughing up productive sputum, ordered sputum culture, added unasyn IV x 5 days.  Tapering solumedrol 40 iV a 8 to q 12.    Doing well, on her baseline oxygen 4 LPM NC, taking deeper breaths.  Sputum culture sent for culture.  Continue 7 days total of antibiotics with augmentin.  Received IV unasyn x 24 hours.   Complete prednisone 40mg daily for 3 days.  Home health arranged.   Home oxygen tubing and loaner tank arranged.  She uses her electric wheelchair baseline.  She feels she is back to her baseline breathing and can be dc home with family.    Therefore, she is discharged in good and stable condition to home with organized home healthcare and close outpatient follow-up.    The patient met 2-midnight criteria for an inpatient stay at the time of discharge.    Discharge Date  11/18/2024    FOLLOW UP ITEMS POST DISCHARGE  Follow up PCP in 1 week for recheck.    DISCHARGE DIAGNOSES  Principal Problem:    Acute on chronic hypoxic respiratory failure (HCC) (POA: Yes)  Active Problems:    YOLA (obstructive sleep apnea) (POA: Yes)    Lymphedema associated with obesity (POA: Yes)    Bronchitis (POA: Yes)    Type 2 diabetes mellitus, without long-term current use of insulin (HCC) (Chronic) (POA: Yes)    Chronic heart failure with preserved ejection fraction (HCC) (POA: Yes)    Pulmonary edema (POA: Yes)  Resolved Problems:    Hypokalemia (POA:  Yes)      FOLLOW UP  No future appointments.  GERBER Sood  [address] [id number] [phone]    Call in 1 day      [facility], Emergency Dept  [address]  [place] [address] [phone]    If symptoms worsen      MEDICATIONS ON DISCHARGE     Medication List        START taking these medications        Instructions   amoxicillin-clavulanate 875-125 MG Tabs  Commonly known as: Augmentin   Take 1 Tablet by mouth 2 times a day for 6 days.  Dose: 1 Tablet     predniSONE 20 MG Tabs  Start taking on: November 19, 2024  Commonly known as: Deltasone   Take 2 Tablets by mouth every day for 3 days.  Dose: 40 mg            CHANGE how you take these medications        Instructions   senna-docusate 8.6-50 MG Tabs  What changed: when to take this  Commonly known as: Pericolace Or Senokot S   Take 2 Tablets by mouth 2 times a day.  Dose: 2 Tablet            CONTINUE taking these medications        Instructions   albuterol 108 (90 Base) MCG/ACT Aers inhalation aerosol   Inhale 2 Puffs every 6 hours as needed for Shortness of Breath. Indications: Asthma  Dose: 2 Puff     amLODIPine 10 MG Tabs  Commonly known as: Norvasc   Take 1 Tablet by mouth every day. Indications: High Blood Pressure  Dose: 10 mg     carvedilol 12.5 MG Tabs  Commonly known as: Coreg   Take 1 Tablet by mouth 2 times a day with meals.  Dose: 12.5 mg     CENTRUM ADULT 50+ MULTIGUMMIES PO   Take 1 Tablet by mouth every day. Indications: supplement  Dose: 1 Tablet     fluticasone 110 MCG/ACT Aero  Commonly known as: Flovent HFA   Inhale 2 Puffs 2 times a day. Indications: Asthma  Dose: 2 Puff     fluticasone 50 MCG/ACT nasal spray  Commonly known as: Flonase   ADMINISTER 1 SPRAY INTO AFFECTED NOSTRIL(S) EVERY DAY.  Dose: 1 Spray     furosemide 20 MG Tabs  Commonly known as: Lasix   Take 1 Tablet by mouth every day.  Dose: 20 mg     gabapentin 300 MG Caps  Commonly known as: Neurontin   Take 1 Capsule by mouth 3 times  a day.  Dose: 300 mg     levothyroxine 88 MCG Tabs  Commonly known as: Synthroid   Take 1 Tablet by mouth every morning on an empty stomach.  Dose: 88 mcg     losartan 100 MG Tabs  Commonly known as: Cozaar   Take 1 Tablet by mouth every day.  Dose: 100 mg     omeprazole 20 MG delayed-release capsule  Commonly known as: PriLOSEC   Take 1 Capsule by mouth 2 times a day for 30 days.  Dose: 20 mg     pioglitazone 30 MG Tabs  Commonly known as: Actos   Take 1 Tablet by mouth every day.  Dose: 30 mg     potassium chloride SA 20 MEQ Tbcr  Commonly known as: Kdur   Take 1 Tablet by mouth every day.  Dose: 20 mEq            STOP taking these medications      acetaminophen 500 MG Tabs  Commonly known as: Tylenol     benzonatate 100 MG Caps  Commonly known as: Tessalon     cetirizine 10 MG Tabs  Commonly known as: ZyrTEC     Nystatin Powd     polyethylene glycol/lytes Pack  Commonly known as: Miralax              Allergies  Allergies   Allergen Reactions    Doxycycline Hives    Prednisone Unspecified     Diarrhea and very irritable     Cephalexin Diarrhea     Diarrhea and mild rash     Montelukast Rash     Leg pain, back pain and rash     Spinach Unspecified     Skin allergy test    Cow's Milk [Lac Bovis]     Other Environmental      Trees except oak    Codeine Unspecified     Headaches and confusion     Lactose Unspecified     Lactose intolerance since a child     Levothyroxine Anxiety     Panic attack     Cannot take generic    Sulfa Drugs Unspecified     Mental status change        DIET  Orders Placed This Encounter   Procedures    Diet Order Diet: Consistent CHO (Diabetic); Fluid modifications: (optional): 1800 ml Fluid Restriction     Standing Status:   Standing     Number of Occurrences:   1     Order Specific Question:   Diet:     Answer:   Consistent CHO (Diabetic) [4]     Order Specific Question:   Fluid modifications: (optional)     Answer:   1800 ml Fluid Restriction [10]       ACTIVITY  As tolerated.  Weight  bearing as tolerated    CONSULTATIONS      PROCEDURES      LABORATORY  Lab Results   Component Value Date    SODIUM 139 11/18/2024    POTASSIUM 3.9 11/18/2024    CHLORIDE 99 11/18/2024    CO2 24 11/18/2024    GLUCOSE 160 (H) 11/18/2024    BUN 38 (H) 11/18/2024    CREATININE 1.22 11/18/2024        Lab Results   Component Value Date    WBC 20.3 (H) 11/18/2024    HEMOGLOBIN 13.8 11/18/2024    HEMATOCRIT 43.5 11/18/2024    PLATELETCT 285 11/18/2024        Total time of the discharge process exceeds 45 minutes.

## 2024-11-18 NOTE — PROGRESS NOTES
Received in bed aaox4, getting lab drawn, hungry, on 5L oymask, BLE weakness and edema, POC discussed, needs attended.

## 2024-11-18 NOTE — DISCHARGE PLANNING
DC Transport Scheduled    Transport Company Scheduled:  [facility]  Spoke with Dora at [facility] to schedule transport.    Scheduled Date: 11/18/2024  Scheduled Time: 1700    Transport Type: [facility]  Destination:   home   Destination address: [address] [place] [address] [address] [place]     Notified care team of scheduled transport via [employer].     If there are any changes needed to the DC transportation scheduled, please contact [facility] at ext. [phone] between the hours of 7514-5032 Mon-Fri. If outside those hours, contact the ED Case Manager at ext. [phone].

## 2024-11-18 NOTE — DISCHARGE PLANNING
"HTH/SCP TCN chart review completed. Collaborated with JAROD Hoskins. Please see initial TCN note 11/15 regarding choices proactively obtained as noted \"patient refusing post acute placement, stating she wants to go home with [facility]. Choice obtained.\"     Given current PT recommendations 11/17 noted to post acute placement, TCN attempted to meet with patient at bedside to provide further opportunities for discussion regarding post acute placement. Patient was direct in communicating her desire to return home \"I will be returning home\" and provided no further opportunities for discussion regarding post acute placement. Patient advised she would need transportation home that could provide physical assistance given her current mobility needs.     Update provided to CM following TCN visit at bedside. CM advised that referrals were sent to SNF per CM protocol given current PT recommendations and advised that  patient was declined by all local [place] SNF facilities. No further acute TCN needs anticipated in patient discharge planning. Thank you.    Completed:  PT recommendations 11/17 as noted recommend post-acute placement for additional physical therapy services prior to discharge home (pt is adament about return home)   Choice obtained: [facility] ([facility])  Pt aware of [facility] blanket referral policy  SCP with [facility] PCP  "

## 2024-11-18 NOTE — DISCHARGE PLANNING
CM completed discharge assessment at bedside. She lives at home alone in a 1-story apartment. She is on 4L oxygen at baseline and is serviced by [facility]. The patient reported that her CPAP power cord is damaged, and the machine is making loud noises. She stated that she has been trying to get [facility] to repair the machine. JAROD called [employer] and spoke with Azalea and Tiffani to discuss the patient's concerns about her CPAP. Tiffani stated that the patient owns the machine and usually she would be expected to bring the machine in to the [facility] office to have it repaired. AJROD notified Tiffani that the patient is homebound and would not be able to bring it in.  Andrea is willing to go to the patient's home to repair the machine and will call her daughter Clarence today to schedule.     Patient is ready for discharge today, HH order received, patient was previously on service with [facility] and would like to resume service. Referral sent to [facility] and patient was accepted. [facility]. She confirmed that she does have her key and will be able to get in the home. No additional CM needs identified for this patient.

## 2024-11-18 NOTE — DIETARY
NUTRITION SERVICES: BMI - Pt with BMI >40 (=Body mass index is 57.69 kg/m².), morbid obesity. Weight loss counseling not appropriate in acute care setting.     RECOMMEND - If appropriate at DC please refer to outpatient nutrition services for weight management.

## 2024-11-19 NOTE — DISCHARGE PLANNING
Call from [facility] asking if GMT is delayed as they were to be here at 1700. Explained they do get behind and if not here by [phone] to call us back.

## 2024-12-06 NOTE — TELEPHONE ENCOUNTER
"1. Caller Name: Cecile Teixeira                        Call Back Number: [phone]  [facility] PCP or Specialty Provider: No  None        2.  Does patient have any active symptoms of respiratory illness? Yes, the patient reports the following respiratory symptoms: cough, chills, sore throat and headache.  Patient reports she has been dealing with these symptoms for about 3 weeks.      3.  Does patient have any comoribidities? Immunosuppressed  Handicap,  History of Blood clot, Lymphedema    4.  Has the patient traveled in the last 14 days OR had any known contact with someone who is suspected or confirmed to have COVID-19?  No.    5. Disposition: Offered patient virtual visit to limit potential exposure to others; patient also given self care instructions     Patient is reporting history of Sleep Apnea and uses a CPAP.  Patient has been in [place] for a little less than a year.  She is trying to establish care with PCP    If virtual visit is not successful, patient to go to  if worsening symptoms.      Patient chart updated with Email.  Patient has IPAD email is [email or web address]  Patient call was transferred to scheduling for virtual visit.  Patient would like primary physician to be Dr. Isaias Naik Kos        Note routed to [facility] Provider: [facility] only.        Answer Assessment - Initial Assessment Questions  1. COVID-19 DIAGNOSIS: \"Who made your Coronavirus (COVID-19) diagnosis?\" \"Was it confirmed by a positive lab test?\" If not diagnosed by a HCP, ask \"Are there lots of cases (community spread) where you live?\" (See public health department website, if unsure)    * MAJOR community spread: high number of cases; numbers of cases are increasing; many people hospitalized.    * MINOR community spread: low number of cases; not increasing; few or no people hospitalized      no  2. ONSET: \"When did the COVID-19 symptoms start?\"     About 3 weeks ago  3. WORST SYMPTOM: \"What is your worst symptom?\" (e.g., cough, fever, " Patient Education   Preventive Care Advice   This is general advice given by our system to help you stay healthy. However, your care team may have specific advice just for you. Please talk to your care team about your preventive care needs.  Nutrition  Eat 5 or more servings of fruits and vegetables each day.  Try wheat bread, brown rice and whole grain pasta (instead of white bread, rice, and pasta).  Get enough calcium and vitamin D. Check the label on foods and aim for 100% of the RDA (recommended daily allowance).  Lifestyle  Exercise at least 150 minutes each week  (30 minutes a day, 5 days a week).  Do muscle strengthening activities 2 days a week. These help control your weight and prevent disease.  No smoking.  Wear sunscreen to prevent skin cancer.  Have a dental exam and cleaning every 6 months.  Yearly exams  See your health care team every year to talk about:  Any changes in your health.  Any medicines your care team has prescribed.  Preventive care, family planning, and ways to prevent chronic diseases.  Shots (vaccines)   HPV shots (up to age 26), if you've never had them before.  Hepatitis B shots (up to age 59), if you've never had them before.  COVID-19 shot: Get this shot when it's due.  Flu shot: Get a flu shot every year.  Tetanus shot: Get a tetanus shot every 10 years.  Pneumococcal, hepatitis A, and RSV shots: Ask your care team if you need these based on your risk.  Shingles shot (for age 50 and up)  General health tests  Diabetes screening:  Starting at age 35, Get screened for diabetes at least every 3 years.  If you are younger than age 35, ask your care team if you should be screened for diabetes.  Cholesterol test: At age 39, start having a cholesterol test every 5 years, or more often if advised.  Bone density scan (DEXA): At age 50, ask your care team if you should have this scan for osteoporosis (brittle bones).  Hepatitis C: Get tested at least once in your life.  STIs (sexually  "shortness of breath, muscle aches)      cough  4. COUGH: \"How bad is the cough?\"        Keeps her awake at night  5. FEVER: \"Do you have a fever?\" If so, ask: \"What is your temperature, how was it measured, and when did it start?\"      Chills but no fever.  Does not have a thermometer  6. RESPIRATORY STATUS: \"Describe your breathing?\" (e.g., shortness of breath, wheezing, unable to speak)   None  7. BETTER-SAME-WORSE: \"Are you getting better, staying the same or getting worse compared to yesterday?\"  If getting worse, ask, \"In what way?\"      Getting better but believes its allergies  8. HIGH RISK DISEASE: \"Do you have any chronic medical problems?\" (e.g., asthma, heart or lung disease, weak immune system, etc.)   Cpap  9. PREGNANCY: \"Is there any chance you are pregnant?\" \"When was your last menstrual period?\"    NA  10. OTHER SYMPTOMS: \"Do you have any other symptoms?\"  (e.g., runny nose, headache, sore throat, loss of smell)        Runny nose, post nasal drip, cough, chills, sore throat and headache.  Symptoms come and go    Protocols used: CORONAVIRUS (COVID-19) DIAGNOSED OR [id number]      " transmitted infections)  Before age 24: Ask your care team if you should be screened for STIs.  After age 24: Get screened for STIs if you're at risk. You are at risk for STIs (including HIV) if:  You are sexually active with more than one person.  You don't use condoms every time.  You or a partner was diagnosed with a sexually transmitted infection.  If you are at risk for HIV, ask about PrEP medicine to prevent HIV.  Get tested for HIV at least once in your life, whether you are at risk for HIV or not.  Cancer screening tests  Cervical cancer screening: If you have a cervix, begin getting regular cervical cancer screening tests starting at age 21.  Breast cancer scan (mammogram): If you've ever had breasts, begin having regular mammograms starting at age 40. This is a scan to check for breast cancer.  Colon cancer screening: It is important to start screening for colon cancer at age 45.  Have a colonoscopy test every 10 years (or more often if you're at risk) Or, ask your provider about stool tests like a FIT test every year or Cologuard test every 3 years.  To learn more about your testing options, visit:   .  For help making a decision, visit:   https://bit.ly/zz40220.  Prostate cancer screening test: If you have a prostate, ask your care team if a prostate cancer screening test (PSA) at age 55 is right for you.  Lung cancer screening: If you are a current or former smoker ages 50 to 80, ask your care team if ongoing lung cancer screenings are right for you.  For informational purposes only. Not to replace the advice of your health care provider. Copyright   2023 [facility]. All rights reserved. Clinically reviewed by the [facility] Transitions Program. [employer] 847341 - REV 01/24.  Learning About Stress  What is stress?     Stress is your body's response to a hard situation. Your body can have a physical, emotional, or mental response. Stress is a fact of life for most people, and it  affects everyone differently. What causes stress for you may not be stressful for someone else.  A lot of things can cause stress. You may feel stress when you go on a job interview, take a test, or run a race. This kind of short-term stress is normal and even useful. It can help you if you need to work hard or react quickly. For example, stress can help you finish an important job on time.  Long-term stress is caused by ongoing stressful situations or events. Examples of long-term stress include long-term health problems, ongoing problems at work, or conflicts in your family. Long-term stress can harm your health.  How does stress affect your health?  When you are stressed, your body responds as though you are in danger. It makes hormones that speed up your heart, make you breathe faster, and give you a burst of energy. This is called the fight-or-flight stress response. If the stress is over quickly, your body goes back to normal and no harm is done.  But if stress happens too often or lasts too long, it can have bad effects. Long-term stress can make you more likely to get sick, and it can make symptoms of some diseases worse. If you tense up when you are stressed, you may develop neck, shoulder, or low back pain. Stress is linked to high blood pressure and heart disease.  Stress also harms your emotional health. It can make you subramanian, tense, or depressed. Your relationships may suffer, and you may not do well at work or school.  What can you do to manage stress?  You can try these things to help manage stress:   Do something active. Exercise or activity can help reduce stress. Walking is a great way to get started. Even everyday activities such as housecleaning or yard work can help.  Try yoga or arlene chi. These techniques combine exercise and meditation. You may need some training at first to learn them.  Do something you enjoy. For example, listen to music or go to a movie. Practice your hobby or do volunteer  "work.  Meditate. This can help you relax, because you are not worrying about what happened before or what may happen in the future.  Do guided imagery. Imagine yourself in any setting that helps you feel calm. You can use online videos, books, or a teacher to guide you.  Do breathing exercises. For example:  From a standing position, bend forward from the waist with your knees slightly bent. Let your arms dangle close to the floor.  Breathe in slowly and deeply as you return to a standing position. Roll up slowly and lift your head last.  Hold your breath for just a few seconds in the standing position.  Breathe out slowly and bend forward from the waist.  Let your feelings out. Talk, laugh, cry, and express anger when you need to. Talking with supportive friends or family, a counselor, or a neda leader about your feelings is a healthy way to relieve stress. Avoid discussing your feelings with people who make you feel worse.  Write. It may help to write about things that are bothering you. This helps you find out how much stress you feel and what is causing it. When you know this, you can find better ways to cope.  What can you do to prevent stress?  You might try some of these things to help prevent stress:  Manage your time. This helps you find time to do the things you want and need to do.  Get enough sleep. Your body recovers from the stresses of the day while you are sleeping.  Get support. Your family, friends, and community can make a difference in how you experience stress.  Limit your news feed. Avoid or limit time on social media or news that may make you feel stressed.  Do something active. Exercise or activity can help reduce stress. Walking is a great way to get started.  Where can you learn more?  Go to https://www.[employer].net/patiented  Enter N032 in the search box to learn more about \"Learning About Stress.\"  Current as of: October 24, 2023  Content Version: 14.2 2024 [employer]. "   Care instructions adapted under license by your healthcare professional. If you have questions about a medical condition or this instruction, always ask your healthcare professional. Healthwise, [facility] disclaims any warranty or liability for your use of this information.    Safer Sex: Care Instructions  Overview  Safer sex is a way to reduce your risk of getting a sexually transmitted infection (STI). It can also help prevent pregnancy.  Several products can help you practice safer sex and reduce your chance of STIs. One of the best is a condom. There are internal and external condoms. You can use a special rubber sheet (dental dam) for protection during oral sex. Disposable gloves can keep your hands from touching blood, semen, or other body fluids that can carry infections.  Remember that birth control methods such as diaphragms, IUDs, foams, and birth control pills do not stop you from getting STIs.  Follow-up care is a key part of your treatment and safety. Be sure to make and go to all appointments, and call your doctor if you are having problems. It's also a good idea to know your test results and keep a list of the medicines you take.  How can you care for yourself at home?  Think about getting vaccinated to help prevent hepatitis A, hepatitis B, and human papillomavirus (HPV). They can be spread through sex.  Use a condom every time you have sex. Use an external condom, which goes on the penis. Or use an internal condom, which goes into the vagina or anus.  Make sure you use the right size external condom. A condom that's too small can break easily. A condom that's too big can slip off during sex.  Use a new condom each time you have sex. Be careful not to poke a hole in the condom when you open the wrapper.  Don't use an internal condom and an external condom at the same time.  Never use petroleum jelly (such as Vaseline), grease, hand lotion, baby oil, or anything with oil in it. These products can  "make holes in the condom.  After intercourse, hold the edge of the condom as you remove it. This will help keep semen from spilling out of the condom.  Do not have sex with anyone who has symptoms of an STI, such as sores on the genitals or mouth.  Do not drink a lot of alcohol or use drugs before sex.  Limit your sex partners. Sex with one partner who has sex only with you can reduce your risk of getting an STI.  Don't share sex toys. But if you do share them, use a condom and clean the sex toys between each use.  Talk to any partners before you have sex. Talk about what you feel comfortable with and whether you have any boundaries with sex. And find out if your partner or partners may be at risk for any STI. Keep in mind that a person may be able to spread an STI even if they do not have symptoms. You and any partners may want to get tested for STIs.  Where can you learn more?  Go to https://www.[employer].net/patiented  Enter B608 in the search box to learn more about \"Safer Sex: Care Instructions.\"  Current as of: November 27, 2023  Content Version: 14.2 2024 [facility] [employer].   Care instructions adapted under license by your healthcare professional. If you have questions about a medical condition or this instruction, always ask your healthcare professional. [employer] disclaims any warranty or liability for your use of this information.       "

## 2025-02-05 PROBLEM — I50.32 CHRONIC HEART FAILURE WITH PRESERVED EJECTION FRACTION (HCC): Chronic | Status: ACTIVE | Noted: 2024-11-09

## 2025-02-05 PROBLEM — K92.1 MELENA: Status: RESOLVED | Noted: 2024-11-13 | Resolved: 2025-02-05

## 2025-02-05 PROBLEM — J96.21 ACUTE ON CHRONIC HYPOXIC RESPIRATORY FAILURE (HCC): Status: RESOLVED | Noted: 2024-11-15 | Resolved: 2025-02-05

## 2025-02-05 PROBLEM — J18.9 PNEUMONIA: Status: RESOLVED | Noted: 2024-11-08 | Resolved: 2025-02-05

## 2025-02-05 PROBLEM — J45.41 MODERATE PERSISTENT ASTHMA WITH EXACERBATION: Status: ACTIVE | Noted: 2022-01-01

## 2025-02-05 PROBLEM — J81.1 PULMONARY EDEMA: Status: RESOLVED | Noted: 2024-11-15 | Resolved: 2025-02-05

## 2025-02-05 PROBLEM — J96.01 ACUTE RESPIRATORY FAILURE WITH HYPOXIA (HCC): Chronic | Status: RESOLVED | Noted: 2024-07-20 | Resolved: 2025-02-05

## 2025-04-23 PROBLEM — S81.802A WOUND OF LEFT LOWER EXTREMITY: Status: RESOLVED | Noted: 2022-01-12 | Resolved: 2025-04-23

## 2025-07-23 NOTE — PROGRESS NOTES
Hospital Medicine Daily Progress Note    Date of Service  4/22/2024    Chief Complaint  Cecile Teixeira is a 78 y.o. female admitted 4/21/2024 with Abdominal Pain (Right lower abdomen) and Urinary Frequency (Started last night)        Hospital Course  No notes on file    Interval Problem Update  Patient seen and examine at bedside  No acute events overnight   I reviewed the chart along with vitals, labs, imaging, test (both pending and resulted) and recommendations from specialists and interdisciplinary team.  78 year old morbidly obese female with a past medical history of hypothyroidism, morbid obesity, hypertension, obstructive sleep apnea on CPAP and asthma recent  who presented 4/21/2024 with abdominal pain. ex-lap with repair of incarcerated umbilical hernia causing obstruction and lysis of adhesion on 03/08/24 at [facility].   Imaging revealed postopertaive fluid collection. Antibiotics have been started  I spoke to surgical team. They recommended no surgery or drain. Empiric antibiotics for 1-2 days and transition to PO antibiotics before discharge.    Patient stable mild abdominal soreness. I updated them of the plan.     I have discussed this patient's plan of care and discharge plan at IDT rounds today with Case Management, Nursing, Nursing leadership, and other members of the IDT team.    Consultants/Specialty  Surgery    Code Status  Full Code    Disposition  The patient is not medically cleared for discharge to home or a post-acute facility.      I have placed the appropriate orders for post-discharge needs.    Review of Systems  Review of Systems   Constitutional:  Negative for chills and fever.   HENT:  Negative for congestion, hearing loss and nosebleeds.    Eyes:  Negative for pain and redness.   Respiratory:  Negative for cough, hemoptysis, shortness of breath and wheezing.    Cardiovascular:  Negative for chest pain and palpitations.   Gastrointestinal:  Positive for abdominal pain.  July 23, 2025       Anthony Vincent MD  [address] [place] [address]  Via In Basket      Patient: Paris Solomon   YOB: 1946   Date of Visit: 7/23/2025       Dear Dr. Vincent:    I saw your patient, Paris Solomon, for an evaluation. Below are my notes for this visit with her.    If you have questions, please do not hesitate to call me.          Sincerely,        Naomi Bettencourt MD        CC: No Recipients    Naomi Bettencourt MD  7/23/2025 12:30 PM  Signed  Assessment and Plan:    Paris Solomon returns to clinic for follow up of osteoporosis.  We have reviewed her recent labs.  As she is tolerating alendronate, I have recommended she continue with this treatment.  I have encouraged her to resume taking it consistently, and she agrees.        1. Age-related osteoporosis without current pathological fracture (Primary)  - continue alendronate 70 mg PO daily  - continue calcium and vitamin D  - continue weight bearing exercise, as tolerated  - I have ordered a DEXA scan, to be completed 2/16/2026  - renal panel, 25-vitamin D in 2/2026  - return to clinic in 3/2026 for follow up    - BD DEXA SCAN AXIAL SKELETON; Future  - Renal Panel; Future  - Vitamin D -25 Hydroxy; Future  - alendronate (FOSAMAX) 70 MG tablet; Take 1 tablet by mouth every 7 days.  Dispense: 12 tablet; Refill: 3    2. Vitamin D insufficiency  - resolved with supplementation  - continue current vitamin D  - 25-vitamin D before the next appointment    - Vitamin D -25 Hydroxy; Future         Chief Complaint   Patient presents with   • Office Visit   • Osteoporosis       Paris is accompanied by: No one    HPI: Paris Solomon is a 79 year old female who returns to clinic for follow up of osteoporosis.    Brief history:  She has a history of a T12 anterior wedge fracture, seen on imaging as early as 2023.  She is seen by Dr. Deluna in Orthopedic Surgery, for management of right ankle pain. It was felt her ongoing pain likely has a  Negative for blood in stool, constipation, diarrhea, nausea and vomiting.   Genitourinary:  Positive for frequency. Negative for dysuria and hematuria.   Musculoskeletal:  Negative for falls, joint pain and myalgias.   Skin:  Negative for rash.   Neurological:  Negative for dizziness, tremors, focal weakness, seizures, loss of consciousness, weakness and headaches.   Psychiatric/Behavioral:  The patient is not nervous/anxious and does not have insomnia.    All other systems reviewed and are negative.       Physical Exam  Temp:  [36.6 °C (97.9 °F)-37 °C (98.6 °F)] 36.6 °C (97.9 °F)  Pulse:  [78-84] 79  Resp:  [18-20] 18  BP: (150-211)/() 172/86  SpO2:  [92 %-97 %] 93 %    Physical Exam  Vitals and nursing note reviewed.   Constitutional:       General: She is not in acute distress.  HENT:      Head: Normocephalic and atraumatic.      Right Ear: External ear normal.      Left Ear: External ear normal.      Nose: Nose normal.      Mouth/Throat:      Mouth: Mucous membranes are moist.   Eyes:      General: No scleral icterus.     Conjunctiva/sclera: Conjunctivae normal.   Cardiovascular:      Rate and Rhythm: Normal rate and regular rhythm.      Pulses: Normal pulses.      Heart sounds: No murmur heard.     No friction rub. No gallop.   Pulmonary:      Effort: Pulmonary effort is normal. No respiratory distress.      Breath sounds: Normal breath sounds. No stridor. No wheezing, rhonchi or rales.   Chest:      Chest wall: No tenderness.   Abdominal:      General: Abdomen is flat. Bowel sounds are normal. There is distension (mild).      Palpations: Abdomen is soft.      Tenderness: There is abdominal tenderness (mild). There is no guarding or rebound.   Musculoskeletal:         General: No swelling, tenderness or deformity. Normal range of motion.      Cervical back: Normal range of motion and neck supple. No rigidity.      Right lower leg: Edema present.      Left lower leg: Edema present.   Skin:     General:  component of bone marrow edema syndrome. DEXA scan 2/15/2024 showed osteoporosis based on lowest T-score -2.6 SD at the left femoral neck, read as a 5.9% decrease in bone density of the hip since 2017. Of note, she was diagnosed with osteoporosis as early as 2/2013. She was treated with fosamax for an unknown time period, which was discontinued in 2015. Bone density improved to osteopenia on DEXA scan in 2017.  DEXA scan 2/15/2024 showed osteoporosis, and she was started on ibandronate 5/2/2024.  She stopped taking this due to nausea, and was resumed on weekly alendronate in 10/2024.      Most recent DEXA: 2/15/2024, osteoporosis based on lowest T-score -2.6 SD at the left femoral neck, read as a 5.9% decrease in bone density of the hip since 2017   Previous osteoporosis treatments:   - fosamax was taken for an unclear amount of time. She doesn't remember this medication, so she doesn't know how long she was on it or why it was stopped   - ibandronate was taken for a short period of time, discontinued due to nausea    She follows up today, and is currently prescribed alendronate 70 mg PO weekly.  Labs from 7/16/2025 show normal calcium, creatinine, and 25-vitamin D.  On discussion, she notes she didn't sleep well last night, due to pain in her neck and arm.  She used a heating pad to help with this.  She notes today with everything else going on with her health, she has not been taking alendronate consistently.  She is tolerating it, but notes she got off track in the past month.  Prior to a month ago, she was taking alendronate consistently.  She notes she had just eaten right before her recent labs were drawn, and this is why her blood glucose was elevated.      Recent fractures: None  Recent falls: None  New back pain: Yes.  She has had evaluation, and will be having an injection for this  Calcium:  - Supplements: 600 mg daily  - Diet: occasional milk or cheese  Vitamin D: 2000 units daily  Physical activity:  Skin is warm.      Coloration: Skin is not jaundiced.   Neurological:      General: No focal deficit present.      Mental Status: She is alert and oriented to person, place, and time. Mental status is at baseline.   Psychiatric:         Mood and Affect: Mood normal.         Behavior: Behavior normal.         Thought Content: Thought content normal.         Judgment: Judgment normal.         Fluids    Intake/Output Summary (Last 24 hours) at 4/22/2024 1814  Last data filed at 4/22/2024 1517  Gross per 24 hour   Intake 370 ml   Output 2150 ml   Net -1780 ml       Laboratory  Recent Labs     04/21/24  1526 04/22/24  0430   WBC 9.3 7.1   RBC 4.28 4.10*   HEMOGLOBIN 13.1 12.6   HEMATOCRIT 41.4 38.7   MCV 96.7 94.4   MCH 30.6 30.7   MCHC 31.6* 32.6   RDW 50.7* 49.8   PLATELETCT 389 362   MPV 9.1 9.2     Recent Labs     04/21/24  1526 04/22/24  0430   SODIUM 139 141   POTASSIUM 4.0 4.0   CHLORIDE 102 105   CO2 24 23   GLUCOSE 114* 98   BUN 20 16   CREATININE 0.86 0.79   CALCIUM 9.5 9.0                   Imaging  CT-ABDOMEN-PELVIS WITH   Final Result      1.  There is been repair of previously seen ventral hernia. There is skin thickening, subcutaneous fat stranding and 6.3 x 5.7 x 6.4 cm rim-enhancing fluid collection in the periumbilical region which is suspicious for infection.   2.  Nonobstructing renal stones.   3.  Colonic diverticulosis. Questionable minor stranding in the sigmoid mesocolon is age-indeterminate but cannot exclude early/mild acute diverticulitis.           Assessment/Plan  * Abdominal fluid collection- (present on admission)  Assessment & Plan  Umbilical hernia repair at [facility] on 03/08/24.   New right lower quadrant abdominal pain. CT abdomen/pelvis with IV contrast showing 6 x 6 cm fluid collection in the zina-umbilical region. No fevers, chills or leukocytosis. Started on antibiotics for possible intra-abdominal abscess in the ER. Surgery consulted, noting possible surgical drainage in the  walking as much as she can     Current Medications:   Current Outpatient Medications   Medication Sig Dispense Refill   • alendronate (FOSAMAX) 70 MG tablet Take 1 tablet by mouth every 7 days. 12 tablet 3   • albuterol 108 (90 Base) MCG/ACT inhaler Inhale 2 puffs into the lungs every 4 hours as needed for Wheezing. 1 each 5   • furosemide (LASIX) 20 MG tablet Take one tab by mouth twice daily for one week then take one tab by mouth daily. 40 tablet 1   • potassium CHLORIDE (KLOR-CON M) 20 MEQ gregg ER tablet Take 1 tablet by mouth daily. 30 tablet 1   • rosuvastatin (CRESTOR) 40 MG tablet Take 1 tablet by mouth nightly. 90 tablet 3   • cyclobenzaprine (FLEXERIL) 5 MG tablet May take 0.5 tablets by mouth 2 times daily as needed for Muscle spasms. May also take 1 tablet at bedtime as needed for Muscle spasms. 45 tablet 2   • lisinopril (ZESTRIL) 2.5 MG tablet TAKE 1 TABLET BY MOUTH DAILY 90 tablet 3   • glimepiride (AMARYL) 2 MG tablet TAKE 1 TABLET BY MOUTH DAILY  WITH BREAKFAST 90 tablet 3   • omeprazole (PriLOSEC) 40 MG capsule TAKE 1 CAPSULE BY MOUTH DAILY 90 capsule 3   • HYDROcodone-acetaminophen (NORCO) 5-325 MG per tablet Take 1 tablet by mouth 2 times daily as needed for Pain. 28 tablet 0   • DULoxetine (CYMBALTA) 30 MG capsule TAKE 1 CAPSULE BY MOUTH DAILY 30 capsule 0   • ezetimibe (ZETIA) 10 MG tablet Take 1 tablet by mouth daily. 90 tablet 3   • Cholecalciferol (Vitamin D) 50 mcg (2,000 units) tablet Take 1 tablet by mouth daily. 100 tablet 1   • famotidine (PEPCID) 20 MG tablet Take 1 tablet by mouth daily. 60 tablet 1   • fluticasone (FLONASE) 50 MCG/ACT nasal spray SHAKE LIQUID AND USE 2 SPRAYS IN EACH NOSTRIL DAILY 48 g 3   • Calcium Carbonate-Vitamin D 600-5 MG-MCG Tab Take 1 tablet by mouth daily.     • ipratropium-albuterol (DUONEB) 0.5-2.5 (3) MG/3ML nebulizer solution Take 3 mLs by nebulization every 6 hours as needed for Wheezing. Max 4x/day. 90 mL 1   • Biotin 5000 MCG Cap Take 5,000 mcg by  morning.    Plan:  -NPO at midnight for potential surgical drainage in the morning.   -Continue IV antibiotics (vancomycin and zosyn)  -Abdominal fluid cultures if undergoing drainage  -Repeat CBC in the morning    COnservative management and empiric IV abx for a few days  FOllow blood and urine cultures if obtained.    Asthma  Assessment & Plan  Not in exacerbation.   Plan:  -Continue flovent    Hypertension- (present on admission)  Assessment & Plan  Not septic on admission.  Plan:  -Continue losartan 25 mg once daily and metoprolol 25 mg once daily (hold for SBP <100 mmHg)  Vitals:    04/22/24 1720   BP: (!) 172/86   Pulse: 79   Resp:    Temp:    SpO2: 93%     Uncontrolled  Increased losartan    YOLA (obstructive sleep apnea)- (present on admission)  Assessment & Plan  Reports CPAP adherence at night.   Plan:  -RT protocol, CPAP    Hypothyroidism- (present on admission)  Assessment & Plan  TSH 9.66 from 03/2024. On levothyroxine 50 mcg once daily.   Plan:  -TSH in morning  -Continue levothyroxine 50 mcg once daily    BMI 60.0-69.9, adult (HCC)- (present on admission)  Assessment & Plan  Morbid obesity. BMI 64.45.   Plan:  -Encourage diet and exercise for weight loss for improved health.          VTE prophylaxis: hepa ppx    I have performed a physical exam and reviewed and updated ROS and Plan today (4/22/2024). In review of yesterday's note (4/21/2024), there are no changes except as documented above.         mouth daily.     • B Complex Vitamins (VITAMIN-B COMPLEX PO) Take 100 mg by mouth daily.     • Omega-3 Fatty Acids (Fish Oil) 1000 MG capsule Take 1 g by mouth daily.     • diclofenac-gabapentin-lidocaine 3-6-5 % cream Apply 1-2 grams (pumps) to right foot/ankle up to 4 times daily as needed for pain 120 g 2   • Restasis 0.05 % ophthalmic emulsion INSTILL 1 DROP IN EACH EYE TWICE DAILY     • magnesium 250 MG tablet Take 250 mg by mouth daily.     • acetaminophen (TYLENOL) 500 MG tablet Take 1 tablet by mouth every 6 hours as needed for Pain.     • aspirin (ECOTRIN) 81 MG EC tablet Take 81 mg by mouth daily.     • vitamin E 1000 units capsule Take 2 capsules by mouth daily.       No current facility-administered medications for this visit.      ALLERGIES:   Allergen Reactions   • Actos [Pioglitazone Hydrochloride]      Blurred vision   • Effexor [Venlafaxine Hydrochloride] HEADACHES   • Levaquin DIZZINESS     Blurry vision, irritablity   • Nitrofurantoin NAUSEA   • Sulfa Antibiotics NAUSEA     Review of Systems: Pertinent positives included in HPI. All others as documented by my MA. I have reviewed these with the patient and agree with the content.       Physical Exam:   Paris is healthy appearing, and in no apparent distress   Visit Vitals  /80 (BP Location: RUE - Right upper extremity, Patient Position: Sitting, Cuff Size: Regular)   Pulse 72   Ht 5' 6\" (1.676 m)   Wt 78.2 kg (172 lb 4.8 oz)   BMI 27.81 kg/m²       Wt Readings from Last 4 Encounters:   07/23/25 78.2 kg (172 lb 4.8 oz)   07/10/25 78 kg (172 lb)   06/25/25 82.4 kg (181 lb 10.5 oz)   06/24/25 81.3 kg (179 lb 3.2 oz)     Physical Exam  Vitals reviewed.   Constitutional:       General: She is not in acute distress.     Appearance: Normal appearance.   Pulmonary:      Effort: Pulmonary effort is normal. No respiratory distress.   Neurological:      Mental Status: She is alert. Mental status is at baseline.   Psychiatric:         Mood and Affect:  Mood normal.         Behavior: Behavior normal.       Diagnostic Studies:      Component      Latest Ref Rng 7/16/2025  12:58 PM   Sodium      135 - 145 mmol/L 137    Potassium      3.4 - 5.1 mmol/L 4.0    Chloride      97 - 110 mmol/L 100    CO2      21 - 32 mmol/L 27    ANION GAP      7 - 19 mmol/L 14    CALCIUM      8.4 - 10.2 mg/dL 9.7    PHOSPHORUS      2.4 - 4.7 mg/dL 2.9    Albumin      3.4 - 5.0 g/dL 3.9    Glucose      70 - 99 mg/dL 207 (H)    BUN      6 - 20 mg/dL 20    Creatinine      0.51 - 0.95 mg/dL 0.76    Glomerular Filtration Rate      >=60  80    BUN/CREATININE RATIO      7 - 25  26 (H)    VITAMIND, 25 HYDROXY      30.0 - 100.0 ng/mL 52.8       Legend:  (H) High       This visit encompassed my longitudinal relationship with the patient for the ongoing treatment of osteoporosis.  Osteoporosis is a chronic lifelong condition

## 2025-08-18 PROBLEM — Z74.1: Status: ACTIVE | Noted: 2025-08-18

## 2025-08-20 ENCOUNTER — APPOINTMENT (OUTPATIENT)
Dept: RADIOLOGY | Facility: MEDICAL CENTER | Age: 80
End: 2025-08-20
Attending: STUDENT IN AN ORGANIZED HEALTH CARE EDUCATION/TRAINING PROGRAM
Payer: MEDICARE

## 2025-08-20 ENCOUNTER — HOME HEALTH ADMISSION (OUTPATIENT)
Dept: HOME HEALTH SERVICES | Facility: HOME HEALTHCARE | Age: 80
End: 2025-08-20
Payer: MEDICARE

## 2025-08-20 ENCOUNTER — HOSPITAL ENCOUNTER (EMERGENCY)
Facility: MEDICAL CENTER | Age: 80
End: 2025-08-21
Attending: STUDENT IN AN ORGANIZED HEALTH CARE EDUCATION/TRAINING PROGRAM
Payer: MEDICARE

## 2025-08-20 VITALS
OXYGEN SATURATION: 96 % | WEIGHT: 293 LBS | TEMPERATURE: 97.6 F | RESPIRATION RATE: 18 BRPM | SYSTOLIC BLOOD PRESSURE: 137 MMHG | BODY MASS INDEX: 57.52 KG/M2 | HEART RATE: 91 BPM | HEIGHT: 60 IN | DIASTOLIC BLOOD PRESSURE: 78 MMHG
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
## 2025-08-20 LAB
ALBUMIN SERPL BCP-MCNC: 4.1 G/DL (ref 3.2–4.9)
ALBUMIN/GLOB SERPL: 1 G/DL
ALP SERPL-CCNC: 91 U/L (ref 30–99)
ALT SERPL-CCNC: 18 U/L (ref 2–50)
ANION GAP SERPL CALC-SCNC: 15 MMOL/L (ref 7–16)
AST SERPL-CCNC: 26 U/L (ref 12–45)
BASOPHILS # BLD AUTO: 0.5 % (ref 0–1.8)
BASOPHILS # BLD: 0.07 K/UL (ref 0–0.12)
BILIRUB SERPL-MCNC: 0.4 MG/DL (ref 0.1–1.5)
BUN SERPL-MCNC: 28 MG/DL (ref 8–22)
CALCIUM ALBUM COR SERPL-MCNC: 9.6 MG/DL (ref 8.5–10.5)
CALCIUM SERPL-MCNC: 9.7 MG/DL (ref 8.5–10.5)
CHLORIDE SERPL-SCNC: 101 MMOL/L (ref 96–112)
CO2 SERPL-SCNC: 22 MMOL/L (ref 20–33)
CREAT SERPL-MCNC: 1.07 MG/DL (ref 0.5–1.4)
EKG IMPRESSION: NORMAL
EOSINOPHIL # BLD AUTO: 0.48 K/UL (ref 0–0.51)
EOSINOPHIL NFR BLD: 3.3 % (ref 0–6.9)
ERYTHROCYTE [DISTWIDTH] IN BLOOD BY AUTOMATED COUNT: 52.3 FL (ref 35.9–50)
FLUAV RNA SPEC QL NAA+PROBE: NEGATIVE
FLUBV RNA SPEC QL NAA+PROBE: NEGATIVE
GFR SERPLBLD CREATININE-BSD FMLA CKD-EPI: 52 ML/MIN/1.73 M 2
GLOBULIN SER CALC-MCNC: 4.1 G/DL (ref 1.9–3.5)
GLUCOSE SERPL-MCNC: 112 MG/DL (ref 65–99)
HCT VFR BLD AUTO: 40 % (ref 37–47)
HGB BLD-MCNC: 12.8 G/DL (ref 12–16)
IMM GRANULOCYTES # BLD AUTO: 0.09 K/UL (ref 0–0.11)
IMM GRANULOCYTES NFR BLD AUTO: 0.6 % (ref 0–0.9)
LYMPHOCYTES # BLD AUTO: 1.31 K/UL (ref 1–4.8)
LYMPHOCYTES NFR BLD: 9.1 % (ref 22–41)
MCH RBC QN AUTO: 30.9 PG (ref 27–33)
MCHC RBC AUTO-ENTMCNC: 32 G/DL (ref 32.2–35.5)
MCV RBC AUTO: 96.6 FL (ref 81.4–97.8)
MONOCYTES # BLD AUTO: 1.17 K/UL (ref 0–0.85)
MONOCYTES NFR BLD AUTO: 8.2 % (ref 0–13.4)
NEUTROPHILS # BLD AUTO: 11.21 K/UL (ref 1.82–7.42)
NEUTROPHILS NFR BLD: 78.3 % (ref 44–72)
NRBC # BLD AUTO: 0 K/UL
NRBC BLD-RTO: 0 /100 WBC (ref 0–0.2)
PLATELET # BLD AUTO: 342 K/UL (ref 164–446)
PMV BLD AUTO: 10.5 FL (ref 9–12.9)
POTASSIUM SERPL-SCNC: 4.9 MMOL/L (ref 3.6–5.5)
PROT SERPL-MCNC: 8.2 G/DL (ref 6–8.2)
RBC # BLD AUTO: 4.14 M/UL (ref 4.2–5.4)
RSV RNA SPEC QL NAA+PROBE: NEGATIVE
SARS-COV-2 RNA RESP QL NAA+PROBE: NOTDETECTED
SODIUM SERPL-SCNC: 138 MMOL/L (ref 135–145)
SPECIMEN SOURCE: NORMAL
TROPONIN T SERPL-MCNC: 22 NG/L (ref 6–19)
WBC # BLD AUTO: 14.3 K/UL (ref 4.8–10.8)

## 2025-08-20 PROCEDURE — 36415 COLL VENOUS BLD VENIPUNCTURE: CPT

## 2025-08-20 PROCEDURE — 72125 CT NECK SPINE W/O DYE: CPT

## 2025-08-20 PROCEDURE — 700101 HCHG RX REV CODE 250: Performed by: STUDENT IN AN ORGANIZED HEALTH CARE EDUCATION/TRAINING PROGRAM

## 2025-08-20 PROCEDURE — 85025 COMPLETE CBC W/AUTO DIFF WBC: CPT

## 2025-08-20 PROCEDURE — 99285 EMERGENCY DEPT VISIT HI MDM: CPT

## 2025-08-20 PROCEDURE — 72131 CT LUMBAR SPINE W/O DYE: CPT

## 2025-08-20 PROCEDURE — 93005 ELECTROCARDIOGRAM TRACING: CPT | Mod: TC | Performed by: STUDENT IN AN ORGANIZED HEALTH CARE EDUCATION/TRAINING PROGRAM

## 2025-08-20 PROCEDURE — 700102 HCHG RX REV CODE 250 W/ 637 OVERRIDE(OP): Performed by: STUDENT IN AN ORGANIZED HEALTH CARE EDUCATION/TRAINING PROGRAM

## 2025-08-20 PROCEDURE — 80053 COMPREHEN METABOLIC PANEL: CPT

## 2025-08-20 PROCEDURE — 70450 CT HEAD/BRAIN W/O DYE: CPT

## 2025-08-20 PROCEDURE — 72128 CT CHEST SPINE W/O DYE: CPT

## 2025-08-20 PROCEDURE — 87637 SARSCOV2&INF A&B&RSV AMP PRB: CPT

## 2025-08-20 PROCEDURE — 71045 X-RAY EXAM CHEST 1 VIEW: CPT

## 2025-08-20 PROCEDURE — A9270 NON-COVERED ITEM OR SERVICE: HCPCS | Performed by: STUDENT IN AN ORGANIZED HEALTH CARE EDUCATION/TRAINING PROGRAM

## 2025-08-20 PROCEDURE — 84484 ASSAY OF TROPONIN QUANT: CPT

## 2025-08-20 PROCEDURE — 94640 AIRWAY INHALATION TREATMENT: CPT

## 2025-08-20 PROCEDURE — 94760 N-INVAS EAR/PLS OXIMETRY 1: CPT

## 2025-08-20 RX ORDER — ACETAMINOPHEN 325 MG/1
650 TABLET ORAL ONCE
Status: COMPLETED | OUTPATIENT
Start: 2025-08-20 | End: 2025-08-20

## 2025-08-20 RX ORDER — IBUPROFEN 600 MG/1
600 TABLET, FILM COATED ORAL ONCE
Status: COMPLETED | OUTPATIENT
Start: 2025-08-20 | End: 2025-08-20

## 2025-08-20 RX ORDER — LIDOCAINE 4 G/G
2 PATCH TOPICAL ONCE
Status: DISCONTINUED | OUTPATIENT
Start: 2025-08-20 | End: 2025-08-21 | Stop reason: HOSPADM

## 2025-08-20 RX ORDER — ALBUTEROL SULFATE 5 MG/ML
2.5 SOLUTION RESPIRATORY (INHALATION) ONCE
Status: COMPLETED | OUTPATIENT
Start: 2025-08-20 | End: 2025-08-20

## 2025-08-20 RX ADMIN — IPRATROPIUM BROMIDE 0.5 MG: 0.5 SOLUTION RESPIRATORY (INHALATION) at 20:52

## 2025-08-20 RX ADMIN — ALBUTEROL SULFATE 2.5 MG: 2.5 SOLUTION RESPIRATORY (INHALATION) at 20:52

## 2025-08-20 RX ADMIN — LIDOCAINE 2 PATCH: 4 PATCH TOPICAL at 20:45

## 2025-08-20 RX ADMIN — IBUPROFEN 600 MG: 600 TABLET ORAL at 21:31

## 2025-08-20 RX ADMIN — ACETAMINOPHEN 650 MG: 325 TABLET, FILM COATED ORAL at 21:31

## 2025-08-20 ASSESSMENT — FIBROSIS 4 INDEX: FIB4 SCORE: 1.28
